# Patient Record
Sex: FEMALE | Race: BLACK OR AFRICAN AMERICAN | Employment: OTHER | URBAN - METROPOLITAN AREA
[De-identification: names, ages, dates, MRNs, and addresses within clinical notes are randomized per-mention and may not be internally consistent; named-entity substitution may affect disease eponyms.]

---

## 2017-01-31 ENCOUNTER — OFFICE VISIT (OUTPATIENT)
Dept: PAIN MANAGEMENT | Age: 52
End: 2017-01-31

## 2017-01-31 VITALS — DIASTOLIC BLOOD PRESSURE: 83 MMHG | SYSTOLIC BLOOD PRESSURE: 141 MMHG | RESPIRATION RATE: 19 BRPM | HEART RATE: 89 BPM

## 2017-01-31 DIAGNOSIS — G89.4 CHRONIC PAIN SYNDROME: ICD-10-CM

## 2017-01-31 DIAGNOSIS — M17.11 PRIMARY OSTEOARTHRITIS OF RIGHT KNEE: Primary | ICD-10-CM

## 2017-01-31 DIAGNOSIS — G89.29 CHRONIC BILATERAL LOW BACK PAIN, WITH SCIATICA PRESENCE UNSPECIFIED: ICD-10-CM

## 2017-01-31 DIAGNOSIS — M54.5 CHRONIC BILATERAL LOW BACK PAIN, WITH SCIATICA PRESENCE UNSPECIFIED: ICD-10-CM

## 2017-01-31 DIAGNOSIS — M47.896 OTHER OSTEOARTHRITIS OF SPINE, LUMBAR REGION: ICD-10-CM

## 2017-01-31 DIAGNOSIS — Z79.899 ENCOUNTER FOR LONG-TERM (CURRENT) USE OF MEDICATIONS: ICD-10-CM

## 2017-01-31 LAB
ALCOHOL UR POC: NORMAL
AMPHETAMINES UR POC: NEGATIVE
BARBITURATES UR POC: NEGATIVE
BENZODIAZEPINES UR POC: NORMAL
BUPRENORPHINE UR POC: NORMAL
CANNABINOIDS UR POC: NEGATIVE
CARISOPRODOL UR POC: NORMAL
COCAINE UR POC: NEGATIVE
FENTANYL UR POC: NORMAL
MDMA/ECSTASY UR POC: NEGATIVE
METHADONE UR POC: NEGATIVE
METHAMPHETAMINE UR POC: NEGATIVE
METHYLPHENIDATE UR POC: NEGATIVE
OPIATES UR POC: NEGATIVE
OXYCODONE UR POC: NORMAL
PHENCYCLIDINE UR POC: NEGATIVE
PROPOXYPHENE UR POC: NORMAL
TRAMADOL UR POC: NORMAL
TRICYCLICS UR POC: NEGATIVE

## 2017-01-31 RX ORDER — OXYCODONE AND ACETAMINOPHEN 10; 325 MG/1; MG/1
1 TABLET ORAL
Qty: 90 TAB | Refills: 0 | Status: SHIPPED | OUTPATIENT
Start: 2017-02-27 | End: 2017-03-30 | Stop reason: SDUPTHER

## 2017-01-31 RX ORDER — OXYCODONE AND ACETAMINOPHEN 10; 325 MG/1; MG/1
1 TABLET ORAL
Qty: 90 TAB | Refills: 0 | Status: SHIPPED | OUTPATIENT
Start: 2017-01-31 | End: 2017-03-30 | Stop reason: SDUPTHER

## 2017-01-31 NOTE — PROGRESS NOTES
Nursing Notes    Patient presents to the office today in follow-up.   eviewed medications with counts as follows:    Rx Date filled Qty Dispensed Pill Count Last Dose Short   Percocet 10/325 12/31/16 90 8 today no   opana er 15 mg 12/31/16 60 0 This am no   Ms. Chaitanya Bills has a reminder for a \"due or due soon\" health maintenance. I have asked that she contact her primary care provider for follow-up on this health maintenance. POC UDS was performed in office today    Any new labs or imaging since last appointment? NO    Have you been to an emergency room (ER) or urgent care clinic since your last visit? NO            Have you been hospitalized since your last visit? NO     If yes, where, when, and reason for visit? Have you seen or consulted any other health care providers outside of the [address] [place] [address]  since your last visit? YES     If yes, where, when, and reason for visit?    Psychologist  Nutritionist   pcp

## 2017-01-31 NOTE — MR AVS SNAPSHOT
Visit Information Date & Time Provider Department Dept. Phone Encounter #  
 1/31/2017 10:30 AM Linda Maharaj MD [address] for Pain Management [phone] [id number] Follow-up Instructions Return in about 2 months (around 3/31/2017). Upcoming Health Maintenance Date Due Hepatitis C Screening 1965 DTaP/Tdap/Td series (1 - Tdap) 8/27/1986 PAP AKA CERVICAL CYTOLOGY 8/27/1986 BREAST CANCER SCRN MAMMOGRAM 8/27/2015 FOBT Q 1 YEAR AGE 50-75 8/27/2015 INFLUENZA AGE 9 TO ADULT 8/1/2016 Allergies as of 1/31/2017  Review Complete On: 1/31/2017 By: Linda Maharaj MD  
 No Known Allergies Current Immunizations  Never Reviewed No immunizations on file. Not reviewed this visit You Were Diagnosed With   
  
 Codes Comments Primary osteoarthritis of right knee    -  Primary ICD-10-CM: M17.11 ICD-9-CM: 715.16 Encounter for long-term (current) use of medications     ICD-10-CM: Z79.899 ICD-9-CM: V58.69 Chronic pain syndrome     ICD-10-CM: G89.4 ICD-9-CM: 338.4 Other osteoarthritis of spine, lumbar region     ICD-10-CM: M47.896 Chronic bilateral low back pain, with sciatica presence unspecified     ICD-10-CM: M54.5, G89.29 ICD-9-CM: 724.2, 338.29 Vitals BP Pulse Resp OB Status Smoking Status 141/83 80 19 Hysterectomy Former Smoker Vitals History Preferred Pharmacy Pharmacy Name Phone [facility] [address] [place] [address], [address] [place] [address] Your Updated Medication List  
  
   
This list is accurate as of: 1/31/17 12:12 PM.  Always use your most recent med list.  
  
  
  
  
 ALPRAZolam 0.5 mg tablet Commonly known as:  Bandar Goody Take  by mouth two (2) times a day. amLODIPine 10 mg tablet Commonly known as:  Tad Alee Take  by mouth daily. Akila Rivero Use as directed. CeleXA 40 mg tablet Generic drug:  citalopram  
 Take 40 mg by mouth daily. FIORICET -40 mg per tablet Generic drug:  butalbital-acetaminophen-caffeine Take 1 Tab by mouth.  
  
 griseofulvin 500 mg tablet Commonly known as:  Tory Mealing Take 500 mg by mouth daily. LASIX 20 mg tablet Generic drug:  furosemide Take  by mouth as needed. lisinopril 10 mg tablet Commonly known as:  Cedric Economy Take  by mouth daily. morphine CR 15 mg CR tablet Commonly known as:  MS CONTIN Take  by mouth every twelve (12) hours. NEURONTIN 300 mg capsule Generic drug:  gabapentin Take 300 mg by mouth two (2) times a day. * oxyCODONE-acetaminophen  mg per tablet Commonly known as:  PERCOCET 10 Take 1 Tab by mouth every eight (8) hours as needed for Pain for up to 30 days. Max Daily Amount: 3 Tabs. * oxyCODONE-acetaminophen  mg per tablet Commonly known as:  PERCOCET 10 Take 1 Tab by mouth every eight (8) hours as needed for Pain for up to 30 days. Max Daily Amount: 3 Tabs. Start taking on:  2/27/2017 * oxyMORphone 15 mg Tr12 Commonly known as:  OPANA ER Take 15 mg by mouth every twelve (12) hours for 30 days. Max Daily Amount: 30 mg.  
  
 * oxyMORphone 15 mg Tr12 Commonly known as:  OPANA ER Take 15 mg by mouth every twelve (12) hours for 30 days. Max Daily Amount: 30 mg. Start taking on:  2/27/2017 PEPCID 20 mg tablet Generic drug:  famotidine Take 20 mg by mouth two (2) times a day. pravastatin 40 mg tablet Commonly known as:  PRAVACHOL Take 40 mg by mouth nightly. topiramate 25 mg tablet Commonly known as:  TOPAMAX Take  by mouth two (2) times a day. traZODone 50 mg tablet Commonly known as:  Emaline Sober Take  by mouth nightly. venlafaxine 75 mg tablet Commonly known as:  [facility] Take 25 mg by mouth nightly. VITAMIN D2 50,000 unit capsule Generic drug:  ergocalciferol Take 50,000 Units by mouth. WELLBUTRIN 75 mg tablet Generic drug:  buPROPion Take  by mouth two (2) times a day. * Notice: This list has 4 medication(s) that are the same as other medications prescribed for you. Read the directions carefully, and ask your doctor or other care provider to review them with you. Prescriptions Printed Refills  
 oxyMORphone (OPANA ER) 15 mg TR12 0 Sig: Take 15 mg by mouth every twelve (12) hours for 30 days. Max Daily Amount: 30 mg.  
 Class: Print Route: Oral  
 oxyCODONE-acetaminophen (PERCOCET 10)  mg per tablet 0 Sig: Take 1 Tab by mouth every eight (8) hours as needed for Pain for up to 30 days. Max Daily Amount: 3 Tabs. Class: Print Route: Oral  
 oxyCODONE-acetaminophen (PERCOCET 10)  mg per tablet 0 Starting on: 2/27/2017 Sig: Take 1 Tab by mouth every eight (8) hours as needed for Pain for up to 30 days. Max Daily Amount: 3 Tabs. Class: Print Route: Oral  
 oxyMORphone (OPANA ER) 15 mg TR12 0 Starting on: 2/27/2017 Sig: Take 15 mg by mouth every twelve (12) hours for 30 days. Max Daily Amount: 30 mg.  
 Class: Print Route: Oral  
  
We Performed the Following AMB POC DRUG SCREEN () [ [facility]] DRUG SCREEN [[id number] Custom] Follow-up Instructions Return in about 2 months (around 3/31/2017). Introducing [facility] & HEALTH SERVICES! Luis Yuen introduces [employer] patient portal. Now you can access parts of your medical record, email your doctor's office, and request medication refills online. 1. In your internet browser, go to [email or web address] 2. Click on the First Time User? Click Here link in the Sign In box. You will see the New Member Sign Up page. 3. Enter your [employer] Access Code exactly as it appears below. You will not need to use this code after youve completed the sign-up process. If you do not sign up before the expiration date, you must request a new code. · [employer] Access Code: [email or web address] Expires: 3/29/2017 12:25 PM 
 
4. Enter the last four digits of your Social Security Number (xxxx) and Date of Birth (mm/dd/yyyy) as indicated and click Submit. You will be taken to the next sign-up page. 5. Create a [employer] ID. This will be your [employer] login ID and cannot be changed, so think of one that is secure and easy to remember. 6. Create a [employer] password. You can change your password at any time. 7. Enter your Password Reset Question and Answer. This can be used at a later time if you forget your password. 8. Enter your e-mail address. You will receive e-mail notification when new information is available in [address] 9. Click Sign Up. You can now view and download portions of your medical record. 10. Click the Download Summary menu link to download a portable copy of your medical information. If you have questions, please visit the Frequently Asked Questions section of the [employer] website. Remember, [employer] is NOT to be used for urgent needs. For medical emergencies, dial 911. Now available from your iPhone and Android! Please provide this summary of care documentation to your next provider. If you have any questions after today's visit, please call [phone].

## 2017-01-31 NOTE — PROGRESS NOTES
HISTORY OF PRESENT ILLNESS  Christi Cabral is a 46 y.o. female. HPI Comments: Meds help with pain control and quality of life. No new side effects reported today. No new medical problems reported today. Visit survey reviewed, and will be scanned. Recent Average pain level (out of 10). -- 7  Chief complaint, right knee pain  Pain to different joints, back pain  Chronic pain  She noticed improvement when the oxymorphone was increased from 10 mg up to 15 mg  Using Percocet 10 mg 3 times a day as needed  Extended release oxymorphone 15 mg twice a day  Alprazolam prescribed by different clinic  She did have an appointment last week but had to reschedule  Medication helps improve general activity, mood, walking, sleep, enjoyment of life             Review of Systems   Constitutional: Positive for chills. HENT: Negative for hearing loss. Eyes: Negative for blurred vision and double vision. Respiratory: Negative for cough and sputum production. Cardiovascular: Positive for leg swelling. Gastrointestinal: Positive for heartburn. Negative for nausea. Genitourinary: Negative for urgency. Musculoskeletal: Positive for joint pain. Skin: Negative for rash. Neurological: Negative for dizziness and seizures. Endo/Heme/Allergies: Negative for environmental allergies. Does not bruise/bleed easily. Psychiatric/Behavioral: Positive for depression. Negative for suicidal ideas. The patient is nervous/anxious and has insomnia. Physical Exam   Constitutional: She appears well-developed and well-nourished. She is cooperative. She does not have a sickly appearance. HENT:   Head: Normocephalic and atraumatic. Right Ear: External ear normal. No drainage. Left Ear: External ear normal. No drainage. Nose: Nose normal.   Eyes: Lids are normal. Right eye exhibits no discharge. Left eye exhibits no discharge. Right conjunctiva has no hemorrhage. Left conjunctiva has no hemorrhage. Neck: Neck supple.  No tracheal deviation present. No thyroid mass present. Pulmonary/Chest: Effort normal. No respiratory distress. Neurological: She is alert. No cranial nerve deficit. Skin: Skin is intact. No rash noted. Psychiatric: Her speech is normal. Her affect is not angry. She does not express inappropriate judgment. Nursing note and vitals reviewed. ASSESSMENT and PLAN  Encounter Diagnoses   Name Primary?  Primary osteoarthritis of right knee Yes    Encounter for long-term (current) use of medications     Chronic pain syndrome     Other osteoarthritis of spine, lumbar region     Chronic bilateral low back pain, with sciatica presence unspecified    No signs of addiction or diversion. The primary Dxes. ,including pain are controlled. Pain/Pain control/Meds and Quality Of Life have been reviewed. Nonpharmacologic therapy and non-opioid pharmacologic therapy are always considered. If opioid therapy is prescribed, this is only if the expected benefits for both pain and function are anticipated to outweigh risks. Possible changes to treatment plan considered. Support/education given as needed. Today-medications are as listed. No significant changes to medications. Follow up -- 2 months.    --Urine test or oral swab today. Also, the prescription monitoring program was reviewed today. The majority of today's visit was spent counseling and coordinating care. Total visit time-40 minutes. -Dragon medical dictation software was used for portions of this report. Unintended errors may occur.

## 2017-03-30 ENCOUNTER — OFFICE VISIT (OUTPATIENT)
Dept: PAIN MANAGEMENT | Age: 52
End: 2017-03-30

## 2017-03-30 VITALS — DIASTOLIC BLOOD PRESSURE: 85 MMHG | SYSTOLIC BLOOD PRESSURE: 151 MMHG | HEART RATE: 90 BPM | RESPIRATION RATE: 19 BRPM

## 2017-03-30 DIAGNOSIS — G89.29 CHRONIC BILATERAL LOW BACK PAIN, WITH SCIATICA PRESENCE UNSPECIFIED: Primary | ICD-10-CM

## 2017-03-30 DIAGNOSIS — M25.561 CHRONIC PAIN OF RIGHT KNEE: ICD-10-CM

## 2017-03-30 DIAGNOSIS — M54.5 CHRONIC BILATERAL LOW BACK PAIN, WITH SCIATICA PRESENCE UNSPECIFIED: Primary | ICD-10-CM

## 2017-03-30 DIAGNOSIS — M17.11 PRIMARY OSTEOARTHRITIS OF RIGHT KNEE: ICD-10-CM

## 2017-03-30 DIAGNOSIS — G89.4 CHRONIC PAIN SYNDROME: ICD-10-CM

## 2017-03-30 DIAGNOSIS — M47.896 OTHER OSTEOARTHRITIS OF SPINE, LUMBAR REGION: ICD-10-CM

## 2017-03-30 DIAGNOSIS — G89.29 CHRONIC PAIN OF RIGHT KNEE: ICD-10-CM

## 2017-03-30 RX ORDER — ALBUTEROL SULFATE 90 UG/1
2 AEROSOL, METERED RESPIRATORY (INHALATION)
COMMUNITY
End: 2021-10-04

## 2017-03-30 RX ORDER — OXYCODONE AND ACETAMINOPHEN 10; 325 MG/1; MG/1
1 TABLET ORAL
Qty: 90 TAB | Refills: 0 | Status: SHIPPED | OUTPATIENT
Start: 2017-03-30 | End: 2017-05-22 | Stop reason: SDUPTHER

## 2017-03-30 RX ORDER — OXYCODONE AND ACETAMINOPHEN 10; 325 MG/1; MG/1
1 TABLET ORAL
Qty: 90 TAB | Refills: 0 | Status: SHIPPED | OUTPATIENT
Start: 2017-04-29 | End: 2017-05-22 | Stop reason: SDUPTHER

## 2017-03-30 RX ORDER — NALOXONE HYDROCHLORIDE 4 MG/.1ML
4 SPRAY NASAL AS NEEDED
Qty: 1 BOTTLE | Refills: 1 | Status: SHIPPED | OUTPATIENT
Start: 2017-03-30 | End: 2021-10-04

## 2017-03-30 NOTE — PROGRESS NOTES
HISTORY OF PRESENT ILLNESS  Gilberto Guillen is a 46 y.o. female. HPI Comments: Meds help with pain control and quality of life. No new side effects reported today. Visit survey reviewed and will be scanned.  reviewed. Recent average level of pain(out of 10)-8  Chief complaint low back pain, knee pain  Chronic pain  Using extended release oxymorphone 15 mg twice a day  Percocet 10 mg 3 times a day as needed  Alprazolam prescribed by different clinic  She reports, gastric bypass surgery next month  She does not feel that the Opana covers her very well, certainly not 12 hours. We have discussed this and I will increase this to 3 times a day. Medication does help with general activity, mood        Measuring clinical outcomes of chronic pain patients. This was reviewed today. The survey will be scanned. Please see the survey for details. Total score-6      Review of Systems   Constitutional: Positive for chills. HENT: Negative for hearing loss. Eyes: Negative for blurred vision and double vision. Respiratory: Negative for cough and sputum production. Cardiovascular: Positive for leg swelling. Gastrointestinal: Positive for heartburn. Negative for nausea. Genitourinary: Negative for urgency. Musculoskeletal: Positive for joint pain. Skin: Negative for rash. Neurological: Negative for dizziness and seizures. Endo/Heme/Allergies: Negative for environmental allergies. Does not bruise/bleed easily. Psychiatric/Behavioral: Positive for depression. Negative for suicidal ideas. The patient is nervous/anxious and has insomnia. Physical Exam   Constitutional: She appears well-developed and well-nourished. She is cooperative. She does not have a sickly appearance. HENT:   Head: Normocephalic and atraumatic. Right Ear: External ear normal. No drainage. Left Ear: External ear normal. No drainage. Nose: Nose normal.   Eyes: Lids are normal. Right eye exhibits no discharge.  Left eye exhibits no discharge. Right conjunctiva has no hemorrhage. Left conjunctiva has no hemorrhage. Neck: Neck supple. No tracheal deviation present. No thyroid mass present. Pulmonary/Chest: Effort normal. No respiratory distress. Neurological: She is alert. No cranial nerve deficit. Skin: Skin is intact. No rash noted. Psychiatric: Her speech is normal. Her affect is not angry. She does not express inappropriate judgment. Nursing note and vitals reviewed. ASSESSMENT and PLAN  Encounter Diagnoses   Name Primary?  Chronic bilateral low back pain, with sciatica presence unspecified Yes    Chronic pain syndrome     Other osteoarthritis of spine, lumbar region     Primary osteoarthritis of right knee     Chronic pain of right knee    No indicators for medication misuse.  reviewed. Pain Meds and Quality Of Life have been reviewed. Nonpharmacologic therapy and non-opioid pharmacologic therapy were considered. If opioid therapy is prescribed, this is only if the expected benefits are anticipated to outweigh risks. Possible changes to treatment plan considered. Support/education given as needed. Today-medications are as listed. changes to medications. Follow up -- 2 months.    -Because the patient's current regimen places him/her at increased risk for possible overdose, a prescription for the lock so nasal spray is being provided. The patient understands that this medication is only to be used in the setting of a possible overdose and that inadvertent use of this medication could precipitate overt withdrawal.  I discussed naloxone with the patient today. In addition, the patient has received the naloxone instruction sheet.

## 2017-03-30 NOTE — PROGRESS NOTES
Nursing Notes    Patient presents to the office today in follow-up. Reviewed medications with counts as follows:    Rx Date filled Qty Dispensed Pill Count Last Dose Short   Percocet 10/325 2/27/17 90 0 2 days ago no   opana er 15 mg 2/27/17 60 0 2 days ago no   Ms. Mimi Sanchez has a reminder for a \"due or due soon\" health maintenance. I have asked that she contact her primary care provider for follow-up on this health maintenance. POC UDS was not performed in office today    Any new labs or imaging since last appointment? NO    Have you been to an emergency room (ER) or urgent care clinic since your last visit? NO            Have you been hospitalized since your last visit? NO     If yes, where, when, and reason for visit? Have you seen or consulted any other health care providers outside of the [address] [place] [address]  since your last visit? YES     If yes, where, when, and reason for visit?    Dr Manpreet Chicas surgery

## 2017-03-30 NOTE — MR AVS SNAPSHOT
Visit Information Date & Time Provider Department Dept. Phone Encounter #  
 3/30/2017 11:30 AM Anson Barajas MD [address] [place] [address] for Pain Management [phone] [id number] Follow-up Instructions Return in about 2 months (around 5/30/2017). Upcoming Health Maintenance Date Due Hepatitis C Screening 1965 DTaP/Tdap/Td series (1 - Tdap) 8/27/1986 PAP AKA CERVICAL CYTOLOGY 8/27/1986 BREAST CANCER SCRN MAMMOGRAM 8/27/2015 FOBT Q 1 YEAR AGE 50-75 8/27/2015 INFLUENZA AGE 9 TO ADULT 8/1/2016 Allergies as of 3/30/2017  Review Complete On: 3/30/2017 By: Anson Barajas MD  
 No Known Allergies Current Immunizations  Never Reviewed No immunizations on file. Not reviewed this visit You Were Diagnosed With   
  
 Codes Comments Chronic bilateral low back pain, with sciatica presence unspecified    -  Primary ICD-10-CM: M54.5, G89.29 ICD-9-CM: 724.2, 338.29 Chronic pain syndrome     ICD-10-CM: G89.4 ICD-9-CM: 338.4 Other osteoarthritis of spine, lumbar region     ICD-10-CM: M47.896 Primary osteoarthritis of right knee     ICD-10-CM: M17.11 ICD-9-CM: 715.16 Chronic pain of right knee     ICD-10-CM: M25.561, G89.29 ICD-9-CM: 719.46, 338.29 Vitals BP Pulse Resp OB Status Smoking Status 151/85 80 19 Hysterectomy Former Smoker Vitals History Preferred Pharmacy Pharmacy Name Phone [facility] [address] [place] [address], [address] [place] [address] Your Updated Medication List  
  
   
This list is accurate as of: 3/30/17 12:21 PM.  Always use your most recent med list.  
  
  
  
  
 ALPRAZolam 0.5 mg tablet Commonly known as:  Leward Lennox Take  by mouth two (2) times a day. amLODIPine 10 mg tablet Commonly known as:  [place] Fantasma Take  by mouth daily. Taylor Cortes Use as directed. CeleXA 40 mg tablet Generic drug:  citalopram  
Take 40 mg by mouth daily. FIORICET -40 mg per tablet Generic drug:  butalbital-acetaminophen-caffeine Take 1 Tab by mouth.  
  
 griseofulvin 500 mg tablet Commonly known as:  Eliana Serene Take 500 mg by mouth daily. LASIX 20 mg tablet Generic drug:  furosemide Take  by mouth as needed. lisinopril 10 mg tablet Commonly known as:  Hayder Antonio Take  by mouth daily. morphine CR 15 mg CR tablet Commonly known as:  MS CONTIN Take  by mouth every twelve (12) hours. NEURONTIN 300 mg capsule Generic drug:  gabapentin Take 300 mg by mouth two (2) times a day. * oxyCODONE-acetaminophen  mg per tablet Commonly known as:  PERCOCET 10 Take 1 Tab by mouth every eight (8) hours as needed for Pain for up to 30 days. Max Daily Amount: 3 Tabs. * oxyCODONE-acetaminophen  mg per tablet Commonly known as:  PERCOCET 10 Take 1 Tab by mouth every eight (8) hours as needed for Pain for up to 30 days. Max Daily Amount: 3 Tabs. Start taking on:  4/29/2017 * oxyMORphone 15 mg Tr12 Commonly known as:  OPANA ER Take 15 mg by mouth three (3) times daily for 30 days. Max Daily Amount: 45 mg.  
  
 * oxyMORphone 15 mg Tr12 Commonly known as:  OPANA ER Take 15 mg by mouth three (3) times daily for 30 days. Max Daily Amount: 45 mg. Start taking on:  4/29/2017 PEPCID 20 mg tablet Generic drug:  famotidine Take 20 mg by mouth two (2) times a day. pravastatin 40 mg tablet Commonly known as:  PRAVACHOL Take 40 mg by mouth nightly. PROAIR HFA 90 mcg/actuation inhaler Generic drug:  albuterol Take  by inhalation. topiramate 25 mg tablet Commonly known as:  TOPAMAX Take  by mouth two (2) times a day. traZODone 50 mg tablet Commonly known as:  Adrian Snyder Take  by mouth nightly. venlafaxine 75 mg tablet Commonly known as:  [facility] Take 25 mg by mouth nightly. VITAMIN D2 50,000 unit capsule Generic drug:  ergocalciferol Take 50,000 Units by mouth. WELLBUTRIN 75 mg tablet Generic drug:  buPROPion Take  by mouth two (2) times a day. * Notice: This list has 4 medication(s) that are the same as other medications prescribed for you. Read the directions carefully, and ask your doctor or other care provider to review them with you. Prescriptions Printed Refills  
 oxyMORphone (OPANA ER) 15 mg TR12 0 Starting on: 4/29/2017 Sig: Take 15 mg by mouth three (3) times daily for 30 days. Max Daily Amount: 45 mg.  
 Class: Print Route: Oral  
 oxyCODONE-acetaminophen (PERCOCET 10)  mg per tablet 0 Sig: Take 1 Tab by mouth every eight (8) hours as needed for Pain for up to 30 days. Max Daily Amount: 3 Tabs. Class: Print Route: Oral  
 oxyCODONE-acetaminophen (PERCOCET 10)  mg per tablet 0 Starting on: 4/29/2017 Sig: Take 1 Tab by mouth every eight (8) hours as needed for Pain for up to 30 days. Max Daily Amount: 3 Tabs. Class: Print Route: Oral  
 oxyMORphone (OPANA ER) 15 mg TR12 0 Sig: Take 15 mg by mouth three (3) times daily for 30 days. Max Daily Amount: 45 mg.  
 Class: Print Route: Oral  
  
Follow-up Instructions Return in about 2 months (around 5/30/2017). Introducing [facility] & [facility] SERVICES! Albert Springer introduces [employer] patient portal. Now you can access parts of your medical record, email your doctor's office, and request medication refills online. 1. In your internet browser, go to [email or web address] 2. Click on the First Time User? Click Here link in the Sign In box. You will see the New Member Sign Up page. 3. Enter your [employer] Access Code exactly as it appears below. You will not need to use this code after youve completed the sign-up process. If you do not sign up before the expiration date, you must request a new code. · [employer] Access Code: [email or web address] Expires: 6/28/2017 12:21 PM 
 
4. Enter the last four digits of your Social Security Number (xxxx) and Date of Birth (mm/dd/yyyy) as indicated and click Submit. You will be taken to the next sign-up page. 5. Create a "[employer]" ID. This will be your "[employer]" login ID and cannot be changed, so think of one that is secure and easy to remember. 6. Create a "[employer]" password. You can change your password at any time. 7. Enter your Password Reset Question and Answer. This can be used at a later time if you forget your password. 8. Enter your e-mail address. You will receive e-mail notification when new information is available in [address] 9. Click Sign Up. You can now view and download portions of your medical record. 10. Click the Download Summary menu link to download a portable copy of your medical information. If you have questions, please visit the Frequently Asked Questions section of the "[employer]" website. Remember, "[employer]" is NOT to be used for urgent needs. For medical emergencies, dial 911. Now available from your iPhone and Android! Please provide this summary of care documentation to your next provider. If you have any questions after today's visit, please call [phone].

## 2017-05-22 ENCOUNTER — OFFICE VISIT (OUTPATIENT)
Dept: PAIN MANAGEMENT | Age: 52
End: 2017-05-22

## 2017-05-22 VITALS — SYSTOLIC BLOOD PRESSURE: 139 MMHG | HEART RATE: 91 BPM | DIASTOLIC BLOOD PRESSURE: 79 MMHG | RESPIRATION RATE: 19 BRPM

## 2017-05-22 DIAGNOSIS — M54.5 CHRONIC BILATERAL LOW BACK PAIN, WITH SCIATICA PRESENCE UNSPECIFIED: ICD-10-CM

## 2017-05-22 DIAGNOSIS — G89.4 CHRONIC PAIN SYNDROME: Primary | ICD-10-CM

## 2017-05-22 DIAGNOSIS — M17.11 PRIMARY OSTEOARTHRITIS OF RIGHT KNEE: ICD-10-CM

## 2017-05-22 DIAGNOSIS — M47.816 SPONDYLOSIS OF LUMBAR REGION WITHOUT MYELOPATHY OR RADICULOPATHY: ICD-10-CM

## 2017-05-22 DIAGNOSIS — G89.29 CHRONIC PAIN OF RIGHT KNEE: ICD-10-CM

## 2017-05-22 DIAGNOSIS — G89.29 CHRONIC BILATERAL LOW BACK PAIN, WITH SCIATICA PRESENCE UNSPECIFIED: ICD-10-CM

## 2017-05-22 DIAGNOSIS — M25.561 CHRONIC PAIN OF RIGHT KNEE: ICD-10-CM

## 2017-05-22 RX ORDER — OXYCODONE AND ACETAMINOPHEN 10; 325 MG/1; MG/1
1 TABLET ORAL
Qty: 90 TAB | Refills: 0 | Status: SHIPPED | OUTPATIENT
Start: 2017-06-21 | End: 2017-08-07 | Stop reason: SDUPTHER

## 2017-05-22 RX ORDER — GABAPENTIN 300 MG/1
300 CAPSULE ORAL 2 TIMES DAILY
Qty: 60 CAP | Refills: 1 | Status: SHIPPED | OUTPATIENT
Start: 2017-05-22 | End: 2020-07-27

## 2017-05-22 RX ORDER — OXYCODONE AND ACETAMINOPHEN 10; 325 MG/1; MG/1
1 TABLET ORAL
Qty: 90 TAB | Refills: 0 | Status: SHIPPED | OUTPATIENT
Start: 2017-05-22 | End: 2017-08-07 | Stop reason: SDUPTHER

## 2017-05-22 NOTE — PROGRESS NOTES
HISTORY OF PRESENT ILLNESS  Vanessa Siddiqi is a 46 y.o. female. HPI Comments: Visit survey reviewed  Chief complaint chronic pain  Low back pain, knee pain  She is using gabapentin 300 mg twice a day  Extended release oxymorphone 15 mg 3 times a day  Percocet 10 mg 3 times a day as needed  Alprazolam prescribed by different clinic  She is hopeful that she will undergo gastric bypass surgery in the near future. She told me today that she was scheduled for the surgery but when they reweighed her, they were hopeful she would have lost weight on her own before surgery but instead she reports she had gained 8 pounds therefore they put the surgery on hold. Measurement of clinical outcome/chronic pain patient-total score of 7 today. Information was reviewed and the sheet will be scanned      Review of Systems   Constitutional: Negative for chills. HENT: Negative for hearing loss. Eyes: Negative for blurred vision and double vision. Respiratory: Negative for cough and sputum production. Cardiovascular: Positive for leg swelling. Gastrointestinal: Positive for heartburn. Negative for nausea. Genitourinary: Negative for urgency. Musculoskeletal: Positive for joint pain. Skin: Negative for rash. Neurological: Negative for dizziness and seizures. Endo/Heme/Allergies: Negative for environmental allergies. Does not bruise/bleed easily. Psychiatric/Behavioral: Positive for depression. Negative for suicidal ideas. The patient is nervous/anxious and has insomnia. Physical Exam   Constitutional: She appears well-developed and well-nourished. She is cooperative. She does not have a sickly appearance. HENT:   Head: Normocephalic and atraumatic. Right Ear: External ear normal. No drainage. Left Ear: External ear normal. No drainage. Nose: Nose normal.   Eyes: Lids are normal. Right eye exhibits no discharge. Left eye exhibits no discharge. Right conjunctiva has no hemorrhage.  Left conjunctiva has no hemorrhage. Neck: Neck supple. No tracheal deviation present. No thyroid mass present. Pulmonary/Chest: Effort normal. No respiratory distress. Neurological: She is alert. No cranial nerve deficit. Skin: Skin is intact. No rash noted. Psychiatric: Her speech is normal. Her affect is not angry. She does not express inappropriate judgment. Nursing note and vitals reviewed. ASSESSMENT and PLAN  Encounter Diagnoses   Name Primary?  Chronic pain syndrome Yes    Primary osteoarthritis of right knee     Chronic bilateral low back pain, with sciatica presence unspecified     Spondylosis of lumbar region without myelopathy or radiculopathy     Chronic pain of right knee    No indicators for recent medication misuse.  reviewed. Pain Meds and Quality Of Life have been reviewed. Nonpharmacologic therapy and non-opioid pharmacologic therapy were considered. If opioid therapy is prescribed, this is only if the expected benefits are anticipated to outweigh risks. Possible changes to treatment plan considered. Support/education given as needed. Today-medications are as listed. No significant changes to medications. Follow up -- 2 months. Jemima Pantoja

## 2017-05-22 NOTE — MR AVS SNAPSHOT
Visit Information Date & Time Provider Department Dept. Phone Encounter #  
 5/22/2017  2:15 PM Ela Lake MD [facility] for Pain Management [phone] Follow-up Instructions Return in about 2 months (around 7/22/2017). Upcoming Health Maintenance Date Due Hepatitis C Screening 1965 DTaP/Tdap/Td series (1 - Tdap) 8/27/1986 PAP AKA CERVICAL CYTOLOGY 8/27/1986 BREAST CANCER SCRN MAMMOGRAM 8/27/2015 FOBT Q 1 YEAR AGE 50-75 8/27/2015 INFLUENZA AGE 9 TO ADULT 8/1/2017 Allergies as of 5/22/2017  Review Complete On: 5/22/2017 By: Ela Lake MD  
 No Known Allergies Current Immunizations  Never Reviewed No immunizations on file. Not reviewed this visit You Were Diagnosed With   
  
 Codes Comments Chronic pain syndrome     ICD-10-CM: G89.4 ICD-9-CM: 338.4 Primary osteoarthritis of right knee     ICD-10-CM: M17.11 ICD-9-CM: 715.16 Vitals BP Pulse Resp OB Status Smoking Status 139/79 80 19 Hysterectomy Former Smoker Vitals History Preferred Pharmacy Pharmacy Name Phone [facility] [address] [facility], [address] [place] [address] Your Updated Medication List  
  
   
This list is accurate as of: 5/22/17  2:44 PM.  Always use your most recent med list.  
  
  
  
  
 ALPRAZolam 0.5 mg tablet Commonly known as:  Barbara Dials Take  by mouth two (2) times a day. amLODIPine 10 mg tablet Commonly known as:  Carlton Cradle Take  by mouth daily. Alfrieda Gaunt Use as directed. CeleXA 40 mg tablet Generic drug:  citalopram  
Take 40 mg by mouth daily. FIORICET -40 mg per tablet Generic drug:  butalbital-acetaminophen-caffeine Take 1 Tab by mouth.  
  
 griseofulvin 500 mg tablet Commonly known as:  Belynda Kelp Take 500 mg by mouth daily. LASIX 20 mg tablet Generic drug:  furosemide Take  by mouth as needed. lisinopril 10 mg tablet Commonly known as:  Jeannie Gear Take  by mouth daily. morphine CR 15 mg CR tablet Commonly known as:  MS CONTIN Take  by mouth every twelve (12) hours. naloxone 4 mg/actuation Spry 4 mg by Nasal route as needed. NEURONTIN 300 mg capsule Generic drug:  gabapentin Take 300 mg by mouth two (2) times a day. oxyCODONE-acetaminophen  mg per tablet Commonly known as:  PERCOCET 10 Take 1 Tab by mouth every eight (8) hours as needed for Pain for up to 30 days. Max Daily Amount: 3 Tabs. oxyMORphone 15 mg Tr12 Commonly known as:  OPANA ER Take 15 mg by mouth three (3) times daily for 30 days. Max Daily Amount: 45 mg. PEPCID 20 mg tablet Generic drug:  famotidine Take 20 mg by mouth two (2) times a day. pravastatin 40 mg tablet Commonly known as:  PRAVACHOL Take 40 mg by mouth nightly. PROAIR HFA 90 mcg/actuation inhaler Generic drug:  albuterol Take  by inhalation. topiramate 25 mg tablet Commonly known as:  TOPAMAX Take  by mouth two (2) times a day. traZODone 50 mg tablet Commonly known as:  Luellen Loft Take  by mouth nightly. venlafaxine 75 mg tablet Commonly known as:  [facility] [place] [facility] Take 25 mg by mouth nightly. VITAMIN D2 50,000 unit capsule Generic drug:  ergocalciferol Take 50,000 Units by mouth. WELLBUTRIN 75 mg tablet Generic drug:  buPROPion Take  by mouth two (2) times a day. Follow-up Instructions Return in about 2 months (around 7/22/2017). Introducing [facility] & HEALTH SERVICES! [facility] introduces [employer] patient portal. Now you can access parts of your medical record, email your doctor's office, and request medication refills online. 1. In your internet browser, go to [email or web address] 2. Click on the First Time User? Click Here link in the Sign In box. You will see the New Member Sign Up page. 3. Enter your [employer] Access Code exactly as it appears below. You will not need to use this code after youve completed the sign-up process. If you do not sign up before the expiration date, you must request a new code. · [employer] Access Code: [email or web address] Expires: 6/28/2017 12:21 PM 
 
4. Enter the last four digits of your Social Security Number (xxxx) and Date of Birth (mm/dd/yyyy) as indicated and click Submit. You will be taken to the next sign-up page. 5. Create a [employer] ID. This will be your [employer] login ID and cannot be changed, so think of one that is secure and easy to remember. 6. Create a [employer] password. You can change your password at any time. 7. Enter your Password Reset Question and Answer. This can be used at a later time if you forget your password. 8. Enter your e-mail address. You will receive e-mail notification when new information is available in [address] 9. Click Sign Up. You can now view and download portions of your medical record. 10. Click the Download Summary menu link to download a portable copy of your medical information. If you have questions, please visit the Frequently Asked Questions section of the [employer] website. Remember, [employer] is NOT to be used for urgent needs. For medical emergencies, dial 911. Now available from your iPhone and Android! Please provide this summary of care documentation to your next provider. If you have any questions after today's visit, please call [phone].

## 2017-05-22 NOTE — PROGRESS NOTES
Nursing Notes    Patient presents to the office today in follow-up. Reviewed medications with counts as follows:    Rx Date filled Qty Dispensed Pill Count Last Dose Short   opana er 15 mg 5/1/17 90 28 today no   Percocet 10/325 5/1/17 90 24 yesterday no   Ms. Lexa Lemus has a reminder for a \"due or due soon\" health maintenance. I have asked that she contact her primary care provider for follow-up on this health maintenance. POC UDS was not performed in office today    Any new labs or imaging since last appointment? NO  Labs    Have you been to an emergency room (ER) or urgent care clinic since your last visit? NO            Have you been hospitalized since your last visit? NO     If yes, where, when, and reason for visit? Have you seen or consulted any other health care providers outside of the [address] [place] [address]  since your last visit? YES     If yes, where, when, and reason for visit?    [facility] at [address] [place] [address]

## 2017-08-07 ENCOUNTER — OFFICE VISIT (OUTPATIENT)
Dept: PAIN MANAGEMENT | Age: 52
End: 2017-08-07

## 2017-08-07 VITALS
HEART RATE: 85 BPM | RESPIRATION RATE: 16 BRPM | SYSTOLIC BLOOD PRESSURE: 163 MMHG | TEMPERATURE: 98.7 F | WEIGHT: 293 LBS | DIASTOLIC BLOOD PRESSURE: 84 MMHG | HEIGHT: 68 IN | BODY MASS INDEX: 44.41 KG/M2

## 2017-08-07 DIAGNOSIS — Z79.899 ENCOUNTER FOR LONG-TERM (CURRENT) USE OF HIGH-RISK MEDICATION: ICD-10-CM

## 2017-08-07 DIAGNOSIS — G44.229 CHRONIC TENSION-TYPE HEADACHE, NOT INTRACTABLE: ICD-10-CM

## 2017-08-07 DIAGNOSIS — M17.11 PRIMARY OSTEOARTHRITIS OF RIGHT KNEE: ICD-10-CM

## 2017-08-07 DIAGNOSIS — M54.40 CHRONIC LOW BACK PAIN WITH SCIATICA, SCIATICA LATERALITY UNSPECIFIED, UNSPECIFIED BACK PAIN LATERALITY: ICD-10-CM

## 2017-08-07 DIAGNOSIS — G89.4 CHRONIC PAIN SYNDROME: Primary | ICD-10-CM

## 2017-08-07 DIAGNOSIS — E66.9 OBESITY, UNSPECIFIED OBESITY SEVERITY, UNSPECIFIED OBESITY TYPE: ICD-10-CM

## 2017-08-07 DIAGNOSIS — G89.29 CHRONIC LOW BACK PAIN WITH SCIATICA, SCIATICA LATERALITY UNSPECIFIED, UNSPECIFIED BACK PAIN LATERALITY: ICD-10-CM

## 2017-08-07 LAB
ALCOHOL UR POC: NORMAL
AMPHETAMINES UR POC: NORMAL
BARBITURATES UR POC: NORMAL
BENZODIAZEPINES UR POC: NORMAL
BUPRENORPHINE UR POC: NORMAL
CANNABINOIDS UR POC: NORMAL
CARISOPRODOL UR POC: NORMAL
COCAINE UR POC: NORMAL
FENTANYL UR POC: NORMAL
MDMA/ECSTASY UR POC: NORMAL
METHADONE UR POC: NORMAL
METHAMPHETAMINE UR POC: NORMAL
METHYLPHENIDATE UR POC: NORMAL
OPIATES UR POC: NORMAL
OXYCODONE UR POC: NORMAL
PHENCYCLIDINE UR POC: NORMAL
PROPOXYPHENE UR POC: NORMAL
TRAMADOL UR POC: NORMAL
TRICYCLICS UR POC: NORMAL

## 2017-08-07 RX ORDER — OXYCODONE AND ACETAMINOPHEN 10; 325 MG/1; MG/1
1 TABLET ORAL
Qty: 90 TAB | Refills: 0 | Status: SHIPPED | OUTPATIENT
Start: 2017-08-07 | End: 2017-09-06

## 2017-08-07 RX ORDER — OXYCODONE AND ACETAMINOPHEN 10; 325 MG/1; MG/1
1 TABLET ORAL
Qty: 90 TAB | Refills: 0 | Status: SHIPPED | OUTPATIENT
Start: 2017-09-06 | End: 2017-12-07 | Stop reason: SDUPTHER

## 2017-08-07 NOTE — PROGRESS NOTES
Nursing Notes    Patient presents to the office today in follow-up. Patient rates her pain at 9/10 on the numerical pain scale. Reviewed medications with counts as follows:    Rx Date filled Qty Dispensed Pill Count Last Dose Short   Oxycodone 10-325mg 06/23/17 90 0 07/25/17 no   opana ER 15mg 06/23/17 90 0 07/26/17 no                                  Comments:     POC UDS was performed in office today    Any new labs or imaging since last appointment? YES, M[facility] lab Xray of abdomin, EGD    Have you been to an emergency room (ER) or urgent care clinic since your last visit? NO            Have you been hospitalized since your last visit? NO     If yes, where, when, and reason for visit? Have you seen or consulted any other health care providers outside of the [address] [place] [address]  since your last visit? YES     If yes, where, when, and reason for visit? HM deferred to pcp.

## 2017-08-07 NOTE — MR AVS SNAPSHOT
Visit Information Date & Time Provider Department Dept. Phone Encounter #  
 8/7/2017  9:40 AM [facility] [address] [place] [address] for Pain Management [phone] [id number] Follow-up Instructions Return in about 2 months (around 10/7/2017). Upcoming Health Maintenance Date Due Hepatitis C Screening 1965 DTaP/Tdap/Td series (1 - Tdap) 8/27/1986 PAP AKA CERVICAL CYTOLOGY 8/27/1986 BREAST CANCER SCRN MAMMOGRAM 8/27/2015 FOBT Q 1 YEAR AGE 50-75 8/27/2015 INFLUENZA AGE 9 TO ADULT 8/1/2017 Allergies as of 8/7/2017  Review Complete On: 8/7/2017 By: Danis Fox No Known Allergies Current Immunizations  Never Reviewed No immunizations on file. Not reviewed this visit You Were Diagnosed With   
  
 Codes Comments Encounter for long-term (current) use of high-risk medication    -  Primary ICD-10-CM: N44.507 ICD-9-CM: V58.69 Chronic pain syndrome     ICD-10-CM: G89.4 ICD-9-CM: 338.4 Primary osteoarthritis of right knee     ICD-10-CM: M17.11 ICD-9-CM: 715.16 Vitals BP Pulse Temp Resp Height(growth percentile) Weight(growth percentile) 163/84 85 98.7 °F (37.1 °C) 16 5' 8\" (1.727 m) (!) 373 lb (169.2 kg) BMI OB Status Smoking Status 56.71 kg/m2 Hysterectomy Former Smoker BMI and BSA Data Body Mass Index Body Surface Area 56.71 kg/m 2 2.85 m 2 Preferred Pharmacy Pharmacy Name Phone [facility] [address] [facility], [address] [facility] Your Updated Medication List  
  
   
This list is accurate as of: 8/7/17 12:16 PM.  Always use your most recent med list.  
  
  
  
  
 ALPRAZolam 0.5 mg tablet Commonly known as:  Johnice Salts Take  by mouth two (2) times a day. amLODIPine 10 mg tablet Commonly known as:  Baljit Railing Take  by mouth daily. Abhishek Held Use as directed. CeleXA 40 mg tablet Generic drug:  citalopram  
 Take 40 mg by mouth daily. FIORICET -40 mg per tablet Generic drug:  butalbital-acetaminophen-caffeine Take 1 Tab by mouth.  
  
 gabapentin 300 mg capsule Commonly known as:  NEURONTIN Take 1 Cap by mouth two (2) times a day. griseofulvin 500 mg tablet Commonly known as:  Gustavo Grove Take 500 mg by mouth daily. LASIX 20 mg tablet Generic drug:  furosemide Take  by mouth as needed. lisinopril 10 mg tablet Commonly known as:  Tildon Lashell Take  by mouth daily. morphine CR 15 mg CR tablet Commonly known as:  MS CONTIN Take  by mouth every twelve (12) hours. naloxone 4 mg/actuation Spry 4 mg by Nasal route as needed. * oxyCODONE-acetaminophen  mg per tablet Commonly known as:  PERCOCET 10 Take 1 Tab by mouth every eight (8) hours as needed for Pain for up to 30 days. Max Daily Amount: 3 Tabs. * oxyCODONE-acetaminophen  mg per tablet Commonly known as:  PERCOCET 10 Take 1 Tab by mouth every eight (8) hours as needed for Pain for up to 30 days. Max Daily Amount: 3 Tabs. Start taking on:  9/6/2017 * oxyMORphone 15 mg Tr12 Commonly known as:  OPANA ER Take 15 mg by mouth three (3) times daily for 30 days. Max Daily Amount: 45 mg.  
  
 * oxyMORphone 15 mg Tr12 Commonly known as:  OPANA ER Take 15 mg by mouth three (3) times daily for 30 days. Max Daily Amount: 45 mg. Start taking on:  9/6/2017 PEPCID 20 mg tablet Generic drug:  famotidine Take 20 mg by mouth two (2) times a day. pravastatin 40 mg tablet Commonly known as:  PRAVACHOL Take 40 mg by mouth nightly. PROAIR HFA 90 mcg/actuation inhaler Generic drug:  albuterol Take  by inhalation. topiramate 25 mg tablet Commonly known as:  TOPAMAX Take  by mouth two (2) times a day. traZODone 50 mg tablet Commonly known as:  Donzella Eaton Take  by mouth nightly. venlafaxine 75 mg tablet Commonly known as:  [facility] Take 25 mg by mouth nightly. VITAMIN D2 50,000 unit capsule Generic drug:  ergocalciferol Take 50,000 Units by mouth. WELLBUTRIN 75 mg tablet Generic drug:  buPROPion Take  by mouth two (2) times a day. * Notice: This list has 4 medication(s) that are the same as other medications prescribed for you. Read the directions carefully, and ask your doctor or other care provider to review them with you. Prescriptions Printed Refills  
 oxyMORphone (OPANA ER) 15 mg TR12 0 Starting on: 9/6/2017 Sig: Take 15 mg by mouth three (3) times daily for 30 days. Max Daily Amount: 45 mg.  
 Class: Print Route: Oral  
 oxyCODONE-acetaminophen (PERCOCET 10)  mg per tablet 0 Sig: Take 1 Tab by mouth every eight (8) hours as needed for Pain for up to 30 days. Max Daily Amount: 3 Tabs. Class: Print Route: Oral  
 oxyCODONE-acetaminophen (PERCOCET 10)  mg per tablet 0 Starting on: 9/6/2017 Sig: Take 1 Tab by mouth every eight (8) hours as needed for Pain for up to 30 days. Max Daily Amount: 3 Tabs. Class: Print Route: Oral  
 oxyMORphone (OPANA ER) 15 mg TR12 0 Sig: Take 15 mg by mouth three (3) times daily for 30 days. Max Daily Amount: 45 mg.  
 Class: Print Route: Oral  
  
We Performed the Following AMB POC DRUG SCREEN () [ [facility]] DRUG SCREEN [[id number] Custom] Follow-up Instructions Return in about 2 months (around 10/7/2017). Patient Instructions PLAN / Pt Instructions: 1. Continue current plan with no evidence of addiction or diversion. Stable on current medication without adverse events. 2. Refill and adjust oxymorphone 10 mg back to 15 mg every 12 hours. 3. Refill oxycodone 10/325 mg up to 3 times daily as needed.  
4. Continue Home exercise program.   
5. She was informed that she will need to inform the pain management clinic within 72 hours if she is prescribed any new pain medicines after surgery. 6. Discussed risks of addiction, dependency, and opioid induced hyperalgesia. 7. Return to clinic in 2 month Introducing [facility]! [facility] introduces [employer] patient portal. Now you can access parts of your medical record, email your doctor's office, and request medication refills online. 1. In your internet browser, go to [email or web address] 2. Click on the First Time User? Click Here link in the Sign In box. You will see the New Member Sign Up page. 3. Enter your [employer] Access Code exactly as it appears below. You will not need to use this code after youve completed the sign-up process. If you do not sign up before the expiration date, you must request a new code. · [employer] Access Code: [email or web address] Expires: 11/5/2017 12:16 PM 
 
4. Enter the last four digits of your Social Security Number (xxxx) and Date of Birth (mm/dd/yyyy) as indicated and click Submit. You will be taken to the next sign-up page. 5. Create a [employer] ID. This will be your [employer] login ID and cannot be changed, so think of one that is secure and easy to remember. 6. Create a [employer] password. You can change your password at any time. 7. Enter your Password Reset Question and Answer. This can be used at a later time if you forget your password. 8. Enter your e-mail address. You will receive e-mail notification when new information is available in [address] 9. Click Sign Up. You can now view and download portions of your medical record. 10. Click the Download Summary menu link to download a portable copy of your medical information. If you have questions, please visit the Frequently Asked Questions section of the [employer] website. Remember, [employer] is NOT to be used for urgent needs. For medical emergencies, dial 911. Now available from your iPhone and Android! Please provide this summary of care documentation to your next provider. If you have any questions after today's visit, please call [phone].

## 2017-08-07 NOTE — PATIENT INSTRUCTIONS
PLAN / Pt Instructions:  1. Continue current plan with no evidence of addiction or diversion. Stable on current medication without adverse events. 2. Refill Oxymorphone 15 mg every 12 hours. 3. Refill Oxycodone 10/325 mg up to 3 times daily as needed. 4. Continue Home exercise program.    5. She was informed that she will need to inform the pain management clinic within 72 hours if she is prescribed any new pain medicines after surgery. 6. Discussed risks of addiction, dependency, and opioid induced hyperalgesia.    7. Return to clinic in 2 month

## 2017-08-07 NOTE — PROGRESS NOTES
HISTORY OF PRESENT ILLNESS  Alpesh June is a 46 y.o. female    HPI: Ms. Latasha Lujan returns today for f/u of Chronic low back pain and right knee pain. H/o partial right TKA. H/o of 2 lumbar surgeries. No h/o ZI injections. Past treatment includes morphine ER and oxymorphone with no improvement. This is my first visit with this patient. She continues unchanged with her chronic pain since last visit. Ms. Latasha Lujan reports that she will be scheduled for gastric bypass surgery after she is cleared by cardiology. She will be taking a stress test soon. She reports once her gastric bypass surgery is complete and she has healed, she will have a breast reduction surgery and a tummy tuck when her weight loss is adequate. She is currently using Opana ER 15 mg every 12 hours and Oxycodone 10/325 mg TID PRN. Medications are helping with pain control and quality of life. Her pain is 7/10 with medication and 10/10 without. Pt describes pain as aching. Aggravating factors include standing and walking. Relieved with rest, sitting, lying down, and avoiding painful activities. Current treatment is helping to improve general activity, mood, walking, sleep, enjoyment of life     She tolerates medications without side effects. Alpesh June reports no change in sleep or constipation. The patient reports 60% relief with current medications. Measuring clinical outcomes of chronic pain patients: score 7/28 ; the lower the number the better the outcome. She  is otherwise doing well with no other complaints today. She denies any adverse events including nausea, vomiting, dizziness, increased constipation, hallucinations, or seizures. The patient reports functional improvement and QOL with pain medication.        Vitals:    08/07/17 1109   BP: 163/84   Pulse: 85   Resp: 16   Temp: 98.7 °F (37.1 °C)   Weight: (!) 169.2 kg (373 lb)   Height: 5' 8\" (1.727 m)   PainSc:   9   PainLoc: Arm       No Known Allergies    History reviewed. No pertinent surgical history. Review of Systems   Constitutional: Positive for malaise/fatigue. Negative for chills, fever and weight loss. Weight loss is dieting   HENT: Positive for hearing loss. Negative for congestion, ear discharge, ear pain, nosebleeds and sore throat. Migraines seeing PCP  Needs to have hearing checked. Eyes: Negative for blurred vision, double vision, pain and discharge. Respiratory: Negative for cough, shortness of breath and wheezing. Cardiovascular: Negative for chest pain, palpitations and leg swelling. Gastrointestinal: Positive for constipation and diarrhea. Negative for heartburn, nausea and vomiting. OTC medications with prune juice. Genitourinary: Negative for dysuria, frequency and urgency. Musculoskeletal: Positive for back pain, joint pain, myalgias and neck pain. Negative for falls. Skin: Negative for itching and rash. Neurological: Positive for tingling, weakness and headaches. Negative for dizziness, tremors, seizures and loss of consciousness. Seeing PCP   Psychiatric/Behavioral: Positive for depression. Negative for hallucinations and suicidal ideas. The patient is nervous/anxious and has insomnia. Followed by psychology        Physical Exam   Constitutional: She is oriented to person, place, and time and well-developed, well-nourished, and in no distress. She appears healthy. Non-toxic appearance. No distress. Obese with cane to walk  Has a knee brace on left knee   HENT:   Head: Normocephalic and atraumatic. Eyes: Right eye exhibits no discharge. Left eye exhibits no discharge. Neck: Neck supple. Pulmonary/Chest: Effort normal.   Musculoskeletal: She exhibits tenderness. Right knee: Tenderness found. Left knee: Tenderness found. Cervical back: She exhibits tenderness. Lumbar back: She exhibits tenderness. Right hand: She exhibits tenderness.         Left hand: She exhibits tenderness. Right foot: There is tenderness. Left foot: There is tenderness. Neurological: She is alert and oriented to person, place, and time. Skin: Skin is warm and dry. She is not diaphoretic. Psychiatric: Mood and affect normal.   Nursing note and vitals reviewed. ASSESSMENT:    1. Chronic pain syndrome    2. Chronic low back pain with sciatica, sciatica laterality unspecified, unspecified back pain laterality    3. Primary osteoarthritis of right knee    4. Obesity, unspecified obesity severity, unspecified obesity type    5. Chronic tension-type headache, not intractable    6. Encounter for long-term (current) use of high-risk medication          [place] Prescription Monitoring Program was reviewed which does not demonstrate aberrancies and/or inconsistencies with regard to the historical prescribing of controlled medications to this patient by other providers. Medications were brought to visit today. Pill count was appropriate. The patients condition and plan were discussed. All questions were answered. The patient agrees with the plan. PLAN / Pt Instructions:  1. Continue current plan with no evidence of addiction or diversion. Stable on current medication without adverse events. 2. Refill Oxymorphone 15 mg every 12 hours. 3. Refill Oxycodone 10/325 mg up to 3 times daily as needed. 4. Continue Home exercise program.    5. She was informed that she will need to inform the pain management clinic within 72 hours if she is prescribed any new pain medicines after surgery. 6. Discussed risks of addiction, dependency, and opioid induced hyperalgesia. 7. Return to clinic in 2 month     Medications Ordered Today   Medications    oxyMORphone (OPANA ER) 15 mg TR12     Sig: Take 15 mg by mouth three (3) times daily for 30 days. Max Daily Amount: 45 mg.      Dispense:  90 Each     Refill:  0    oxyCODONE-acetaminophen (PERCOCET 10)  mg per tablet Sig: Take 1 Tab by mouth every eight (8) hours as needed for Pain for up to 30 days. Max Daily Amount: 3 Tabs. Dispense:  90 Tab     Refill:  0    oxyCODONE-acetaminophen (PERCOCET 10)  mg per tablet     Sig: Take 1 Tab by mouth every eight (8) hours as needed for Pain for up to 30 days. Max Daily Amount: 3 Tabs. Dispense:  90 Tab     Refill:  0    oxyMORphone (OPANA ER) 15 mg TR12     Sig: Take 15 mg by mouth three (3) times daily for 30 days. Max Daily Amount: 45 mg. Dispense:  90 Each     Refill:  0         Prescription monitoring program reviewed. The patient  should keep track of total Tylenol intake and make sure liver function tests have been checked with primary care physician. POC UDS today. Pain medications prescribed with the objective of pain relief and improved physical and psychosocial function in this patient. Spent 25 minutes with patient today reviewing the treatment plan, goals of treatment plan, and limitations of the treatment plan, to include the potential for side effects from medications and procedures. Zara Huggins PA-C 8/7/2017      Note: Please excuse any typographical errors. Voice recognition software was used for this note and may cause mistakes.

## 2017-09-07 ENCOUNTER — OP HISTORICAL/CONVERTED ENCOUNTER (OUTPATIENT)
Dept: OTHER | Age: 52
End: 2017-09-07

## 2017-10-09 ENCOUNTER — OFFICE VISIT (OUTPATIENT)
Dept: PAIN MANAGEMENT | Age: 52
End: 2017-10-09

## 2017-10-09 VITALS
BODY MASS INDEX: 44.41 KG/M2 | HEIGHT: 68 IN | SYSTOLIC BLOOD PRESSURE: 152 MMHG | HEART RATE: 86 BPM | WEIGHT: 293 LBS | DIASTOLIC BLOOD PRESSURE: 82 MMHG | RESPIRATION RATE: 16 BRPM | TEMPERATURE: 99 F

## 2017-10-09 DIAGNOSIS — M47.816 SPONDYLOSIS OF LUMBAR REGION WITHOUT MYELOPATHY OR RADICULOPATHY: ICD-10-CM

## 2017-10-09 DIAGNOSIS — M17.11 PRIMARY OSTEOARTHRITIS OF RIGHT KNEE: ICD-10-CM

## 2017-10-09 DIAGNOSIS — G89.29 CHRONIC PAIN OF RIGHT KNEE: ICD-10-CM

## 2017-10-09 DIAGNOSIS — M25.561 CHRONIC PAIN OF RIGHT KNEE: ICD-10-CM

## 2017-10-09 DIAGNOSIS — M47.896 OTHER OSTEOARTHRITIS OF SPINE, LUMBAR REGION: ICD-10-CM

## 2017-10-09 DIAGNOSIS — G89.4 CHRONIC PAIN SYNDROME: ICD-10-CM

## 2017-10-09 RX ORDER — OXYCODONE AND ACETAMINOPHEN 10; 325 MG/1; MG/1
1 TABLET ORAL
Qty: 90 TAB | Refills: 0 | Status: SHIPPED | OUTPATIENT
Start: 2017-11-08 | End: 2017-12-07 | Stop reason: SDUPTHER

## 2017-10-09 RX ORDER — OXYCODONE AND ACETAMINOPHEN 10; 325 MG/1; MG/1
1 TABLET ORAL
Qty: 90 TAB | Refills: 0 | Status: SHIPPED | OUTPATIENT
Start: 2017-10-09 | End: 2017-12-07 | Stop reason: SDUPTHER

## 2017-10-09 NOTE — PATIENT INSTRUCTIONS
1. Continue current plan with no evidence of addiction or diversion. Stable on current medication without adverse events. 2. Refill  oxymorphone ER 15 mg every 12 hours. 3. Refill oxycodone 10/325 mg up to 3 times daily as needed. 4. Continue Home exercise program.    5. Discussed risks of addiction, dependency, and opioid induced hyperalgesia.    6. Return to clinic in 2 months

## 2017-10-09 NOTE — PROGRESS NOTES
HISTORY OF PRESENT ILLNESS  Mil Allen is a 46 y.o. female. HPI Ms. Dl Julien returns today for f/u of Chronic low back pain and Right knee pain. H/o partial right TKA. H/o of 2 lumbar surgeries. No h/o ZI injections. Past treatment includes morphine ER and oxymorphone with no improvement. She continues unchanged since last visit. She is doing well with her current treatment plan which has been offering moderate pain control. She is planning several upcoming surgeries. She is scheduled for gastric sleeve on 10/23. She is very optimistic about her upcoming surgery. She is then looking forward to breast surgery and tummy tuck as well. After the surgery she is planning to have bilateral knee replacements. She is otherwise doing well with no other complaints today. We will continue with her current treatment plan with no changes. I will have her follow-up in 2 months for further evaluation and recommendation. She is currently using Opana ER 15 mg every 12 hours and Oxycodone 10/325 mg TID PRN. Medications are helping with pain control and quality of life. Her pain is 7/10 with medication and 10/10 without. Pt describes pain as aching. Aggravating factors include standing and walking. Relieved with rest, sitting, lying down, and avoiding painful activities. Current treatment is helping to improve general activity, mood, walking, sleep, enjoyment of life     She is otherwise doing well with no other complaints today. She denies any adverse events including nausea, vomiting, dizziness, constipation, hallucinations, or seizures. Patient attended education class 3/17/15     No Known Allergies    History reviewed. No pertinent surgical history. Review of Systems   Constitutional: Positive for chills. HENT: Negative for hearing loss. Eyes: Negative for blurred vision and double vision. Respiratory: Negative for cough and sputum production. Cardiovascular: Positive for leg swelling. Gastrointestinal: Positive for heartburn. Genitourinary: Negative for urgency. Musculoskeletal: Positive for joint pain. Skin: Negative for rash. Neurological: Negative for seizures. Endo/Heme/Allergies: Negative for environmental allergies. Does not bruise/bleed easily. Psychiatric/Behavioral: Positive for depression. Negative for suicidal ideas. The patient is nervous/anxious and has insomnia. Physical Exam   Constitutional: She appears well-developed and well-nourished. She is cooperative. She does not have a sickly appearance. Obese, walks with a cane. HENT:   Head: Normocephalic and atraumatic. Eyes: EOM are normal.   Neck: Neck supple. No tracheal deviation present. No thyroid mass present. Pulmonary/Chest: Effort normal. No respiratory distress. Musculoskeletal:   Right knee brace. Gait antalgic    Neurological: She is alert. No cranial nerve deficit. Skin: Skin is intact. No rash noted. Psychiatric: Her speech is normal. Her affect is not angry. She does not express inappropriate judgment. Nursing note and vitals reviewed. ASSESSMENT:    1. Chronic pain syndrome    2. Other osteoarthritis of spine, lumbar region    3. Primary osteoarthritis of right knee    4. Spondylosis of lumbar region without myelopathy or radiculopathy    5. Chronic pain of right knee         [place] Prescription Monitoring Program was reviewed which does not demonstrate aberrancies and/or inconsistencies with regard to the historical prescribing of controlled medications to this patient by other providers. PLAN / Pt Instructions:  1. Continue current plan with no evidence of addiction or diversion. Stable on current medication without adverse events. 2. Refill  oxymorphone ER 15 mg every 12 hours. 3. Refill oxycodone 10/325 mg up to 3 times daily as needed. 4. Continue Home exercise program.    5. Discussed risks of addiction, dependency, and opioid induced hyperalgesia.    6. Return to clinic in 2 months    Medications Ordered Today   Medications    oxyMORphone (OPANA ER) 15 mg TR12     Sig: Take 15 mg by mouth three (3) times daily for 30 days. Max Daily Amount: 45 mg. Dispense:  90 Each     Refill:  0    oxyMORphone (OPANA ER) 15 mg TR12     Sig: Take 15 mg by mouth three (3) times daily for 30 days. Max Daily Amount: 45 mg. Dispense:  90 Each     Refill:  0    oxyCODONE-acetaminophen (PERCOCET 10)  mg per tablet     Sig: Take 1 Tab by mouth every eight (8) hours as needed for Pain for up to 30 days. Max Daily Amount: 3 Tabs. Dispense:  90 Tab     Refill:  0    oxyCODONE-acetaminophen (PERCOCET 10)  mg per tablet     Sig: Take 1 Tab by mouth every eight (8) hours as needed for Pain for up to 30 days. Max Daily Amount: 3 Tabs. Dispense:  90 Tab     Refill:  0       Pain medications prescribed with the objective of pain relief and improved physical and psychosocial function in this patient. Spent 25 minutes with patient today reviewing the treatment plan, goals of treatment plan, and limitations of the treatment plan, to include the potential for side effects from medications and procedures. Ilene Ulrich, [id number] 10/9/2017     Note: Please excuse any typographical errors. Voice recognition software was used for this note and may cause mistakes.

## 2017-10-09 NOTE — MR AVS SNAPSHOT
Visit Information Date & Time Provider Department Dept. Phone Encounter #  
 10/9/2017  2:00 PM Jamar Jones [facility] [id number] Follow-up Instructions Return in about 2 months (around 12/9/2017). Upcoming Health Maintenance Date Due Hepatitis C Screening 1965 DTaP/Tdap/Td series (1 - Tdap) 8/27/1986 PAP AKA CERVICAL CYTOLOGY 8/27/1986 BREAST CANCER SCRN MAMMOGRAM 8/27/2015 FOBT Q 1 YEAR AGE 50-75 8/27/2015 INFLUENZA AGE 9 TO ADULT 8/1/2017 Allergies as of 10/9/2017  Review Complete On: 10/9/2017 By: TABATHA Hawthorne No Known Allergies Current Immunizations  Never Reviewed No immunizations on file. Not reviewed this visit You Were Diagnosed With   
  
 Codes Comments Chronic pain syndrome     ICD-10-CM: G89.4 ICD-9-CM: 338.4 Other osteoarthritis of spine, lumbar region     ICD-10-CM: M47.896 ICD-9-CM: 721.3 Primary osteoarthritis of right knee     ICD-10-CM: M17.11 ICD-9-CM: 715.16 Spondylosis of lumbar region without myelopathy or radiculopathy     ICD-10-CM: M47.816 ICD-9-CM: 700. 3 Chronic pain of right knee     ICD-10-CM: M25.561, G89.29 ICD-9-CM: 719.46, 338.29 Vitals BP Pulse Temp Resp Height(growth percentile) Weight(growth percentile) 152/82 86 99 °F (37.2 °C) 16 5' 8\" (1.727 m) (!) 365 lb (165.6 kg) BMI OB Status Smoking Status 55.5 kg/m2 Hysterectomy Former Smoker Vitals History BMI and BSA Data Body Mass Index Body Surface Area 55.5 kg/m 2 2.82 m 2 Preferred Pharmacy Pharmacy Name Phone [facility] [id number], [address] Your Updated Medication List  
  
   
This list is accurate as of: 10/9/17  2:46 PM.  Always use your most recent med list.  
  
  
  
  
 ALPRAZolam 0.5 mg tablet Commonly known as:  Marcy Gaspar Take  by mouth two (2) times a day. amLODIPine 10 mg tablet Commonly known as:  Angel Denisa Take  by mouth daily. Joaquina Echols Use as directed. CeleXA 40 mg tablet Generic drug:  citalopram  
Take 40 mg by mouth daily. FIORICET -40 mg per tablet Generic drug:  butalbital-acetaminophen-caffeine Take 1 Tab by mouth.  
  
 gabapentin 300 mg capsule Commonly known as:  NEURONTIN Take 1 Cap by mouth two (2) times a day. griseofulvin 500 mg tablet Commonly known as:  Emir Maw Take 500 mg by mouth daily. LASIX 20 mg tablet Generic drug:  furosemide Take  by mouth as needed. lisinopril 10 mg tablet Commonly known as:  Michelle Jose Take  by mouth daily. morphine CR 15 mg CR tablet Commonly known as:  MS CONTIN Take  by mouth every twelve (12) hours. naloxone 4 mg/actuation nasal spray Commonly known as:  NARCAN  
4 mg by Nasal route as needed. * oxyCODONE-acetaminophen  mg per tablet Commonly known as:  PERCOCET 10 Take 1 Tab by mouth every eight (8) hours as needed for Pain for up to 30 days. Max Daily Amount: 3 Tabs. * oxyCODONE-acetaminophen  mg per tablet Commonly known as:  PERCOCET 10 Take 1 Tab by mouth every eight (8) hours as needed for Pain for up to 30 days. Max Daily Amount: 3 Tabs. Start taking on:  11/8/2017 * oxyMORphone 15 mg Tr12 Commonly known as:  OPANA ER Take 15 mg by mouth three (3) times daily for 30 days. Max Daily Amount: 45 mg.  
  
 * oxyMORphone 15 mg Tr12 Commonly known as:  OPANA ER Take 15 mg by mouth three (3) times daily for 30 days. Max Daily Amount: 45 mg. Start taking on:  11/8/2017 PEPCID 20 mg tablet Generic drug:  famotidine Take 20 mg by mouth two (2) times a day. pravastatin 40 mg tablet Commonly known as:  PRAVACHOL Take 40 mg by mouth nightly. PROAIR HFA 90 mcg/actuation inhaler Generic drug:  albuterol Take  by inhalation. topiramate 25 mg tablet Commonly known as:  TOPAMAX Take  by mouth two (2) times a day. traZODone 50 mg tablet Commonly known as:  Delifna Askew Take  by mouth nightly. venlafaxine 75 mg tablet Commonly known as:  [facility] Take 25 mg by mouth nightly. VITAMIN D2 50,000 unit capsule Generic drug:  ergocalciferol Take 50,000 Units by mouth. WELLBUTRIN 75 mg tablet Generic drug:  buPROPion Take  by mouth two (2) times a day. * Notice: This list has 4 medication(s) that are the same as other medications prescribed for you. Read the directions carefully, and ask your doctor or other care provider to review them with you. Prescriptions Printed Refills  
 oxyMORphone (OPANA ER) 15 mg TR12 0 Sig: Take 15 mg by mouth three (3) times daily for 30 days. Max Daily Amount: 45 mg.  
 Class: Print Route: Oral  
 oxyMORphone (OPANA ER) 15 mg TR12 0 Starting on: 11/8/2017 Sig: Take 15 mg by mouth three (3) times daily for 30 days. Max Daily Amount: 45 mg.  
 Class: Print Route: Oral  
 oxyCODONE-acetaminophen (PERCOCET 10)  mg per tablet 0 Sig: Take 1 Tab by mouth every eight (8) hours as needed for Pain for up to 30 days. Max Daily Amount: 3 Tabs. Class: Print Route: Oral  
 oxyCODONE-acetaminophen (PERCOCET 10)  mg per tablet 0 Starting on: 11/8/2017 Sig: Take 1 Tab by mouth every eight (8) hours as needed for Pain for up to 30 days. Max Daily Amount: 3 Tabs. Class: Print Route: Oral  
  
Follow-up Instructions Return in about 2 months (around 12/9/2017). Patient Instructions 1. Continue current plan with no evidence of addiction or diversion. Stable on current medication without adverse events. 2. Refill  oxymorphone ER 15 mg every 12 hours. 3. Refill oxycodone 10/325 mg up to 3 times daily as needed.  
4. Continue Home exercise program.   
 5. Discussed risks of addiction, dependency, and opioid induced hyperalgesia. 6. Return to clinic in 2 months Introducing [facility]! Lucie Chou introduces [employer] patient portal. Now you can access parts of your medical record, email your doctor's office, and request medication refills online. 1. In your internet browser, go to [email or web address] 2. Click on the First Time User? Click Here link in the Sign In box. You will see the New Member Sign Up page. 3. Enter your [employer] Access Code exactly as it appears below. You will not need to use this code after youve completed the sign-up process. If you do not sign up before the expiration date, you must request a new code. · [employer] Access Code: [email or web address] Expires: 11/5/2017 12:16 PM 
 
4. Enter the last four digits of your Social Security Number (xxxx) and Date of Birth (mm/dd/yyyy) as indicated and click Submit. You will be taken to the next sign-up page. 5. Create a [employer] ID. This will be your [employer] login ID and cannot be changed, so think of one that is secure and easy to remember. 6. Create a [employer] password. You can change your password at any time. 7. Enter your Password Reset Question and Answer. This can be used at a later time if you forget your password. 8. Enter your e-mail address. You will receive e-mail notification when new information is available in [address]. 9. Click Sign Up. You can now view and download portions of your medical record. 10. Click the Download Summary menu link to download a portable copy of your medical information. If you have questions, please visit the Frequently Asked Questions section of the [employer] website. Remember, [employer] is NOT to be used for urgent needs. For medical emergencies, dial 911. Now available from your iPhone and Android! Please provide this summary of care documentation to your next provider. If you have any questions after today's visit, please call [phone].

## 2017-10-09 NOTE — PROGRESS NOTES
Nursing Notes    Patient presents to the office today in follow-up. Patient rates her pain at 10/10 on the numerical pain scale. Reviewed medications with counts as follows:    Rx Date filled Qty Dispensed Pill Count Last Dose Short   opana ER 15mg 09/06/17 90 0 10/05/17 no   Oxycodone 10-325mg  09/06/17 90 0 10/05/17 no                                  Comments:     POC UDS was not performed in office today    Any new labs or imaging since last appointment? YES    Have you been to an emergency room (ER) or urgent care clinic since your last visit? NO            Have you been hospitalized since your last visit? NO     If yes, where, when, and reason for visit? Have you seen or consulted any other health care providers outside of the [address] [place] [address]  since your last visit? YES     If yes, where, when, and reason for visit? HM deferred to pcp.

## 2017-10-27 ENCOUNTER — TELEPHONE (OUTPATIENT)
Dept: PAIN MANAGEMENT | Age: 52
End: 2017-10-27

## 2017-12-07 ENCOUNTER — OFFICE VISIT (OUTPATIENT)
Dept: PAIN MANAGEMENT | Age: 52
End: 2017-12-07

## 2017-12-07 VITALS
RESPIRATION RATE: 18 BRPM | TEMPERATURE: 98.6 F | SYSTOLIC BLOOD PRESSURE: 124 MMHG | DIASTOLIC BLOOD PRESSURE: 80 MMHG | HEART RATE: 63 BPM

## 2017-12-07 DIAGNOSIS — M54.40 CHRONIC LOW BACK PAIN WITH SCIATICA, SCIATICA LATERALITY UNSPECIFIED, UNSPECIFIED BACK PAIN LATERALITY: ICD-10-CM

## 2017-12-07 DIAGNOSIS — G89.4 CHRONIC PAIN SYNDROME: ICD-10-CM

## 2017-12-07 DIAGNOSIS — M25.561 CHRONIC PAIN OF RIGHT KNEE: ICD-10-CM

## 2017-12-07 DIAGNOSIS — M47.816 SPONDYLOSIS OF LUMBAR REGION WITHOUT MYELOPATHY OR RADICULOPATHY: ICD-10-CM

## 2017-12-07 DIAGNOSIS — M47.896 OTHER OSTEOARTHRITIS OF SPINE, LUMBAR REGION: ICD-10-CM

## 2017-12-07 DIAGNOSIS — M17.11 PRIMARY OSTEOARTHRITIS OF RIGHT KNEE: ICD-10-CM

## 2017-12-07 DIAGNOSIS — G89.29 CHRONIC PAIN OF RIGHT KNEE: ICD-10-CM

## 2017-12-07 DIAGNOSIS — G89.29 CHRONIC LOW BACK PAIN WITH SCIATICA, SCIATICA LATERALITY UNSPECIFIED, UNSPECIFIED BACK PAIN LATERALITY: ICD-10-CM

## 2017-12-07 PROBLEM — E66.01 OBESITY, MORBID (HCC): Status: ACTIVE | Noted: 2017-12-07

## 2017-12-07 RX ORDER — OXYCODONE AND ACETAMINOPHEN 10; 325 MG/1; MG/1
1 TABLET ORAL
Qty: 90 TAB | Refills: 0 | Status: SHIPPED | OUTPATIENT
Start: 2017-12-07 | End: 2018-03-23 | Stop reason: SDUPTHER

## 2017-12-07 RX ORDER — OXYCODONE AND ACETAMINOPHEN 10; 325 MG/1; MG/1
1 TABLET ORAL
Qty: 90 TAB | Refills: 0 | Status: SHIPPED | OUTPATIENT
Start: 2018-01-06 | End: 2018-03-23 | Stop reason: SDUPTHER

## 2017-12-07 RX ORDER — OXYCODONE AND ACETAMINOPHEN 10; 325 MG/1; MG/1
1 TABLET ORAL
Qty: 90 TAB | Refills: 0 | Status: SHIPPED | OUTPATIENT
Start: 2018-02-05 | End: 2018-03-23 | Stop reason: SDUPTHER

## 2017-12-07 NOTE — MR AVS SNAPSHOT
Visit Information Date & Time Provider Department Dept. Phone Encounter #  
 12/7/2017 12:40 PM Rene Leon, [address] [place] [address] Pain Management [phone] Follow-up Instructions Return in about 3 months (around 3/7/2018). Upcoming Health Maintenance Date Due Hepatitis C Screening 1965 DTaP/Tdap/Td series (1 - Tdap) 8/27/1986 PAP AKA CERVICAL CYTOLOGY 8/27/1986 BREAST CANCER SCRN MAMMOGRAM 8/27/2015 FOBT Q 1 YEAR AGE 50-75 8/27/2015 Influenza Age 5 to Adult 8/1/2017 Allergies as of 12/7/2017  Review Complete On: 12/7/2017 By: TABATHA Church No Known Allergies Current Immunizations  Never Reviewed No immunizations on file. Not reviewed this visit You Were Diagnosed With   
  
 Codes Comments Chronic pain syndrome     ICD-10-CM: G89.4 ICD-9-CM: 338.4 Other osteoarthritis of spine, lumbar region     ICD-10-CM: M47.896 ICD-9-CM: 721.3 Primary osteoarthritis of right knee     ICD-10-CM: M17.11 ICD-9-CM: 715.16 Chronic low back pain with sciatica, sciatica laterality unspecified, unspecified back pain laterality     ICD-10-CM: M54.40, G89.29 ICD-9-CM: 724.2, 724.3, 338.29 Spondylosis of lumbar region without myelopathy or radiculopathy     ICD-10-CM: M47.816 ICD-9-CM: 251. 3 Chronic pain of right knee     ICD-10-CM: M25.561, G89.29 ICD-9-CM: 719.46, 338.29 Vitals BP Pulse Temp Resp OB Status Smoking Status 124/80 (BP 1 Location: Right arm, BP Patient Position: Sitting) 63 98.6 °F (37 °C) (Oral) 18 Hysterectomy Former Smoker Vitals History Preferred Pharmacy Pharmacy Name Phone [facility] [address] [facility], [address] Your Updated Medication List  
  
   
This list is accurate as of: 12/7/17 12:54 PM.  Always use your most recent med list.  
  
  
  
  
 ALPRAZolam 0.5 mg tablet Commonly known as:  Denia Dungannon Take  by mouth two (2) times a day. amLODIPine 10 mg tablet Commonly known as:  Edgar Conine Take  by mouth daily. Colletta Glenwood Use as directed. CeleXA 40 mg tablet Generic drug:  citalopram  
Take 40 mg by mouth daily. FIORICET -40 mg per tablet Generic drug:  butalbital-acetaminophen-caffeine Take 1 Tab by mouth.  
  
 gabapentin 300 mg capsule Commonly known as:  NEURONTIN Take 1 Cap by mouth two (2) times a day. griseofulvin 500 mg tablet Commonly known as:  Juani Jami Take 500 mg by mouth daily. LASIX 20 mg tablet Generic drug:  furosemide Take  by mouth as needed. lisinopril 10 mg tablet Commonly known as:  Terence Lights Take  by mouth daily. morphine CR 15 mg CR tablet Commonly known as:  MS CONTIN Take  by mouth every twelve (12) hours. naloxone 4 mg/actuation nasal spray Commonly known as:  NARCAN  
4 mg by Nasal route as needed. * oxyCODONE-acetaminophen  mg per tablet Commonly known as:  PERCOCET 10 Take 1 Tab by mouth every eight (8) hours as needed for Pain for up to 30 days. Max Daily Amount: 3 Tabs. * oxyCODONE-acetaminophen  mg per tablet Commonly known as:  PERCOCET 10 Take 1 Tab by mouth every eight (8) hours as needed for Pain for up to 30 days. Max Daily Amount: 3 Tabs. Start taking on:  1/6/2018 * oxyCODONE-acetaminophen  mg per tablet Commonly known as:  PERCOCET 10 Take 1 Tab by mouth every eight (8) hours as needed for Pain for up to 30 days. Max Daily Amount: 3 Tabs. Start taking on:  2/5/2018 * oxyMORphone 15 mg Tr12 Commonly known as:  OPANA ER Take 15 mg by mouth three (3) times daily for 30 days. Max Daily Amount: 45 mg.  
  
 * oxyMORphone 15 mg Tr12 Commonly known as:  OPANA ER Take 15 mg by mouth three (3) times daily for 30 days. Max Daily Amount: 45 mg. Start taking on:  1/6/2018 * oxyMORphone 15 mg Tr12 Commonly known as:  OPANA ER Take 15 mg by mouth three (3) times daily for 30 days. Max Daily Amount: 45 mg. Start taking on:  2/5/2018 PEPCID 20 mg tablet Generic drug:  famotidine Take 20 mg by mouth two (2) times a day. pravastatin 40 mg tablet Commonly known as:  PRAVACHOL Take 40 mg by mouth nightly. PROAIR HFA 90 mcg/actuation inhaler Generic drug:  albuterol Take  by inhalation. topiramate 25 mg tablet Commonly known as:  TOPAMAX Take  by mouth two (2) times a day. traZODone 50 mg tablet Commonly known as:  Mylene  Take  by mouth nightly. venlafaxine 75 mg tablet Commonly known as:  [facility] Take 25 mg by mouth nightly. VITAMIN D2 50,000 unit capsule Generic drug:  ergocalciferol Take 50,000 Units by mouth. WELLBUTRIN 75 mg tablet Generic drug:  buPROPion Take  by mouth two (2) times a day. * Notice: This list has 6 medication(s) that are the same as other medications prescribed for you. Read the directions carefully, and ask your doctor or other care provider to review them with you. Prescriptions Printed Refills  
 oxyMORphone (OPANA ER) 15 mg TR12 0 Sig: Take 15 mg by mouth three (3) times daily for 30 days. Max Daily Amount: 45 mg.  
 Class: Print Route: Oral  
 oxyMORphone (OPANA ER) 15 mg TR12 0 Starting on: 1/6/2018 Sig: Take 15 mg by mouth three (3) times daily for 30 days. Max Daily Amount: 45 mg.  
 Class: Print Route: Oral  
 oxyMORphone (OPANA ER) 15 mg TR12 0 Starting on: 2/5/2018 Sig: Take 15 mg by mouth three (3) times daily for 30 days. Max Daily Amount: 45 mg.  
 Class: Print Route: Oral  
 oxyCODONE-acetaminophen (PERCOCET 10)  mg per tablet 0 Sig: Take 1 Tab by mouth every eight (8) hours as needed for Pain for up to 30 days. Max Daily Amount: 3 Tabs. Class: Print  Route: Oral  
 oxyCODONE-acetaminophen (PERCOCET 10)  mg per tablet 0 Starting on: 1/6/2018 Sig: Take 1 Tab by mouth every eight (8) hours as needed for Pain for up to 30 days. Max Daily Amount: 3 Tabs. Class: Print Route: Oral  
 oxyCODONE-acetaminophen (PERCOCET 10)  mg per tablet 0 Starting on: 2/5/2018 Sig: Take 1 Tab by mouth every eight (8) hours as needed for Pain for up to 30 days. Max Daily Amount: 3 Tabs. Class: Print Route: Oral  
  
Follow-up Instructions Return in about 3 months (around 3/7/2018). Patient Instructions 1. Continue current plan with no evidence of addiction or diversion. Stable on current medication without adverse events. 2. Refill  oxymorphone ER 15 mg every 12 hours. 3. Refill oxycodone 10/325 mg up to 3 times daily as needed. 4. Continue Home exercise program.   
5. Discussed risks of addiction, dependency, and opioid induced hyperalgesia. 6. Return to clinic in 3 months Introducing [facility]! Shahrzad Jake introduces [employer] patient portal. Now you can access parts of your medical record, email your doctor's office, and request medication refills online. 1. In your internet browser, go to [email or web address] 2. Click on the First Time User? Click Here link in the Sign In box. You will see the New Member Sign Up page. 3. Enter your [employer] Access Code exactly as it appears below. You will not need to use this code after youve completed the sign-up process. If you do not sign up before the expiration date, you must request a new code. · [employer] Access Code: [email or web address] Expires: 3/7/2018 12:54 PM 
 
4. Enter the last four digits of your Social Security Number (xxxx) and Date of Birth (mm/dd/yyyy) as indicated and click Submit. You will be taken to the next sign-up page. 5. Create a [employer] ID.  This will be your [employer] login ID and cannot be changed, so think of one that is secure and easy to remember. 6. Create a [employer] password. You can change your password at any time. 7. Enter your Password Reset Question and Answer. This can be used at a later time if you forget your password. 8. Enter your e-mail address. You will receive e-mail notification when new information is available in [address] 9. Click Sign Up. You can now view and download portions of your medical record. 10. Click the Download Summary menu link to download a portable copy of your medical information. If you have questions, please visit the Frequently Asked Questions section of the [employer] website. Remember, [employer] is NOT to be used for urgent needs. For medical emergencies, dial 911. Now available from your iPhone and Android! Please provide this summary of care documentation to your next provider. If you have any questions after today's visit, please call [phone].

## 2017-12-07 NOTE — PROGRESS NOTES
HISTORY OF PRESENT ILLNESS  Jj Thompson is a 46 y.o. female. HPI Ms. Ramon Ward returns today for f/u of Chronic low back pain and Right knee pain. H/o partial right TKA. H/o of 2 lumbar surgeries. No h/o ZI injections. Past treatment includes morphine ER and oxymorphone with no improvement. She is now status post gastric sleeve surgery on 10/23/2017. She did receive some postsurgical medication while she was in the hospital and was disclosed to our practice. She does report some mild worsening knee pain since her surgery but currently remains optimistic. She is overall doing well with her treatment plan which has been offering moderate pain control. We will continue with no changes today. I will have her follow-up in 3 months or sooner if needed. She is currently using Opana ER 15 mg every 12 hours and Oxycodone 10/325 mg TID PRN. Medications are helping with pain control and quality of life. Her pain is 7/10 with medication and 10/10 without. Pt describes pain as aching. Aggravating factors include standing and walking. Relieved with rest, sitting, lying down, and avoiding painful activities. Current treatment is helping to improve general activity, mood, walking, sleep, enjoyment of life     She is otherwise doing well with no other complaints today. She denies any adverse events including nausea, vomiting, dizziness, constipation, hallucinations, or seizures. Because the patient's current regimen places him/her at increased risk for possible overdose, a prescription for naloxone nasal spray has been provided. The patient understands that this medication is only to be used in the setting of a possible overdose and that inadvertent use of this medication could precipitate overt withdrawal.    Patient attended education class 3/17/15       No Known Allergies    History reviewed. No pertinent surgical history. Review of Systems   Constitutional: Positive for chills.    HENT: Negative for hearing loss. Eyes: Negative for blurred vision and double vision. Respiratory: Negative for cough and sputum production. Cardiovascular: Positive for leg swelling. Gastrointestinal: Positive for heartburn. Genitourinary: Negative for urgency. Musculoskeletal: Positive for joint pain. Skin: Negative for rash. Neurological: Negative for seizures. Endo/Heme/Allergies: Negative for environmental allergies. Does not bruise/bleed easily. Psychiatric/Behavioral: Positive for depression. Negative for suicidal ideas. The patient is nervous/anxious and has insomnia. Physical Exam   Constitutional: She appears well-developed and well-nourished. She is cooperative. She does not have a sickly appearance. Obese, walks with a cane. HENT:   Head: Normocephalic and atraumatic. Eyes: EOM are normal.   Neck: Neck supple. No tracheal deviation present. No thyroid mass present. Pulmonary/Chest: Effort normal. No respiratory distress. Musculoskeletal:   Right knee brace. Gait antalgic    Neurological: She is alert. No cranial nerve deficit. Skin: Skin is intact. No rash noted. Psychiatric: Her speech is normal. Her affect is not angry. She does not express inappropriate judgment. Nursing note and vitals reviewed. ASSESSMENT:    1. Chronic pain syndrome    2. Other osteoarthritis of spine, lumbar region    3. Primary osteoarthritis of right knee    4. Chronic low back pain with sciatica, sciatica laterality unspecified, unspecified back pain laterality    5. Spondylosis of lumbar region without myelopathy or radiculopathy    6. Chronic pain of right knee         [place] Prescription Monitoring Program was reviewed which does not demonstrate aberrancies and/or inconsistencies with regard to the historical prescribing of controlled medications to this patient by other providers. NOTE: disclosed oxycodone for postsurgical pain 10/27/2017    PLAN / Pt Instructions:  1.  Continue current plan with no evidence of addiction or diversion. Stable on current medication without adverse events. 2. Refill  oxymorphone ER 15 mg every 12 hours. 3. Refill oxycodone 10/325 mg up to 3 times daily as needed. 4. Continue Home exercise program.    5. Naloxone 4 mg nasal spray for opioid induced respiratory depression emergency only. 6. Discussed risks of addiction, dependency, and opioid induced hyperalgesia. 7. Return to clinic in 3 months    Medications Ordered Today   Medications    oxyMORphone (OPANA ER) 15 mg TR12     Sig: Take 15 mg by mouth three (3) times daily for 30 days. Max Daily Amount: 45 mg. Dispense:  90 Each     Refill:  0    oxyMORphone (OPANA ER) 15 mg TR12     Sig: Take 15 mg by mouth three (3) times daily for 30 days. Max Daily Amount: 45 mg. Dispense:  90 Each     Refill:  0    oxyMORphone (OPANA ER) 15 mg TR12     Sig: Take 15 mg by mouth three (3) times daily for 30 days. Max Daily Amount: 45 mg. Dispense:  90 Each     Refill:  0    oxyCODONE-acetaminophen (PERCOCET 10)  mg per tablet     Sig: Take 1 Tab by mouth every eight (8) hours as needed for Pain for up to 30 days. Max Daily Amount: 3 Tabs. Dispense:  90 Tab     Refill:  0    oxyCODONE-acetaminophen (PERCOCET 10)  mg per tablet     Sig: Take 1 Tab by mouth every eight (8) hours as needed for Pain for up to 30 days. Max Daily Amount: 3 Tabs. Dispense:  90 Tab     Refill:  0    oxyCODONE-acetaminophen (PERCOCET 10)  mg per tablet     Sig: Take 1 Tab by mouth every eight (8) hours as needed for Pain for up to 30 days. Max Daily Amount: 3 Tabs. Dispense:  90 Tab     Refill:  0       Pain medications prescribed with the objective of pain relief and improved physical and psychosocial function in this patient.     Spent 25 minutes with patient today reviewing the treatment plan, goals of treatment plan, and limitations of the treatment plan, to include the potential for side effects from medications and procedures. Jamar Jones, [address] 12/7/2017     Note: Please excuse any typographical errors. Voice recognition software was used for this note and may cause mistakes.

## 2017-12-07 NOTE — PROGRESS NOTES
Nursing Notes    Patient presents to the office today in follow-up. Patient rates her pain at 8/10 on the numerical pain scale. Reviewed medications with counts as follows:    Rx Date filled Qty Dispensed Pill Count Last Dose Short   OPANA ER 15 MG TAB 11/8/17 60 13 TODAY NO   PERCOCET  MG TAB 11/8/17 90 0 12/6/17 NO                                  Comments:     POC UDS was not performed in office today    Any new labs or imaging since last appointment? YES, LAB WORK,MAMMOGRAM, STRESS TEST AND EKG     Have you been to an emergency room (ER) or urgent care clinic since your last visit? NO            Have you been hospitalized since your last visit? YES, GASTRIC SLEEVE SURGERY ON 10/23/17 AT [facility]. If yes, where, when, and reason for visit? Have you seen or consulted any other health care providers outside of the [address] [place] [address]  since your last visit? YES, BARIATRIC SURGEON. If yes, where, when, and reason for visit? HM deferred to pcp.

## 2017-12-07 NOTE — PATIENT INSTRUCTIONS
1. Continue current plan with no evidence of addiction or diversion. Stable on current medication without adverse events. 2. Refill  oxymorphone ER 15 mg every 12 hours. 3. Refill oxycodone 10/325 mg up to 3 times daily as needed. 4. Continue Home exercise program.    5. Discussed risks of addiction, dependency, and opioid induced hyperalgesia.    6. Return to clinic in 3 months

## 2018-02-06 ENCOUNTER — TELEPHONE (OUTPATIENT)
Dept: PAIN MANAGEMENT | Age: 53
End: 2018-02-06

## 2018-03-12 ENCOUNTER — OP HISTORICAL/CONVERTED ENCOUNTER (OUTPATIENT)
Dept: OTHER | Age: 53
End: 2018-03-12

## 2018-03-23 ENCOUNTER — OFFICE VISIT (OUTPATIENT)
Dept: PAIN MANAGEMENT | Age: 53
End: 2018-03-23

## 2018-03-23 VITALS
SYSTOLIC BLOOD PRESSURE: 128 MMHG | HEART RATE: 83 BPM | HEIGHT: 68 IN | BODY MASS INDEX: 44.41 KG/M2 | WEIGHT: 293 LBS | DIASTOLIC BLOOD PRESSURE: 83 MMHG | TEMPERATURE: 98.4 F | RESPIRATION RATE: 16 BRPM

## 2018-03-23 DIAGNOSIS — M54.40 CHRONIC LOW BACK PAIN WITH SCIATICA, SCIATICA LATERALITY UNSPECIFIED, UNSPECIFIED BACK PAIN LATERALITY: ICD-10-CM

## 2018-03-23 DIAGNOSIS — M17.11 PRIMARY OSTEOARTHRITIS OF RIGHT KNEE: ICD-10-CM

## 2018-03-23 DIAGNOSIS — Z79.899 ENCOUNTER FOR LONG-TERM (CURRENT) USE OF HIGH-RISK MEDICATION: Primary | ICD-10-CM

## 2018-03-23 DIAGNOSIS — G89.29 CHRONIC LOW BACK PAIN WITH SCIATICA, SCIATICA LATERALITY UNSPECIFIED, UNSPECIFIED BACK PAIN LATERALITY: ICD-10-CM

## 2018-03-23 DIAGNOSIS — M47.816 SPONDYLOSIS OF LUMBAR REGION WITHOUT MYELOPATHY OR RADICULOPATHY: ICD-10-CM

## 2018-03-23 DIAGNOSIS — G89.29 CHRONIC PAIN OF RIGHT KNEE: ICD-10-CM

## 2018-03-23 DIAGNOSIS — M25.561 CHRONIC PAIN OF RIGHT KNEE: ICD-10-CM

## 2018-03-23 DIAGNOSIS — G89.4 CHRONIC PAIN SYNDROME: ICD-10-CM

## 2018-03-23 LAB
ALCOHOL UR POC: NORMAL
AMPHETAMINES UR POC: NEGATIVE
BARBITURATES UR POC: NORMAL
BENZODIAZEPINES UR POC: NORMAL
BUPRENORPHINE UR POC: NEGATIVE
CANNABINOIDS UR POC: NEGATIVE
CARISOPRODOL UR POC: NORMAL
COCAINE UR POC: NEGATIVE
FENTANYL UR POC: NORMAL
MDMA/ECSTASY UR POC: NORMAL
METHADONE UR POC: NEGATIVE
METHAMPHETAMINE UR POC: NORMAL
METHYLPHENIDATE UR POC: NORMAL
OPIATES UR POC: NEGATIVE
OXYCODONE UR POC: NEGATIVE
PHENCYCLIDINE UR POC: NORMAL
PROPOXYPHENE UR POC: NORMAL
TRAMADOL UR POC: NORMAL
TRICYCLICS UR POC: NORMAL

## 2018-03-23 RX ORDER — TRAZODONE HYDROCHLORIDE 100 MG/1
150 TABLET ORAL DAILY
COMMUNITY
Start: 2018-03-06 | End: 2020-10-07 | Stop reason: SDUPTHER

## 2018-03-23 RX ORDER — ARIPIPRAZOLE 5 MG/1
5 TABLET ORAL DAILY
COMMUNITY
Start: 2018-03-06 | End: 2020-07-27 | Stop reason: DRUGHIGH

## 2018-03-23 RX ORDER — URSODIOL 300 MG/1
300 CAPSULE ORAL 2 TIMES DAILY
COMMUNITY
Start: 2018-02-19 | End: 2018-10-03

## 2018-03-23 RX ORDER — OXYCODONE AND ACETAMINOPHEN 10; 325 MG/1; MG/1
1 TABLET ORAL
Qty: 90 TAB | Refills: 0 | Status: SHIPPED | OUTPATIENT
Start: 2018-03-23 | End: 2018-06-25 | Stop reason: SDUPTHER

## 2018-03-23 RX ORDER — OXYCODONE AND ACETAMINOPHEN 10; 325 MG/1; MG/1
1 TABLET ORAL
Qty: 90 TAB | Refills: 0 | Status: SHIPPED | OUTPATIENT
Start: 2018-04-22 | End: 2018-06-25 | Stop reason: SDUPTHER

## 2018-03-23 RX ORDER — TIZANIDINE 4 MG/1
4 TABLET ORAL DAILY
COMMUNITY
Start: 2018-03-12 | End: 2020-07-27

## 2018-03-23 RX ORDER — PANTOPRAZOLE SODIUM 40 MG/1
40 TABLET, DELAYED RELEASE ORAL 2 TIMES DAILY
COMMUNITY
Start: 2018-03-01 | End: 2020-07-27

## 2018-03-23 RX ORDER — OXYCODONE AND ACETAMINOPHEN 10; 325 MG/1; MG/1
1 TABLET ORAL
Qty: 90 TAB | Refills: 0 | Status: SHIPPED | OUTPATIENT
Start: 2018-05-21 | End: 2018-06-25 | Stop reason: SDUPTHER

## 2018-03-23 NOTE — PROGRESS NOTES
HISTORY OF PRESENT ILLNESS  Jared Cam is a 46 y.o. female. HPI Ms. Gonzalez Leonard returns today for f/u of Chronic low back pain and Right knee pain. H/o partial right TKA. H/o of 2 lumbar surgeries. No h/o ZI injections. Past treatment includes morphine ER and oxymorphone with no improvement. status post gastric sleeve surgery on 10/23/2017. She continues unchanged since last visit. She does report that she ran out of her medications because she missed her last appointment. I discussed with her that we will have to make adjustments to her medication. She is upset about this today because she was hoping to increase her medications due to increased pain. I have recommended that we adjust her oxymorphone down to 10 mg every 12 hours ×7 days and then adjust back to 15 mg. We will discuss further changes next visit. She is also followed up with her obstetrician who has recommended surgery \"due to arthritis on her pelvis. \"  We discussed postprocedural plan in the office today. I explained to her that she must contact our office to disclose any outside controlled substance within 72 hours. She verbally agrees. I will have her follow-up in 3 months or sooner if needed. She is currently using Oxymorphone ER 15 mg every 12 hours and Oxycodone 10/325 mg TID PRN. Medications are helping with pain control and quality of life. Her pain is 7/10 with medication and 10/10 without. Pt describes pain as aching. Aggravating factors include standing and walking. Relieved with rest, sitting, lying down, and avoiding painful activities. Current treatment is helping to improve general activity, mood, walking, sleep, enjoyment of life     She is otherwise doing well with no other complaints today. She denies any adverse events including nausea, vomiting, dizziness, constipation, hallucinations, or seizures.      Because the patient's current regimen places him/her at increased risk for possible overdose, a prescription for naloxone nasal spray has been provided. The patient understands that this medication is only to be used in the setting of a possible overdose and that inadvertent use of this medication could precipitate overt withdrawal.    Patient attended education class 3/17/15    POC UDS today. Confirmation pending. No Known Allergies    History reviewed. No pertinent surgical history. Review of Systems   Constitutional: Positive for chills. HENT: Negative for hearing loss. Eyes: Negative for blurred vision and double vision. Respiratory: Negative for cough and sputum production. Cardiovascular: Positive for leg swelling. Gastrointestinal: Positive for heartburn. Genitourinary: Negative for urgency. Skin: Negative for rash. Neurological: Negative for seizures. Endo/Heme/Allergies: Negative for environmental allergies. Does not bruise/bleed easily. Psychiatric/Behavioral: Positive for depression. Negative for suicidal ideas. The patient is nervous/anxious and has insomnia. Physical Exam   Constitutional: She appears well-developed and well-nourished. She is cooperative. She does not have a sickly appearance. Obese, walks with a cane. HENT:   Head: Normocephalic and atraumatic. Eyes: EOM are normal.   Neck: Neck supple. No tracheal deviation present. No thyroid mass present. Pulmonary/Chest: Effort normal. No respiratory distress. Musculoskeletal:   Right knee brace. Gait antalgic    Neurological: She is alert. No cranial nerve deficit. Skin: Skin is intact. No rash noted. Psychiatric: Her speech is normal. Her affect is not angry. She does not express inappropriate judgment. Nursing note and vitals reviewed. ASSESSMENT:    1. Chronic low back pain with sciatica, sciatica laterality unspecified, unspecified back pain laterality    2. Chronic pain syndrome    3. Spondylosis of lumbar region without myelopathy or radiculopathy    4.  Chronic pain of right knee 5. Primary osteoarthritis of right knee         [place] Prescription Monitoring Program was reviewed which does not demonstrate aberrancies and/or inconsistencies with regard to the historical prescribing of controlled medications to this patient by other providers. NOTE: disclosed oxycodone for postsurgical pain 10/27/2017    PLAN / Pt Instructions:  1. Continue current plan with no evidence of addiction or diversion. Stable on current medication without adverse events. 2. Refill and adjust oxymorphone ER 50 mg down to 10 mg every 12 hours ×7 days and then adjust back to 15 mg thereafter. 3. Refill oxycodone 10/325 mg up to 3 times daily as needed. 4. Please call to disclose any outside controlled substance within 72 hours  5. Continue Home exercise program.    6. Naloxone 4 mg nasal spray for opioid induced respiratory depression emergency only. 7. Discussed risks of addiction, dependency, and opioid induced hyperalgesia. 8. Return to clinic in 3 months    Medications Ordered Today   Medications    oxyMORphone (OPANA ER) 15 mg TR12     Sig: Take 15 mg by mouth three (3) times daily for 30 days. Max Daily Amount: 45 mg. Dispense:  90 Each     Refill:  0    oxyMORphone (OPANA ER) 15 mg TR12     Sig: Take 15 mg by mouth three (3) times daily for 30 days. Max Daily Amount: 45 mg. Dispense:  90 Each     Refill:  0    oxyMORphone (OPANA ER) 15 mg TR12     Sig: Take 15 mg by mouth three (3) times daily for 30 days. Max Daily Amount: 45 mg. Dispense:  90 Each     Refill:  0    oxyCODONE-acetaminophen (PERCOCET 10)  mg per tablet     Sig: Take 1 Tab by mouth every eight (8) hours as needed for Pain for up to 30 days. Max Daily Amount: 3 Tabs. Dispense:  90 Tab     Refill:  0    oxyCODONE-acetaminophen (PERCOCET 10)  mg per tablet     Sig: Take 1 Tab by mouth every eight (8) hours as needed for Pain for up to 30 days. Max Daily Amount: 3 Tabs.      Dispense:  90 Tab     Refill: 0    oxyCODONE-acetaminophen (PERCOCET 10)  mg per tablet     Sig: Take 1 Tab by mouth every eight (8) hours as needed for Pain for up to 30 days. Max Daily Amount: 3 Tabs. Dispense:  90 Tab     Refill:  0    oxyMORphone 10 mg PO ER tablet     Sig: Take 1 Tab by mouth every twelve (12) hours for 7 days. Max Daily Amount: 20 mg. Dispense:  14 Tab     Refill:  0       Pain medications prescribed with the objective of pain relief and improved physical and psychosocial function in this patient. Spent 25 minutes with patient today reviewing the treatment plan, goals of treatment plan, and limitations of the treatment plan, to include the potential for side effects from medications and procedures. Son Kessler 3/23/2018     Note: Please excuse any typographical errors. Voice recognition software was used for this note and may cause mistakes.

## 2018-03-23 NOTE — PATIENT INSTRUCTIONS
1. Continue current plan with no evidence of addiction or diversion. Stable on current medication without adverse events. 2. Refill and adjust oxymorphone ER 50 mg down to 10 mg every 12 hours ×7 days and then adjust back to 15 mg thereafter. 3. Refill oxycodone 10/325 mg up to 3 times daily as needed. 4. Please call to disclose any outside controlled substance within 72 hours  5. Continue Home exercise program.    6. Naloxone 4 mg nasal spray for opioid induced respiratory depression emergency only. 7. Discussed risks of addiction, dependency, and opioid induced hyperalgesia.    8. Return to clinic in 3 months

## 2018-03-23 NOTE — PROGRESS NOTES
Nursing Notes    Patient presents to the office today in follow-up. Patient rates her pain at 10/10 on the numerical pain scale. Reviewed medications with counts as follows:    Rx Date filled Qty Dispensed Pill Count Last Dose Short   Percocet 10 mg 02/05/18 90 0 2 weeks  no   Oxymorphone Er 15 mg 02/06/18 90 0 2 weeks  no         Comments: Patient is here today for a follow up appt today she states her pain level today is a 10  She states she was in the ER for pain and hernia xray was taken she also has arthritis on her pelvis   Will be having surgery next month. POC UDS was performed in office today per verbal order     Any new labs or imaging since last appointment? YES xray of pelvis     Have you been to an emergency room (ER) or urgent care clinic since your last visit? YES       [facility]     Have you been hospitalized since your last visit? NO     If yes, where, when, and reason for visit? Have you seen or consulted any other health care providers outside of the [address] [place] [address]  since your last visit? YES  [facility]     If yes, where, when, and reason for visit? HM deferred to pcp.

## 2018-03-23 NOTE — MR AVS SNAPSHOT
[address] [address] 
[phone] Patient: Tosin Ambrosio MRN: [id number] :1965 Visit Information Date & Time Provider Department Dept. Phone Encounter #  
 3/23/2018 10:20 AM TABATHA Scott [facility] [id number] Follow-up Instructions Return in about 3 months (around 2018). Upcoming Health Maintenance Date Due Hepatitis C Screening 1965 DTaP/Tdap/Td series (1 - Tdap) 1986 PAP AKA CERVICAL CYTOLOGY 1986 BREAST CANCER SCRN MAMMOGRAM 2015 FOBT Q 1 YEAR AGE 50-75 2015 Influenza Age 5 to Adult 2017 MEDICARE YEARLY EXAM 3/14/2018 Allergies as of 3/23/2018  Review Complete On: 3/23/2018 By: TABATHA Scott No Known Allergies Current Immunizations  Never Reviewed No immunizations on file. Not reviewed this visit You Were Diagnosed With   
  
 Codes Comments Chronic low back pain with sciatica, sciatica laterality unspecified, unspecified back pain laterality     ICD-10-CM: M54.40, G89.29 ICD-9-CM: 724.2, 724.3, 338.29 Chronic pain syndrome     ICD-10-CM: G89.4 ICD-9-CM: 338.4 Spondylosis of lumbar region without myelopathy or radiculopathy     ICD-10-CM: M47.816 ICD-9-CM: 126. 3 Chronic pain of right knee     ICD-10-CM: M25.561, G89.29 ICD-9-CM: 719.46, 338.29 Primary osteoarthritis of right knee     ICD-10-CM: M17.11 ICD-9-CM: 715.16 Vitals BP Pulse Temp Resp Height(growth percentile) Weight(growth percentile) 128/83 (BP 1 Location: Right arm, BP Patient Position: Sitting) 83 98.4 °F (36.9 °C) 16 5' 8\" (1.727 m) 340 lb (154.2 kg) BMI OB Status Smoking Status 51.7 kg/m2 Hysterectomy Former Smoker BMI and BSA Data Body Mass Index Body Surface Area 51.7 kg/m 2 2.72 m 2 Preferred Pharmacy Pharmacy Name Phone [facility] [address] [facility], [address] [phone] Your Updated Medication List  
  
   
This list is accurate as of 3/23/18 11:11 AM.  Always use your most recent med list.  
  
  
  
  
 ALPRAZolam 0.5 mg tablet Commonly known as:  Ezequiel Mackeyker Take  by mouth two (2) times a day. amLODIPine 10 mg tablet Commonly known as:  Kalyani Abhi Take  by mouth daily. ARIPiprazole 5 mg tablet Commonly known as:  ABILIFY Take 5 mg by mouth two (2) times a day. Misheljoe Fleming Use as directed. CeleXA 40 mg tablet Generic drug:  citalopram  
Take 40 mg by mouth daily. FIORICET -40 mg per tablet Generic drug:  butalbital-acetaminophen-caffeine Take 1 Tab by mouth.  
  
 gabapentin 300 mg capsule Commonly known as:  NEURONTIN Take 1 Cap by mouth two (2) times a day. griseofulvin 500 mg tablet Commonly known as:  Edu Chacko Take 500 mg by mouth daily. LASIX 20 mg tablet Generic drug:  furosemide Take  by mouth as needed. lisinopril 10 mg tablet Commonly known as:  Chelo Fair Take  by mouth daily. morphine CR 15 mg CR tablet Commonly known as:  MS CONTIN Take  by mouth every twelve (12) hours. naloxone 4 mg/actuation nasal spray Commonly known as:  NARCAN  
4 mg by Nasal route as needed. * oxyCODONE-acetaminophen  mg per tablet Commonly known as:  PERCOCET 10 Take 1 Tab by mouth every eight (8) hours as needed for Pain for up to 30 days. Max Daily Amount: 3 Tabs. * oxyCODONE-acetaminophen  mg per tablet Commonly known as:  PERCOCET 10 Take 1 Tab by mouth every eight (8) hours as needed for Pain for up to 30 days. Max Daily Amount: 3 Tabs. Start taking on:  4/22/2018 * oxyCODONE-acetaminophen  mg per tablet Commonly known as:  PERCOCET 10 Take 1 Tab by mouth every eight (8) hours as needed for Pain for up to 30 days. Max Daily Amount: 3 Tabs. Start taking on:  5/21/2018 * oxyMORphone 10 mg ER tablet Commonly known as:  OPANA ER Take 1 Tab by mouth every twelve (12) hours for 7 days. Max Daily Amount: 20 mg.  
  
 * oxyMORphone 15 mg Tr12 Commonly known as:  OPANA ER Take 15 mg by mouth three (3) times daily for 30 days. Max Daily Amount: 45 mg. Start taking on:  3/30/2018 * oxyMORphone 15 mg Tr12 Commonly known as:  OPANA ER Take 15 mg by mouth three (3) times daily for 30 days. Max Daily Amount: 45 mg. Start taking on:  4/29/2018 * oxyMORphone 15 mg Tr12 Commonly known as:  OPANA ER Take 15 mg by mouth three (3) times daily for 30 days. Max Daily Amount: 45 mg. Start taking on:  5/28/2018  
  
 pantoprazole 40 mg tablet Commonly known as:  PROTONIX Take 40 mg by mouth two (2) times a day. PEPCID 20 mg tablet Generic drug:  famotidine Take 20 mg by mouth two (2) times a day. pravastatin 40 mg tablet Commonly known as:  PRAVACHOL Take 40 mg by mouth nightly. PROAIR HFA 90 mcg/actuation inhaler Generic drug:  albuterol Take  by inhalation. tiZANidine 4 mg tablet Commonly known as:  Megan Base Take 4 mg by mouth daily. topiramate 25 mg tablet Commonly known as:  TOPAMAX Take  by mouth two (2) times a day. * traZODone 50 mg tablet Commonly known as:  Sherrlyn Pali Take  by mouth nightly. * traZODone 100 mg tablet Commonly known as:  Sherrlyn Pali Take 100 mg by mouth daily. ursodiol 300 mg capsule Commonly known as:  ACTIGALL Take 300 mg by mouth two (2) times a day. venlafaxine 75 mg tablet Commonly known as:  [facility] Take 25 mg by mouth nightly. VITAMIN D2 50,000 unit capsule Generic drug:  ergocalciferol Take 50,000 Units by mouth. WELLBUTRIN 75 mg tablet Generic drug:  buPROPion Take  by mouth two (2) times a day. * Notice: This list has 9 medication(s) that are the same as other medications prescribed for you. Read the directions carefully, and ask your doctor or other care provider to review them with you. Prescriptions Printed Refills  
 oxyMORphone (OPANA ER) 15 mg TR12 0 Starting on: 3/30/2018 Sig: Take 15 mg by mouth three (3) times daily for 30 days. Max Daily Amount: 45 mg.  
 Class: Print Route: Oral  
 oxyMORphone (OPANA ER) 15 mg TR12 0 Starting on: 4/29/2018 Sig: Take 15 mg by mouth three (3) times daily for 30 days. Max Daily Amount: 45 mg.  
 Class: Print Route: Oral  
 oxyMORphone (OPANA ER) 15 mg TR12 0 Starting on: 5/28/2018 Sig: Take 15 mg by mouth three (3) times daily for 30 days. Max Daily Amount: 45 mg.  
 Class: Print Route: Oral  
 oxyCODONE-acetaminophen (PERCOCET 10)  mg per tablet 0 Sig: Take 1 Tab by mouth every eight (8) hours as needed for Pain for up to 30 days. Max Daily Amount: 3 Tabs. Class: Print Route: Oral  
 oxyCODONE-acetaminophen (PERCOCET 10)  mg per tablet 0 Starting on: 4/22/2018 Sig: Take 1 Tab by mouth every eight (8) hours as needed for Pain for up to 30 days. Max Daily Amount: 3 Tabs. Class: Print Route: Oral  
 oxyCODONE-acetaminophen (PERCOCET 10)  mg per tablet 0 Starting on: 5/21/2018 Sig: Take 1 Tab by mouth every eight (8) hours as needed for Pain for up to 30 days. Max Daily Amount: 3 Tabs. Class: Print Route: Oral  
 oxyMORphone 10 mg PO ER tablet 0 Sig: Take 1 Tab by mouth every twelve (12) hours for 7 days. Max Daily Amount: 20 mg.  
 Class: Print Route: Oral  
  
Follow-up Instructions Return in about 3 months (around 6/23/2018). Patient Instructions 1. Continue current plan with no evidence of addiction or diversion. Stable on current medication without adverse events.    
2. Refill and adjust oxymorphone ER 50 mg down to 10 mg every 12 hours ×7 days and then adjust back to 15 mg thereafter. 3. Refill oxycodone 10/325 mg up to 3 times daily as needed. 4. Please call to disclose any outside controlled substance within 72 hours 5. Continue Home exercise program.   
6. Naloxone 4 mg nasal spray for opioid induced respiratory depression emergency only. 7. Discussed risks of addiction, dependency, and opioid induced hyperalgesia. 8. Return to clinic in 3 months Introducing [facility] & [facility]! [facility] introduces Maraquia patient portal. Now you can access parts of your medical record, email your doctor's office, and request medication refills online. 1. In your internet browser, go to [email or web address] 2. Click on the First Time User? Click Here link in the Sign In box. You will see the New Member Sign Up page. 3. Enter your Maraquia Access Code exactly as it appears below. You will not need to use this code after youve completed the sign-up process. If you do not sign up before the expiration date, you must request a new code. · Maraquia Access Code: [email or web address] Expires: 6/21/2018 11:03 AM 
 
4. Enter the last four digits of your Social Security Number (xxxx) and Date of Birth (mm/dd/yyyy) as indicated and click Submit. You will be taken to the next sign-up page. 5. Create a Maraquia ID. This will be your Maraquia login ID and cannot be changed, so think of one that is secure and easy to remember. 6. Create a Maraquia password. You can change your password at any time. 7. Enter your Password Reset Question and Answer. This can be used at a later time if you forget your password. 8. Enter your e-mail address. You will receive e-mail notification when new information is available in [address] 9. Click Sign Up. You can now view and download portions of your medical record. 10. Click the Download Summary menu link to download a portable copy of your medical information. If you have questions, please visit the Frequently Asked Questions section of the [employer] website. Remember, V[employer] is NOT to be used for urgent needs. For medical emergencies, dial 911. Now available from your iPhone and Android! Please provide this summary of care documentation to your next provider. If you have any questions after today's visit, please call [phone].

## 2018-04-16 ENCOUNTER — OP HISTORICAL/CONVERTED ENCOUNTER (OUTPATIENT)
Dept: OTHER | Age: 53
End: 2018-04-16

## 2018-04-24 ENCOUNTER — IP HISTORICAL/CONVERTED ENCOUNTER (OUTPATIENT)
Dept: OTHER | Age: 53
End: 2018-04-24

## 2018-05-09 ENCOUNTER — TELEPHONE (OUTPATIENT)
Dept: PAIN MANAGEMENT | Age: 53
End: 2018-05-09

## 2018-06-25 ENCOUNTER — OFFICE VISIT (OUTPATIENT)
Dept: PAIN MANAGEMENT | Age: 53
End: 2018-06-25

## 2018-06-25 VITALS
HEIGHT: 68 IN | SYSTOLIC BLOOD PRESSURE: 120 MMHG | RESPIRATION RATE: 16 BRPM | BODY MASS INDEX: 44.41 KG/M2 | DIASTOLIC BLOOD PRESSURE: 77 MMHG | TEMPERATURE: 98 F | WEIGHT: 293 LBS | HEART RATE: 87 BPM

## 2018-06-25 DIAGNOSIS — M47.816 SPONDYLOSIS OF LUMBAR REGION WITHOUT MYELOPATHY OR RADICULOPATHY: ICD-10-CM

## 2018-06-25 DIAGNOSIS — G89.4 CHRONIC PAIN SYNDROME: ICD-10-CM

## 2018-06-25 DIAGNOSIS — G89.29 CHRONIC LOW BACK PAIN WITH SCIATICA, SCIATICA LATERALITY UNSPECIFIED, UNSPECIFIED BACK PAIN LATERALITY: ICD-10-CM

## 2018-06-25 DIAGNOSIS — M54.40 CHRONIC LOW BACK PAIN WITH SCIATICA, SCIATICA LATERALITY UNSPECIFIED, UNSPECIFIED BACK PAIN LATERALITY: ICD-10-CM

## 2018-06-25 DIAGNOSIS — M17.11 PRIMARY OSTEOARTHRITIS OF RIGHT KNEE: ICD-10-CM

## 2018-06-25 DIAGNOSIS — G89.29 CHRONIC PAIN OF RIGHT KNEE: ICD-10-CM

## 2018-06-25 DIAGNOSIS — M25.561 CHRONIC PAIN OF RIGHT KNEE: ICD-10-CM

## 2018-06-25 RX ORDER — OXYCODONE AND ACETAMINOPHEN 10; 325 MG/1; MG/1
1 TABLET ORAL
Qty: 120 TAB | Refills: 0 | Status: SHIPPED | OUTPATIENT
Start: 2018-08-23 | End: 2018-09-22

## 2018-06-25 RX ORDER — OXYCODONE AND ACETAMINOPHEN 10; 325 MG/1; MG/1
1 TABLET ORAL
Qty: 120 TAB | Refills: 0 | Status: SHIPPED | OUTPATIENT
Start: 2018-07-24 | End: 2018-08-23

## 2018-06-25 RX ORDER — OXYCODONE AND ACETAMINOPHEN 10; 325 MG/1; MG/1
1 TABLET ORAL
Qty: 90 TAB | Refills: 0 | Status: SHIPPED | OUTPATIENT
Start: 2018-06-25 | End: 2018-07-25

## 2018-06-25 NOTE — MR AVS SNAPSHOT
[place] [id number] 
[phone] Patient: Tin Bundy MRN: [id number] :1965 Visit Information Date & Time Provider Department Dept. Phone Encounter #  
 [facility] 12[id number] Follow-up Instructions Return in about 3 months (around 2018). Upcoming Health Maintenance Date Due Hepatitis C Screening 1965 DTaP/Tdap/Td series (1 - Tdap) 1986 PAP AKA CERVICAL CYTOLOGY 1986 BREAST CANCER SCRN MAMMOGRAM 2015 FOBT Q 1 YEAR AGE 50-75 2015 MEDICARE YEARLY EXAM 3/14/2018 Influenza Age 5 to Adult 2018 Allergies as of 2018  Review Complete On: 2018 By: TABATHA Cazares No Known Allergies Current Immunizations  Never Reviewed No immunizations on file. Not reviewed this visit You Were Diagnosed With   
  
 Codes Comments Chronic low back pain with sciatica, sciatica laterality unspecified, unspecified back pain laterality     ICD-10-CM: M54.40, G89.29 ICD-9-CM: 724.2, 724.3, 338.29 Chronic pain syndrome     ICD-10-CM: G89.4 ICD-9-CM: 338.4 Spondylosis of lumbar region without myelopathy or radiculopathy     ICD-10-CM: M47.816 ICD-9-CM: 317. 3 Chronic pain of right knee     ICD-10-CM: M25.561, G89.29 ICD-9-CM: 719.46, 338.29 Primary osteoarthritis of right knee     ICD-10-CM: M17.11 ICD-9-CM: 715.16 Vitals BP Pulse Temp Resp Height(growth percentile) Weight(growth percentile) 120/77 (BP 1 Location: Left arm, BP Patient Position: Sitting) 87 98 °F (36.7 °C) 16 5' 8\" (1.727 m) 338 lb (153.3 kg) BMI OB Status Smoking Status 51.39 kg/m2 Hysterectomy Former Smoker BMI and BSA Data Body Mass Index Body Surface Area  
 51.39 kg/m 2 2.71 m 2 Preferred Pharmacy Pharmacy Name Phone [address]ie [address] [facility], [address] [phone] Your Updated Medication List  
  
   
This list is accurate as of 6/25/18  1:19 PM.  Always use your most recent med list.  
  
  
  
  
 ALPRAZolam 0.5 mg tablet Commonly known as:  Townsend Crater Take  by mouth two (2) times a day. amLODIPine 10 mg tablet Commonly known as:  Win John Take  by mouth daily. ARIPiprazole 5 mg tablet Commonly known as:  ABILIFY Take 5 mg by mouth two (2) times a day. Suzette Khoury Use as directed. CeleXA 40 mg tablet Generic drug:  citalopram  
Take 40 mg by mouth daily. FIORICET -40 mg per tablet Generic drug:  butalbital-acetaminophen-caffeine Take 1 Tab by mouth.  
  
 gabapentin 300 mg capsule Commonly known as:  NEURONTIN Take 1 Cap by mouth two (2) times a day. griseofulvin 500 mg tablet Commonly known as:  Elihue Rough Take 500 mg by mouth daily. LASIX 20 mg tablet Generic drug:  furosemide Take  by mouth as needed. lisinopril 10 mg tablet Commonly known as:  Neetu Golder Take  by mouth daily. morphine CR 15 mg CR tablet Commonly known as:  MS CONTIN Take  by mouth every twelve (12) hours. naloxone 4 mg/actuation nasal spray Commonly known as:  NARCAN  
4 mg by Nasal route as needed. * oxyCODONE-acetaminophen  mg per tablet Commonly known as:  PERCOCET 10 Take 1 Tab by mouth every eight (8) hours as needed for Pain for up to 30 days. Max Daily Amount: 3 Tabs. * oxyCODONE-acetaminophen  mg per tablet Commonly known as:  PERCOCET 10 Take 1 Tab by mouth four (4) times daily as needed for Pain for up to 30 days. Max Daily Amount: 4 Tabs. Start taking on:  7/24/2018 * oxyCODONE-acetaminophen  mg per tablet Commonly known as:  PERCOCET 10 Take 1 Tab by mouth four (4) times daily as needed for Pain for up to 30 days. Max Daily Amount: 4 Tabs. Start taking on:  8/23/2018 * oxyMORphone 15 mg Tr12 Commonly known as:  OPANA ER Take 15 mg by mouth three (3) times daily for 30 days. Max Daily Amount: 45 mg.  
  
 * oxyMORphone 10 mg ER tablet Commonly known as:  OPANA ER Take 1 Tab by mouth every eight (8) hours for 30 days. Max Daily Amount: 30 mg. Start taking on:  7/3/2018 * oxyMORphone 10 mg ER tablet Commonly known as:  OPANA ER Take 1 Tab by mouth every twelve (12) hours for 30 days. Max Daily Amount: 20 mg.  
Start taking on:  8/2/2018 * oxyMORphone 5 mg ER tablet Commonly known as:  OPANA ER Take 1 Tab by mouth every twelve (12) hours for 30 days. Max Daily Amount: 10 mg. Start taking on:  9/1/2018  
  
 pantoprazole 40 mg tablet Commonly known as:  PROTONIX Take 40 mg by mouth two (2) times a day. PEPCID 20 mg tablet Generic drug:  famotidine Take 20 mg by mouth two (2) times a day. pravastatin 40 mg tablet Commonly known as:  PRAVACHOL Take 40 mg by mouth nightly. PROAIR HFA 90 mcg/actuation inhaler Generic drug:  albuterol Take  by inhalation. tiZANidine 4 mg tablet Commonly known as:  Patsey Beets Take 4 mg by mouth daily. topiramate 25 mg tablet Commonly known as:  TOPAMAX Take  by mouth two (2) times a day. * traZODone 50 mg tablet Commonly known as:  Wendy Vazquez Take  by mouth nightly. * traZODone 100 mg tablet Commonly known as:  Wendy Vazquez Take 100 mg by mouth daily. ursodiol 300 mg capsule Commonly known as:  ACTIGALL Take 300 mg by mouth two (2) times a day. venlafaxine 75 mg tablet Commonly known as:  [facility] Take 25 mg by mouth nightly. VITAMIN D2 50,000 unit capsule Generic drug:  ergocalciferol Take 50,000 Units by mouth. WELLBUTRIN 75 mg tablet Generic drug:  buPROPion Take  by mouth two (2) times a day. * Notice: This list has 9 medication(s) that are the same as other medications prescribed for you. Read the directions carefully, and ask your doctor or other care provider to review them with you. Prescriptions Printed Refills  
 oxyCODONE-acetaminophen (PERCOCET 10)  mg per tablet 0 Sig: Take 1 Tab by mouth every eight (8) hours as needed for Pain for up to 30 days. Max Daily Amount: 3 Tabs. Class: Print Route: Oral  
 oxyMORphone 10 mg PO ER tablet 0 Starting on: 7/3/2018 Sig: Take 1 Tab by mouth every eight (8) hours for 30 days. Max Daily Amount: 30 mg.  
 Class: Print Route: Oral  
 oxyCODONE-acetaminophen (PERCOCET 10)  mg per tablet 0 Starting on: 7/24/2018 Sig: Take 1 Tab by mouth four (4) times daily as needed for Pain for up to 30 days. Max Daily Amount: 4 Tabs. Class: Print Route: Oral  
 oxyCODONE-acetaminophen (PERCOCET 10)  mg per tablet 0 Starting on: 8/23/2018 Sig: Take 1 Tab by mouth four (4) times daily as needed for Pain for up to 30 days. Max Daily Amount: 4 Tabs. Class: Print Route: Oral  
 oxyMORphone 10 mg PO ER tablet 0 Starting on: 8/2/2018 Sig: Take 1 Tab by mouth every twelve (12) hours for 30 days. Max Daily Amount: 20 mg.  
 Class: Print Route: Oral  
 oxyMORphone 5 mg PO ER tablet 0 Starting on: 9/1/2018 Sig: Take 1 Tab by mouth every twelve (12) hours for 30 days. Max Daily Amount: 10 mg.  
 Class: Print Route: Oral  
  
Follow-up Instructions Return in about 3 months (around 9/25/2018). Patient Instructions 1. Continue current plan with no evidence of addiction or diversion. Stable on current medication without adverse events. 2. Refill and adjust oxymorphone ER 15 mg down to 10 mg every 8 hours x 1 month, then adjust down to 10 mg every 12 hours x 1 month, then adjust down to 5 mg every 12 hours until next visit. 3. Refill oxycodone 10/325 mg up to 3 times daily as needed times 1 month, then adjust up to 4 times daily as needed until next visit. 4. Continue Home exercise program.   
5. Naloxone 4 mg nasal spray for opioid induced respiratory depression emergency only. 6. Discussed risks of addiction, dependency, and opioid induced hyperalgesia. 7. Please remember to call at least 4-5 business days prior to your medication refill. 8. Return to clinic in 3 months. Please call and cancel your appointment and pain management agreement if you do decide to transfer your care. Introducing [facility] & HEALTH SERVICES! [employer] introduces InnoCyte patient portal. Now you can access parts of your medical record, email your doctor's office, and request medication refills online. 1. In your internet browser, go to [email or web address] 2. Click on the First Time User? Click Here link in the Sign In box. You will see the New Member Sign Up page. 3. Enter your InnoCyte Access Code exactly as it appears below. You will not need to use this code after youve completed the sign-up process. If you do not sign up before the expiration date, you must request a new code. · InnoCyte Access Code: [email or web address] Expires: 9/23/2018  1:19 PM 
 
4. Enter the last four digits of your Social Security Number (xxxx) and Date of Birth (mm/dd/yyyy) as indicated and click Submit. You will be taken to the next sign-up page. 5. Create a Root4t ID. This will be your InnoCyte login ID and cannot be changed, so think of one that is secure and easy to remember. 6. Create a InnoCyte password. You can change your password at any time. 7. Enter your Password Reset Question and Answer. This can be used at a later time if you forget your password. 8. Enter your e-mail address. You will receive e-mail notification when new information is available in [address] 9. Click Sign Up.  You can now view and download portions of your medical record. 10. Click the Download Summary menu link to download a portable copy of your medical information. If you have questions, please visit the Frequently Asked Questions section of the [employer] website. Remember, [employer] is NOT to be used for urgent needs. For medical emergencies, dial 911. Now available from your iPhone and Android! Please provide this summary of care documentation to your next provider. If you have any questions after today's visit, please call [phone].

## 2018-06-25 NOTE — PROGRESS NOTES
Nursing Notes    Patient presents to the office today in follow-up. Patient rates her pain at 9/10 on the numerical pain scale. Reviewed medications with counts as follows:    Rx Date filled Qty Dispensed Pill Count Last Dose Short   Percocet 10 mg 05/22/18 90 0 2 days ago no   Oxymorphone ER 15 mg 06/04/18 90 19 This am no         Comments: Patient is here today for a follow up appt today she states her pain level today is a 9  She states she had a knee replacement in [place] and she stayed overnight she states she was given Percocet medication and she called the clinic and informed us   PHQ9 was done patient takes antidepressant medications     POC UDS was not performed in office today    Any new labs or imaging since last appointment? YES    Have you been to an emergency room (ER) or urgent care clinic since your last visit? NO            Have you been hospitalized since your last visit? YES   Knee replacement   If yes, where, when, and reason for visit? Have you seen or consulted any other health care providers outside of the [address] [place] [address]  since your last visit? YES   Knee replacement was done at [facility] [place]   If yes, where, when, and reason for visit? Ms. Little Wiseman has a reminder for a \"due or due soon\" health maintenance. I have asked that she contact her primary care provider for follow-up on this health maintenance.

## 2018-06-25 NOTE — PATIENT INSTRUCTIONS
1. Continue current plan with no evidence of addiction or diversion. Stable on current medication without adverse events. 2. Refill and adjust oxymorphone ER 15 mg down to 10 mg every 8 hours x 1 month, then adjust down to 10 mg every 12 hours x 1 month, then adjust down to 5 mg every 12 hours until next visit. 3. Refill oxycodone 10/325 mg up to 3 times daily as needed times 1 month, then adjust up to 4 times daily as needed until next visit. 4. Continue Home exercise program.    5. Naloxone 4 mg nasal spray for opioid induced respiratory depression emergency only. 6. Discussed risks of addiction, dependency, and opioid induced hyperalgesia. 7. Please remember to call at least 4-5 business days prior to your medication refill. 8. Return to clinic in 3 months. Please call and cancel your appointment and pain management agreement if you do decide to transfer your care.

## 2018-06-25 NOTE — PROGRESS NOTES
HISTORY OF PRESENT ILLNESS  Mian Pacheco is a 46 y.o. female. HPI Ms. Vale Pallas returns today for f/u of chronic low back pain and Right knee pain. H/o partial right TKA. H/o of 2 lumbar surgeries. No h/o ZI injections. Past treatment includes morphine ER and oxymorphone with no improvement. status post gastric sleeve surgery on 10/23/2017. She continues unchanged since last visit. We discussed her current condition and medications in detail today. She has been doing well with her current treatment plan but does continue to report minimal improvement with oxymorphone. We will go ahead and start tapering her oxymorphone. Please see tapering plan below. She will continue with her oxycodone to use on an as-needed basis only. I have agreed to adjust this up to 4 times daily as needed during her tapering plan. We had a lengthy discussion about changes to our practice. I explained to her that moving forward we will be focusing on more conservative and limited opioid plan of care. This will result in changes to her current treatment plan. We have discussed starting tapering oxymorphone today. She is in agreement with this plan as she notices no improvement with oxymorphone. She understands it we will also be tapering her oxycodone when she has completed her taper of oxymorphone. I will have her follow-up in 3 months or sooner if needed. She is currently using Oxymorphone ER 15 mg every 12 hours and Oxycodone 10/325 mg TID PRN. Medications are helping with pain control and quality of life. Her pain is 7/10 with medication and 10/10 without. Pt describes pain as aching. Aggravating factors include standing and walking. Relieved with rest, sitting, lying down, and avoiding painful activities. Current treatment is helping to improve general activity, mood, walking, sleep, enjoyment of life     Ms. Vale Pallas is tolerating medications well, with no side effects noted.  She is able to stay more active with less discomfort with these current doses. In the past 30 days, the patient reports an average of 30% pain relief with current treatment/medications. She is informed of side effects, risks, and benefits of this regimen, and emphasizes that she derives a significant improvement in functionality and quality of life, and notes that non-opioid medications and therapies in the past have not offered significant benefit. She is otherwise doing well with no other complaints today. She denies any adverse events including nausea, vomiting, dizziness, constipation, hallucinations, or seizures. Because the patient's current regimen places him/her at increased risk for possible overdose, a prescription for naloxone nasal spray has been provided. The patient understands that this medication is only to be used in the setting of a possible overdose and that inadvertent use of this medication could precipitate overt withdrawal.      MME: 180    Adjusted MME: 105  COMM: not completed   OSWESTRY: 46 %  Last UDS reviewed  Patient attended education class 3/17/15         No Known Allergies    History reviewed. No pertinent surgical history. Review of Systems   Constitutional: Positive for chills. HENT: Negative for hearing loss. Eyes: Negative for blurred vision and double vision. Respiratory: Negative for cough and sputum production. Cardiovascular: Positive for leg swelling. Gastrointestinal: Positive for heartburn. Genitourinary: Negative for urgency. Skin: Negative for rash. Neurological: Negative for seizures. Endo/Heme/Allergies: Negative for environmental allergies. Does not bruise/bleed easily. Psychiatric/Behavioral: Positive for depression. Negative for suicidal ideas. The patient is nervous/anxious and has insomnia. Physical Exam   Constitutional: She appears well-developed and well-nourished. She is cooperative. She does not have a sickly appearance. Obese, walks with a cane. HENT:   Head: Normocephalic and atraumatic. Eyes: EOM are normal.   Neck: Neck supple. No tracheal deviation present. No thyroid mass present. Pulmonary/Chest: Effort normal. No respiratory distress. Musculoskeletal:   Right knee brace. Gait antalgic    Neurological: She is alert. No cranial nerve deficit. Skin: Skin is intact. No rash noted. Psychiatric: Her speech is normal. Her affect is not angry. She does not express inappropriate judgment. Nursing note and vitals reviewed. ASSESSMENT:    1. Chronic low back pain with sciatica, sciatica laterality unspecified, unspecified back pain laterality    2. Chronic pain syndrome    3. Spondylosis of lumbar region without myelopathy or radiculopathy    4. Chronic pain of right knee    5. Primary osteoarthritis of right knee         [place] Prescription Monitoring Program was reviewed which does not demonstrate aberrancies and/or inconsistencies with regard to the historical prescribing of controlled medications to this patient by other providers. NOTE: disclosed oxycodone for postsurgical pain 10/27/2017    PLAN / Pt Instructions:  1. Continue current plan with no evidence of addiction or diversion. Stable on current medication without adverse events. 2. Refill and adjust oxymorphone ER 15 mg down to 10 mg every 8 hours x 1 month, then adjust down to 10 mg every 12 hours x 1 month, then adjust down to 5 mg every 12 hours until next visit. 3. Refill oxycodone 10/325 mg up to 3 times daily as needed times 1 month, then adjust up to 4 times daily as needed until next visit. 4. Continue Home exercise program.    5. Naloxone 4 mg nasal spray for opioid induced respiratory depression emergency only. 6. Discussed risks of addiction, dependency, and opioid induced hyperalgesia. 7. Please remember to call at least 4-5 business days prior to your medication refill. 8. Return to clinic in 3 months.  Please call and cancel your appointment and pain management agreement if you do decide to transfer your care. Medications Ordered Today   Medications    oxyCODONE-acetaminophen (PERCOCET 10)  mg per tablet     Sig: Take 1 Tab by mouth every eight (8) hours as needed for Pain for up to 30 days. Max Daily Amount: 3 Tabs. Dispense:  90 Tab     Refill:  0    oxyMORphone 10 mg PO ER tablet     Sig: Take 1 Tab by mouth every eight (8) hours for 30 days. Max Daily Amount: 30 mg. Dispense:  90 Tab     Refill:  0    oxyCODONE-acetaminophen (PERCOCET 10)  mg per tablet     Sig: Take 1 Tab by mouth four (4) times daily as needed for Pain for up to 30 days. Max Daily Amount: 4 Tabs. Dispense:  120 Tab     Refill:  0    oxyCODONE-acetaminophen (PERCOCET 10)  mg per tablet     Sig: Take 1 Tab by mouth four (4) times daily as needed for Pain for up to 30 days. Max Daily Amount: 4 Tabs. Dispense:  120 Tab     Refill:  0    oxyMORphone 10 mg PO ER tablet     Sig: Take 1 Tab by mouth every twelve (12) hours for 30 days. Max Daily Amount: 20 mg. Dispense:  60 Tab     Refill:  0    oxyMORphone 5 mg PO ER tablet     Sig: Take 1 Tab by mouth every twelve (12) hours for 30 days. Max Daily Amount: 10 mg. Dispense:  60 Tab     Refill:  0       DISPOSITION   Pain medications are prescribed with the objective of pain relief and improved physical and psychosocial function in this patient.  Patient has been counseled on proper use of prescribed medications.  Patient has been counseled about chronic medical conditions and their relationship to anxiety and depression and recommended mental health support as needed.  Reviewed with patient self-help tools, home exercise, and lifestyle changes to assist the patient in self-management of symptoms.  Reviewed with patient the treatment plan, goals of treatment plan, and limitations of treatment plan, to include the potential for side effects from medications and procedures.   If side effects occur, it is the responsibility of the patient to inform the clinic so that a change in the treatment plan can be made in a safe manner. The patient is advised that stopping prescribed medication may cause an increase in symptoms and possible medication withdrawal symptoms. The patient is informed an emergency room evaluation may be necessary if this occurs. Spent 25 minutes with patient today which more than 50% of that time was spent on counseling and coordination of care. Kurt Brewer, [id number] 6/25/2018     Note: Please excuse any typographical errors. Voice recognition software was used for this note and may cause mistakes.

## 2018-07-20 ENCOUNTER — TELEPHONE (OUTPATIENT)
Dept: PAIN MANAGEMENT | Age: 53
End: 2018-07-20

## 2018-07-20 ENCOUNTER — DOCUMENTATION ONLY (OUTPATIENT)
Dept: PAIN MANAGEMENT | Age: 53
End: 2018-07-20

## 2018-07-20 NOTE — TELEPHONE ENCOUNTER
Sola Gonzalez has called requesting a refill of their controlled medication, Percocet  mg, for the management of Chronic low back pain with sciatica, sciatica laterality unspecified, unspecified back pain laterality . Last office visit date: 6/25/18    Date last  was pulled and reviewed : 7/20/18 and compliant. Last filled 6/25/18    Was the patient compliant when the above report was pulled? yes    Analgesia: Patient report 75% of pain relief with current medication regimen    Aberrancies: No aberranices in the last 30 days. ADL's: Patient report she is able to do basic ADL's at home. Adverse Reaction:Patient report no adverse reaction. Provider's last note and plan of care reviewed? yes  Request forwarded to provider for review.

## 2018-08-24 ENCOUNTER — TELEPHONE (OUTPATIENT)
Dept: PAIN MANAGEMENT | Age: 53
End: 2018-08-24

## 2018-08-24 NOTE — TELEPHONE ENCOUNTER
Patient requested medicine refill [id number] 9:53am    1. Name,  verified  2. Medicine needed - percocet, opana  3.  [phone]  4. Percentage of pain relief while on medicine - 50  5. Can patient do daily tasks - yes  6.   Is patient having any side effects while on medicine - constipation only thing listed

## 2018-08-28 ENCOUNTER — TELEPHONE (OUTPATIENT)
Dept: PAIN MANAGEMENT | Age: 53
End: 2018-08-28

## 2018-08-29 NOTE — TELEPHONE ENCOUNTER
Verbal order was obtained by the provider for the pt to be able to come by the office and  her prescriptions. The order was RBV'd. I called and made the pt aware. She verbalized understanding and will be by the office later to  her prescriptions.

## 2018-10-03 ENCOUNTER — OFFICE VISIT (OUTPATIENT)
Dept: PAIN MANAGEMENT | Age: 53
End: 2018-10-03

## 2018-10-03 VITALS
WEIGHT: 293 LBS | RESPIRATION RATE: 16 BRPM | BODY MASS INDEX: 44.41 KG/M2 | HEIGHT: 68 IN | HEART RATE: 81 BPM | SYSTOLIC BLOOD PRESSURE: 132 MMHG | DIASTOLIC BLOOD PRESSURE: 88 MMHG | TEMPERATURE: 98.4 F

## 2018-10-03 DIAGNOSIS — G89.29 CHRONIC BILATERAL LOW BACK PAIN, WITH SCIATICA PRESENCE UNSPECIFIED: ICD-10-CM

## 2018-10-03 DIAGNOSIS — G89.4 CHRONIC PAIN SYNDROME: Primary | ICD-10-CM

## 2018-10-03 DIAGNOSIS — Z79.899 ENCOUNTER FOR LONG-TERM (CURRENT) USE OF HIGH-RISK MEDICATION: ICD-10-CM

## 2018-10-03 DIAGNOSIS — M25.50 PAIN IN JOINT, MULTIPLE SITES: ICD-10-CM

## 2018-10-03 DIAGNOSIS — M54.5 CHRONIC BILATERAL LOW BACK PAIN, WITH SCIATICA PRESENCE UNSPECIFIED: ICD-10-CM

## 2018-10-03 LAB
ALCOHOL UR POC: NORMAL
AMPHETAMINES UR POC: NEGATIVE
BARBITURATES UR POC: NORMAL
BENZODIAZEPINES UR POC: NORMAL
BUPRENORPHINE UR POC: NEGATIVE
CANNABINOIDS UR POC: NEGATIVE
CARISOPRODOL UR POC: NORMAL
COCAINE UR POC: NEGATIVE
FENTANYL UR POC: NORMAL
MDMA/ECSTASY UR POC: NORMAL
METHADONE UR POC: NEGATIVE
METHAMPHETAMINE UR POC: NORMAL
METHYLPHENIDATE UR POC: NORMAL
OPIATES UR POC: NEGATIVE
OXYCODONE UR POC: NORMAL
PHENCYCLIDINE UR POC: NORMAL
PROPOXYPHENE UR POC: NORMAL
TRAMADOL UR POC: NORMAL
TRICYCLICS UR POC: NORMAL

## 2018-10-03 RX ORDER — OXYCODONE AND ACETAMINOPHEN 10; 325 MG/1; MG/1
1 TABLET ORAL
COMMUNITY
End: 2018-10-03 | Stop reason: SDUPTHER

## 2018-10-03 RX ORDER — OXYCODONE AND ACETAMINOPHEN 10; 325 MG/1; MG/1
1 TABLET ORAL
Qty: 120 TAB | Refills: 0 | Status: SHIPPED | OUTPATIENT
Start: 2018-10-04 | End: 2018-11-05 | Stop reason: SDUPTHER

## 2018-10-03 RX ORDER — OXYMORPHONE HYDROCHLORIDE 5 MG/1
5 TABLET ORAL 2 TIMES DAILY
Qty: 60 TAB | Refills: 0 | Status: SHIPPED | OUTPATIENT
Start: 2018-10-04 | End: 2018-11-05 | Stop reason: SDUPTHER

## 2018-10-03 NOTE — PROGRESS NOTES
Nursing Notes    Patient presents to the office today in follow-up. Patient rates her pain at 9/10 on the numerical pain scale. Reviewed medications with counts as follows:    Rx Date filled Qty Dispensed Pill Count Last Dose Short   Percocet 10/325 mg 09/05/18 120 12 today no   opana ER 10 mg  09/05/18 60 5 today no                            Last opioid agreement 12/06/17  Last urine drug screen 10/03/18    Comments:     POC UDS was performed in office today    Any new labs or imaging since last appointment? NO    Have you been to an emergency room (ER) or urgent care clinic since your last visit? NO            Have you been hospitalized since your last visit? NO     If yes, where, when, and reason for visit? Have you seen or consulted any other health care providers outside of the [address] [place] [address]  since your last visit? NO     If yes, where, when, and reason for visit? Ms. Bhavana Dorman has a reminder for a \"due or due soon\" health maintenance. I have asked that she contact her primary care provider for follow-up on this health maintenance.     PHQ over the last two weeks 10/3/2018   PHQ Not Done -   Little interest or pleasure in doing things Several days   Feeling down, depressed, irritable, or hopeless Not at all   Total Score PHQ 2 1   Trouble falling or staying asleep, or sleeping too much Nearly every day   Feeling tired or having little energy Not at all   Poor appetite, weight loss, or overeating Several days   Feeling bad about yourself - or that you are a failure or have let yourself or your family down Not at all   Trouble concentrating on things such as school, work, reading, or watching TV Not at all   Moving or speaking so slowly that other people could have noticed; or the opposite being so fidgety that others notice Not at all   Thoughts of being better off dead, or hurting yourself in some way Not at all   PHQ 9 Score 5   How difficult have these problems made it for you to do your work, take care of your home and get along with others Somewhat difficult     Pt is on medication for depression. She is controlled at this time. Provider made aware. Fall Risk Assessment, last 12 mths 10/3/2018   Able to walk? Yes   Fall in past 12 months?  No

## 2018-10-03 NOTE — PROGRESS NOTES
Referral date 2/25/15, source Dr. Janki Tyler (PCP) and for joint pain and low back pain. HPI:  Page Bernabe is a 48 y.o. female here for f/u visit for ongoing evaluation of chronic low back and joint pain. Pt was last seen here on 6/25/18. Pt denies interval changes on the character or distribution of pain. Pain is located lower back, neto knees, neto feet and neto hands. The pain is described as aching and burning with pins and needles to neto feet. Pain at its best is 9/10. Pain at its worse is 10/10. The pain is worsened by house chores, walking, bending. Symptoms are relieved by pace hands in hot water, massaging feet, injections (for a day). Pt has tried oxymorphone, oxycodone,compound cream, lidocaine patches with no perceived benefit. Patient has history of right TKA, lumbar surgery x2. Pt followed up with PCP about new left pinky numbness. Pt has not been to a chiropractor, RFA, acupuncture, SCS, nexwave. Social History     Social History    Marital status: SINGLE     Spouse name: N/A    Number of children: N/A    Years of education: N/A     Occupational History    Not on file. Social History Main Topics    Smoking status: Former Smoker    Smokeless tobacco: Never Used    Alcohol use No    Drug use: No    Sexual activity: Not on file     Other Topics Concern    Not on file     Social History Narrative     History reviewed. No pertinent family history. No Known Allergies  Past Medical History:   Diagnosis Date    Arthritis     Back injury     Essential hypertension      History reviewed. No pertinent surgical history. Current Outpatient Prescriptions on File Prior to Visit   Medication Sig    ARIPiprazole (ABILIFY) 5 mg tablet Take 5 mg by mouth daily.  traZODone (DESYREL) 100 mg tablet Take 100 mg by mouth daily.  pantoprazole (PROTONIX) 40 mg tablet Take 40 mg by mouth two (2) times a day.  tiZANidine (ZANAFLEX) 4 mg tablet Take 4 mg by mouth daily.     gabapentin (NEURONTIN) 300 mg capsule Take 1 Cap by mouth two (2) times a day.  albuterol (PROAIR HFA) 90 mcg/actuation inhaler Take 2 Puffs by inhalation every four (4) hours as needed.  ergocalciferol (VITAMIN D2) 50,000 unit capsule Take 50,000 Units by mouth every seven (7) days.  Cane lita Use as directed.  buPROPion (WELLBUTRIN) 75 mg tablet Take  by mouth two (2) times a day.  topiramate (TOPAMAX) 25 mg tablet Take  by mouth two (2) times a day.  amLODIPine (NORVASC) 10 mg tablet Take  by mouth daily.  ALPRAZolam (XANAX) 0.5 mg tablet Take  by mouth two (2) times a day.  citalopram (CELEXA) 40 mg tablet Take 40 mg by mouth daily.  lisinopril (PRINIVIL, ZESTRIL) 10 mg tablet Take  by mouth daily.  butalbital-acetaminophen-caffeine (FIORICET) -40 mg per tablet Take 1 Tab by mouth daily as needed.  furosemide (LASIX) 20 mg tablet Take  by mouth as needed.  venlafaxine (EFFEXOR) 75 mg tablet Take 25 mg by mouth nightly.  pravastatin (PRAVACHOL) 40 mg tablet Take 40 mg by mouth nightly.  naloxone 4 mg/actuation spry 4 mg by Nasal route as needed. No current facility-administered medications on file prior to visit. ROS:  Denies fever,  nausea, vomiting, diarrhea,chest pain,  weakness, trouble swallowing, changes in hearing, falls, dizziness, bladder incontinence, bowel incontinence,suicidal ideations, homicidal ideations or alcohol use. Positive findings for chills, constipation, shortness of breath/trouble breathing, abdominal pain, changes in vision, depression, anxiety    Opioid specific risk: Benzo. Opioid specific history: Concurrent opioid use since approximately 2015 with no opioid holiday.     Vitals:    10/03/18 1554   BP: 132/88   Pulse: 81   Resp: 16   Temp: 98.4 °F (36.9 °C)   TempSrc: Oral   Weight: (!) 160.6 kg (354 lb)   Height: 5' 8\" (1.727 m)   PainSc:   9        Imaging:  ordered    Labs:   BUN/Cr:   AST/ALT:       Physical exam:  AFVSS, no acute distress, overweight body habitus. A&OXs 3. Normocephalic, atraumatic. Conjugate gaze, clear sclerae. Speech is clear and appropriate. Mood is appropriate and patient is cooperative. Gait and balance are within functional limits. Non-labored breathing. Lungs CTA B/L. No wheezing, rales or rhonchi. Heart RRR with S1 and S2 noted. No murmurs. UE:   Strength for right upper extremity is 5/5 for shoulder abduction, elbow flexion, wrist extension, elbow extension, finger abduction, flexor digitorum profundus to digits 2 through 5. Strength for left upper extremity is 5/5 for shoulder abduction, elbow flexion, wrist extension, elbow extension, finger abduction, flexor digitorum profundus to digits 2 through 5. Decreased sensation to light touch is intact for left C4-T1. Sensation to light touch is intact for right C4-T1. Muscle stretch reflexes for right upper extremity are absent for C5, C6, C7. Muscle stretch reflexes for left upper extremity are absent for C5, C6, C7. LE:   Strength for right lower extremity is 5/5 for hip flexion, knee extension, dorsiflexion, extensor hallucis longus, plantar flexion. Strength for left lower extremity is 5/5 for hip flexion, knee extension, dorsiflexion, extensor hallucis longus, plantar flexion. Sensation to light touch is intact for right L2-S2. Sensation to light touch is intact for left L2-S2. Muscle Stretch reflexes for right lower extremity are absent for L4, S1.   Muscle Stretch reflexes for left lower extremity are absent for L4, S1.   Negative ankle clonus on the right and the left. Downgoing plantar response on the right and the left. Negative seated straight leg raise bilaterally. Negative supine straight leg raise bilaterally. Full AROM lumbar flexion decreased by 50% to endrange reproduction of primary pain. Full AROM lumbar extension decreased by 75% to endrange reproduction of primary pain.   TTP bilateral SIJ, bilateral piriformis, lumbosacral beltline. Exam limited by patient's ability to lie on exam table due to increased pain    Calculated MEQ - 120  Naloxone rescue - no  Prophylactic bowel program - no  Date of last OCA 12/21/17  Last UDS today, consistent   date checked today, findings consistent    Primary Care Physician  No primary care provider on file. No primary provider on file. None    Today   Last Visit  Prior Visit  ORT -        PGIC -2 and 9       ERROL -at least 48%  46%     COMM - 8  not completed       PHQ -- . PHQ over the last two weeks 10/3/2018   PHQ Not Done -   Little interest or pleasure in doing things Several days   Feeling down, depressed, irritable, or hopeless Not at all   Total Score PHQ 2 1   Trouble falling or staying asleep, or sleeping too much Nearly every day   Feeling tired or having little energy Not at all   Poor appetite, weight loss, or overeating Several days   Feeling bad about yourself - or that you are a failure or have let yourself or your family down Not at all   Trouble concentrating on things such as school, work, reading, or watching TV Not at all   Moving or speaking so slowly that other people could have noticed; or the opposite being so fidgety that others notice Not at all   Thoughts of being better off dead, or hurting yourself in some way Not at all   PHQ 9 Score 5   How difficult have these problems made it for you to do your work, take care of your home and get along with others Somewhat difficult       DSM V-OUD Screen -deferred    Assessment/Plan:     ICD-10-CM ICD-9-CM    1. Chronic pain syndrome G89.4 338.4 XR SPINE LUMB COMP W BEND      oxyMORphone (OPANA) 5 mg tablet      oxyCODONE-acetaminophen (PERCOCET 10)  mg per tablet   2. Encounter for long-term (current) use of high-risk medication Z79.899 V58.69 DRUG SCREEN      AMB POC DRUG SCREEN ()   3.  Chronic bilateral low back pain, with sciatica presence unspecified M54.5 724.2 XR SPINE LUMB COMP W BEND    G89.29 338.29 oxyMORphone (OPANA) 5 mg tablet      oxyCODONE-acetaminophen (PERCOCET 10)  mg per tablet   4. Pain in joint, multiple sites M25.50 719.49 oxyMORphone (OPANA) 5 mg tablet      oxyCODONE-acetaminophen (PERCOCET 10)  mg per tablet      Ordered nexwave to be used as needed for pain    Order compound cream to be used 3-4 times daily as needed for pain    Ordered xray lumbar spine with flexion/extension/Gonzalez views    Do not recommend long term opioid therapy for this patient at this time. Pt currently taking oxymorphone 10 mg tablet 1 tablet every 12 hours for pain and oxycodone/acetaminophen 10/325 mg tablet take 1 tablet 4 times daily as needed for pain. Their MME is 120. Today, we will continue the weaning of patients opioid medication with a goal of being opioid free, pending safety and compliance. Pt instructed to call if they experience any signs of withdrawal (diarrhea, nausea, vomiting, sweating or chills, agitation, itching). Today, pt given prescription for oxymorphone 5 mg tablet take 1 tablet 12 hours daily for pain and oxycodone/acetaminophen 10/325 mg tablet take 1 tablet 4 times daily as needed for pain. Their new MME is 90. Will address short-acting opioid once extended release has been discontinued. Pt instructed to call 7-10 days before they run out of their medications for refill. At this time pt will be provided with oxymorphone 5 mg tablet 1 tablet daily for pain  and  oxycodone/acetaminophen 10/325 mg tablet take 1 tablet 4 times daily as needed for pain pending safety and compliance. At following refill, pt will be provided with discontinue oxymorphone and continue oxycodone/acetaminophen 10/325 mg tablet take 1 tablet 4 times daily as needed for pain  pending safety and compliance.     At next office visit, the plan is to provide patient with oxycodone/acetaminophen 10/325 mg tablet take 1 tablet 4 times daily as needed for pain with a total of 110 tablets to be budgeted over 30 days. Their new MME will be approximately 60. If patient has difficulty with the wean or difficulty with cravings we will consider referral to mental health for ongoing assessment and treatment for opioid use disorder. Consider RFA, physical therapy, SCS trial    Follow up ongoing assessment and ongoing development of integrative and comprehensive plan of care for chronic pain. Goals: To establish complementary and integrative plan of care to address chronic pain issues while minimizing pharmaceuticals to maximize patient's function improve quality of life. Education:  Patient again educated on the importance of strict compliance with the opioid care agreement while on opioid therapy. Patient also again educated that they should avoid driving while on chronic opioid therapy. Also advised to avoid alcohol and to avoid benzodiazepines while on opioid therapy. Handouts given regarding opioid safety and sleep health. Patient Homework:  Continue to independently investigate yoga for persons with chronic pain and core strengthening exercises. Follow-up Disposition:  Return in about 3 months (around 1/3/2019). [address] was used for portions of this report. Unintended errors may occur.

## 2018-10-03 NOTE — MR AVS SNAPSHOT
[address] [facility] [address] 
[phone] Patient: Yuliet Walter MRN: [id number] :1965 Visit Information Date & Time Provider Department Dept. Phone Encounter #  
 10/3/2018  3:30 PM Luz Dugan NP [address] [facility] [phone] Follow-up Instructions Return in about 3 months (around 1/3/2019). Upcoming Health Maintenance Date Due Hepatitis C Screening 1965 DTaP/Tdap/Td series (1 - Tdap) 1986 PAP AKA CERVICAL CYTOLOGY 1986 Shingrix Vaccine Age 50> (1 of 2) 2015 BREAST CANCER SCRN MAMMOGRAM 2015 FOBT Q 1 YEAR AGE 50-75 2015 MEDICARE YEARLY EXAM 3/14/2018 Influenza Age 5 to Adult 2018 Allergies as of 10/3/2018  Review Complete On: 10/3/2018 By: Luz Dugan NP No Known Allergies Current Immunizations  Never Reviewed No immunizations on file. Not reviewed this visit You Were Diagnosed With   
  
 Codes Comments Chronic pain syndrome    -  Primary ICD-10-CM: G89.4 ICD-9-CM: 338.4 Encounter for long-term (current) use of high-risk medication     ICD-10-CM: Z79.899 ICD-9-CM: V58.69 Chronic bilateral low back pain, with sciatica presence unspecified     ICD-10-CM: M54.5, G89.29 ICD-9-CM: 724.2, 338.29 Pain in joint, multiple sites     ICD-10-CM: M25.50 ICD-9-CM: 719.49 Vitals BP Pulse Temp Resp Height(growth percentile) Weight(growth percentile) 132/88 (BP 1 Location: Right arm, BP Patient Position: Sitting) 81 98.4 °F (36.9 °C) (Oral) 16 5' 8\" (1.727 m) (!) 354 lb (160.6 kg) BMI OB Status Smoking Status 53.83 kg/m2 Hysterectomy Former Smoker Vitals History BMI and BSA Data Body Mass Index Body Surface Area  
 53.83 kg/m 2 2.78 m 2 Preferred Pharmacy Pharmacy Name Phone [address] [facility], [address] [phone] Your Updated Medication List  
  
   
This list is accurate as of 10/3/18  4:46 PM.  Always use your most recent med list.  
  
  
  
  
 ALPRAZolam 0.5 mg tablet Commonly known as:  aYzmin Rousseau Take  by mouth two (2) times a day. amLODIPine 10 mg tablet Commonly known as:  Betty Gaytaner Take  by mouth daily. ARIPiprazole 5 mg tablet Commonly known as:  ABILIFY Take 5 mg by mouth daily. Marcellina Gosling Use as directed. CeleXA 40 mg tablet Generic drug:  citalopram  
Take 40 mg by mouth daily. FIORICET -40 mg per tablet Generic drug:  butalbital-acetaminophen-caffeine Take 1 Tab by mouth daily as needed. gabapentin 300 mg capsule Commonly known as:  NEURONTIN Take 1 Cap by mouth two (2) times a day. LASIX 20 mg tablet Generic drug:  furosemide Take  by mouth as needed. lisinopril 10 mg tablet Commonly known as:  Daniel Spinner Take  by mouth daily. naloxone 4 mg/actuation nasal spray Commonly known as:  NARCAN  
4 mg by Nasal route as needed. * OPANA ER 10 mg ER tablet Generic drug:  oxyMORphone Take 10 mg by mouth every twelve (12) hours. * oxyMORphone 5 mg tablet Commonly known as:  OPANA Take 1 Tab by mouth two (2) times a day for 30 days. Max Daily Amount: 10 mg. Start taking on:  10/4/2018  
  
 oxyCODONE-acetaminophen  mg per tablet Commonly known as:  PERCOCET 10 Take 1 Tab by mouth every six (6) hours as needed for Pain for up to 30 days. Max Daily Amount: 4 Tabs. Start taking on:  10/4/2018  
  
 pantoprazole 40 mg tablet Commonly known as:  PROTONIX Take 40 mg by mouth two (2) times a day. pravastatin 40 mg tablet Commonly known as:  PRAVACHOL Take 40 mg by mouth nightly. PROAIR HFA 90 mcg/actuation inhaler Generic drug:  albuterol Take 2 Puffs by inhalation every four (4) hours as needed. tiZANidine 4 mg tablet Commonly known as:  Reno Weber Take 4 mg by mouth daily. topiramate 25 mg tablet Commonly known as:  TOPAMAX Take  by mouth two (2) times a day. traZODone 100 mg tablet Commonly known as:  Evelina Delfino Take 100 mg by mouth daily. venlafaxine 75 mg tablet Commonly known as:  [facility] Take 25 mg by mouth nightly. VITAMIN D2 50,000 unit capsule Generic drug:  ergocalciferol Take 50,000 Units by mouth every seven (7) days. WELLBUTRIN 75 mg tablet Generic drug:  buPROPion Take  by mouth two (2) times a day. * Notice: This list has 2 medication(s) that are the same as other medications prescribed for you. Read the directions carefully, and ask your doctor or other care provider to review them with you. Prescriptions Printed Refills  
 oxyMORphone (OPANA) 5 mg tablet 0 Starting on: 10/4/2018 Sig: Take 1 Tab by mouth two (2) times a day for 30 days. Max Daily Amount: 10 mg.  
 Class: Print Route: Oral  
 oxyCODONE-acetaminophen (PERCOCET 10)  mg per tablet 0 Starting on: 10/4/2018 Sig: Take 1 Tab by mouth every six (6) hours as needed for Pain for up to 30 days. Max Daily Amount: 4 Tabs. Class: Print Route: Oral  
  
We Performed the Following AMB POC DRUG SCREEN () [ [facility]] DRUG SCREEN [[id number] Custom] Follow-up Instructions Return in about 3 months (around 1/3/2019). To-Do List   
 10/03/2018 Imaging:  XR SPINE LUMB COMP W BEND Patient Instructions Learning About Sleeping Well What does sleeping well mean? Sleeping well means getting enough sleep. How much sleep is enough varies among people. The number of hours you sleep is not as important as how you feel when you wake up. If you do not feel refreshed, you probably need more sleep. Another sign of not getting enough sleep is feeling tired during the day. The average total nightly sleep time is 7½ to 8 hours. Healthy adults may need a little more or a little less than this. Why is getting enough sleep important? Getting enough quality sleep is a basic part of good health. When your sleep suffers, your mood and your thoughts can suffer too. You may find yourself feeling more grumpy or stressed. Not getting enough sleep also can lead to serious problems, including injury, accidents, anxiety, and depression. What might cause poor sleeping? Many things can cause sleep problems, including: · Stress. Stress can be caused by fear about a single event, such as giving a speech. Or you may have ongoing stress, such as worry about work or school. · Depression, anxiety, and other mental or emotional conditions. · Changes in your sleep habits or surroundings. This includes changes that happen where you sleep, such as noise, light, or sleeping in a different bed. It also includes changes in your sleep pattern, such as having jet lag or working a late shift. · Health problems, such as pain, breathing problems, and restless legs syndrome. · Lack of regular exercise. How can you help yourself? Here are some tips that may help you sleep more soundly and wake up feeling more refreshed. Your sleeping area · Use your bedroom only for sleeping and sex. A bit of light reading may help you fall asleep. But if it doesn't, do your reading elsewhere in the house. Don't watch TV in bed. · Be sure your bed is big enough to stretch out comfortably, especially if you have a sleep partner. · Keep your bedroom quiet, dark, and cool. Use curtains, blinds, or a sleep mask to block out light. To block out noise, use earplugs, soothing music, or a \"white noise\" machine. Your evening and bedtime routine · Create a relaxing bedtime routine. You might want to take a warm shower or bath, listen to soothing music, or drink a cup of noncaffeinated tea. · Go to bed at the same time every night. And get up at the same time every morning, even if you feel tired. What to avoid · Limit caffeine (coffee, tea, caffeinated sodas) during the day, and don't have any for at least 4 to 6 hours before bedtime. · Don't drink alcohol before bedtime. Alcohol can cause you to wake up more often during the night. · Don't smoke or use tobacco, especially in the evening. Nicotine can keep you awake. · Don't take naps during the day, especially close to bedtime. · Don't lie in bed awake for too long. If you can't fall asleep, or if you wake up in the middle of the night and can't get back to sleep within 15 minutes or so, get out of bed and go to another room until you feel sleepy. · Don't take medicine right before bed that may keep you awake or make you feel hyper or energized. Your doctor can tell you if your medicine may do this and if you can take it earlier in the day. If you can't sleep · Imagine yourself in a peaceful, pleasant scene. Focus on the details and feelings of being in a place that is relaxing. · Get up and do a quiet or boring activity until you feel sleepy. · Don't drink any liquids after 6 p.m. if you wake up often because you have to go to the bathroom. Where can you learn more? Go to http://karen-vijay.info/. Enter T155 in the search box to learn more about \"Learning About Sleeping Well. \" Current as of: December 7, 2017 Content Version: 11.8 © 2009-2941 [employer] ([employer]). Care instructions adapted under license by "[employer] (which disclaims liability or warranty for this information). If you have questions about a medical condition or this instruction, always ask your healthcare professional. [place] [address] any warranty or liability for your use of this information. Safe Use of Opioid Pain Medicine: Care Instructions Your Care Instructions Pain is your body's way of warning you that something is wrong. Pain feels different for everybody. Only you can describe your pain. A doctor can suggest or prescribe many types of medicines for pain. These range from over-the-counter medicines like acetaminophen (Tylenol) to powerful medicines called opioids. Examples of opioids are fentanyl, hydrocodone, morphine, and oxycodone. Heroin is an illegal opioid Opioids are strong medicines. They can help you manage pain when you use them the right way. But if you misuse them, they can cause serious harm and even death. For these reasons, doctors are very careful about how they prescribe opioids. If you decide to take opioids, here are some things to remember. · Keep your doctor informed. You can get addicted to opioids. The risk is higher if you have a history of substance use. Your doctor will monitor you closely for signs of misuse and addiction and to figure out when you no longer need to take opioids. · Make a treatment plan. The goal of your plan is to be able to function and do the things you need to do, even if you still have some pain. You might be able to manage your pain with other non-opioid options like physical therapy, relaxation, or over-the-counter pain medicines. · Be aware of the side effects. Opioids can cause serious side effects, such as constipation, dry mouth, and nausea. And over time, you may need a higher dose to get pain relief. This is called tolerance. Your body also gets used to opioids. This is called physical dependence. If you suddenly stop taking them, you may have withdrawal symptoms. The doctor carefully considered what pain medicine is right for you. You may not have received opioids if your doctor was concerned about drug interactions or your safety, or if he or she had other concerns. It is best to have one doctor or clinic treat your pain.  This way you will get the pain medicine that will help you the most. And a doctor will be able to watch for any problems that the medicine might cause. The doctor has checked you carefully, but problems can develop later. If you notice any problems or new symptoms,  get medical treatment right away. Follow-up care is a key part of your treatment and safety. Be sure to make and go to all appointments, and call your doctor if you are having problems. It's also a good idea to know your test results and keep a list of the medicines you take. How can you care for yourself at home? · If you need to take opioids to manage your pain, remember these safety tips. ¨ Follow directions carefully. It's easy to misuse opioids if you take a dose other than what's prescribed by your doctor. This can lead to overdose and even death. Even sharing them with someone they weren't meant for is misuse. ¨ Be cautious. Opioids may affect your judgment and decision making. Do not drive or operate machinery until you can think clearly. Talk with your doctor about when it is safe to drive. ¨ Reduce the risk of drug interactions. Opioids can be dangerous if you take them with alcohol or with certain drugs like sleeping pills and muscle relaxers. Make sure your doctor knows about all the other medicines you take, including over-the-counter medicines. Don't start any new medicines before you talk to your doctor or pharmacist. 
Grace Harrington Keep others safe. Store opioids in a safe and secure place. Make sure that pets, children, friends, and family can't get to them. When you're done using opioids, make sure to properly dispose of them. You can either use a community drug take-back program or your drugstore's mail-back program. If one of these programs isn't available, you can flush opioid skin patches and unused opioid pills down the toilet. ¨ Reduce the risk of overdose. Misuse of opioids can be very dangerous. Protect yourself by asking your doctor about a naloxone rescue kit. It can help youand even save your lifeif you take too much of an opioid. · Try other ways to reduce pain. ¨ Relax, and reduce stress. Relaxation techniques such as deep breathing or meditation can help. ¨ Keep moving. Gentle, daily exercise can help reduce pain over the long run. Try low- or no-impact exercises such as walking, swimming, and stationary biking. Do stretches to stay flexible. ¨ Try heat, cold packs, and massage. ¨ Get enough sleep. Pain can make you tired and drain your energy. Talk with your doctor if you have trouble sleeping because of pain. ¨ Think positive. Your thoughts can affect your pain level. Do things that you enjoy to distract yourself when you have pain instead of focusing on the pain. See a movie, read a book, listen to music, or spend time with a friend. · If you are not taking a prescription pain medicine, ask your doctor if you can take an over-the-counter medicine. When should you call for help? Call your doctor now or seek immediate medical care if: 
  · You have a new kind of pain.  
  · You have new symptoms, such as a fever or rash, along with the pain.  
 Watch closely for changes in your health, and be sure to contact your doctor if: 
  · You think you might be using too much pain medicine, and you need help to use less or stop.  
  · Your pain gets worse.  
  · You would like a referral to a doctor or clinic that specializes in pain management. Where can you learn more? Go to http://karen-vijay.info/. Enter R108 in the search box to learn more about \"Safe Use of Opioid Pain Medicine: Care Instructions. \" Current as of: September 10, 2017 Content Version: 11.8 © 9877-2011 [employer]. Care instructions adapted under license by [employer] (which disclaims liability or warranty for this information).  If you have questions about a medical condition or this instruction, always ask your healthcare professional. [address] any warranty or liability for your use of this information. Recovering From Depression: Care Instructions Your Care Instructions Taking good care of yourself is important as you recover from depression. In time, your symptoms will fade as your treatment takes hold. Do not give up. Instead, focus your energy on getting better. Your mood will improve. It just takes some time. Focus on things that can help you feel better, such as being with friends and family, eating well, and getting enough rest. But take things slowly. Do not do too much too soon. You will begin to feel better gradually. Follow-up care is a key part of your treatment and safety. Be sure to make and go to all appointments, and call your doctor if you are having problems. It's also a good idea to know your test results and keep a list of the medicines you take. How can you care for yourself at home? Be realistic · If you have a large task to do, break it up into smaller steps you can handle, and just do what you can. · You may want to put off important decisions until your depression has lifted. If you have plans that will have a major impact on your life, such as marriage, divorce, or a job change, try to wait a bit. Talk it over with friends and loved ones who can help you look at the overall picture first. 
· Reaching out to people for help is important. Do not isolate yourself. Let your family and friends help you. Find someone you can trust and confide in, and talk to that person. · Be patient, and be kind to yourself. Remember that depression is not your fault and is not something you can overcome with willpower alone. Treatment is necessary for depression, just like for any other illness. Feeling better takes time, and your mood will improve little by little. Stay active · Stay busy and get outside. Take a walk, or try some other light exercise. · Talk with your doctor about an exercise program. Exercise can help with mild depression. · Go to a movie or concert. Take part in a Buddhism activity or other social gathering. Go to a ball game. · Ask a friend to have dinner with you. Take care of yourself · Eat a balanced diet with plenty of fresh fruits and vegetables, whole grains, and lean protein. If you have lost your appetite, eat small snacks rather than large meals. · Avoid drinking alcohol or using illegal drugs. Do not take medicines that have not been prescribed for you. They may interfere with medicines you may be taking for depression, or they may make your depression worse. · Take your medicines exactly as they are prescribed. You may start to feel better within 1 to 3 weeks of taking antidepressant medicine. But it can take as many as 6 to 8 weeks to see more improvement. If you have questions or concerns about your medicines, or if you do not notice any improvement by 3 weeks, talk to your doctor. · If you have any side effects from your medicine, tell your doctor. Antidepressants can make you feel tired, dizzy, or nervous. Some people have dry mouth, constipation, headaches, sexual problems, or diarrhea. Many of these side effects are mild and will go away on their own after you have been taking the medicine for a few weeks. Some may last longer. Talk to your doctor if side effects are bothering you too much. You might be able to try a different medicine. · Get enough sleep. If you have problems sleeping: ¨ Go to bed at the same time every night, and get up at the same time every morning. ¨ Keep your bedroom dark and quiet. ¨ Do not exercise after 5:00 p.m. ¨ Avoid drinks with caffeine after 5:00 p.m. · Avoid sleeping pills unless they are prescribed by the doctor treating your depression.  Sleeping pills may make you groggy during the day, and they may interact with other medicine you are taking. · If you have any other illnesses, such as diabetes, heart disease, or high blood pressure, make sure to continue with your treatment. Tell your doctor about all of the medicines you take, including those with or without a prescription. · Keep the numbers for these national suicide hotlines: 9-380-100-TALK (4-229.761.7477) and 7-061-KBRERAN (5-127.371.6103). If you or someone you know talks about suicide or feeling hopeless, get help right away. When should you call for help? Call 911 anytime you think you may need emergency care. For example, call if: 
  · You feel like hurting yourself or someone else.  
  · Someone you know has depression and is about to attempt or is attempting suicide.  
[facility] your doctor now or seek immediate medical care if: 
  · You hear voices.  
  · Someone you know has depression and: 
¨ Starts to give away his or her possessions. ¨ Uses illegal drugs or drinks alcohol heavily. ¨ Talks or writes about death, including writing suicide notes or talking about guns, knives, or pills. ¨ Starts to spend a lot of time alone. ¨ Acts very aggressively or suddenly appears calm.  
 Watch closely for changes in your health, and be sure to contact your doctor if: 
  · You do not get better as expected. Where can you learn more? Go to http://karen-vijay.info/. Enter B834 in the search box to learn more about \"Recovering From Depression: Care Instructions. \" Current as of: December 7, 2017 Content Version: 11.7 © 0280-3565 [employer]. Care instructions adapted under license by [employer] (which disclaims liability or warranty for this information). If you have questions about a medical condition or this instruction, always ask your healthcare professional. [address] any warranty or liability for your use of this information. Introducing [facility] SERVICES! [employer] introduces Swish patient portal. Now you can access parts of your medical record, email your doctor's office, and request medication refills online. 1. In your internet browser, go to [email or web address] 2. Click on the First Time User? Click Here link in the Sign In box. You will see the New Member Sign Up page. 3. Enter your Swish Access Code exactly as it appears below. You will not need to use this code after youve completed the sign-up process. If you do not sign up before the expiration date, you must request a new code. · Swish Access Code: [email or web address] Expires: 1/1/2019  4:46 PM 
 
4. Enter the last four digits of your Social Security Number (xxxx) and Date of Birth (mm/dd/yyyy) as indicated and click Submit. You will be taken to the next sign-up page. 5. Create a Swish ID. This will be your Swish login ID and cannot be changed, so think of one that is secure and easy to remember. 6. Create a Swish password. You can change your password at any time. 7. Enter your Password Reset Question and Answer. This can be used at a later time if you forget your password. 8. Enter your e-mail address. You will receive e-mail notification when new information is available in [address] 9. Click Sign Up. You can now view and download portions of your medical record. 10. Click the Download Summary menu link to download a portable copy of your medical information. If you have questions, please visit the Frequently Asked Questions section of the Swish website. Remember, Swish is NOT to be used for urgent needs. For medical emergencies, dial 911. Now available from your iPhone and Android! Please provide this summary of care documentation to your next provider. If you have any questions after today's visit, please call [phone].

## 2018-11-05 DIAGNOSIS — M54.5 CHRONIC BILATERAL LOW BACK PAIN, WITH SCIATICA PRESENCE UNSPECIFIED: ICD-10-CM

## 2018-11-05 DIAGNOSIS — M25.50 PAIN IN JOINT, MULTIPLE SITES: ICD-10-CM

## 2018-11-05 DIAGNOSIS — G89.4 CHRONIC PAIN SYNDROME: ICD-10-CM

## 2018-11-05 DIAGNOSIS — G89.29 CHRONIC BILATERAL LOW BACK PAIN, WITH SCIATICA PRESENCE UNSPECIFIED: ICD-10-CM

## 2018-11-05 RX ORDER — OXYMORPHONE HYDROCHLORIDE 5 MG/1
5 TABLET ORAL DAILY
Qty: 30 TAB | Refills: 0 | Status: SHIPPED | OUTPATIENT
Start: 2018-11-05 | End: 2018-12-05

## 2018-11-05 RX ORDER — OXYCODONE AND ACETAMINOPHEN 10; 325 MG/1; MG/1
1 TABLET ORAL
Qty: 120 TAB | Refills: 0 | Status: SHIPPED | OUTPATIENT
Start: 2018-11-05 | End: 2018-12-04 | Stop reason: SDUPTHER

## 2018-11-05 NOTE — TELEPHONE ENCOUNTER
Chong Cunningham has called requesting a refill of their controlled medication, Opana 5 mg tab and Percocet  mg tab, for the management of chronic LBP and joint pain. Last office visit date: 10/3/18 with Talha Ngo and has a f/u appt with them on 1/3/19. Date last  was pulled and reviewed : 11/5/18. Last fill date: 10/6/18. Was the patient compliant when the above report was pulled? Patient filled Alprazolam 0.5 MG Tablet from another provider on 10/18/18. Analgesia: Patient reports 50% pain relief. Aberrancies: None reported in the last 30 days. ADL's: Patient states they are able to perform ADL's on current regimen. Adverse Reaction: Patient reports constipation. Provider's last note and plan of care reviewed? yes  Request forwarded to provider for review.     Last UDS:10/3/18  Last OCA: 12/7/17

## 2018-11-06 NOTE — TELEPHONE ENCOUNTER
Attempted to contact patient regarding prescription pick-up, to inform them it was ready for  ; no answer noted at number given; vm left to contact triage line.

## 2018-12-04 DIAGNOSIS — G89.29 CHRONIC BILATERAL LOW BACK PAIN, WITH SCIATICA PRESENCE UNSPECIFIED: ICD-10-CM

## 2018-12-04 DIAGNOSIS — M54.5 CHRONIC BILATERAL LOW BACK PAIN, WITH SCIATICA PRESENCE UNSPECIFIED: ICD-10-CM

## 2018-12-04 DIAGNOSIS — M25.50 PAIN IN JOINT, MULTIPLE SITES: ICD-10-CM

## 2018-12-04 DIAGNOSIS — G89.4 CHRONIC PAIN SYNDROME: ICD-10-CM

## 2018-12-04 RX ORDER — OXYCODONE AND ACETAMINOPHEN 10; 325 MG/1; MG/1
1 TABLET ORAL
Qty: 120 TAB | Refills: 0 | Status: SHIPPED | OUTPATIENT
Start: 2018-12-06 | End: 2019-01-05

## 2018-12-04 NOTE — TELEPHONE ENCOUNTER
Ebony Claude has called requesting a refill of their controlled medication, Opana 5mg and Oxycodone 10/325mg, for the management of LBP and joint pain. Last office visit date: 10/31/18  Last opioid care agreement 12/07/17  Last UDS was done 10/03/18    Date last  was pulled and reviewed : 12/04/18    Was the patient compliant when the above report was pulled? Patient filled Alprazolam 0.5mg #60 filled 11/16/18    Analgesia:  Patient reports 50% pain relief on current regimen. Aberrancies:  None in last 30 days. ADL's:  Patient states they are able to perform ADL's on current regimen. Adverse Reaction: Patient reports some constipation. Provider's last note and plan of care reviewed? yes  Request forwarded to provider for review.

## 2018-12-04 NOTE — TELEPHONE ENCOUNTER
Reviewed, please have patient update opioid care agreement prior to picking up prescriptions thank you

## 2018-12-05 NOTE — TELEPHONE ENCOUNTER
Attached OCA with prescription  Patient identified using two patient identifiers (name and ); patient advised prescriptions are ready for pick-up. Patient also informed to avoid concurrent use of benzodiazepines and opioids d/t increase r/o respiratory depression. Patient verbalized understanding. Patient has Narcan prescription.

## 2019-01-08 ENCOUNTER — OFFICE VISIT (OUTPATIENT)
Dept: PAIN MANAGEMENT | Age: 54
End: 2019-01-08

## 2019-01-08 VITALS
WEIGHT: 293 LBS | HEART RATE: 105 BPM | RESPIRATION RATE: 16 BRPM | OXYGEN SATURATION: 94 % | DIASTOLIC BLOOD PRESSURE: 76 MMHG | BODY MASS INDEX: 44.41 KG/M2 | TEMPERATURE: 98.6 F | SYSTOLIC BLOOD PRESSURE: 132 MMHG | HEIGHT: 68 IN

## 2019-01-08 DIAGNOSIS — M54.5 CHRONIC BILATERAL LOW BACK PAIN, WITH SCIATICA PRESENCE UNSPECIFIED: ICD-10-CM

## 2019-01-08 DIAGNOSIS — Z79.899 ENCOUNTER FOR LONG-TERM (CURRENT) USE OF HIGH-RISK MEDICATION: ICD-10-CM

## 2019-01-08 DIAGNOSIS — M25.50 PAIN IN JOINT, MULTIPLE SITES: Primary | ICD-10-CM

## 2019-01-08 DIAGNOSIS — G89.29 CHRONIC BILATERAL LOW BACK PAIN, WITH SCIATICA PRESENCE UNSPECIFIED: ICD-10-CM

## 2019-01-08 DIAGNOSIS — G89.4 CHRONIC PAIN SYNDROME: ICD-10-CM

## 2019-01-08 RX ORDER — OXYCODONE AND ACETAMINOPHEN 10; 325 MG/1; MG/1
1 TABLET ORAL
Qty: 120 TAB | Refills: 0 | Status: SHIPPED | OUTPATIENT
Start: 2019-01-08 | End: 2019-02-12 | Stop reason: SDUPTHER

## 2019-01-08 NOTE — PROGRESS NOTES
Referral date 2/25/15, source Dr. Hilaria Caceres (PCP) and for joint pain and low back pain. HPI: 
Gilda Shepard is a 48 y.o. female here for f/u visit for ongoing evaluation of chronic low back and joint pain. Pt was last seen here on 10/3/18. Pt denies interval changes on the character or distribution of pain. Pain is located lower back, neto knees, neto feet and neto hands. The pain is described as burning to lower back, aching to bilateral knees and  pins and needles to neto feet. Pain at its best is 7/10. Pain at its worse is 10/10. The pain is worsened by house chores, walking, bending. Symptoms are improved by placing hands in hot water, massaging feet, injections (for a day), Aspercreme. Pt has tried oxymorphone, oxycodone,compound cream, lidocaine patches with no perceived benefit. Patient has history of right TKA, lumbar surgery x2. Pt has not been to a chiropractor, RFA, acupuncture, SCS. Since last visit, patient has new bilateral shoulder pain we will have her follow-up with PCP. Social History Socioeconomic History  Marital status: SINGLE Spouse name: Not on file  Number of children: Not on file  Years of education: Not on file  Highest education level: Not on file Social Needs  Financial resource strain: Not on file  Food insecurity - worry: Not on file  Food insecurity - inability: Not on file  Transportation needs - medical: Not on file  Transportation needs - non-medical: Not on file Occupational History  Not on file Tobacco Use  Smoking status: Former Smoker  Smokeless tobacco: Never Used Substance and Sexual Activity  Alcohol use: No  
 Drug use: No  
 Sexual activity: Not on file Other Topics Concern  Not on file Social History Narrative  Not on file History reviewed. No pertinent family history. No Known Allergies Past Medical History:  
Diagnosis Date  Arthritis  Back injury  Essential hypertension History reviewed. No pertinent surgical history. Current Outpatient Medications on File Prior to Visit Medication Sig  ARIPiprazole (ABILIFY) 5 mg tablet Take 5 mg by mouth daily.  traZODone (DESYREL) 100 mg tablet Take 100 mg by mouth daily.  pantoprazole (PROTONIX) 40 mg tablet Take 40 mg by mouth two (2) times a day.  tiZANidine (ZANAFLEX) 4 mg tablet Take 4 mg by mouth daily.  gabapentin (NEURONTIN) 300 mg capsule Take 1 Cap by mouth two (2) times a day. (Patient taking differently: Take 300 mg by mouth three (3) times daily.)  albuterol (PROAIR HFA) 90 mcg/actuation inhaler Take 2 Puffs by inhalation every four (4) hours as needed.  naloxone 4 mg/actuation spry 4 mg by Nasal route as needed.  ergocalciferol (VITAMIN D2) 50,000 unit capsule Take 50,000 Units by mouth every seven (7) days.  Cane lita Use as directed.  buPROPion (WELLBUTRIN) 75 mg tablet Take  by mouth two (2) times a day.  topiramate (TOPAMAX) 25 mg tablet Take  by mouth two (2) times a day.  amLODIPine (NORVASC) 10 mg tablet Take  by mouth daily.  ALPRAZolam (XANAX) 0.5 mg tablet Take  by mouth two (2) times a day.  citalopram (CELEXA) 40 mg tablet Take 40 mg by mouth daily.  lisinopril (PRINIVIL, ZESTRIL) 10 mg tablet Take  by mouth daily.  butalbital-acetaminophen-caffeine (FIORICET) -40 mg per tablet Take 1 Tab by mouth daily as needed.  furosemide (LASIX) 20 mg tablet Take  by mouth as needed.  venlafaxine (EFFEXOR) 75 mg tablet Take 25 mg by mouth nightly.  pravastatin (PRAVACHOL) 40 mg tablet Take 40 mg by mouth nightly. No current facility-administered medications on file prior to visit.    
  
 
ROS: 
Denies fever, chills, nausea, vomiting,  constipation, abdominal pain, chest pain, shortness or breath/trouble breathing, weakness, trouble swallowing, changes in vision, changes in hearing, falls, dizziness, bladder incontinence, bowel incontinence, depression, anxiety, suicidal ideations, homicidal ideations or alcohol use. Positive findings for diarrhea has since subsided Opioid specific risk: Benzo. Opioid specific history: Concurrent opioid use since approximately 2015 with no opioid holiday. Vitals:  
 01/08/19 1402 BP: 132/76 Pulse: (!) 105 Resp: 16 Temp: 98.6 °F (37 °C) TempSrc: Oral  
SpO2: 94% Weight: (!) 161.9 kg (357 lb) Height: 5' 8\" (1.727 m) PainSc:   8 PainLoc: Knee Imaging: 
Ordered patient did not obtain Labs: BUN/Cr:  
AST/ALT:  
 
 
Physical exam: 
AFVS  Slightly tachcardia, no acute distress, obese body habitus. A&OXs 3. Normocephalic, atraumatic. Conjugate gaze, clear sclerae. Speech is clear and appropriate. Mood is appropriate and patient is cooperative. Gait and balance are within functional limits with use of right hand and mono cane Non-labored breathing. No acute distress noted UE:  
Strength for right upper extremity is 5/5 for shoulder abduction, elbow flexion, wrist extension, elbow extension, finger abduction, flexor digitorum profundus to digits 2 through 5. Strength for left upper extremity is 5/5 for shoulder abduction, elbow flexion, wrist extension, elbow extension, finger abduction, flexor digitorum profundus to digits 2 through 5. Decreased sensation to light touch is intact for left C4-T1. Sensation to light touch is intact for right C4-T1. Muscle stretch reflexes for right upper extremity are absent for C5, C6, C7. Muscle stretch reflexes for left upper extremity are absent for C5, C6, C7. LE:  
Strength for right lower extremity is 5/5 for hip flexion, knee extension, dorsiflexion, extensor hallucis longus, plantar flexion. Strength for left lower extremity is 5/5 for hip flexion, knee extension, dorsiflexion, extensor hallucis longus, plantar flexion.  
Sensation to light touch is intact for right L2-S2. 
 Sensation to light touch is intact for left L2-S2. Muscle Stretch reflexes for right lower extremity are absent for L4, S1.  
Muscle Stretch reflexes for left lower extremity are absent for L4, S1.  
Negative ankle clonus on the right and the left. Downgoing plantar response on the right and the left. Negative seated straight leg raise bilaterally. Negative supine straight leg raise bilaterally. Full AROM lumbar flexion decreased by 50% to endrange reproduction of primary pain. Full AROM lumbar extension decreased by 75% to endrange reproduction of primary pain. Bilateral SIJ, bilateral piriformis, lumbosacral beltline TTP. Exam limited by patient's ability to lie on exam table due to increased pain Calculated MEQ - 60 Naloxone rescue - yes Prophylactic bowel program - no Date of last OCA 12/7/18 Last UDS 10/3/18, consistent  date checked today, findings consistent Primary Care Physician No primary care provider on file. No primary provider on file. None Today   Last Visit  Prior Visit ORT -       
PGIC - 1 and 10 2 and 9      
ERROL -68%   at least 48%  46% COMM -2   8   not completed PHQ -- . PHQ over the last two weeks 1/8/2019 PHQ Not Done Active Diagnosis of Depression or Bipolar Disorder Little interest or pleasure in doing things Not at all Feeling down, depressed, irritable, or hopeless Not at all Total Score PHQ 2 0 Trouble falling or staying asleep, or sleeping too much - Feeling tired or having little energy - Poor appetite, weight loss, or overeating - Feeling bad about yourself - or that you are a failure or have let yourself or your family down - Trouble concentrating on things such as school, work, reading, or watching TV - Moving or speaking so slowly that other people could have noticed; or the opposite being so fidgety that others notice - Thoughts of being better off dead, or hurting yourself in some way -  
PHQ 9 Score -  
 How difficult have these problems made it for you to do your work, take care of your home and get along with others -  
 
 
DSM V-OUD Screen -deferred Assessment/Plan: ICD-10-CM ICD-9-CM 1. Chronic bilateral low back pain, with sciatica presence unspecified M54.5 724.2 REFERRAL TO PAIN MANAGEMENT  
 G89.29 338.29 REFERRAL TO PHYSICAL THERAPY  
   oxyCODONE-acetaminophen (PERCOCET 10)  mg per tablet 2. Chronic pain syndrome G89.4 338.4 REFERRAL TO PAIN MANAGEMENT  
   REFERRAL TO PHYSICAL THERAPY  
   oxyCODONE-acetaminophen (PERCOCET 10)  mg per tablet 3. Pain in joint, multiple sites M25.50 719.49 REFERRAL TO PAIN MANAGEMENT  
   REFERRAL TO PHYSICAL THERAPY  
   oxyCODONE-acetaminophen (PERCOCET 10)  mg per tablet 4. Encounter for long-term (current) use of high-risk medication Z79.899 V58.69 Pain management referral per patient request  
 
Patient declines chiropractic care Patient declines physical therapy but would like to try aquatic therapy. Referral for aquatic therapy please address Kinesio phobia, fear avoidance of movement therapies to help empower patient overcome chronic pain Pt to trial OTC capsicin cream to be applied to knees 3-4 times daily as needed for pain Pt declines any other compound cream at this time At last visit ordered nexwave to be used as needed for pain, patient declined due to cost 
 
At last visit ordered compound cream to be used 3-4 times daily as needed for pain, patient declined due to cost 
 
At the last visit ordered xray lumbar spine with flexion/extension/Gonzalez views, patient did not obtain Do not recommend long term opioid therapy for this patient at this time. Pt currently taking oxycodone/acetaminophen 10/325 mg tablet 1 tablet up to 4 times daily as needed. Their MME is 61.  Today, we will continue the weaning of patients opioid medication with a goal of being opioid free, pending safety and compliance. Pt instructed to call if they experience any signs of withdrawal (diarrhea, nausea, vomiting, sweating or chills, agitation, itching). Today, pt given prescription for oxycodone/acetaminophen 10/325 mg tablet 1 tablet up to 4 times daily as needed. Their new MME is 61. Pt instructed to call 5-7 days before they run out of their medications for refill. At this time pt will be provided with  oxycodone/acetaminophen 10/325 mg tablet 1 tablet up to 4 times daily as needed pending safety and compliance. At following refill, pt will be provided with oxycodone/acetaminophen 10/325 mg tablet 1 tablet up to 4 times daily as needed with a total of 110 tablets to be budgeted over 30 days pending safety and compliance. At next office visit, the plan is to provide patient with oxycodone/acetaminophen 10/325 mg tablet 1 tablet up to 4 times daily as needed with a total of 110 tablets to be budgeted over 30 days. Their new MME will be 60. If patient has difficulty with the wean or difficulty with cravings we will consider referral to mental health for ongoing assessment and treatment for opioid use disorder. Consider RFA, SCS trial, updated imaging based on results interventional options as appropriate. Diagnostic/therapeutic bilateral SIJ injection, piriformis stretching exercises to be done at home. Follow up ongoing assessment and ongoing development of integrative and comprehensive plan of care for chronic pain. Goals: To establish complementary and integrative plan of care to address chronic pain issues while minimizing pharmaceuticals to maximize patient's function improve quality of life. Education: 
Patient again educated on the importance of strict compliance with the opioid care agreement while on opioid therapy. Patient also again educated that they should avoid driving while on chronic opioid therapy. Also advised to avoid alcohol and to avoid benzodiazepines while on opioid therapy. Handouts given regarding opioid safety and sleep health. Patient given handout information on anti-inflammatory diet, medical risk of long-term opioid use, treat your own back by Jean Thompson. Patient Homework: 
Continue to independently investigate yoga for persons with chronic pain and core strengthening exercises. Follow-up Disposition: 
Return in about 3 months (around 4/8/2019). [address] was used for portions of this report. Unintended errors may occur.

## 2019-01-08 NOTE — PATIENT INSTRUCTIONS
Learning About Sleeping Well What does sleeping well mean? Sleeping well means getting enough sleep. How much sleep is enough varies among people. The number of hours you sleep is not as important as how you feel when you wake up. If you do not feel refreshed, you probably need more sleep. Another sign of not getting enough sleep is feeling tired during the day. The average total nightly sleep time is 7½ to 8 hours. Healthy adults may need a little more or a little less than this. Why is getting enough sleep important? Getting enough quality sleep is a basic part of good health. When your sleep suffers, your mood and your thoughts can suffer too. You may find yourself feeling more grumpy or stressed. Not getting enough sleep also can lead to serious problems, including injury, accidents, anxiety, and depression. What might cause poor sleeping? Many things can cause sleep problems, including: · Stress. Stress can be caused by fear about a single event, such as giving a speech. Or you may have ongoing stress, such as worry about work or school. · Depression, anxiety, and other mental or emotional conditions. · Changes in your sleep habits or surroundings. This includes changes that happen where you sleep, such as noise, light, or sleeping in a different bed. It also includes changes in your sleep pattern, such as having jet lag or working a late shift. · Health problems, such as pain, breathing problems, and restless legs syndrome. · Lack of regular exercise. How can you help yourself? Here are some tips that may help you sleep more soundly and wake up feeling more refreshed. Your sleeping area · Use your bedroom only for sleeping and sex. A bit of light reading may help you fall asleep. But if it doesn't, do your reading elsewhere in the house. Don't watch TV in bed. · Be sure your bed is big enough to stretch out comfortably, especially if you have a sleep partner. · Keep your bedroom quiet, dark, and cool. Use curtains, blinds, or a sleep mask to block out light. To block out noise, use earplugs, soothing music, or a \"white noise\" machine. Your evening and bedtime routine · Create a relaxing bedtime routine. You might want to take a warm shower or bath, listen to soothing music, or drink a cup of noncaffeinated tea. · Go to bed at the same time every night. And get up at the same time every morning, even if you feel tired. What to avoid · Limit caffeine (coffee, tea, caffeinated sodas) during the day, and don't have any for at least 4 to 6 hours before bedtime. · Don't drink alcohol before bedtime. Alcohol can cause you to wake up more often during the night. · Don't smoke or use tobacco, especially in the evening. Nicotine can keep you awake. · Don't take naps during the day, especially close to bedtime. · Don't lie in bed awake for too long. If you can't fall asleep, or if you wake up in the middle of the night and can't get back to sleep within 15 minutes or so, get out of bed and go to another room until you feel sleepy. · Don't take medicine right before bed that may keep you awake or make you feel hyper or energized. Your doctor can tell you if your medicine may do this and if you can take it earlier in the day. If you can't sleep · Imagine yourself in a peaceful, pleasant scene. Focus on the details and feelings of being in a place that is relaxing. · Get up and do a quiet or boring activity until you feel sleepy. · Don't drink any liquids after 6 p.m. if you wake up often because you have to go to the bathroom. Where can you learn more? Go to http://karen-vijay.info/. Enter F630 in the search box to learn more about \"Learning About Sleeping Well. \" Current as of: December 7, 2017 Content Version: 11.8 © 4486-9351 Healthwise, [employer].  Care instructions adapted under license by [address] (which disclaims liability or warranty for this information). If you have questions about a medical condition or this instruction, always ask your healthcare professional. [address] any warranty or liability for your use of this information. Safe Use of Opioid Pain Medicine: Care Instructions Your Care Instructions Pain is your body's way of warning you that something is wrong. Pain feels different for everybody. Only you can describe your pain. A doctor can suggest or prescribe many types of medicines for pain. These range from over-the-counter medicines like acetaminophen (Tylenol) to powerful medicines called opioids. Examples of opioids are fentanyl, hydrocodone, morphine, and oxycodone. Heroin is an illegal opioid Opioids are strong medicines. They can help you manage pain when you use them the right way. But if you misuse them, they can cause serious harm and even death. For these reasons, doctors are very careful about how they prescribe opioids. If you decide to take opioids, here are some things to remember. · Keep your doctor informed. You can get addicted to opioids. The risk is higher if you have a history of substance use. Your doctor will monitor you closely for signs of misuse and addiction and to figure out when you no longer need to take opioids. · Make a treatment plan. The goal of your plan is to be able to function and do the things you need to do, even if you still have some pain. You might be able to manage your pain with other non-opioid options like physical therapy, relaxation, or over-the-counter pain medicines. · Be aware of the side effects. Opioids can cause serious side effects, such as constipation, dry mouth, and nausea. And over time, you may need a higher dose to get pain relief. This is called tolerance. Your body also gets used to opioids. This is called physical dependence.  If you suddenly stop taking them, you may have withdrawal symptoms. The doctor carefully considered what pain medicine is right for you. You may not have received opioids if your doctor was concerned about drug interactions or your safety, or if he or she had other concerns. It is best to have one doctor or clinic treat your pain. This way you will get the pain medicine that will help you the most. And a doctor will be able to watch for any problems that the medicine might cause. The doctor has checked you carefully, but problems can develop later. If you notice any problems or new symptoms,  get medical treatment right away. Follow-up care is a key part of your treatment and safety. Be sure to make and go to all appointments, and call your doctor if you are having problems. It's also a good idea to know your test results and keep a list of the medicines you take. How can you care for yourself at home? · If you need to take opioids to manage your pain, remember these safety tips. ? Follow directions carefully. It's easy to misuse opioids if you take a dose other than what's prescribed by your doctor. This can lead to overdose and even death. Even sharing them with someone they weren't meant for is misuse. ? Be cautious. Opioids may affect your judgment and decision making. Do not drive or operate machinery until you can think clearly. Talk with your doctor about when it is safe to drive. ? Reduce the risk of drug interactions. Opioids can be dangerous if you take them with alcohol or with certain drugs like sleeping pills and muscle relaxers. Make sure your doctor knows about all the other medicines you take, including over-the-counter medicines. Don't start any new medicines before you talk to your doctor or pharmacist. 
? Keep others safe. Store opioids in a safe and secure place. Make sure that pets, children, friends, and family can't get to them.  When you're done using opioids, make sure to properly dispose of them. You can either use a community drug take-back program or your drugstore's mail-back program. If one of these programs isn't available, you can flush opioid skin patches and unused opioid pills down the toilet. ? Reduce the risk of overdose. Misuse of opioids can be very dangerous. Protect yourself by asking your doctor about a naloxone rescue kit. It can help youand even save your lifeif you take too much of an opioid. · Try other ways to reduce pain. ? Relax, and reduce stress. Relaxation techniques such as deep breathing or meditation can help. ? Keep moving. Gentle, daily exercise can help reduce pain over the long run. Try low- or no-impact exercises such as walking, swimming, and stationary biking. Do stretches to stay flexible. ? Try heat, cold packs, and massage. ? Get enough sleep. Pain can make you tired and drain your energy. Talk with your doctor if you have trouble sleeping because of pain. ? Think positive. Your thoughts can affect your pain level. Do things that you enjoy to distract yourself when you have pain instead of focusing on the pain. See a movie, read a book, listen to music, or spend time with a friend. · If you are not taking a prescription pain medicine, ask your doctor if you can take an over-the-counter medicine. When should you call for help? Call your doctor now or seek immediate medical care if: 
  · You have a new kind of pain.  
  · You have new symptoms, such as a fever or rash, along with the pain.  
 Watch closely for changes in your health, and be sure to contact your doctor if: 
  · You think you might be using too much pain medicine, and you need help to use less or stop.  
  · Your pain gets worse.  
  · You would like a referral to a doctor or clinic that specializes in pain management. Where can you learn more? Go to http://karen-vijay.info/. Enter R108 in the search box to learn more about \"Safe Use of Opioid Pain Medicine: Care Instructions. \" Current as of: September 10, 2017 Content Version: 11.8 © 8433-4123 Healthwise, [employer]. Care instructions adapted under license by [employer] (which disclaims liability or warranty for this information). If you have questions about a medical condition or this instruction, always ask your healthcare professional. [place] [address] any warranty or liability for your use of this information.

## 2019-01-08 NOTE — PROGRESS NOTES
Nursing Notes Patient presents to the office today in follow-up. Patient rates her pain at 8/10 on the numerical pain scale. Reviewed medications with counts as follows:   
Rx Date filled Qty Dispensed Pill Count Last Dose Short Oxycodone  mg 12/07/18 120 0 yesterday no The pt was counseled about bringing all of our medication bottles to their appt. Last opioid agreement 12/7/18 Last urine drug screen 10/3/18 PHQ over the last two weeks 1/8/2019 PHQ Not Done Active Diagnosis of Depression or Bipolar Disorder Little interest or pleasure in doing things Not at all Feeling down, depressed, irritable, or hopeless Not at all Total Score PHQ 2 0 Trouble falling or staying asleep, or sleeping too much - Feeling tired or having little energy - Poor appetite, weight loss, or overeating - Feeling bad about yourself - or that you are a failure or have let yourself or your family down - Trouble concentrating on things such as school, work, reading, or watching TV - Moving or speaking so slowly that other people could have noticed; or the opposite being so fidgety that others notice - Thoughts of being better off dead, or hurting yourself in some way -  
PHQ 9 Score - How difficult have these problems made it for you to do your work, take care of your home and get along with others - Comments: POC UDS was not performed in office today Any new labs or imaging since last appointment? NO Have you been to an emergency room (ER) or urgent care clinic since your last visit? NO Have you been hospitalized since your last visit? NO If yes, where, when, and reason for visit? Have you seen or consulted any other health care providers outside of the [address] [place] [address]  since your last visit? YES If yes, where, when, and reason for visit? Ms. Richard Monson has a reminder for a \"due or due soon\" health maintenance.  I have asked that she contact her primary care provider for follow-up on this health maintenance.

## 2019-02-12 DIAGNOSIS — G89.29 CHRONIC BILATERAL LOW BACK PAIN, WITH SCIATICA PRESENCE UNSPECIFIED: ICD-10-CM

## 2019-02-12 DIAGNOSIS — M25.50 PAIN IN JOINT, MULTIPLE SITES: ICD-10-CM

## 2019-02-12 DIAGNOSIS — M54.5 CHRONIC BILATERAL LOW BACK PAIN, WITH SCIATICA PRESENCE UNSPECIFIED: ICD-10-CM

## 2019-02-12 DIAGNOSIS — G89.4 CHRONIC PAIN SYNDROME: ICD-10-CM

## 2019-02-12 NOTE — TELEPHONE ENCOUNTER
Samy Aguiar has called requesting a refill of their controlled medication, Percocet 10/325 mg tab, for the management of chronic LBP and joint pain. Last office visit date: 1/8/19 with Harrison and has a f/u appt on 4/8/19 with Queen Lesley. Last opioid care agreement 12/7/18  Last UDS was done 10/3/18    Date last  was pulled and reviewed : 2/12/19  Last fill date for medication was 1/10/19    Was the patient compliant when the above report was pulled? Patient filled Alprazolam from another provider on 2/6/19. Analgesia: Patient reports 50% pain relief on current regimen. Aberrancies: Last FYI 1/8/19    ADL's: Patient states they are able to perform ADL's on current regimen. Adverse Reaction: Patient reports some constipation. Provider's last note and plan of care reviewed? yes  Request forwarded to provider for review.

## 2019-02-13 RX ORDER — OXYCODONE AND ACETAMINOPHEN 10; 325 MG/1; MG/1
1 TABLET ORAL
Qty: 120 TAB | Refills: 0 | Status: SHIPPED | OUTPATIENT
Start: 2019-02-13 | End: 2019-03-15

## 2019-02-13 NOTE — TELEPHONE ENCOUNTER
Attempted to contact patient to inform them their prescription was ready for ; as well as inform patient to avoid the concurrent use of benzodiazepines and opioids d/t increased r/o respiratory depression;but patient did not answer the phone at one of the numbers listed, and when I tried to call the other number, a male answered the phone and I started hearing vulgarities in the background, and the phone call ended.

## 2019-02-13 NOTE — TELEPHONE ENCOUNTER
Please advise patient against taking benzos while on opioid chronic opioid therapy, will discuss UDS at next office visit

## 2020-01-07 ENCOUNTER — OFFICE VISIT (OUTPATIENT)
Dept: SURGERY | Age: 55
End: 2020-01-07

## 2020-01-07 VITALS
OXYGEN SATURATION: 93 % | DIASTOLIC BLOOD PRESSURE: 86 MMHG | TEMPERATURE: 99.3 F | SYSTOLIC BLOOD PRESSURE: 186 MMHG | RESPIRATION RATE: 20 BRPM | HEART RATE: 119 BPM | HEIGHT: 68 IN | WEIGHT: 293 LBS | BODY MASS INDEX: 44.41 KG/M2

## 2020-01-07 DIAGNOSIS — G89.29 CHRONIC LOW BACK PAIN WITH SCIATICA, SCIATICA LATERALITY UNSPECIFIED, UNSPECIFIED BACK PAIN LATERALITY: ICD-10-CM

## 2020-01-07 DIAGNOSIS — E66.01 OBESITY, MORBID (HCC): Primary | ICD-10-CM

## 2020-01-07 DIAGNOSIS — I10 ESSENTIAL HYPERTENSION: ICD-10-CM

## 2020-01-07 DIAGNOSIS — J45.20 MILD INTERMITTENT ASTHMA WITHOUT COMPLICATION: ICD-10-CM

## 2020-01-07 DIAGNOSIS — K21.9 GASTROESOPHAGEAL REFLUX DISEASE WITHOUT ESOPHAGITIS: ICD-10-CM

## 2020-01-07 DIAGNOSIS — R63.5 WEIGHT GAIN, ABNORMAL: ICD-10-CM

## 2020-01-07 DIAGNOSIS — M54.40 CHRONIC LOW BACK PAIN WITH SCIATICA, SCIATICA LATERALITY UNSPECIFIED, UNSPECIFIED BACK PAIN LATERALITY: ICD-10-CM

## 2020-01-07 DIAGNOSIS — E78.00 HYPERCHOLESTEREMIA: ICD-10-CM

## 2020-01-07 RX ORDER — LANOLIN ALCOHOL/MO/W.PET/CERES
500 CREAM (GRAM) TOPICAL DAILY
COMMUNITY

## 2020-01-07 RX ORDER — BISMUTH SUBSALICYLATE 262 MG
1 TABLET,CHEWABLE ORAL DAILY
COMMUNITY

## 2020-01-07 RX ORDER — MELOXICAM 15 MG/1
15 TABLET ORAL DAILY
COMMUNITY
End: 2021-01-16

## 2020-01-07 NOTE — PROGRESS NOTES
1. Have you been to the ER, urgent care clinic since your last visit? Hospitalized since your last visit? new patient    2. Have you seen or consulted any other health care providers outside of the [address] [place] [address] since your last visit? Include any pap smears or colon screening.  New patient

## 2020-01-08 PROBLEM — E78.00 HYPERCHOLESTEREMIA: Status: ACTIVE | Noted: 2020-01-08

## 2020-01-08 PROBLEM — J45.20 MILD INTERMITTENT ASTHMA WITHOUT COMPLICATION: Status: ACTIVE | Noted: 2020-01-08

## 2020-01-08 PROBLEM — K21.9 GASTROESOPHAGEAL REFLUX DISEASE WITHOUT ESOPHAGITIS: Status: ACTIVE | Noted: 2020-01-08

## 2020-01-08 PROBLEM — I10 ESSENTIAL HYPERTENSION: Status: ACTIVE | Noted: 2020-01-08

## 2020-01-08 NOTE — PATIENT INSTRUCTIONS
Learning About Bariatric Surgery  What is bariatric surgery? Bariatric surgery is surgery to help you lose weight. This type of surgery is only used for people who are very overweight and have not been able to lose weight with diet and exercise. This surgery makes the stomach smaller. Some types of surgery also change the connection between your stomach and intestines. How is bariatric surgery done? Bariatric surgery may be either \"open\" or \"laparoscopic. \" Open surgery is done through a large cut (incision) in the belly. Laparoscopic surgery is done through several small cuts. The doctor puts a lighted tube, or scope, and other surgical tools through small cuts in your belly. The doctor is able to see your organs with the scope. There are different types of bariatric surgery. Gastric sleeve surgery  The surgery is usually done through several small incisions in the belly. The doctor removes more than half of your stomach. This leaves a thin sleeve, or tube, that is about the size of a banana. Because part of your stomach has been removed, this can't be reversed. Garry-en-Y gastric bypass surgery  Garry-en-Y (say \"samantha-en-why\") surgery changes the connection between the stomach and the intestines. The doctor separates a section of your stomach from the rest of your stomach. This makes a small pouch. The new pouch will hold the food you eat. The doctor connects the stomach pouch to the middle part of the small intestine. Gastric banding surgery  The surgery is usually done through several small incisions in the belly. The doctor wraps a band around the upper part of the stomach. This creates a small pouch. The small size of the pouch means that you will get full after you eat just a small amount of food. The doctor can inflate or deflate the band to adjust the size. This lets the doctor adjust how quickly food passes from the new pouch into the stomach.  It does not change the connection between the stomach and the intestines. What can you expect after the surgery? You may stay in the hospital for one or more days after the surgery. How long you stay depends on the type of surgery you had. Most people need 2 to 4 weeks before they are ready to get back to their usual routine. For the first 2 to 6 weeks after surgery, you probably will need to follow a liquid or soft diet. Bit by bit, you will be able to eat more solid foods. Your doctor may advise you to work with a dietitian. This way you'll be sure to get enough protein, vitamins, and minerals while you are losing weight. Even with a healthy diet, you may need to take vitamin and mineral supplements. After surgery, you will not be able to eat very much at one time. You will get full quickly. Try not to eat too much at one time or eat foods that are high in fat or sugar. If you do, you may vomit, get stomach pain, or have diarrhea. You probably will lose weight very quickly in the first few months after surgery. As time goes on, your weight loss will slow down. You will have regular doctor visits to check how you are doing. Think of bariatric surgery as a tool to help you lose weight. It isn't an instant fix. You will still need to eat a healthy diet and get regular exercise. This will help you reach your weight goal and avoid regaining the weight you lose. Follow-up care is a key part of your treatment and safety. Be sure to make and go to all appointments, and call your doctor if you are having problems. It's also a good idea to know your test results and keep a list of the medicines you take. Where can you learn more? Go to http://karen-vijay.info/. Enter G469 in the search box to learn more about \"Learning About Bariatric Surgery. \"  Current as of: March 28, 2019  Content Version: 12.2  © 6201-9984 [employer].  Care instructions adapted under license by [employer] (which disclaims liability or warranty for this information). If you have questions about a medical condition or this instruction, always ask your healthcare professional. [place] [address] any warranty or liability for your use of this information.

## 2020-01-12 NOTE — PROGRESS NOTES
Bariatric Surgery Consultation    Subjective: The patient is a 47 y.o. obese  female with a Body mass index is 61.58 kg/m². .  The patient has been at her heaviest weight for the past year; she underwent laparoscopic sleeve gastrectomy at [facility] several years ago with regain of all lost weight (she now weighs more than pre-sleeve weight). Bariatric comorbidities present are   Patient Active Problem List   Diagnosis Code    Low back pain M54.5    Encounter for long-term (current) use of other medications Z79.899    HA (headache) R51    Chronic pain syndrome G89.4    DJD (degenerative joint disease), lumbar M47.816    Right knee pain M25.561    Right knee DJD M17.11    Chronic headache R51    Obesity, morbid ([facility]) E66.01    Gastroesophageal reflux disease without esophagitis K21.9    Mild intermittent asthma without complication R84.15    Essential hypertension I10    Hypercholesteremia E78.00   . The patient desires revision to gastric bypass for additional surgical weight loss. The patients goal weight is 200 lbs. The highest acceptable weight is 250 lbs. These goals are consistent with expected outcomes of their desired operation. her Medical goals are to be able to proceed with knee replacement;  her qualty of life goals include the ability to ambulate better.     Patient Active Problem List    Diagnosis Date Noted    Gastroesophageal reflux disease without esophagitis 01/08/2020    Mild intermittent asthma without complication 03/29/0256    Essential hypertension 01/08/2020    Hypercholesteremia 01/08/2020    Obesity, morbid ([facility] [address] 12/07/2017    Chronic headache 08/22/2016    Low back pain 02/25/2015    Encounter for long-term (current) use of other medications 02/25/2015    HA (headache) 02/25/2015    Chronic pain syndrome 02/25/2015    DJD (degenerative joint disease), lumbar 02/25/2015    Right knee pain 02/25/2015    Right knee DJD 02/25/2015      Past Surgical History:   Procedure Laterality Date    HX APPENDECTOMY      HX  SECTION      HX GASTRECTOMY      lap sleeve gastrectomy    HX HYSTERECTOMY      HX KNEE REPLACEMENT      left knee    HX ORTHOPAEDIC      rt knee partial replacement      Social History     Tobacco Use    Smoking status: Former Smoker     Packs/day: 1.00     Years: 30.00     Pack years: 30.00     Last attempt to quit: 2019     Years since quittin.1    Smokeless tobacco: Never Used   Substance Use Topics    Alcohol use: No      No family history on file. Prior to Admission medications    Medication Sig Start Date End Date Taking? Authorizing Provider   meloxicam (MOBIC) 15 mg tablet Take 15 mg by mouth daily. Yes Provider, Historical   calcium citrate/vitamin D3 (CALCIUM CITRATE + D PO) Take  by mouth. Yes Provider, Historical   cyanocobalamin (VITAMIN B-12) 500 mcg tablet Take 500 mcg by mouth daily. Yes Provider, Historical   multivitamin (ONE A DAY) tablet Take 1 Tab by mouth daily. Yes Provider, Historical   POTASSIUM PO Take  by mouth. Yes Provider, Historical   ARIPiprazole (ABILIFY) 5 mg tablet Take 5 mg by mouth daily. 3/6/18  Yes Provider, Historical   traZODone (DESYREL) 100 mg tablet Take 150 mg by mouth daily. 3/6/18  Yes Provider, Historical   tiZANidine (ZANAFLEX) 4 mg tablet Take 4 mg by mouth daily. 3/12/18  Yes Provider, Historical   gabapentin (NEURONTIN) 300 mg capsule Take 1 Cap by mouth two (2) times a day. Patient taking differently: Take 300 mg by mouth three (3) times daily. 17  Yes Amish Garcia MD   albuterol [facility] HFA) 90 mcg/actuation inhaler Take 2 Puffs by inhalation every four (4) hours as needed. Yes Provider, Historical   ergocalciferol (VITAMIN D2) 50,000 unit capsule Take 50,000 Units by mouth every seven (7) days. Yes Provider, Historical   Cane lita Use as directed. 3/14/16  Yes Abhishek German NP   amLODIPine (NORVASC) 10 mg tablet Take  by mouth daily.    Yes Provider, Historical   ALPRAZolam (XANAX) 0.5 mg tablet Take  by mouth two (2) times a day. Yes Provider, Historical   citalopram (CELEXA) 40 mg tablet Take 40 mg by mouth daily. Yes Provider, Historical   lisinopril (PRINIVIL, ZESTRIL) 10 mg tablet Take  by mouth daily. Yes Provider, Historical   butalbital-acetaminophen-caffeine (FIORICET) -40 mg per tablet Take 1 Tab by mouth daily as needed. Yes Provider, Historical   furosemide (LASIX) 20 mg tablet Take  by mouth as needed. Yes Provider, Historical   pravastatin (PRAVACHOL) 40 mg tablet Take 40 mg by mouth nightly. Yes Provider, Historical   pantoprazole (PROTONIX) 40 mg tablet Take 40 mg by mouth two (2) times a day. 3/1/18   Provider, Historical   naloxone 4 mg/actuation spry 4 mg by Nasal route as needed. 3/30/17   Manfred Messina MD   buPROPion [facility]) 75 mg tablet Take  by mouth two (2) times a day. Provider, Historical   topiramate (TOPAMAX) 25 mg tablet Take  by mouth two (2) times a day. Provider, Historical   venlafaxine (EFFEXOR) 75 mg tablet Take 25 mg by mouth nightly.     Provider, Historical     No Known Allergies      Review of Systems:    Constitutional: positive for weight gain  Eyes: positive for contacts/glasses  Ears, nose, mouth, throat, and face: positive for snoring  Respiratory: positive for asthma, wheezing or dyspnea on exertion, negative for pneumonia  Cardiovascular: negative for chest pressure/discomfort, palpitations  Gastrointestinal: positive for reflux symptoms, negative for dysphagia and diarrhea  Genitourinary:negative  Integument/breast: negative  Hematologic/lymphatic: negative  Musculoskeletal:positive for arthralgias, stiff joints, back pain and bone pain  Neurological: negative for paresthesia    Objective:     Visit Vitals  /86   Pulse (!) 119   Temp 99.3 °F (37.4 °C)   Resp 20   Ht 5' 8\" (1.727 m)   Wt (!) 405 lb (183.7 kg)   SpO2 93%   BMI 61.58 kg/m²       Physical Exam:  GENERAL: alert, cooperative, no distress, appears stated age, morbidly obese, EYE: negative, LYMPHATIC: Cervical, supraclavicular nodes normal. THROAT & NECK: normal, LUNG: clear to auscultation bilaterally, HEART: regular rate and rhythm, S1, S2 normal, no murmur. ABDOMEN: Obese, non-distended, soft. Well-healed laparoscopic scars. No pain with palpation, mass or hernia. EXTREMITIES:  extremities normal, atraumatic, no cyanosis or edema, SKIN: Normal., NEUROLOGIC: negative. Assessment:     Persistent morbid obesity with comorbidities despite sleeve gastrectomy. No success with medical management. She has regained all previously lost weight. She admits to poor food choices to include fried chicken and Hawaiian Punch; minimal exercise ability with orthopedic problems. She desires laparoscopic revision to gastric bypass. Plan:     1. UGI, upper endoscopy. 2. Dietician evaluation. 3. Follow-up after above.       Signed By: Salima Dudley MD     January 12, 2020

## 2020-03-03 ENCOUNTER — HOSPITAL ENCOUNTER (OUTPATIENT)
Dept: GENERAL RADIOLOGY | Age: 55
Discharge: HOME | End: 2020-03-03
Attending: SURGERY
Payer: MEDICARE

## 2020-03-03 DIAGNOSIS — K21.9 GASTROESOPHAGEAL REFLUX DISEASE WITHOUT ESOPHAGITIS: ICD-10-CM

## 2020-03-03 PROCEDURE — 74240 X-RAY XM UPR GI TRC 1CNTRST: CPT

## 2020-03-30 ENCOUNTER — TELEPHONE (OUTPATIENT)
Dept: SURGERY | Age: 55
End: 2020-03-30

## 2020-03-30 NOTE — TELEPHONE ENCOUNTER
I called the patient and she said she was called from the Dr that is to look down her throat and they wanted to change her appointment and she was calling to get the time and date., I gave her Dr Sol Brown office number and then I told her per Dr Pretty Jorgensen note she needs to see a dietician as well. She said she was never given any information in regards to that. I gave her Yoon's Phone number so she can get her scheduled. Pt in agreement.

## 2020-03-30 NOTE — TELEPHONE ENCOUNTER
Patient called stating she has an upcoming  appointment with Dr. Tori Maria but didn't know when. She stated she thought she was told by nurse it would be on 4/3/20.

## 2020-05-29 ENCOUNTER — CLINICAL SUPPORT (OUTPATIENT)
Dept: SURGERY | Age: 55
End: 2020-05-29

## 2020-05-29 DIAGNOSIS — E66.01 OBESITY, MORBID (HCC): Primary | ICD-10-CM

## 2020-05-29 NOTE — PROGRESS NOTES
Pre-operative Bariatric Nutrition Evaluation (1 of 4)     Date: 2020   Debra Baltazar M.D. Name: Colleen Lowe  :  1965  Age:  47  Gender: Female   Type of Surgery: [x]           Gastric Bypass   []           Sleeve Gastrectomy    ASSESSMENT:    Medications/Supplements:   Prior to Admission medications    Medication Sig Start Date End Date Taking? Authorizing Provider   meloxicam (MOBIC) 15 mg tablet Take 15 mg by mouth daily. Provider, Historical   calcium citrate/vitamin D3 (CALCIUM CITRATE + D PO) Take  by mouth. Provider, Historical   cyanocobalamin (VITAMIN B-12) 500 mcg tablet Take 500 mcg by mouth daily. Provider, Historical   multivitamin (ONE A DAY) tablet Take 1 Tab by mouth daily. Provider, Historical   POTASSIUM PO Take  by mouth. Provider, Historical   ARIPiprazole (ABILIFY) 5 mg tablet Take 5 mg by mouth daily. 3/6/18   Provider, Historical   traZODone (DESYREL) 100 mg tablet Take 150 mg by mouth daily. 3/6/18   Provider, Historical   pantoprazole (PROTONIX) 40 mg tablet Take 40 mg by mouth two (2) times a day. 3/1/18   Provider, Historical   tiZANidine (ZANAFLEX) 4 mg tablet Take 4 mg by mouth daily. 3/12/18   Provider, Historical   gabapentin (NEURONTIN) 300 mg capsule Take 1 Cap by mouth two (2) times a day. Patient taking differently: Take 300 mg by mouth three (3) times daily. 17   Smitha Morse MD   albuterol [facility] HFA) 90 mcg/actuation inhaler Take 2 Puffs by inhalation every four (4) hours as needed. Provider, Historical   naloxone 4 mg/actuation spry 4 mg by Nasal route as needed. 3/30/17   Smitha Morse MD   ergocalciferol (VITAMIN D2) 50,000 unit capsule Take 50,000 Units by mouth every seven (7) days. Provider, Historical   Cane lita Use as directed. 3/14/16   Norm Mistry, NP   buPROPion [facility]) 75 mg tablet Take  by mouth two (2) times a day.     Provider, Historical   topiramate (TOPAMAX) 25 mg tablet Take  by mouth two (2) times a day. Provider, Historical   amLODIPine (NORVASC) 10 mg tablet Take  by mouth daily. Provider, Historical   ALPRAZolam (XANAX) 0.5 mg tablet Take  by mouth two (2) times a day. Provider, Historical   citalopram (CELEXA) 40 mg tablet Take 40 mg by mouth daily. Provider, Historical   lisinopril (PRINIVIL, ZESTRIL) 10 mg tablet Take  by mouth daily. Provider, Historical   butalbital-acetaminophen-caffeine (FIORICET) -40 mg per tablet Take 1 Tab by mouth daily as needed. Provider, Historical   furosemide (LASIX) 20 mg tablet Take  by mouth as needed. Provider, Historical   venlafaxine (EFFEXOR) 75 mg tablet Take 25 mg by mouth nightly. Provider, Historical   pravastatin (PRAVACHOL) 40 mg tablet Take 40 mg by mouth nightly. Provider, Historical       Food Allergies/Intolerances:no food allergies; lactose intolerance     Anthropometrics:    Ht:68\"   Recent Office Wt: 405#    IBW: 140#    %IBW: 289%     BMI:61    Category: obesity III     Reported wt history: Pt presents today for pre-op nutrition evaluation for wt loss surgery. Pt with previous sleeve gastrectomy in 2017 and pt reports losing about 65#. Has since regained majority of her wt. Attributes wt regain r/t poor eating habits influenced by home environment and food preferences of others in her home. Has attempted wt loss through various methods. Recently trying to eat more baked foods and cut out sodas and sweets. Has been unable to maintain long term or significant wt loss and is now seeking approval for weight loss surgery revision. Pt will need to complete 4 months of supervised weight loss for insurance requirements.      Exercise/Physical Activity:none d/t arthritis and joint pain; in the past would do some exercise at home     Reported Diet History:h/o sleeve gastrectomy in 2017; otherwise mostly self-directed diets     24 Hour Diet Recall  Breakfast  Slice of bread, stinson and scrambled eggs or oatmeal or sausage and eggs    Lunch  Lunch is usually skipped    Dinner  Fried chicken, greens, baked potato    Snacks  2nd helpings at 10:00 pm or fruit    Beverages  Flavored water; soda 2 times per week        Environment/Psychosocial/Support:states she has a good support system at home; pt does majority of cooking at home. Pt writes a list for grocery shopping. Pt's sister has had a gastric bypass several years ago. NUTRITION DIAGNOSIS:  1. Self-monitoring deficit   2. Food and nutrition related knowledge deficit       NUTRITION INTERVENTION:  Pt educated on nutrition recommendations for weight loss surgery, specifically gastric bypass. Instructed on consuming 3 meals per day starting now. Use the balanced plate method to plan meals, include 3 oz of lean source of protein, 1/2 cup whole grains, unlimited non-starchy vegetables, 1/2 cup fruit and 1 serving of low fat dairy. Utilize handouts listing healthy snack and meal ideas to limit restaurant meals. After surgery measure all meals to 1/2 cup. Each meal will contain a 1/4 cup lean protein and 1/4 cup fruit, non-starchy vegetable or starch (limiting to once per day). Aim for 60 g protein per day. Sip on 48-64 oz of sugar free, calorie free, non-carbonated beverages each day. Do not use a straw. Do not consume beverages 30 minutes before, during or 30 minutes after meals. Read all nutrition labels. Demonstrated and emphasized identifying serving size, total fat, sugar and protein content. Defined low fat as </= 3 g per serving. Discussed lean and extra lean sources of protein. Provided list of low fat cooking methods. Avoid foods with sugar listed in the first 3 ingredients and >/15 g sugar per serving. Excess sugar/fat intake may lead to dumping syndrome. Discussed signs and symptoms of dumping syndrome. Practice mindful eating habits; take small bites, chew thoroughly, avoid distractions, utilize hunger/fullness scale.  Consume meals over 20-30 minutes. Attend Bariatric Support Group and increase physical activity (approved per MD) for long term weight maintenance. NUTRITION MONITORING AND EVALUATION:    The following goals were established with patient;  1. Eat 3 meals day. Do not skip meals. Use protein shakes PRN. 2. Continue to reduce and eliminate carbonated beverages. 3. Review nutrition education handouts. Follow up next month for continued nutrition education and supervised weight loss. Specific tips and techniques to facilitate compliance with above recommendations were provided and discussed. Nutrition evaluation reveals important lifestyle changes are indicated. Goals set and recommendations made. Will continue to assess. If further details are desired please feel free to contact me at [phone]. This phone number was also provided to the patient for any further questions or concerns.            Liudmila Allan RD

## 2020-06-17 PROBLEM — G47.30 SLEEP APNEA IN ADULT: Status: ACTIVE | Noted: 2018-03-06

## 2020-06-17 PROBLEM — F32.A DEPRESSION: Status: ACTIVE | Noted: 2018-03-06

## 2020-06-17 PROBLEM — F41.9 ANXIETY: Status: ACTIVE | Noted: 2017-08-24

## 2020-06-20 ENCOUNTER — OFFICE VISIT (OUTPATIENT)
Dept: PRIMARY CARE CLINIC | Age: 55
End: 2020-06-20

## 2020-06-20 DIAGNOSIS — K21.9 GASTROESOPHAGEAL REFLUX DISEASE WITHOUT ESOPHAGITIS: Primary | ICD-10-CM

## 2020-06-20 DIAGNOSIS — Z01.818 PRE-OP EXAM: ICD-10-CM

## 2020-06-25 LAB — SARS-COV-2, NAA: NOT DETECTED

## 2020-06-29 ENCOUNTER — CLINICAL SUPPORT (OUTPATIENT)
Dept: SURGERY | Age: 55
End: 2020-06-29

## 2020-06-29 NOTE — PROGRESS NOTES
[facility] at [facility]  Supervised Weight Loss     Date:   2020    Patient's Name: Colleen Lowe  : 1965    Insurance:  Medicare          Session: 2 of  4  Surgery: Gastric Bypass  Surgeon:  Edgar Contreras M.D. Height: 68\"   Previous Weight:    405#      Lbs. BMI: 61   Pounds Lost since last month: 0               Pounds Gained since last month: 0    Starting Weight: 405#   Previous Months Weight: 405#  Overall Pounds Lost: 0  Overall Pounds Gained: 0    Other Pertinent Information: Today's appointment was completed over the phone in accordance with social distancing guidelines d/t COVID-19. Pt was unable to report an updated weight for today's appointment. Smoking Status:  Currently taking Chantix; 1-2 cigarettes per day  Alcohol Intake: none    I have reviewed with pt the guidelines of the supervised wt loss program.  Pt understands the expectations of some wt loss during the program and that wt gain could delay the process. I have also explained that classes need to be consecutive. Missing a class may result in starting over. Pt has received this information in writing. Changes that patient has made since last month include:  Reduced soda intake, increased water, reducing fried foods. Using protein shakes PRN. Eating Habits and Behaviors  General healthy eating guidelines were discussed. A nutrition lesson specific to the importance of protein intake after surgery was provided. We discussed food sources of protein, protein supplements and multiple reasons as to why protein is important after bariatric surgery. Pts were instructed to focus on including protein at every meal and practice eating protein first at the meal. Pts were encouraged to sample a protein shake for tolerance. Patients were also instructed to use the balanced plate method for help with portion control and general healthy eating prior to surgery.  We discussed measuring meals to 1/2 cup total per meal after surgery. Drinking only calorie-free, sugar-free and non-carbonated beverages. We discussed the importance of drinking 64 ounces of fluid per day to prevent dehydration post-operatively. Patient's current diet habits include: eating 2-3 meals per day. Denies snacking. Eating baked, grilled, broiled and fried foods. Denies eating out. Drinking mostly water and 1 soda per day (reduced from previous intake). Physical Activity/Exercise  We talked about the importance of increasing daily physical activity and beginning to develop an exercise regimen/routine. We talked about exercise as being an important part of long term weight loss after surgery. Comments:  During class, I discussed with patient the importance of getting into an exercise routine. Pt is currently doing leg exercises at home for activity. Pt has been encouraged to maintain and increase as tolerated. Try adding in chair exercises as tolerated. Behavior Modification       We talked about how to eat more mindfully. Tips and recommendations for how to make these changes were provided. Pt was encouraged to keep a food journal and record what they were taking in daily. Overall Assessment: Pt demonstrates small/appropriate lifestyle changes evidenced by reported changes. Will continue to assess. Patient-Set Goals:   1. Nutrition - protein at each meal or snack  2. Exercise - chair exercises  3.  Behavior -smoking cessation     Sally Kumari RD  6/29/2020

## 2020-07-27 ENCOUNTER — CLINICAL SUPPORT (OUTPATIENT)
Dept: SURGERY | Age: 55
End: 2020-07-27

## 2020-07-27 ENCOUNTER — VIRTUAL VISIT (OUTPATIENT)
Dept: BEHAVIORAL/MENTAL HEALTH CLINIC | Age: 55
End: 2020-07-27
Payer: MEDICARE

## 2020-07-27 DIAGNOSIS — F33.1 MODERATE EPISODE OF RECURRENT MAJOR DEPRESSIVE DISORDER (HCC): Primary | ICD-10-CM

## 2020-07-27 PROCEDURE — 99441 PR PHYS/QHP TELEPHONE EVALUATION 5-10 MIN: CPT | Performed by: NURSE PRACTITIONER

## 2020-07-27 RX ORDER — ARIPIPRAZOLE 10 MG/1
10 TABLET ORAL DAILY
Qty: 90 TAB | Refills: 0 | Status: SHIPPED | OUTPATIENT
Start: 2020-07-27 | End: 2020-09-04 | Stop reason: SDUPTHER

## 2020-07-27 NOTE — PROGRESS NOTES
Greg Dorman is a 47 y.o. female who presents today for the following:  Chief Complaint   Patient presents with    Follow-up     \"When I first started that Abilify it was helping me but now it's not helping as much. \"    Depression     Telephone visit. No Known Allergies    Current Outpatient Medications   Medication Sig    meloxicam (MOBIC) 15 mg tablet Take 15 mg by mouth daily.  calcium citrate/vitamin D3 (CALCIUM CITRATE + D PO) Take  by mouth.  cyanocobalamin (VITAMIN B-12) 500 mcg tablet Take 500 mcg by mouth daily.  multivitamin (ONE A DAY) tablet Take 1 Tab by mouth daily.  ARIPiprazole (ABILIFY) 5 mg tablet Take 5 mg by mouth daily.  traZODone (DESYREL) 100 mg tablet Take 150 mg by mouth daily.  albuterol (PROAIR HFA) 90 mcg/actuation inhaler Take 2 Puffs by inhalation every four (4) hours as needed.  naloxone 4 mg/actuation spry 4 mg by Nasal route as needed.  ergocalciferol (VITAMIN D2) 50,000 unit capsule Take 50,000 Units by mouth every seven (7) days.  Cane lita Use as directed.  amLODIPine (NORVASC) 10 mg tablet Take  by mouth daily.  citalopram (CELEXA) 40 mg tablet Take 40 mg by mouth daily.  lisinopril (PRINIVIL, ZESTRIL) 10 mg tablet Take  by mouth daily.  butalbital-acetaminophen-caffeine (FIORICET) -40 mg per tablet Take 1 Tab by mouth daily as needed.  furosemide (LASIX) 20 mg tablet Take  by mouth as needed. No current facility-administered medications for this visit.         Past Medical History:   Diagnosis Date    Anxiety 2017    Arthritis     Back injury     Depression     Essential hypertension     GERD (gastroesophageal reflux disease)     Sleep apnea in adult 3/6/2018       Past Surgical History:   Procedure Laterality Date    HX APPENDECTOMY      HX  SECTION      HX GASTRECTOMY      lap sleeve gastrectomy    HX HYSTERECTOMY      HX KNEE REPLACEMENT      left knee    HX ORTHOPAEDIC      rt knee partial replacement       History reviewed. No pertinent family history. Social History     Socioeconomic History    Marital status: SINGLE     Spouse name: Not on file    Number of children: Not on file    Years of education: Not on file    Highest education level: Not on file   Occupational History    Not on file   Social Needs    Financial resource strain: Not on file    Food insecurity     Worry: Not on file     Inability: Not on file    Transportation needs     Medical: Not on file     Non-medical: Not on file   Tobacco Use    Smoking status: Former Smoker     Packs/day: 1.00     Years: 30.00     Pack years: 30.00     Last attempt to quit: 2019     Years since quittin.6    Smokeless tobacco: Never Used   Substance and Sexual Activity    Alcohol use: No    Drug use: No    Sexual activity: Not on file   Lifestyle    Physical activity     Days per week: Not on file     Minutes per session: Not on file    Stress: Not on file   Relationships    Social connections     Talks on phone: Not on file     Gets together: Not on file     Attends Jain service: Not on file     Active member of club or organization: Not on file     Attends meetings of clubs or organizations: Not on file     Relationship status: Not on file    Intimate partner violence     Fear of current or ex partner: Not on file     Emotionally abused: Not on file     Physically abused: Not on file     Forced sexual activity: Not on file   Other Topics Concern    Not on file   Social History Narrative    Not on file         Mrs. Aidan Eli consents to telephone visit. She follows up in the clinic for recurrent major depression and anxiety disorder.   Patient was last seen by Dr. Mathew Paul March 3, 2019, where she was prescribed Celexa 40 mg tablet take 1 tablet daily, trazodone 100 mg tablet take 1 tablet at bedtime, Abilify 5 mg tablet take 1 tablet daily and Xanax 0.5 mg tablet take 1 tablet 3 times daily as needed for anxiety. On today's visit, patient reports feeling depressed for the past 6 months. She describes her mood as being \"down. \"  Patient denies suicidal/homicidal thinking-risk for suicide is low. She also reports having anger episodes about 3 times a week. Patient reports that the Abilify used to work well but now it is not helping very much. No psychotic symptoms noted or reported. Sleep and appetite-stable. Patient reports that she is in the process of quitting smoking. She is also receptive to coming off of Xanax in the near future. Review of Systems   Cardiovascular:        Swelling in ankles sometimes   Psychiatric/Behavioral: Positive for depression. The patient is nervous/anxious. All other systems reviewed and are negative. There were no vitals taken for this visit. Physical Exam  Neurological:      Mental Status: She is alert and oriented to person, place, and time. Psychiatric:         Attention and Perception: Attention and perception normal.         Mood and Affect: Mood is depressed. Speech: Speech normal.         Behavior: Behavior normal. Behavior is cooperative. Thought Content: Thought content normal.         Cognition and Memory: Cognition and memory normal.         Judgment: Judgment normal.        Plan:    Increase Abilify to 10 mg tablet take 1 tablet daily for mood. Continue Xanax as needed for anxiety, Celexa and trazodone. Patient is advised to call 911 or go to the local emergency department for suicidal/homicidal thinking. Patient is advised to follow-up with primary care provider and specialists as appropriate. Patient is advised to avoid smoking, alcohol and drug use. There are no diagnoses linked to this encounter.

## 2020-07-27 NOTE — PROGRESS NOTES
[employer] [facility] at [facility]  Supervised Weight Loss     Date:   2020    Patient's Name: Isaiah Coreas  : 1965     Insurance:  Medicare          Session: 3 of  4  Surgery: Gastric Bypass                  Surgeon:  Alena Pineda M.D.      Height: 68\"                 Previous Weight:    405#      Lbs. BMI: 61             Pounds Lost since last month: 0               Pounds Gained since last month: 0     Starting Weight: 405#                       Previous Months Weight: 405#  Overall Pounds Lost: 0                    Overall Pounds Gained: 0     Other Pertinent Information: Today's appointment was completed over the phone in accordance with social distancing guidelines d/t COVID-19. Pt was unable to report an updated weight for today's appointment.      Smoking Status: quit smoking   Alcohol Intake: none    I have reviewed with pt the guidelines of the supervised wt loss program.  Pt understands the expectations of some wt loss during the program and that wt gain could delay the process. I have also explained that appointments need to be consecutive and missing an appointment may result in starting over. Pt has received this information in writing. Changes that patient has made since last month include:  Smoking cessation (quit date ), stretching exercise, working on not skipping meals - drinking protein shakes instead of skipping. Eating Habits and Behaviors  A nutrition lesson specific to vitamins was provided. We discussed the various reasons for needing vitamins and different types and doses. General healthy eating guidelines were also discussed. Pts were instructed that their plate should be made up 1/2 plate coming from non-starchy vegetables, 1/4 coming from lean meat, and 1/4 of their plate coming from carbohydrates, including fruits, starches, or milk.   We discussed measuring meals to 1/2 cup total per meal after surgery. Drinking only calorie-free, sugar-free and non-carbonated beverages. We discussed the importance of drinking 64 ounces of fluid per day to prevent dehydration post-operatively. Patient's current diet habits include: eating 2-3 meals per day. Using protein shakes instead of skipping meals. Drinking mostly water and flavored bhatt, occasional soda. Snacking on cookies. Pt does most of her own cooking. Eating out is minimal.         Physical Activity/Exercise  We talked about the importance of increasing daily physical activity and beginning to develop an exercise regimen/routine. We talked about exercise as being an important part of long term weight loss after surgery. Comments:  During class, I discussed with patient the importance of getting into an exercise routine. Pt is currently doing some stretching at home for activity. Pt has been encouraged to continue with stretching and add in more chair exercises. Behavior Modification       We talked about how to eat more mindfully and identify emotional eating triggers. Tips and recommendations for how to make these changes were provided. Pt was encouraged to keep a food journal and record what they were taking in daily. Overall Assessment: Pt demonstrates appropriate lifestyle changes evidenced by reported changes. Will continue to assess. Patient-Set Goals:   1. Nutrition - reading labels for added sugar content   2. Exercise - chair exercises   3.  Behavior -continue with smoking cessation     Mati Samaniego, RD  7/27/2020

## 2020-07-30 ENCOUNTER — TELEPHONE (OUTPATIENT)
Dept: BEHAVIORAL/MENTAL HEALTH CLINIC | Age: 55
End: 2020-07-30

## 2020-08-14 ENCOUNTER — TELEPHONE (OUTPATIENT)
Dept: BEHAVIORAL/MENTAL HEALTH CLINIC | Age: 55
End: 2020-08-14

## 2020-08-14 DIAGNOSIS — F41.1 GENERALIZED ANXIETY DISORDER: Primary | ICD-10-CM

## 2020-08-14 RX ORDER — ALPRAZOLAM 0.5 MG/1
0.5 TABLET ORAL
Qty: 90 TAB | Refills: 0 | OUTPATIENT
Start: 2020-08-14 | End: 2020-08-19 | Stop reason: SDUPTHER

## 2020-08-19 ENCOUNTER — TELEPHONE (OUTPATIENT)
Dept: BEHAVIORAL/MENTAL HEALTH CLINIC | Age: 55
End: 2020-08-19

## 2020-08-19 DIAGNOSIS — F41.1 GENERALIZED ANXIETY DISORDER: ICD-10-CM

## 2020-08-19 RX ORDER — ALPRAZOLAM 0.5 MG/1
0.5 TABLET ORAL
Qty: 90 TAB | Refills: 0 | Status: SHIPPED | OUTPATIENT
Start: 2020-08-19 | End: 2020-09-18

## 2020-08-31 ENCOUNTER — CLINICAL SUPPORT (OUTPATIENT)
Dept: SURGERY | Age: 55
End: 2020-08-31

## 2020-08-31 NOTE — PROGRESS NOTES
[facility] at [address]  Supervised Weight Loss     Date:   2020    Patient's Name: Day Weiss  : 1965     Insurance:  Medicare          Session: 9 NI  4  Surgery: Gastric Bypass                  Surgeon: Adam Mehta M.D.      Height: 68\"                 SIZFWRTY ISTVAC:    462#      FJZ.                              BMI: 40             Pounds Lost since last month: 12               Pounds Gained since last month: 0     Starting Weight: 405#                       Previous Months Weight: 405#  Overall Pounds Lost: 12                    Overall Pounds Gained: 0     Other Pertinent Information: Today's appointment was completed over the phone in accordance with social distancing guidelines d/t COVID-19. Pt provided an updated wt from a recent physician office visit. Smoking Status:  Quit on    Alcohol Intake: none    I have reviewed with pt the guidelines of the supervised wt loss program.  Pt understands the expectations of some wt loss during the program and that wt gain could delay the process. I have also explained that appointments need to be consecutive and missing an appointment may result in starting over. Pt has received this information in writing. Changes that patient has made since last month include:  Drinking protein shakes,eating more protein, drinking more water. Eating Habits and Behaviors  General healthy eating guidelines were also discussed. Pts were instructed that their plate should be made up 1/2 plate coming from non-starchy vegetables, 1/4 coming from lean meat, and 1/4 of their plate coming from carbohydrates, including fruits, starches, or milk. We discussed measuring meals to 1/2 cup total per meal after surgery. Drinking only calorie-free, sugar-free and non-carbonated beverages. We discussed the importance of drinking 64 ounces of fluid per day to prevent dehydration post-operatively. Patient's current diet habits include: Using protein shakes (3-4 per day, not daily) in place of skipping the meal. Eating protein foods at most meals (eggs,stinson, chicken, fish) and eating fruits and vegetables. Still consuming fried foods and we discussed the risk for dumping syndrome with high fat or high sugar foods. Drinking mostly water and flavored bhatt. Physical Activity/Exercise  We talked about the importance of increasing daily physical activity and beginning to develop an exercise regimen/routine. We talked about exercise as being an important part of long term weight loss after surgery. Comments:  During class, I discussed with patient the importance of getting into an exercise routine. Pt is currently doing leg exercises at home for activity. Pt has been encouraged to try chair exercises at home. Behavior Modification       A behavior modification lesson was provided with an emphasis on developing mindful eating behaviors. We talked about how to eat more mindfully and identify emotional eating triggers. Tips and recommendations for how to make these changes were provided. Pt was encouraged to keep a food journal and record what they were taking in daily. Overall Assessment: Pt demonstrates appropriate lifestyle changes evidenced by reported changes and reported weight loss. Demonstrates good understanding of basic nutrition guidelines for gastric bypass. Appears to be an appropriate candidate for surgery at this time. Patient-Set Goals:   1. Nutrition - continue to improve food choices, limiting intake of fried foods (try air fried)   2. Exercise - chair exercises   3.  Behavior -continue to review nutrition education materials    Julian Carlson RD  8/31/2020 Area H Indication Text: Tumors in this location are included in Area H (eyelids, eyebrows, nose, lips, chin, ear, pre-auricular, post-auricular, temple, genitalia, hands, feet, ankles and areola).  Tissue conservation is critical in these anatomic locations.

## 2020-09-04 DIAGNOSIS — F33.1 MODERATE EPISODE OF RECURRENT MAJOR DEPRESSIVE DISORDER (HCC): ICD-10-CM

## 2020-09-04 DIAGNOSIS — F33.9 EPISODE OF RECURRENT MAJOR DEPRESSIVE DISORDER, UNSPECIFIED DEPRESSION EPISODE SEVERITY (HCC): Primary | ICD-10-CM

## 2020-09-04 RX ORDER — CITALOPRAM 40 MG/1
40 TABLET, FILM COATED ORAL DAILY
Qty: 90 TAB | Refills: 0 | Status: SHIPPED | OUTPATIENT
Start: 2020-09-04 | End: 2020-11-30 | Stop reason: SDUPTHER

## 2020-09-04 RX ORDER — ARIPIPRAZOLE 10 MG/1
10 TABLET ORAL DAILY
Qty: 90 TAB | Refills: 0 | Status: SHIPPED | OUTPATIENT
Start: 2020-09-04 | End: 2020-11-30 | Stop reason: SDUPTHER

## 2020-09-18 ENCOUNTER — HOSPITAL ENCOUNTER (OUTPATIENT)
Dept: PREADMISSION TESTING | Age: 55
Discharge: HOME | End: 2020-09-18
Payer: MEDICARE

## 2020-09-18 DIAGNOSIS — Z01.812 PRE-PROCEDURE LAB EXAM: ICD-10-CM

## 2020-09-18 PROCEDURE — 87635 SARS-COV-2 COVID-19 AMP PRB: CPT

## 2020-09-20 LAB — SARS-COV-2, COV2NT: NOT DETECTED

## 2020-09-22 ENCOUNTER — ANESTHESIA (OUTPATIENT)
Dept: ENDOSCOPY | Age: 55
End: 2020-09-22
Payer: MEDICARE

## 2020-09-22 ENCOUNTER — ANESTHESIA EVENT (OUTPATIENT)
Dept: ENDOSCOPY | Age: 55
End: 2020-09-22
Payer: MEDICARE

## 2020-09-22 ENCOUNTER — HOSPITAL ENCOUNTER (OUTPATIENT)
Age: 55
Setting detail: OUTPATIENT SURGERY
Discharge: HOME | End: 2020-09-22
Attending: SPECIALIST | Admitting: SPECIALIST
Payer: MEDICARE

## 2020-09-22 VITALS
HEART RATE: 103 BPM | DIASTOLIC BLOOD PRESSURE: 77 MMHG | BODY MASS INDEX: 43.4 KG/M2 | OXYGEN SATURATION: 94 % | HEIGHT: 69 IN | RESPIRATION RATE: 16 BRPM | TEMPERATURE: 98 F | WEIGHT: 293 LBS | SYSTOLIC BLOOD PRESSURE: 174 MMHG

## 2020-09-22 PROCEDURE — 76060000031 HC ANESTHESIA FIRST 0.5 HR: Performed by: SPECIALIST

## 2020-09-22 PROCEDURE — 77030021593 HC FCPS BIOP ENDOSC BSC -A: Performed by: SPECIALIST

## 2020-09-22 PROCEDURE — 2709999900 HC NON-CHARGEABLE SUPPLY: Performed by: SPECIALIST

## 2020-09-22 PROCEDURE — 88305 TISSUE EXAM BY PATHOLOGIST: CPT

## 2020-09-22 PROCEDURE — 74011000250 HC RX REV CODE- 250: Performed by: NURSE ANESTHETIST, CERTIFIED REGISTERED

## 2020-09-22 PROCEDURE — 76040000019: Performed by: SPECIALIST

## 2020-09-22 PROCEDURE — 74011250636 HC RX REV CODE- 250/636: Performed by: SPECIALIST

## 2020-09-22 PROCEDURE — 74011250636 HC RX REV CODE- 250/636: Performed by: NURSE ANESTHETIST, CERTIFIED REGISTERED

## 2020-09-22 RX ORDER — SODIUM CHLORIDE 9 MG/ML
50 INJECTION, SOLUTION INTRAVENOUS CONTINUOUS
Status: DISCONTINUED | OUTPATIENT
Start: 2020-09-22 | End: 2020-09-22 | Stop reason: HOSPADM

## 2020-09-22 RX ORDER — TIZANIDINE HYDROCHLORIDE 4 MG/1
4 CAPSULE, GELATIN COATED ORAL 3 TIMES DAILY
COMMUNITY

## 2020-09-22 RX ORDER — ALPRAZOLAM 0.5 MG/1
0.5 TABLET ORAL
COMMUNITY
End: 2020-10-07 | Stop reason: SDUPTHER

## 2020-09-22 RX ORDER — ATROPINE SULFATE 0.1 MG/ML
0.5 INJECTION INTRAVENOUS
Status: DISCONTINUED | OUTPATIENT
Start: 2020-09-22 | End: 2020-09-22 | Stop reason: HOSPADM

## 2020-09-22 RX ORDER — KETOCONAZOLE 20 MG/G
CREAM TOPICAL DAILY
COMMUNITY

## 2020-09-22 RX ORDER — SODIUM CHLORIDE 9 MG/ML
50 INJECTION, SOLUTION INTRAVENOUS CONTINUOUS
Status: CANCELLED | OUTPATIENT
Start: 2020-09-22 | End: 2020-09-22

## 2020-09-22 RX ORDER — EPINEPHRINE 0.1 MG/ML
1 INJECTION INTRACARDIAC; INTRAVENOUS
Status: DISCONTINUED | OUTPATIENT
Start: 2020-09-22 | End: 2020-09-22 | Stop reason: HOSPADM

## 2020-09-22 RX ORDER — FLUMAZENIL 0.1 MG/ML
0.2 INJECTION INTRAVENOUS
Status: DISCONTINUED | OUTPATIENT
Start: 2020-09-22 | End: 2020-09-22 | Stop reason: HOSPADM

## 2020-09-22 RX ORDER — SODIUM CHLORIDE 0.9 % (FLUSH) 0.9 %
5-40 SYRINGE (ML) INJECTION EVERY 8 HOURS
Status: DISCONTINUED | OUTPATIENT
Start: 2020-09-22 | End: 2020-09-22 | Stop reason: HOSPADM

## 2020-09-22 RX ORDER — SODIUM CHLORIDE 0.9 % (FLUSH) 0.9 %
5-40 SYRINGE (ML) INJECTION EVERY 8 HOURS
Status: CANCELLED | OUTPATIENT
Start: 2020-09-22

## 2020-09-22 RX ORDER — PROPOFOL 10 MG/ML
INJECTION, EMULSION INTRAVENOUS AS NEEDED
Status: DISCONTINUED | OUTPATIENT
Start: 2020-09-22 | End: 2020-09-22 | Stop reason: HOSPADM

## 2020-09-22 RX ORDER — NALOXONE HYDROCHLORIDE 0.4 MG/ML
0.4 INJECTION, SOLUTION INTRAMUSCULAR; INTRAVENOUS; SUBCUTANEOUS
Status: DISCONTINUED | OUTPATIENT
Start: 2020-09-22 | End: 2020-09-22 | Stop reason: HOSPADM

## 2020-09-22 RX ORDER — SODIUM CHLORIDE 0.9 % (FLUSH) 0.9 %
5-40 SYRINGE (ML) INJECTION AS NEEDED
Status: DISCONTINUED | OUTPATIENT
Start: 2020-09-22 | End: 2020-09-22 | Stop reason: HOSPADM

## 2020-09-22 RX ORDER — PRAVASTATIN SODIUM 20 MG/1
20 TABLET ORAL
COMMUNITY

## 2020-09-22 RX ORDER — OLOPATADINE HYDROCHLORIDE 1 MG/ML
2 SOLUTION/ DROPS OPHTHALMIC 2 TIMES DAILY
COMMUNITY

## 2020-09-22 RX ORDER — DEXTROMETHORPHAN/PSEUDOEPHED 2.5-7.5/.8
1.2 DROPS ORAL
Status: DISCONTINUED | OUTPATIENT
Start: 2020-09-22 | End: 2020-09-22 | Stop reason: HOSPADM

## 2020-09-22 RX ORDER — LIDOCAINE HYDROCHLORIDE 20 MG/ML
INJECTION, SOLUTION EPIDURAL; INFILTRATION; INTRACAUDAL; PERINEURAL AS NEEDED
Status: DISCONTINUED | OUTPATIENT
Start: 2020-09-22 | End: 2020-09-22 | Stop reason: HOSPADM

## 2020-09-22 RX ORDER — BISACODYL 5 MG
5 TABLET, DELAYED RELEASE (ENTERIC COATED) ORAL DAILY PRN
COMMUNITY
End: 2021-10-04

## 2020-09-22 RX ORDER — VARENICLINE TARTRATE 0.5 MG/1
0.5 TABLET, FILM COATED ORAL 2 TIMES DAILY
COMMUNITY
End: 2020-12-03

## 2020-09-22 RX ORDER — CHOLECALCIFEROL (VITAMIN D3) 125 MCG
220 CAPSULE ORAL
COMMUNITY
End: 2020-12-03

## 2020-09-22 RX ORDER — SODIUM CHLORIDE 0.9 % (FLUSH) 0.9 %
5-40 SYRINGE (ML) INJECTION AS NEEDED
Status: CANCELLED | OUTPATIENT
Start: 2020-09-22

## 2020-09-22 RX ORDER — MINOCYCLINE HYDROCHLORIDE 100 MG/1
100 TABLET ORAL 2 TIMES DAILY
COMMUNITY
End: 2021-03-10 | Stop reason: ALTCHOICE

## 2020-09-22 RX ADMIN — LIDOCAINE HYDROCHLORIDE 50 MG: 20 INJECTION, SOLUTION EPIDURAL; INFILTRATION; INTRACAUDAL; PERINEURAL at 12:08

## 2020-09-22 RX ADMIN — PROPOFOL 40 MG: 10 INJECTION, EMULSION INTRAVENOUS at 12:14

## 2020-09-22 RX ADMIN — SODIUM CHLORIDE: 900 INJECTION, SOLUTION INTRAVENOUS at 11:53

## 2020-09-22 RX ADMIN — PROPOFOL 100 MG: 10 INJECTION, EMULSION INTRAVENOUS at 12:09

## 2020-09-22 RX ADMIN — PROPOFOL 100 MG: 10 INJECTION, EMULSION INTRAVENOUS at 12:10

## 2020-09-22 RX ADMIN — PROPOFOL 100 MG: 10 INJECTION, EMULSION INTRAVENOUS at 12:08

## 2020-09-22 RX ADMIN — PROPOFOL 100 MG: 10 INJECTION, EMULSION INTRAVENOUS at 12:12

## 2020-09-22 NOTE — ANESTHESIA PREPROCEDURE EVALUATION
Relevant Problems   No relevant active problems       Anesthetic History   No history of anesthetic complications            Review of Systems / Medical History  Patient summary reviewed, nursing notes reviewed and pertinent labs reviewed    Pulmonary  Within defined limits      Sleep apnea    Asthma        Neuro/Psych   Within defined limits      Psychiatric history     Cardiovascular  Within defined limits  Hypertension                   GI/Hepatic/Renal  Within defined limits   GERD           Endo/Other  Within defined limits      Morbid obesity and arthritis     Other Findings              Physical Exam    Airway  Mallampati: II  TM Distance: > 6 cm  Neck ROM: normal range of motion   Mouth opening: Normal     Cardiovascular  Regular rate and rhythm,  S1 and S2 normal,  no murmur, click, rub, or gallop             Dental  No notable dental hx       Pulmonary  Breath sounds clear to auscultation               Abdominal  GI exam deferred       Other Findings            Anesthetic Plan    ASA: 4  Anesthesia type: MAC            Anesthetic plan and risks discussed with: Patient

## 2020-09-22 NOTE — PROCEDURES
[address]  [address]                 NAME:  Lucy Darden   :   1965   MRN:   [id number]     Date/Time:  2020 12:18 PM    Esophagogastroduodenoscopy (EGD) Procedure Note    :  Krystian Guillory MD    Referring Provider:  Don Escobedo MD    Anethesia/Sedation:  MAC anesthesia Propofol    Preoperative diagnosis: GERD    Postoperative diagnosis: gastritis    Procedure Details     After infom consent was obtained for the procedure, with all risks and benefits of procedure explained the patient was taken to the endoscopy suite and placed in the left lateral decubitus position. Following sequential administration of sedation as per above, the [id number] gastroscope was inserted into the mouth and advanced under direct vision to second portion of the duodenum. A careful inspection was made as the gastroscope was withdrawn, including a retroflexed view of the proximal stomach; findings and interventions are described below. Findings:  Esophagus:normal  Stomach:Erythema and erosions in antrum, biopsies done  Duodenum/jejunum:normal      Therapies:  none    Specimens: antral bx           EBL: None    Complications:   None; patient tolerated the procedure well. Impression:    See Postoperative diagnosis above    Recommendations:  -Acid suppression with a proton pump inhibitor. , -Await pathology. , -No NSAIDS    Discharge disposition:  Home in the company of  when able to ambulate    Krystian Guillory MD

## 2020-09-22 NOTE — PERIOP NOTES

## 2020-09-22 NOTE — ANESTHESIA POSTPROCEDURE EVALUATION
Procedure(s):  ESOPHAGOGASTRODUODENOSCOPY (EGD)    :-  ESOPHAGOGASTRODUODENAL (EGD) BIOPSY. MAC    Anesthesia Post Evaluation      Multimodal analgesia: multimodal analgesia not used between 6 hours prior to anesthesia start to PACU discharge  Patient location during evaluation: PACU  Patient participation: complete - patient participated  Level of consciousness: awake  Pain score: 0  Pain management: adequate  Airway patency: patent  Anesthetic complications: no  Cardiovascular status: acceptable  Respiratory status: acceptable  Hydration status: acceptable  Comments: I have evaluated the patient and meets criteria for discharge from PACU.  Pee Brian MD        INITIAL Post-op Vital signs:   Vitals Value Taken Time   /77 9/22/2020 12:36 PM   Temp 36.7 °C (98 °F) 9/22/2020 12:36 PM   Pulse 103 9/22/2020 12:36 PM   Resp 16 9/22/2020 12:36 PM   SpO2 94 % 9/22/2020 12:31 PM

## 2020-09-22 NOTE — H&P
Pre-endoscopy H and P     The patient was seen and examined in the endoscopy suite. The airway was assessed and docuemented. The problem list and medications were reviewed.      Patient Active Problem List   Diagnosis Code    Low back pain M54.5    Encounter for long-term (current) use of other medications Z79.899    HA (headache) R51    Chronic pain syndrome G89.4    DJD (degenerative joint disease), lumbar M47.816    Right knee pain M25.561    Right knee DJD M17.11    Chronic headache R51    Obesity, morbid (HCC) E66.01    Gastroesophageal reflux disease without esophagitis K21.9    Mild intermittent asthma without complication B22.92    Essential hypertension I10    Hypercholesteremia E78.00    Anxiety F41.9    Depression F32.9    Sleep apnea in adult G47.30     Social History     Socioeconomic History    Marital status: SINGLE     Spouse name: Not on file    Number of children: Not on file    Years of education: Not on file    Highest education level: Not on file   Occupational History    Not on file   Social Needs    Financial resource strain: Not on file    Food insecurity     Worry: Not on file     Inability: Not on file    Transportation needs     Medical: Not on file     Non-medical: Not on file   Tobacco Use    Smoking status: Former Smoker     Packs/day: 1.00     Years: 30.00     Pack years: 30.00     Last attempt to quit: 2019     Years since quittin.8    Smokeless tobacco: Never Used   Substance and Sexual Activity    Alcohol use: No    Drug use: No    Sexual activity: Not on file   Lifestyle    Physical activity     Days per week: Not on file     Minutes per session: Not on file    Stress: Not on file   Relationships    Social connections     Talks on phone: Not on file     Gets together: Not on file     Attends Restoration service: Not on file     Active member of club or organization: Not on file     Attends meetings of clubs or organizations: Not on file Relationship status: Not on file    Intimate partner violence     Fear of current or ex partner: Not on file     Emotionally abused: Not on file     Physically abused: Not on file     Forced sexual activity: Not on file   Other Topics Concern    Not on file   Social History Narrative    Not on file     Past Medical History:   Diagnosis Date    Anxiety 8/24/2017    Arthritis     Back injury     Depression     Essential hypertension     GERD (gastroesophageal reflux disease)     Sleep apnea in adult 3/6/2018         Prior to Admission Medications   Prescriptions Last Dose Informant Patient Reported? Taking? ALPRAZolam (Xanax) 0.5 mg tablet 9/21/2020 at Unknown time  Yes Yes   Sig: Take 0.5 mg by mouth. ARIPiprazole (ABILIFY) 10 mg tablet 9/21/2020 at Unknown time  No Yes   Sig: Take 1 Tab by mouth daily for 90 days. Indications: additional medications to treat depression   Cane lita   No No   Sig: Use as directed. albuterol (PROAIR HFA) 90 mcg/actuation inhaler 9/15/2020 at Unknown time  Yes Yes   Sig: Take 2 Puffs by inhalation every four (4) hours as needed. amLODIPine (NORVASC) 10 mg tablet 9/21/2020 at Unknown time  Yes Yes   Sig: Take  by mouth daily. bisacodyL (Dulcolax, bisacodyl,) 5 mg EC tablet 9/21/2020 at Unknown time  Yes Yes   Sig: Take 5 mg by mouth daily as needed for Constipation. butalbital-acetaminophen-caffeine (FIORICET) -40 mg per tablet 9/21/2020 at Unknown time  Yes Yes   Sig: Take 1 Tab by mouth daily as needed. calcium citrate/vitamin D3 (CALCIUM CITRATE + D PO) 9/21/2020 at Unknown time  Yes Yes   Sig: Take  by mouth.   citalopram (CeleXA) 40 mg tablet 9/21/2020 at Unknown time  No Yes   Sig: Take 1 Tab by mouth daily for 90 days. cyanocobalamin (VITAMIN B-12) 500 mcg tablet 9/21/2020 at Unknown time  Yes Yes   Sig: Take 500 mcg by mouth daily.    ergocalciferol (VITAMIN D2) 50,000 unit capsule 9/15/2020 at Unknown time  Yes Yes   Sig: Take 50,000 Units by mouth every seven (7) days. furosemide (LASIX) 20 mg tablet 2020 at Unknown time  Yes Yes   Sig: Take  by mouth as needed. ketoconazole (NIZORAL) 2 % topical cream 2020 at Unknown time  Yes Yes   Sig: Apply  to affected area daily. lisinopril (PRINIVIL, ZESTRIL) 10 mg tablet 2020 at Unknown time  Yes Yes   Sig: Take  by mouth daily. meloxicam (MOBIC) 15 mg tablet 2020 at Unknown time  Yes Yes   Sig: Take 15 mg by mouth daily. minocycline (DYNACIN) 100 mg tablet 2020 at Unknown time  Yes Yes   Sig: Take 100 mg by mouth two (2) times a day. multivitamin (ONE A DAY) tablet 2020 at Unknown time  Yes Yes   Sig: Take 1 Tab by mouth daily. naloxone 4 mg/actuation spry 2020 at Unknown time  No Yes   Si mg by Nasal route as needed. naproxen sodium (Aleve) 220 mg cap 2020 at Unknown time  Yes Yes   Sig: Take 220 mg by mouth. olopatadine (PATANOL) 0.1 % ophthalmic solution 2020 at Unknown time  Yes Yes   Sig: Administer 2 Drops to both eyes two (2) times a day. pravastatin (PRAVACHOL) 20 mg tablet 2020 at Unknown time  Yes Yes   Sig: Take 20 mg by mouth nightly. tiZANidine (ZANAFLEX) 4 mg capsule 2020 at Unknown time  Yes Yes   Sig: Take 4 mg by mouth three (3) times daily. traZODone (DESYREL) 100 mg tablet 2020 at Unknown time  Yes Yes   Sig: Take 150 mg by mouth daily. varenicline (Chantix) 0.5 mg tablet 2020 at Unknown time  Yes Yes   Sig: Take 0.5 mg by mouth two (2) times a day. Facility-Administered Medications: None       Chief complaint, history of present illness, and review of systems and Past medical History are positive for: GERD, obesity and sleep apnea    The heart, lungs and mental status were satisfactory for the administration of sedation and for the procedure. I discussed with the patient the objectives, risks, consequences and alternatives to the procedure.      The patient was counseled at length about the risks of patric Covid-19 in the rolo-operative and post-operative states including the recovery window of their procedure. The patient was made aware that patric Covid-19 after a surgical procedure may worsen their prognosis for recovering from the virus and lend to a higher morbidity and or mortality risk. The patient was given the options of postponing their procedure. All of the risks, benefits, and alternatives were discussed. The patient does  wish to proceed with the procedure.     Plan: Endoscopic procedure with sedation     Carline Gill MD   9/22/2020  12:01 PM

## 2020-09-22 NOTE — ROUTINE PROCESS
Abbie Baca  1965  [id number]    Situation:  Verbal report given from: Lennard Oppenheim  Procedure: Procedure(s):  ESOPHAGOGASTRODUODENOSCOPY (EGD)    :-  ESOPHAGOGASTRODUODENAL (EGD) BIOPSY    Background:    Preoperative diagnosis: GERD    Postoperative diagnosis: gastritis    :  Dr. Vegas Turkish): Endoscopy Technician-1: Mary Jo Arias  Endoscopy RN-1: Simran Louise RN    Specimens:   ID Type Source Tests Collected by Time Destination   1 : gastric bx Preservative Gastric  Marcella Anne MD 9/22/2020 1214 Pathology       Assessment:  Intra-procedure medications   Propofol  mg      Anesthesia gave intra-procedure sedation and medications, see anesthesia flow sheet     Intravenous fluids: LR@ KVO     Vital signs stable     Abdominal assessment: round and soft       Recommendation:    Permission to share finding with friend Arnel yes    All side rails up, bed in low position, wheels locked. Nurse at bedside.

## 2020-09-22 NOTE — DISCHARGE INSTRUCTIONS
Brooklyn Diaz  [id number]  1965    EGD DISCHARGE INSTRUCTIONS  Discomfort:  Sore throat- throat lozenges or warm salt water gargle  redness at IV site- apply warm compress to area; if redness or soreness persist- contact your physician  Gaseous discomfort- walking, belching will help relieve any discomfort    DIET  You may resume your regular diet - however -  remember your colon is empty and a heavy meal will produce gas. Avoid these foods:  vegetables, fried / greasy foods, carbonated drinks  You may not drink alcoholic beverages for at least 12 hours    MEDICATIONS   Regarding Aspirin or Nonsteroidal medications specifically, please see below. ACTIVITY  You may resume your normal daily activities. Spend the remainder of the day resting -  avoid any strenuous activity. You may not operate a vehicle for 12 hours  You may not engage in an occupation involving machinery or appliances for rest of today. Avoid making any critical decisions for at least 24 hour    CALL M.D. ANY SIGN OF   Increasing pain, nausea, vomiting  Abdominal distension (swelling)  New increased bleeding (oral or rectal)  Fever (chills)  Pain in chest area  Bloody discharge from nose or mouth  Shortness of breath    You may not  take any Advil, Aspirin, Ibuprofen, Motrin, Aleve, or Goodys for 10 days, ONLY  Tylenol as needed for pain.     Post procedure diagnosis: gastritis      Follow-up Instructions:   Call Dr. Arnaldo Srivastava  Results of procedure / biopsy in 10 days, take Omeprazole as prescribed  Telephone #  [phone]        Mack Iverson from Nurse    The following personal items collected during your admission are returned to you:   Dental Appliance: Dental Appliances: None  Vision: Visual Aid: Glasses(reading)  Hearing Aid:    Jewelry:    Clothing:    Other Valuables:    Valuables sent to safe:

## 2020-10-06 ENCOUNTER — VIRTUAL VISIT (OUTPATIENT)
Dept: SURGERY | Age: 55
End: 2020-10-06
Payer: MEDICARE

## 2020-10-06 VITALS — BODY MASS INDEX: 43.4 KG/M2 | HEIGHT: 69 IN | WEIGHT: 293 LBS

## 2020-10-06 DIAGNOSIS — K21.9 GASTROESOPHAGEAL REFLUX DISEASE WITHOUT ESOPHAGITIS: ICD-10-CM

## 2020-10-06 DIAGNOSIS — J45.20 MILD INTERMITTENT ASTHMA WITHOUT COMPLICATION: ICD-10-CM

## 2020-10-06 DIAGNOSIS — E78.00 HYPERCHOLESTEREMIA: ICD-10-CM

## 2020-10-06 DIAGNOSIS — E66.01 OBESITY, MORBID (HCC): Primary | ICD-10-CM

## 2020-10-06 DIAGNOSIS — I10 ESSENTIAL HYPERTENSION: ICD-10-CM

## 2020-10-06 PROCEDURE — 99212 OFFICE O/P EST SF 10 MIN: CPT | Performed by: SURGERY

## 2020-10-06 NOTE — PROGRESS NOTES
1. Have you been to the ER, urgent care clinic since your last visit? Hospitalized since your last visit? No    2. Have you seen or consulted any other health care providers outside of the [address] [place] [address] since your last visit? Include any pap smears or colon screening.  No

## 2020-10-06 NOTE — PATIENT INSTRUCTIONS
Learning About Weight-Loss (Bariatric) Surgery What is weight-loss surgery? Bariatric surgery is surgery to help you lose weight. This type of surgery is only used for people who are very overweight and have not been able to lose weight with diet and exercise. This surgery makes the stomach smaller. Some types of surgery also change the connection between your stomach and intestines. Having weight-loss surgery is a big step. After surgery, you'll need to make new, lifelong changes in how you eat and drink. How is weight-loss surgery done? Bariatric surgery may be either \"open\" or \"laparoscopic. \" Open surgery is done through a large cut (incision) in the belly. Laparoscopic surgery is done through several small cuts. The doctor puts a lighted tube, or scope, and other surgical tools through small cuts in your belly. The doctor is able to see your organs with the scope. There are different types of bariatric surgery. Gastric sleeve surgery The surgery is usually done through several small incisions in the belly. The doctor removes more than half of your stomach. This leaves a thin sleeve, or tube, that is about the size of a banana. Because part of your stomach has been removed, this can't be reversed. Garry-en-Y gastric bypass surgery Garry-en-Y (say \"samantha-en-why\") surgery changes the connection between the stomach and the intestines. The doctor separates a section of your stomach from the rest of your stomach. This makes a small pouch. The new pouch will hold the food you eat. The doctor connects the stomach pouch to the middle part of the small intestine. Gastric banding surgery The surgery is usually done through several small incisions in the belly. The doctor wraps a band around the upper part of the stomach. This creates a small pouch. The small size of the pouch means that you will get full after you eat just a small amount of food.  The doctor can inflate or deflate the band to adjust the size. This lets the doctor adjust how quickly food passes from the new pouch into the stomach. It does not change the connection between the stomach and the intestines. What can you expect after the surgery? You may stay in the hospital for one or more days after the surgery. How long you stay depends on the type of surgery you had. Most people need 2 to 4 weeks before they are ready to get back to their usual routine. For the first 2 to 6 weeks after surgery, you probably will need to follow a liquid or soft diet. Bit by bit, you will be able to eat more solid foods. Your doctor may advise you to work with a dietitian. This way you'll be sure to get enough protein, vitamins, and minerals while you are losing weight. Even with a healthy diet, you may need to take vitamin and mineral supplements. After surgery, you will not be able to eat very much at one time. You will get full quickly. Try not to eat too much at one time or eat foods that are high in fat or sugar. If you do, you may vomit, get stomach pain, or have diarrhea. You probably will lose weight very quickly in the first few months after surgery. As time goes on, your weight loss will slow down. You will have regular doctor visits to check how you are doing. Think of bariatric surgery as a tool to help you lose weight. It isn't an instant fix. You will still need to eat a healthy diet and get regular exercise. This will help you reach your weight goal and avoid regaining the weight you lose. Follow-up care is a key part of your treatment and safety. Be sure to make and go to all appointments, and call your doctor if you are having problems. It's also a good idea to know your test results and keep a list of the medicines you take. Where can you learn more? Go to http://www.gray.com/ Enter G469 in the search box to learn more about \"Learning About Weight-Loss (Bariatric) Surgery. \" 
 Current as of: December 11, 2019               Content Version: 12.6 © 1249-4446 [employer]. Care instructions adapted under license by [employer] (which disclaims liability or warranty for this information). If you have questions about a medical condition or this instruction, always ask your healthcare professional. [address] any warranty or liability for your use of this information.

## 2020-10-06 NOTE — PROGRESS NOTES
I was in the office while conducting this encounter. Consent:  She and/or her healthcare decision maker is aware that this patient-initiated Telehealth encounter is a billable service, with coverage as determined by her insurance carrier. She is aware that she may receive a bill and has provided verbal consent to proceed: Yes    This virtual visit was conducted via [employer]. Pursuant to the emergency declaration under the [facility], [facility] waiver authority and the [employer] and [employer] Act, this Virtual  Visit was conducted to reduce the patient's risk of exposure to COVID-19 and provide continuity of care for an established patient. Services were provided through a video synchronous discussion virtually to substitute for in-person clinic visit. Due to this being a TeleHealth evaluation, many elements of the physical examination are unable to be assessed. Total Time: minutes: 5-10 minutes. Subjective: The patient is a 54 y.o. obese female seeking approval for revision of sleeve gastrectomy to gastric bypass. Body mass index is 58.89 kg/m². Josette Herring has tried multiple diets in her  lifetime most recently trying unsupervised diets during which she was able to lose small amounts of weight and then gained it back plus more.      Bariatric comorbidities present are   Past Medical History:   Diagnosis Date    Anxiety 8/24/2017    Arthritis     Back injury     Depression     Essential hypertension     GERD (gastroesophageal reflux disease)     Sleep apnea in adult 3/6/2018       Patient Active Problem List    Diagnosis Date Noted    Gastroesophageal reflux disease without esophagitis 01/08/2020    Mild intermittent asthma without complication 68/68/8296    Essential hypertension 01/08/2020    Hypercholesteremia 01/08/2020    Depression 03/06/2018    Sleep apnea in adult 03/06/2018    Obesity, morbid ([facility] [address] 2017    Anxiety 2017    Chronic headache 2016    Low back pain 2015    Encounter for long-term (current) use of other medications 2015    HA (headache) 2015    Chronic pain syndrome 2015    DJD (degenerative joint disease), lumbar 2015    Right knee pain 2015    Right knee DJD 2015     Past Medical History:   Diagnosis Date    Anxiety 2017    Arthritis     Back injury     Depression     Essential hypertension     GERD (gastroesophageal reflux disease)     Sleep apnea in adult 3/6/2018      Past Surgical History:   Procedure Laterality Date    HX APPENDECTOMY      HX  SECTION      HX GASTRECTOMY      lap sleeve gastrectomy    HX HYSTERECTOMY      HX KNEE REPLACEMENT      left knee    HX ORTHOPAEDIC      rt knee partial replacement      Social History     Tobacco Use    Smoking status: Former Smoker     Packs/day: 1.00     Years: 30.00     Pack years: 30.00     Last attempt to quit: 2019     Years since quittin.8    Smokeless tobacco: Never Used   Substance Use Topics    Alcohol use: No      No family history on file. Prior to Admission medications    Medication Sig Start Date End Date Taking? Authorizing Provider   naproxen sodium (Aleve) 220 mg cap Take 220 mg by mouth. Yes Provider, Historical   varenicline (Chantix) 0.5 mg tablet Take 0.5 mg by mouth two (2) times a day. Yes Provider, Historical   ALPRAZolam (Xanax) 0.5 mg tablet Take 0.5 mg by mouth. Yes Provider, Historical   tiZANidine (ZANAFLEX) 4 mg capsule Take 4 mg by mouth three (3) times daily. Yes Provider, Historical   pravastatin (PRAVACHOL) 20 mg tablet Take 20 mg by mouth nightly. Yes Provider, Historical   minocycline (DYNACIN) 100 mg tablet Take 100 mg by mouth two (2) times a day. Yes Provider, Historical   ketoconazole (NIZORAL) 2 % topical cream Apply  to affected area daily.    Yes Provider, Historical   olopatadine (PATANOL) 0.1 % ophthalmic solution Administer 2 Drops to both eyes two (2) times a day. Yes Provider, Historical   bisacodyL (Dulcolax, bisacodyl,) 5 mg EC tablet Take 5 mg by mouth daily as needed for Constipation. Yes Provider, Historical   ARIPiprazole (ABILIFY) 10 mg tablet Take 1 Tab by mouth daily for 90 days. Indications: additional medications to treat depression 9/4/20 12/3/20 Yes Bobby Goss NP   citalopram (CeleXA) 40 mg tablet Take 1 Tab by mouth daily for 90 days. 9/4/20 12/3/20 Yes Bobby Goss NP   meloxicam (MOBIC) 15 mg tablet Take 15 mg by mouth daily. Yes Provider, Historical   calcium citrate/vitamin D3 (CALCIUM CITRATE + D PO) Take  by mouth. Yes Provider, Historical   cyanocobalamin (VITAMIN B-12) 500 mcg tablet Take 500 mcg by mouth daily. Yes Provider, Historical   multivitamin (ONE A DAY) tablet Take 1 Tab by mouth daily. Yes Provider, Historical   traZODone (DESYREL) 100 mg tablet Take 150 mg by mouth daily. 3/6/18  Yes Provider, Historical   albuterol (PROAIR HFA) 90 mcg/actuation inhaler Take 2 Puffs by inhalation every four (4) hours as needed. Yes Provider, Historical   ergocalciferol (VITAMIN D2) 50,000 unit capsule Take 50,000 Units by mouth every seven (7) days. Yes Provider, Historical   Cane lita Use as directed. 3/14/16  Yes Trino Quintero NP   amLODIPine (NORVASC) 10 mg tablet Take  by mouth daily. Yes Provider, Historical   lisinopril (PRINIVIL, ZESTRIL) 10 mg tablet Take  by mouth daily. Yes Provider, Historical   butalbital-acetaminophen-caffeine (FIORICET) -40 mg per tablet Take 1 Tab by mouth daily as needed. Yes Provider, Historical   furosemide (LASIX) 20 mg tablet Take  by mouth as needed. Yes Provider, Historical   naloxone 4 mg/actuation spry 4 mg by Nasal route as needed.  3/30/17   Alton Wyatt MD     No Known Allergies      Objective:     Visit Vitals  Ht 5' 8.5\" (1.74 m)   Wt (!) 393 lb (178.3 kg)   BMI 58.89 kg/m² Data Review: Upper gastrointestinal series: No hiatal hernia; significant spontaneous reflux. Upper endoscopy: Gastritis, no esophagitis or hiatal hernia. Assessment:     Recurrent morbid obesity with multiple comorbidities. No success with medical management. She has completed all preoperative prerequisites and has been found to be a suitable candidate for revisional bariatric surgery. Plan:     1. Continue diet, exercise as tolerated. 2.  Will refer to patient selection committee for final approval for laparoscopic revision of sleeve gastrectomy to gastric bypass. 7 minutes spent in virtual encounter reviewing preoperative work-up, choice of procedure, anticipated hospital course, postoperative diet, activity restrictions, need for patient selection committee discussion.       Signed By: Maricel Michele MD     October 6, 2020

## 2020-10-07 ENCOUNTER — TELEPHONE (OUTPATIENT)
Dept: BEHAVIORAL/MENTAL HEALTH CLINIC | Age: 55
End: 2020-10-07

## 2020-10-07 DIAGNOSIS — F41.1 GENERALIZED ANXIETY DISORDER: Primary | ICD-10-CM

## 2020-10-07 DIAGNOSIS — F33.9 EPISODE OF RECURRENT MAJOR DEPRESSIVE DISORDER, UNSPECIFIED DEPRESSION EPISODE SEVERITY (HCC): ICD-10-CM

## 2020-10-07 RX ORDER — TRAZODONE HYDROCHLORIDE 100 MG/1
100 TABLET ORAL
Qty: 90 TAB | Refills: 0 | Status: SHIPPED | OUTPATIENT
Start: 2020-10-07 | End: 2020-12-14 | Stop reason: SDUPTHER

## 2020-10-07 RX ORDER — ALPRAZOLAM 0.5 MG/1
0.5 TABLET ORAL
Qty: 90 TAB | Refills: 0 | Status: SHIPPED | OUTPATIENT
Start: 2020-10-07 | End: 2020-11-06

## 2020-10-29 ENCOUNTER — OFFICE VISIT (OUTPATIENT)
Dept: SURGERY | Age: 55
End: 2020-10-29
Payer: MEDICARE

## 2020-10-29 VITALS
DIASTOLIC BLOOD PRESSURE: 92 MMHG | OXYGEN SATURATION: 96 % | HEIGHT: 69 IN | RESPIRATION RATE: 20 BRPM | TEMPERATURE: 98.1 F | BODY MASS INDEX: 43.4 KG/M2 | WEIGHT: 293 LBS | SYSTOLIC BLOOD PRESSURE: 159 MMHG | HEART RATE: 86 BPM

## 2020-10-29 DIAGNOSIS — J45.20 MILD INTERMITTENT ASTHMA WITHOUT COMPLICATION: ICD-10-CM

## 2020-10-29 DIAGNOSIS — I10 ESSENTIAL HYPERTENSION: ICD-10-CM

## 2020-10-29 DIAGNOSIS — E78.00 HYPERCHOLESTEREMIA: ICD-10-CM

## 2020-10-29 DIAGNOSIS — K21.9 GASTROESOPHAGEAL REFLUX DISEASE WITHOUT ESOPHAGITIS: ICD-10-CM

## 2020-10-29 DIAGNOSIS — M17.11 PRIMARY OSTEOARTHRITIS OF RIGHT KNEE: ICD-10-CM

## 2020-10-29 DIAGNOSIS — E66.01 OBESITY, MORBID (HCC): Primary | ICD-10-CM

## 2020-10-29 DIAGNOSIS — G47.30 SLEEP APNEA IN ADULT: ICD-10-CM

## 2020-10-29 PROCEDURE — G8427 DOCREV CUR MEDS BY ELIG CLIN: HCPCS | Performed by: SURGERY

## 2020-10-29 PROCEDURE — G8755 DIAS BP > OR = 90: HCPCS | Performed by: SURGERY

## 2020-10-29 PROCEDURE — 3017F COLORECTAL CA SCREEN DOC REV: CPT | Performed by: SURGERY

## 2020-10-29 PROCEDURE — G9717 DOC PT DX DEP/BP F/U NT REQ: HCPCS | Performed by: SURGERY

## 2020-10-29 PROCEDURE — 99213 OFFICE O/P EST LOW 20 MIN: CPT | Performed by: SURGERY

## 2020-10-29 PROCEDURE — G8753 SYS BP > OR = 140: HCPCS | Performed by: SURGERY

## 2020-10-29 PROCEDURE — G8417 CALC BMI ABV UP PARAM F/U: HCPCS | Performed by: SURGERY

## 2020-10-29 NOTE — PROGRESS NOTES
1. Have you been to the ER, urgent care clinic since your last visit? Hospitalized since your last visit? No    2. Have you seen or consulted any other health care providers outside of the [address] [place] [address] since your last visit? Include any pap smears or colon screening.  No

## 2020-10-29 NOTE — Clinical Note
Okay to schedule her for laparoscopic revision of gastrojejunostomy with partial gastrectomy and upper endoscopy. She will need IVC filter insertion 1 week preoperatively. She will need cotinine levels checked at preadmission testing. She will do a 4-week preoperative liver shrinking diet.

## 2020-10-30 NOTE — PROGRESS NOTES
Subjective: The patient is a 54 y.o. obese female with recurrent morbid obesity. No success with medical management. She desires revisional bariatric surgery. Body mass index is 59.04 kg/m². Montell Dancer has tried multiple diets in her  lifetime most recently trying unsupervised diets during which she was able to lose small amounts of weight (12 pounds since beginning our program). She is still tobacco free. She walks for exercise but is limited by knee pain.     Bariatric comorbidities present are   Past Medical History:   Diagnosis Date    Anxiety 2017    Arthritis     Back injury     Depression     Essential hypertension     GERD (gastroesophageal reflux disease)     Sleep apnea in adult 3/6/2018       Patient Active Problem List    Diagnosis Date Noted    Gastroesophageal reflux disease without esophagitis 2020    Mild intermittent asthma without complication     Essential hypertension 2020    Hypercholesteremia 2020    Depression 2018    Sleep apnea in adult 2018    Obesity, morbid ([address] 2017    Anxiety 2017    Chronic headache 2016    Low back pain 2015    Encounter for long-term (current) use of other medications 2015    HA (headache) 2015    Chronic pain syndrome 2015    DJD (degenerative joint disease), lumbar 2015    Right knee pain 2015    Right knee DJD 2015     Past Medical History:   Diagnosis Date    Anxiety 2017    Arthritis     Back injury     Depression     Essential hypertension     GERD (gastroesophageal reflux disease)     Sleep apnea in adult 3/6/2018      Past Surgical History:   Procedure Laterality Date    HX APPENDECTOMY      HX  SECTION      HX GASTRECTOMY      lap sleeve gastrectomy    HX HYSTERECTOMY      HX KNEE REPLACEMENT      left knee    HX ORTHOPAEDIC      rt knee partial replacement      Social History Tobacco Use    Smoking status: Former Smoker     Packs/day: 1.00     Years: 30.00     Pack years: 30.00     Last attempt to quit: 2019     Years since quittin.9    Smokeless tobacco: Never Used   Substance Use Topics    Alcohol use: No      No family history on file. Prior to Admission medications    Medication Sig Start Date End Date Taking? Authorizing Provider   traZODone (DESYREL) 100 mg tablet Take 1 Tab by mouth nightly for 90 days. 10/7/20 1/5/21 Yes Alfonso Porter NP   varenicline (Chantix) 0.5 mg tablet Take 0.5 mg by mouth two (2) times a day. Yes Provider, Historical   tiZANidine (ZANAFLEX) 4 mg capsule Take 4 mg by mouth three (3) times daily. Yes Provider, Historical   pravastatin (PRAVACHOL) 20 mg tablet Take 20 mg by mouth nightly. Yes Provider, Historical   ketoconazole (NIZORAL) 2 % topical cream Apply  to affected area daily. Yes Provider, Historical   olopatadine (PATANOL) 0.1 % ophthalmic solution Administer 2 Drops to both eyes two (2) times a day. Yes Provider, Historical   bisacodyL (Dulcolax, bisacodyl,) 5 mg EC tablet Take 5 mg by mouth daily as needed for Constipation. Yes Provider, Historical   ARIPiprazole (ABILIFY) 10 mg tablet Take 1 Tab by mouth daily for 90 days. Indications: additional medications to treat depression 9/4/20 12/3/20 Yes Moshe Goss NP   citalopram (CeleXA) 40 mg tablet Take 1 Tab by mouth daily for 90 days. 9/4/20 12/3/20 Yes Moshe Goss NP   meloxicam (MOBIC) 15 mg tablet Take 15 mg by mouth daily. Yes Provider, Historical   calcium citrate/vitamin D3 (CALCIUM CITRATE + D PO) Take  by mouth. Yes Provider, Historical   cyanocobalamin (VITAMIN B-12) 500 mcg tablet Take 500 mcg by mouth daily. Yes Provider, Historical   multivitamin (ONE A DAY) tablet Take 1 Tab by mouth daily.    Yes Provider, Historical   albuterol (PROAIR HFA) 90 mcg/actuation inhaler Take 2 Puffs by inhalation every four (4) hours as needed. Yes Provider, Historical   ergocalciferol (VITAMIN D2) 50,000 unit capsule Take 50,000 Units by mouth every seven (7) days. Yes Provider, Historical   Cane lita Use as directed. 3/14/16  Yes Kishor Rg NP   amLODIPine (NORVASC) 10 mg tablet Take  by mouth daily. Yes Provider, Historical   lisinopril (PRINIVIL, ZESTRIL) 10 mg tablet Take  by mouth daily. Yes Provider, Historical   butalbital-acetaminophen-caffeine (FIORICET) -40 mg per tablet Take 1 Tab by mouth daily as needed. Yes Provider, Historical   furosemide (LASIX) 20 mg tablet Take  by mouth as needed. Yes Provider, Historical   ALPRAZolam (Xanax) 0.5 mg tablet Take 1 Tab by mouth three (3) times daily as needed for Anxiety for up to 30 days. Max Daily Amount: 1.5 mg. 10/7/20 11/6/20  Nadia Lee NP   naproxen sodium (Aleve) 220 mg cap Take 220 mg by mouth. Provider, Historical   minocycline (DYNACIN) 100 mg tablet Take 100 mg by mouth two (2) times a day. Provider, Historical   naloxone 4 mg/actuation spry 4 mg by Nasal route as needed. 3/30/17   Jelani Garcia MD     No Known Allergies        Objective:     Visit Vitals  BP (!) 159/92   Pulse 86   Temp 98.1 °F (36.7 °C)   Resp 20   Ht 5' 8.5\" (1.74 m)   Wt (!) 394 lb (178.7 kg)   SpO2 96%   BMI 59.04 kg/m²       Assessment:     Recurrent morbid obesity with multiple comorbidities. No success with medical management. She has completed all preoperative prerequisites, and has been cleared for surgery by our patient selection committee with certain additional prerequisites. I discussed IVC filter insertion, a 4-week preoperative diet, cotinine testing at preadmission testing. Patient is in agreement with these additional requirements and desires to proceed with scheduling. All questions answered. Plan:     1. Continue low calorie, low carbohydrate diet. Walking for exercise as tolerated.   2.  We will proceed with scheduling for laparoscopic revision of sleeve gastrectomy to gastric bypass and upper endoscopy. IVC filter insertion will be performed 1 week preoperatively. Urine cotinine levels to be checked at preadmission testing. She will need a 4-week preoperative liver shrinking diet. 15 minutes spent with patient (greater than 50% of time in face-face consultation reviewing patient selection committee findings/recommendations, anticipated hospital course, and expected results).       Signed By: Conni Pallas, MD     October 29, 2020

## 2020-10-30 NOTE — PATIENT INSTRUCTIONS
Learning About Weight-Loss (Bariatric) Surgery What is weight-loss surgery? Bariatric surgery is surgery to help you lose weight. This type of surgery is only used for people who are very overweight and have not been able to lose weight with diet and exercise. This surgery makes the stomach smaller. Some types of surgery also change the connection between your stomach and intestines. Having weight-loss surgery is a big step. After surgery, you'll need to make new, lifelong changes in how you eat and drink. How is weight-loss surgery done? Bariatric surgery may be either \"open\" or \"laparoscopic. \" Open surgery is done through a large cut (incision) in the belly. Laparoscopic surgery is done through several small cuts. The doctor puts a lighted tube, or scope, and other surgical tools through small cuts in your belly. The doctor is able to see your organs with the scope. There are different types of bariatric surgery. Gastric sleeve surgery The surgery is usually done through several small incisions in the belly. The doctor removes more than half of your stomach. This leaves a thin sleeve, or tube, that is about the size of a banana. Because part of your stomach has been removed, this can't be reversed. Garry-en-Y gastric bypass surgery Garry-en-Y (say \"samantha-en-why\") surgery changes the connection between the stomach and the intestines. The doctor separates a section of your stomach from the rest of your stomach. This makes a small pouch. The new pouch will hold the food you eat. The doctor connects the stomach pouch to the middle part of the small intestine. Gastric banding surgery The surgery is usually done through several small incisions in the belly. The doctor wraps a band around the upper part of the stomach. This creates a small pouch. The small size of the pouch means that you will get full after you eat just a small amount of food.  The doctor can inflate or deflate the band to adjust the size. This lets the doctor adjust how quickly food passes from the new pouch into the stomach. It does not change the connection between the stomach and the intestines. What can you expect after the surgery? You may stay in the hospital for one or more days after the surgery. How long you stay depends on the type of surgery you had. Most people need 2 to 4 weeks before they are ready to get back to their usual routine. For the first 2 to 6 weeks after surgery, you probably will need to follow a liquid or soft diet. Bit by bit, you will be able to eat more solid foods. Your doctor may advise you to work with a dietitian. This way you'll be sure to get enough protein, vitamins, and minerals while you are losing weight. Even with a healthy diet, you may need to take vitamin and mineral supplements. After surgery, you will not be able to eat very much at one time. You will get full quickly. Try not to eat too much at one time or eat foods that are high in fat or sugar. If you do, you may vomit, get stomach pain, or have diarrhea. You probably will lose weight very quickly in the first few months after surgery. As time goes on, your weight loss will slow down. You will have regular doctor visits to check how you are doing. Think of bariatric surgery as a tool to help you lose weight. It isn't an instant fix. You will still need to eat a healthy diet and get regular exercise. This will help you reach your weight goal and avoid regaining the weight you lose. Follow-up care is a key part of your treatment and safety. Be sure to make and go to all appointments, and call your doctor if you are having problems. It's also a good idea to know your test results and keep a list of the medicines you take. Where can you learn more? Go to http://www.gray.com/ Enter G469 in the search box to learn more about \"Learning About Weight-Loss (Bariatric) Surgery. \" 
 Current as of: December 11, 2019               Content Version: 12.6 © 2910-1957 [employer]. Care instructions adapted under license by [employer] (which disclaims liability or warranty for this information). If you have questions about a medical condition or this instruction, always ask your healthcare professional. [address] any warranty or liability for your use of this information.

## 2020-11-02 ENCOUNTER — VIRTUAL VISIT (OUTPATIENT)
Dept: BEHAVIORAL/MENTAL HEALTH CLINIC | Age: 55
End: 2020-11-02
Payer: MEDICARE

## 2020-11-02 DIAGNOSIS — F33.41 RECURRENT MAJOR DEPRESSIVE DISORDER, IN PARTIAL REMISSION (HCC): Primary | ICD-10-CM

## 2020-11-02 PROCEDURE — 99441 PR PHYS/QHP TELEPHONE EVALUATION 5-10 MIN: CPT | Performed by: NURSE PRACTITIONER

## 2020-11-02 NOTE — PROGRESS NOTES
Radha Healy is a 54 y.o. female who presents today for the following:  Chief Complaint   Patient presents with    Follow-up     \"The depressed feeling comes and goes but I don't feel depressed. \"    Depression       No Known Allergies    Current Outpatient Medications   Medication Sig    ALPRAZolam (Xanax) 0.5 mg tablet Take 1 Tab by mouth three (3) times daily as needed for Anxiety for up to 30 days. Max Daily Amount: 1.5 mg.    traZODone (DESYREL) 100 mg tablet Take 1 Tab by mouth nightly for 90 days.  naproxen sodium (Aleve) 220 mg cap Take 220 mg by mouth.  varenicline (Chantix) 0.5 mg tablet Take 0.5 mg by mouth two (2) times a day.  tiZANidine (ZANAFLEX) 4 mg capsule Take 4 mg by mouth three (3) times daily.  pravastatin (PRAVACHOL) 20 mg tablet Take 20 mg by mouth nightly.  minocycline (DYNACIN) 100 mg tablet Take 100 mg by mouth two (2) times a day.  ketoconazole (NIZORAL) 2 % topical cream Apply  to affected area daily.  olopatadine (PATANOL) 0.1 % ophthalmic solution Administer 2 Drops to both eyes two (2) times a day.  bisacodyL (Dulcolax, bisacodyl,) 5 mg EC tablet Take 5 mg by mouth daily as needed for Constipation.  ARIPiprazole (ABILIFY) 10 mg tablet Take 1 Tab by mouth daily for 90 days. Indications: additional medications to treat depression    citalopram (CeleXA) 40 mg tablet Take 1 Tab by mouth daily for 90 days.  meloxicam (MOBIC) 15 mg tablet Take 15 mg by mouth daily.  calcium citrate/vitamin D3 (CALCIUM CITRATE + D PO) Take  by mouth.  cyanocobalamin (VITAMIN B-12) 500 mcg tablet Take 500 mcg by mouth daily.  multivitamin (ONE A DAY) tablet Take 1 Tab by mouth daily.  albuterol (PROAIR HFA) 90 mcg/actuation inhaler Take 2 Puffs by inhalation every four (4) hours as needed.  naloxone 4 mg/actuation spry 4 mg by Nasal route as needed.  ergocalciferol (VITAMIN D2) 50,000 unit capsule Take 50,000 Units by mouth every seven (7) days.    Alissa Pallas lita Use as directed.  amLODIPine (NORVASC) 10 mg tablet Take  by mouth daily.  lisinopril (PRINIVIL, ZESTRIL) 10 mg tablet Take  by mouth daily.  butalbital-acetaminophen-caffeine (FIORICET) -40 mg per tablet Take 1 Tab by mouth daily as needed.  furosemide (LASIX) 20 mg tablet Take  by mouth as needed. No current facility-administered medications for this visit. Past Medical History:   Diagnosis Date    Anxiety 2017    Arthritis     Back injury     Depression     Essential hypertension     GERD (gastroesophageal reflux disease)     Sleep apnea in adult 3/6/2018       Past Surgical History:   Procedure Laterality Date    HX APPENDECTOMY      HX  SECTION      HX GASTRECTOMY      lap sleeve gastrectomy    HX HYSTERECTOMY      HX KNEE REPLACEMENT      left knee    HX ORTHOPAEDIC      rt knee partial replacement       History reviewed. No pertinent family history.     Social History     Socioeconomic History    Marital status: SINGLE     Spouse name: Not on file    Number of children: Not on file    Years of education: Not on file    Highest education level: Not on file   Occupational History    Not on file   Social Needs    Financial resource strain: Not on file    Food insecurity     Worry: Not on file     Inability: Not on file    Transportation needs     Medical: Not on file     Non-medical: Not on file   Tobacco Use    Smoking status: Former Smoker     Packs/day: 1.00     Years: 30.00     Pack years: 30.00     Last attempt to quit: 2019     Years since quittin.9    Smokeless tobacco: Never Used   Substance and Sexual Activity    Alcohol use: No    Drug use: No    Sexual activity: Not on file   Lifestyle    Physical activity     Days per week: Not on file     Minutes per session: Not on file    Stress: Not on file   Relationships    Social connections     Talks on phone: Not on file     Gets together: Not on file     Attends [employer]ical service: Not on file     Active member of club or organization: Not on file     Attends meetings of clubs or organizations: Not on file     Relationship status: Not on file    Intimate partner violence     Fear of current or ex partner: Not on file     Emotionally abused: Not on file     Physically abused: Not on file     Forced sexual activity: Not on file   Other Topics Concern    Not on file   Social History Narrative    Not on file         Ms. Rachel Carter is unable to do virtual visit, telephone visit required. Patient last visited the clinic 7/27/2020 which Abilify was increased to Abilify 10 mg 1 tab daily. Psychotropic medications-Celexa 40 mg 1 tab daily, trazodone 100 mg 1 tab at bedtime and xanax 0.5 mg 1 tab three times as needed. Patient reports that she is scheduled for gastric bypass later today. She denies feeling depressed or anxious. However, she reports having short periods of sadness for the past couple of weeks but it goes away quickly. No anger episodes reported. No suicidal/homicidal thinking, risk is low, protective factor-family. Sleep and appetite-stable. Patient denies psychotic symptoms-none noted. She denies medication side effects and is requesting to continue medications as prescribed. Review of Systems   Musculoskeletal: Positive for joint pain. All other systems reviewed and are negative. There were no vitals taken for this visit. Physical Exam  Psychiatric:         Attention and Perception: Attention and perception normal.         Mood and Affect: Mood normal.         Speech: Speech normal.         Behavior: Behavior normal. Behavior is cooperative. Thought Content: Thought content normal.         Cognition and Memory: Cognition and memory normal.         Judgment: Judgment normal.        Plan:    Continue Abilify 10 mg 1 tab daily, Celexa 40 mg 1 tab daily, trazodone 100 mg 1 tab at bedtime and xanax 0.5 mg 1 tab three times a day as needed.    Patient is advised to call 911 or go to the local emergency department for suicidal/homicidal thinking. Patient is advised to follow-up with primary care provider and specialists as appropriate. Patient is advised to avoid smoking, alcohol and drug use.         There are no diagnoses linked to this encounter.

## 2020-11-09 DIAGNOSIS — F41.1 GENERALIZED ANXIETY DISORDER: Primary | ICD-10-CM

## 2020-11-09 RX ORDER — ALPRAZOLAM 0.5 MG/1
0.5 TABLET ORAL
Qty: 90 TAB | Refills: 1 | Status: SHIPPED | OUTPATIENT
Start: 2020-11-09 | End: 2021-02-07

## 2020-11-30 DIAGNOSIS — F33.9 EPISODE OF RECURRENT MAJOR DEPRESSIVE DISORDER, UNSPECIFIED DEPRESSION EPISODE SEVERITY (HCC): ICD-10-CM

## 2020-11-30 DIAGNOSIS — F33.1 MODERATE EPISODE OF RECURRENT MAJOR DEPRESSIVE DISORDER (HCC): ICD-10-CM

## 2020-11-30 RX ORDER — ARIPIPRAZOLE 10 MG/1
10 TABLET ORAL DAILY
Qty: 90 TAB | Refills: 0 | Status: SHIPPED | OUTPATIENT
Start: 2020-11-30 | End: 2021-02-28

## 2020-11-30 RX ORDER — CITALOPRAM 40 MG/1
40 TABLET, FILM COATED ORAL DAILY
Qty: 90 TAB | Refills: 0 | Status: SHIPPED | OUTPATIENT
Start: 2020-11-30 | End: 2021-02-28

## 2020-12-03 ENCOUNTER — HOSPITAL ENCOUNTER (OUTPATIENT)
Dept: GENERAL RADIOLOGY | Age: 55
Discharge: HOME | End: 2020-12-03
Attending: SURGERY
Payer: MEDICARE

## 2020-12-03 ENCOUNTER — HOSPITAL ENCOUNTER (OUTPATIENT)
Dept: PREADMISSION TESTING | Age: 55
Discharge: HOME | End: 2020-12-03
Payer: MEDICARE

## 2020-12-03 VITALS
TEMPERATURE: 97.5 F | SYSTOLIC BLOOD PRESSURE: 167 MMHG | HEART RATE: 93 BPM | WEIGHT: 293 LBS | DIASTOLIC BLOOD PRESSURE: 96 MMHG | HEIGHT: 68 IN | BODY MASS INDEX: 44.41 KG/M2

## 2020-12-03 LAB
25(OH)D3 SERPL-MCNC: 58.7 NG/ML (ref 30–100)
ALBUMIN SERPL-MCNC: 4.2 G/DL (ref 3.5–5)
ALBUMIN/GLOB SERPL: 1 {RATIO} (ref 1.1–2.2)
ALP SERPL-CCNC: 269 U/L (ref 45–117)
ALT SERPL-CCNC: 65 U/L (ref 12–78)
ANION GAP SERPL CALC-SCNC: 9 MMOL/L (ref 5–15)
APPEARANCE UR: ABNORMAL
AST SERPL-CCNC: 75 U/L (ref 15–37)
ATRIAL RATE: 90 BPM
BACTERIA URNS QL MICRO: ABNORMAL /HPF
BASOPHILS # BLD: 0 K/UL (ref 0–0.1)
BASOPHILS NFR BLD: 1 % (ref 0–1)
BILIRUB SERPL-MCNC: 0.6 MG/DL (ref 0.2–1)
BILIRUB UR QL: NEGATIVE
BUN SERPL-MCNC: 16 MG/DL (ref 6–20)
BUN/CREAT SERPL: 23 (ref 12–20)
CALCIUM SERPL-MCNC: 9.8 MG/DL (ref 8.5–10.1)
CALCULATED P AXIS, ECG09: 68 DEGREES
CALCULATED R AXIS, ECG10: 38 DEGREES
CALCULATED T AXIS, ECG11: 63 DEGREES
CHLORIDE SERPL-SCNC: 101 MMOL/L (ref 97–108)
CHOLEST SERPL-MCNC: 241 MG/DL
CO2 SERPL-SCNC: 29 MMOL/L (ref 21–32)
COLOR UR: ABNORMAL
CREAT SERPL-MCNC: 0.69 MG/DL (ref 0.55–1.02)
DIAGNOSIS, 93000: NORMAL
DIFFERENTIAL METHOD BLD: ABNORMAL
EOSINOPHIL # BLD: 0.1 K/UL (ref 0–0.4)
EOSINOPHIL NFR BLD: 2 % (ref 0–7)
EPITH CASTS URNS QL MICRO: ABNORMAL /LPF
ERYTHROCYTE [DISTWIDTH] IN BLOOD BY AUTOMATED COUNT: 14.6 % (ref 11.5–14.5)
GLOBULIN SER CALC-MCNC: 4.1 G/DL (ref 2–4)
GLUCOSE SERPL-MCNC: 170 MG/DL (ref 65–100)
GLUCOSE UR STRIP.AUTO-MCNC: NEGATIVE MG/DL
HCT VFR BLD AUTO: 41.7 % (ref 35–47)
HGB BLD-MCNC: 12.8 G/DL (ref 11.5–16)
HGB UR QL STRIP: NEGATIVE
HYALINE CASTS URNS QL MICRO: ABNORMAL /LPF (ref 0–5)
IMM GRANULOCYTES # BLD AUTO: 0 K/UL (ref 0–0.04)
IMM GRANULOCYTES NFR BLD AUTO: 0 % (ref 0–0.5)
KETONES UR QL STRIP.AUTO: NEGATIVE MG/DL
LEUKOCYTE ESTERASE UR QL STRIP.AUTO: NEGATIVE
LYMPHOCYTES # BLD: 1.6 K/UL (ref 0.8–3.5)
LYMPHOCYTES NFR BLD: 26 % (ref 12–49)
MAGNESIUM SERPL-MCNC: 1.9 MG/DL (ref 1.6–2.4)
MCH RBC QN AUTO: 28.2 PG (ref 26–34)
MCHC RBC AUTO-ENTMCNC: 30.7 G/DL (ref 30–36.5)
MCV RBC AUTO: 91.9 FL (ref 80–99)
MONOCYTES # BLD: 0.4 K/UL (ref 0–1)
MONOCYTES NFR BLD: 6 % (ref 5–13)
NEUTS SEG # BLD: 4.1 K/UL (ref 1.8–8)
NEUTS SEG NFR BLD: 65 % (ref 32–75)
NITRITE UR QL STRIP.AUTO: NEGATIVE
NRBC # BLD: 0 K/UL (ref 0–0.01)
NRBC BLD-RTO: 0 PER 100 WBC
P-R INTERVAL, ECG05: 198 MS
PH UR STRIP: 6 [PH] (ref 5–8)
PLATELET # BLD AUTO: 225 K/UL (ref 150–400)
PMV BLD AUTO: 11.3 FL (ref 8.9–12.9)
POTASSIUM SERPL-SCNC: 3.5 MMOL/L (ref 3.5–5.1)
PROT SERPL-MCNC: 8.3 G/DL (ref 6.4–8.2)
PROT UR STRIP-MCNC: 300 MG/DL
Q-T INTERVAL, ECG07: 334 MS
QRS DURATION, ECG06: 86 MS
QTC CALCULATION (BEZET), ECG08: 408 MS
RBC # BLD AUTO: 4.54 M/UL (ref 3.8–5.2)
RBC #/AREA URNS HPF: ABNORMAL /HPF (ref 0–5)
SODIUM SERPL-SCNC: 139 MMOL/L (ref 136–145)
SP GR UR REFRACTOMETRY: 1.02 (ref 1–1.03)
T4 FREE SERPL-MCNC: 0.9 NG/DL (ref 0.8–1.5)
TSH SERPL DL<=0.05 MIU/L-ACNC: 1.43 UIU/ML (ref 0.36–3.74)
UA: UC IF INDICATED,UAUC: ABNORMAL
UROBILINOGEN UR QL STRIP.AUTO: 0.2 EU/DL (ref 0.2–1)
VENTRICULAR RATE, ECG03: 90 BPM
WBC # BLD AUTO: 6.2 K/UL (ref 3.6–11)
WBC URNS QL MICRO: ABNORMAL /HPF (ref 0–4)

## 2020-12-03 PROCEDURE — 84443 ASSAY THYROID STIM HORMONE: CPT

## 2020-12-03 PROCEDURE — 84439 ASSAY OF FREE THYROXINE: CPT

## 2020-12-03 PROCEDURE — 83735 ASSAY OF MAGNESIUM: CPT

## 2020-12-03 PROCEDURE — 82465 ASSAY BLD/SERUM CHOLESTEROL: CPT

## 2020-12-03 PROCEDURE — 82306 VITAMIN D 25 HYDROXY: CPT

## 2020-12-03 PROCEDURE — 71046 X-RAY EXAM CHEST 2 VIEWS: CPT

## 2020-12-03 PROCEDURE — 80053 COMPREHEN METABOLIC PANEL: CPT

## 2020-12-03 PROCEDURE — 81001 URINALYSIS AUTO W/SCOPE: CPT

## 2020-12-03 PROCEDURE — 36415 COLL VENOUS BLD VENIPUNCTURE: CPT

## 2020-12-03 PROCEDURE — 93005 ELECTROCARDIOGRAM TRACING: CPT

## 2020-12-03 PROCEDURE — 85025 COMPLETE CBC W/AUTO DIFF WBC: CPT

## 2020-12-03 NOTE — PERIOP NOTES
PT ADVISED TO NOT EAT SOLID FOODS AFTER MIDNIGHT THE NIGHT BEFORE SURGERY INCLUDING CANDY,MINTS OR GUM. DO NOT DRINK ALCOHOL 24 HOURS BEFORE SURGERY. PT MAY DRINK CLEAR LIQUIDS FROM 12 MIDNIGHT UNTIL 1 HOUR PRIOR TO ARRIVAL. WENT OVER PAGES 7-11 IN [facility] HANDBOOK. PT INSTRUCTED ON INCENTIVE SPIROMETRY AND TOLERATED WELL. Patient given surgical site infection information FAQs handout and hand hygiene tips sheet. Pre-operative instructions reviewed and patient verbalizes understanding of instructions. Patient has been given the opportunity to ask additional questions. PATIENT GIVEN 2 BOTTLES OF CHG SOAP TO USE DAY BEFORE SURGERY AND DOS. INSTRUCTIONS PROVIDED. PATIENT GIVEN WRITTEN INSTRUCTIONS TO GO TO THE IMAGING CENTER/CANCER CENTER [address] [place] [address] [address] TO HAVE CHEST XRAY COMPLETED. PT WILL BE SCHEDULED FOR COVID TESTING.

## 2020-12-04 NOTE — PERIOP NOTES
12/4/20 @ 8051 - SENT NOTE TO LETICIA STEPHENS [facility]IA [facility] IN REFERENCE TO PT'S ABNORMAL LABS.     Glucose=170  AST=75  ALK Phos=269  Cholesterol=241      12/4/20 @ 0989 - LABS FAXED TO PT'S PCP THROUGH [facility]

## 2020-12-07 ENCOUNTER — DOCUMENTATION ONLY (OUTPATIENT)
Dept: SURGERY | Age: 55
End: 2020-12-07

## 2020-12-07 NOTE — PROGRESS NOTES
Please note the following abnormal labs:   Glucose=170   AST=75   ALK Phos=269   Cholesterol=241     Thank you,   Leslee Bravo RN   [facility] [place] [facility] [place] NP made aware of labs.

## 2020-12-10 ENCOUNTER — OFFICE VISIT (OUTPATIENT)
Dept: SURGERY | Age: 55
End: 2020-12-10
Payer: MEDICARE

## 2020-12-10 VITALS
DIASTOLIC BLOOD PRESSURE: 96 MMHG | HEART RATE: 107 BPM | OXYGEN SATURATION: 93 % | SYSTOLIC BLOOD PRESSURE: 168 MMHG | TEMPERATURE: 98.1 F | HEIGHT: 68 IN | WEIGHT: 293 LBS | RESPIRATION RATE: 20 BRPM | BODY MASS INDEX: 44.41 KG/M2

## 2020-12-10 DIAGNOSIS — I10 ESSENTIAL HYPERTENSION: ICD-10-CM

## 2020-12-10 DIAGNOSIS — Z74.09 IMPAIRED MOBILITY: ICD-10-CM

## 2020-12-10 DIAGNOSIS — R73.9 HYPERGLYCEMIA: ICD-10-CM

## 2020-12-10 DIAGNOSIS — G89.18 POST-OP PAIN: ICD-10-CM

## 2020-12-10 DIAGNOSIS — E66.01 MORBID OBESITY (HCC): Primary | ICD-10-CM

## 2020-12-10 DIAGNOSIS — G47.33 OSA (OBSTRUCTIVE SLEEP APNEA): ICD-10-CM

## 2020-12-10 PROCEDURE — 99024 POSTOP FOLLOW-UP VISIT: CPT | Performed by: NURSE PRACTITIONER

## 2020-12-10 RX ORDER — ONDANSETRON 4 MG/1
4 TABLET, ORALLY DISINTEGRATING ORAL
Qty: 20 TAB | Refills: 0 | Status: SHIPPED | OUTPATIENT
Start: 2020-12-10 | End: 2021-02-11 | Stop reason: SDUPTHER

## 2020-12-10 RX ORDER — GABAPENTIN 100 MG/1
100-200 CAPSULE ORAL
Qty: 30 CAP | Refills: 0 | Status: SHIPPED | OUTPATIENT
Start: 2020-12-10 | End: 2021-02-11 | Stop reason: SDUPTHER

## 2020-12-10 RX ORDER — POLYETHYLENE GLYCOL 3350 17 G/17G
17 POWDER, FOR SOLUTION ORAL DAILY
Qty: 100 PACKET | Refills: 3 | Status: SHIPPED | OUTPATIENT
Start: 2020-12-10 | End: 2021-10-04

## 2020-12-10 RX ORDER — ENOXAPARIN SODIUM 100 MG/ML
40 INJECTION SUBCUTANEOUS DAILY
Qty: 14 SYRINGE | Refills: 0 | Status: SHIPPED | OUTPATIENT
Start: 2020-12-10 | End: 2021-02-11 | Stop reason: ALTCHOICE

## 2020-12-10 RX ORDER — PANTOPRAZOLE SODIUM 40 MG/1
40 TABLET, DELAYED RELEASE ORAL DAILY
Qty: 30 TAB | Refills: 1 | Status: SHIPPED | OUTPATIENT
Start: 2020-12-10 | End: 2021-04-02

## 2020-12-10 NOTE — PATIENT INSTRUCTIONS
Central Scheduling (contact [phone]) will contact you about scheduling the IVC filter which should be about 1 week prior to surgery Go to the lab today to check for diabetes and I will follow up with you next week once I get the results Start the pre op diet tomorrow with goal of 10-15 lbs weight loss prior to surgery Great job staying smoke free! Bring your CPAP to the hospital with you Stop the meloxicam 3 days before surgery Day Before Surgery: 
 -  take (2) Extra Strength Tylenol (acetaminophen) at noon and 8 pm  
 -  you may drink sugar free clear liquids until 3 hours prior to surgery  your after surgery prescriptions BEFORE surgery Make your 2, 4 and 6 week appointments for after surgery You are required to be tested for COVID-19 prior to surgery. Testing must be done at [facility] 96 hours prior to surgery. You will be contacted by preadmission testing for a date and time. Failure to test and or a positive test will result in cancellation of surgery. Sign up for My Chart

## 2020-12-10 NOTE — PROGRESS NOTES
1. Have you been to the ER, urgent care clinic since your last visit? Hospitalized since your last visit? no    2. Have you seen or consulted any other health care providers outside of the [address] [place] [address] since your last visit? Include any pap smears or colon screening.  no

## 2020-12-10 NOTE — PROGRESS NOTES
HISTORY OF PRESENT ILLNESS  Alexander Lamb is a 54 y.o. female. HPI   Chief Complaint   Patient presents with    Surg H&P     Schedule lap revision of jejunostomy with partial gastrectomy on 2020 by Dr. Ginger Perez.      Patient Active Problem List   Diagnosis Code    Low back pain M54.5    Encounter for long-term (current) use of other medications Z79.899    HA (headache) R51.9    Chronic pain syndrome G89.4    DJD (degenerative joint disease), lumbar M47.816    Right knee pain M25.561    Right knee DJD M17.11    Chronic headache R51.9, G89.29    Obesity, morbid (HCC) E66.01    Gastroesophageal reflux disease without esophagitis K21.9    Mild intermittent asthma without complication F85.95    Essential hypertension I10    Hypercholesteremia E78.00    Anxiety F41.9    Depression F32.9    Sleep apnea in adult G47.30     Past Medical History:   Diagnosis Date    Anxiety 2017    Arthritis     Asthma     Back injury     Depression     Essential hypertension     GERD (gastroesophageal reflux disease)     Morbid obesity (HCC)     Sleep apnea in adult 3/6/2018    CPAP     Past Surgical History:   Procedure Laterality Date    HX APPENDECTOMY      HX  SECTION      HX COLONOSCOPY      HX GASTRECTOMY  2016    lap sleeve gastrectomy    HX HYSTERECTOMY      HX KNEE REPLACEMENT      left knee    HX ORTHOPAEDIC      rt knee partial replacement    HX ORTHOPAEDIC Right     LIGAMENT REPAIR     Social History     Socioeconomic History    Marital status: SINGLE     Spouse name: Not on file    Number of children: Not on file    Years of education: Not on file    Highest education level: Not on file   Occupational History    Occupation: disability      Comment: since  with back    Social Needs    Financial resource strain: Not on file    Food insecurity     Worry: Not on file     Inability: Not on file   [employer] needs     Medical: Not on file     Non-medical: Not on file Tobacco Use    Smoking status: Former Smoker     Packs/day: 1.00     Years: 30.00     Pack years: 30.00     Last attempt to quit: 2020     Years since quittin.5    Smokeless tobacco: Never Used   Substance and Sexual Activity    Alcohol use: No    Drug use: No    Sexual activity: Not on file     Comment: post menopausal    Lifestyle    Physical activity     Days per week: Not on file     Minutes per session: Not on file    Stress: Not on file   Relationships    Social connections     Talks on phone: Not on file     Gets together: Not on file     Attends Roman Catholic service: Not on file     Active member of club or organization: Not on file     Attends meetings of clubs or organizations: Not on file     Relationship status: Not on file    Intimate partner violence     Fear of current or ex partner: Not on file     Emotionally abused: Not on file     Physically abused: Not on file     Forced sexual activity: Not on file   Other Topics Concern    Not on file   Social History Narrative    In the home with 15year old grand daughter and friend, Terry Hogue MD    Review of Systems   Constitutional: Negative for chills and fever. HENT: Negative for congestion, hearing loss and sinus pain. Missing teeth and few loose teeth   \"bone lose\"   Eyes: Positive for blurred vision (recently worse). Respiratory: Positive for shortness of breath. Negative for cough. +JACKIE + CPAP    Cardiovascular: Positive for leg swelling. Negative for chest pain. No DVT hx    Gastrointestinal: Negative for abdominal pain, blood in stool, constipation, diarrhea, heartburn, melena, nausea and vomiting. Genitourinary: Positive for frequency and urgency. Negative for dysuria, flank pain and hematuria. Musculoskeletal: Positive for back pain, joint pain and myalgias. Negative for falls and neck pain.         Uses mobile chair, walker , cane   Neurological: Positive for dizziness, tingling (feet ) and headaches (migraines ). Negative for seizures and loss of consciousness. Endo/Heme/Allergies: Positive for polydipsia. Thirsty all the time   \"never told I had diabetes\"   Psychiatric/Behavioral: Positive for depression. The patient is nervous/anxious. Psych regular follow up       Physical Exam  Constitutional:       Appearance: She is obese. HENT:      Head: Normocephalic. Nose: Nose normal.      Mouth/Throat:      Mouth: Mucous membranes are moist.   Eyes:      Extraocular Movements: Extraocular movements intact. Conjunctiva/sclera: Conjunctivae normal.      Pupils: Pupils are equal, round, and reactive to light. Cardiovascular:      Rate and Rhythm: Normal rate and regular rhythm. Pulmonary:      Effort: Pulmonary effort is normal. No respiratory distress. Breath sounds: Normal breath sounds. Abdominal:      Palpations: Abdomen is soft. Tenderness: There is no abdominal tenderness. Comments: Obese with rectus diastasis    Musculoskeletal:         General: Swelling (LE 1+ pedal edema ) present. Comments: Ambulates short distances with cane   Used wheelchair in office    Skin:     General: Skin is warm. Comments: Diaphoretic    Neurological:      General: No focal deficit present. Mental Status: She is alert and oriented to person, place, and time. Psychiatric:         Mood and Affect: Mood normal.         Behavior: Behavior normal.         ASSESSMENT and PLAN    ICD-10-CM ICD-9-CM    1. Morbid obesity ([facility] [address]  E66.01 278.01 IR PLC IVC FILTER      HEMOGLOBIN A1C WITH EAG   2. BMI 60.0-69.9, adult (HCC)  Z68.44 V85.44 IR PLC IVC FILTER   3. JACKIE (obstructive sleep apnea)  G47.33 327.23    4. Essential hypertension  I10 401.9    5. Impaired mobility  Z74.09 799.89 IR PLC IVC FILTER   6. Hyperglycemia  R73.9 790.29 HEMOGLOBIN A1C WITH EAG   7. Post-op pain  G89.18 338.18 gabapentin (NEURONTIN) 100 mg capsule     1.   Morbid obesity: Scheduled for revision sleeve to gastric bypass on 1/14/21 with Dr. Mimi Garcia protocol reviewed:  Acetaminophen 1000 mg at noon and 8 pm day before surgery and may have clear liquids (no caffeine, no ETOH, no carbonation and calorie free) until 3 hours prior to surgery   Preadmission testing results reviewed face to face with patient   BS was 170 and she was fasting; sent to lab for HgA1C level and will f/u once resulted    Post operative prescriptions sent to pharmacy on file for AFTER surgery:  Pantoprazole 40 mg every day x 30 days, then reassess need; Zofran 4 mg ODT #20 no refills for post op nausea; Lovenox 40 mg SQ every day x 14 days for DVT prophylaxis; Miralax   Post op pain management:  Gabapentin 100-300 mg q 8 hours prn pain x 5 days; acetaminophen 1000 mg po q 8 hours prn; abdominal support / splinting; heating pad prn   Started on liver shrinking diet tomorrow and Bariatric vitamins   Reviewed post operative diet, restrictions, follow up and medications  Post operative 2, 4 and 6  week appointments made  Reviewed educational materials and book    Stressed NO NSAIDS and she will DC meloxicam 3 days prior to surgery and for life     2. JACKIE bring CPAP to hospital   3. HTN continue antihypertensives and monitor  4. DM Type 2 in Obese new diagnosis and A1C was 7.7%, will call patient and refer to Hi Krishna MD; however, surgery will improve diabetes and ok to proceed with A1C < 8%  5. Impaired mobility  Due to pain in knees and back. IVC filter preop and will need post op Lovenox x 14 days; PT post op and may need home PT   She is to bring her walker and cane to the hospital       Javier [place] verbalized understanding and questions were answered to the best of my knowledge and ability. Diet and filter placement educational materials were provided.     37 minutes spent in face to face with patient

## 2020-12-11 LAB
EST. AVERAGE GLUCOSE BLD GHB EST-MCNC: 174 MG/DL
HBA1C MFR BLD: 7.7 % (ref 4.8–5.6)

## 2020-12-14 ENCOUNTER — VIRTUAL VISIT (OUTPATIENT)
Dept: BEHAVIORAL/MENTAL HEALTH CLINIC | Age: 55
End: 2020-12-14
Payer: MEDICARE

## 2020-12-14 DIAGNOSIS — F33.9 EPISODE OF RECURRENT MAJOR DEPRESSIVE DISORDER, UNSPECIFIED DEPRESSION EPISODE SEVERITY (HCC): ICD-10-CM

## 2020-12-14 DIAGNOSIS — F41.1 GENERALIZED ANXIETY DISORDER: ICD-10-CM

## 2020-12-14 DIAGNOSIS — F32.5 MAJOR DEPRESSION IN REMISSION (HCC): Primary | ICD-10-CM

## 2020-12-14 PROCEDURE — 99441 PR PHYS/QHP TELEPHONE EVALUATION 5-10 MIN: CPT | Performed by: NURSE PRACTITIONER

## 2020-12-14 RX ORDER — TRAZODONE HYDROCHLORIDE 150 MG/1
150 TABLET ORAL
Qty: 90 TAB | Refills: 0 | Status: SHIPPED | OUTPATIENT
Start: 2020-12-14 | End: 2021-03-16

## 2020-12-14 NOTE — PROGRESS NOTES
Sandeep Beard is a 54 y.o. female who presents today for the following:  Chief Complaint   Patient presents with    Follow-up     \"Everything's going fine. \"    Anxiety    Depression     Sandeep Beard, who was evaluated through a synchronous (real-time) audio telephone only encounter, and/or her healthcare decision maker, is aware that it is a billable service, with coverage as determined by her insurance carrier. She provided verbal consent to proceed: YES Consent obtained within past 12 months:Yes, and patient identification was verified. It was conducted pursuant to the emergency declaration under the [facility], [address] [place] [address] authority and the [employer] and [employer] Act. A caregiver was present when appropriate. Ability to conduct physical exam was limited. I was home. The patient was home. Telephone length of visit 5 minutes and 10 seconds. No Known Allergies    Current Outpatient Medications   Medication Sig    traZODone (DESYREL) 150 mg tablet Take 1 Tab by mouth nightly as needed for Sleep for up to 90 days.  ondansetron (ZOFRAN ODT) 4 mg disintegrating tablet Take 1 Tab by mouth every eight (8) hours as needed for Nausea or Nausea or Vomiting (AFTER SURGERY).  enoxaparin (LOVENOX) 40 mg/0.4 mL 0.4 mL by SubCUTAneous route daily. AFTER SURGERY  Indications: blood clot in a deep vein of the extremities    gabapentin (NEURONTIN) 100 mg capsule Take 1-2 Caps by mouth every eight (8) hours as needed for Pain (AFTER SURGERY). Max Daily Amount: 600 mg.  polyethylene glycol (MIRALAX) 17 gram packet Take 1 Packet by mouth daily. Indications: constipation    pantoprazole (PROTONIX) 40 mg tablet Take 1 Tab by mouth daily. AFTER SURGERY    citalopram (CeleXA) 40 mg tablet Take 1 Tab by mouth daily for 90 days.  ARIPiprazole (ABILIFY) 10 mg tablet Take 1 Tab by mouth daily for 90 days.  Indications: additional medications to treat depression    ALPRAZolam (XANAX) 0.5 mg tablet Take 1 Tab by mouth three (3) times daily as needed for Anxiety for up to 90 days. Max Daily Amount: 1.5 mg.    tiZANidine (ZANAFLEX) 4 mg capsule Take 4 mg by mouth three (3) times daily.  pravastatin (PRAVACHOL) 20 mg tablet Take 20 mg by mouth nightly.  minocycline (DYNACIN) 100 mg tablet Take 100 mg by mouth two (2) times a day.  ketoconazole (NIZORAL) 2 % topical cream Apply  to affected area daily.  olopatadine (PATANOL) 0.1 % ophthalmic solution Administer 2 Drops to both eyes two (2) times a day.  bisacodyL (Dulcolax, bisacodyl,) 5 mg EC tablet Take 5 mg by mouth daily as needed for Constipation.  meloxicam (MOBIC) 15 mg tablet Take 15 mg by mouth daily.  calcium citrate/vitamin D3 (CALCIUM CITRATE + D PO) Take 600 mg by mouth daily.  cyanocobalamin (VITAMIN B-12) 500 mcg tablet Take 500 mcg by mouth daily.  multivitamin (ONE A DAY) tablet Take 1 Tab by mouth daily.  albuterol (PROAIR HFA) 90 mcg/actuation inhaler Take 2 Puffs by inhalation every four (4) hours as needed.  naloxone 4 mg/actuation spry 4 mg by Nasal route as needed.  ergocalciferol (VITAMIN D2) 50,000 unit capsule Take 50,000 Units by mouth every seven (7) days.  Cane lita Use as directed.  amLODIPine (NORVASC) 10 mg tablet Take  by mouth daily.  lisinopril (PRINIVIL, ZESTRIL) 10 mg tablet Take 40 mg by mouth daily.  butalbital-acetaminophen-caffeine (FIORICET) -40 mg per tablet Take 1 Tab by mouth daily as needed.  furosemide (LASIX) 20 mg tablet Take  by mouth as needed. No current facility-administered medications for this visit.         Past Medical History:   Diagnosis Date    Anxiety 8/24/2017    Arthritis     Asthma     Back injury     Depression     Essential hypertension     GERD (gastroesophageal reflux disease)     Morbid obesity ([address]     Sleep apnea in adult 3/6/2018    CPAP       Past Surgical History:   Procedure Laterality Date    HX APPENDECTOMY      HX  SECTION      HX COLONOSCOPY      HX GASTRECTOMY  2016    lap sleeve gastrectomy    HX HYSTERECTOMY      HX KNEE REPLACEMENT      left knee    HX ORTHOPAEDIC      rt knee partial replacement    HX ORTHOPAEDIC Right     LIGAMENT REPAIR       Family History   Problem Relation Age of Onset    Stroke Mother     Heart Disease Mother         PACEMAKER    Alcohol abuse Father     Seizures Father     Anesth Problems Neg Hx        Social History     Socioeconomic History    Marital status: SINGLE     Spouse name: Not on file    Number of children: Not on file    Years of education: Not on file    Highest education level: Not on file   Occupational History    Occupation: disability      Comment: since  with back    Social Needs    Financial resource strain: Not on file    Food insecurity     Worry: Not on file     Inability: Not on file   [employer] needs     Medical: Not on file     Non-medical: Not on file   Tobacco Use    Smoking status: Former Smoker     Packs/day: 1.00     Years: 30.00     Pack years: 30.00     Last attempt to quit: 2020     Years since quittin.5    Smokeless tobacco: Never Used   Substance and Sexual Activity    Alcohol use: No    Drug use: No    Sexual activity: Not on file     Comment: post menopausal    Lifestyle    Physical activity     Days per week: Not on file     Minutes per session: Not on file    Stress: Not on file   Relationships    Social connections     Talks on phone: Not on file     Gets together: Not on file     Attends Judaism service: Not on file     Active member of club or organization: Not on file     Attends meetings of clubs or organizations: Not on file     Relationship status: Not on file    Intimate partner violence     Fear of current or ex partner: Not on file     Emotionally abused: Not on file     Physically abused: Not on file     Forced sexual activity: Not on file   Other Topics Concern    Not on file   Social History Narrative    In the home with 15year old grand daughter and friend, Dean Clarke is unable to do virtual visit, telephone visit required. Patient last visited the clinic 11/2/2020. Psychotropic medications-Celexa 40 mg 1 tab daily, Abilify 10 mg 1 tab daily, trazodone 100 mg 1 tab at bedtime and xanax 0.5 mg 1 tab three times as needed for anxiety. Patient reports that she has been doing well since last visit except for poor sleep. Patient denies feeling depressed or anxious. She reports her medications are working well as evidenced by stable mood. Appetite is stable. No psychotic symptoms noted. No suicidal homicidal thinking noted, risk for suicide is low, protective factor-family. Patient reports her primary care doctor is in the process of running tests to see if she has diabetes due to increased urination. She is currently scheduled for gastric bypass surgery January 14, 2021. Patient has history of smoking. No alcohol or drug use noted. Review of Systems   Genitourinary: Positive for frequency. Musculoskeletal: Positive for joint pain. All other systems reviewed and are negative. There were no vitals taken for this visit. Physical Exam  Psychiatric:         Attention and Perception: Attention and perception normal.         Mood and Affect: Mood normal.         Speech: Speech normal.         Behavior: Behavior normal. Behavior is cooperative. Thought Content: Thought content normal.         Cognition and Memory: Cognition and memory normal.         Judgment: Judgment normal.        Plan:    Increase Trazodone to 150 mg 1 tab at bedtime as needed. Continue Abilify 10 mg 1 tab daily, Celexa 40 mg 1 tab daily and xanax 0.5 mg 1 tab three times a day as needed for anxiety.    Patient is advised to call 911 or go to the local emergency department for suicidal/homicidal thinking. Patient is advised to follow-up with primary care provider and specialists as appropriate. Patient is advised to avoid smoking, alcohol and drug use.     There are no diagnoses linked to this encounter.

## 2020-12-17 ENCOUNTER — OFFICE VISIT (OUTPATIENT)
Dept: SURGERY | Age: 55
End: 2020-12-17
Payer: MEDICARE

## 2020-12-17 VITALS
HEART RATE: 107 BPM | BODY MASS INDEX: 44.41 KG/M2 | WEIGHT: 293 LBS | SYSTOLIC BLOOD PRESSURE: 198 MMHG | OXYGEN SATURATION: 95 % | TEMPERATURE: 98.6 F | DIASTOLIC BLOOD PRESSURE: 92 MMHG | HEIGHT: 68 IN | RESPIRATION RATE: 24 BRPM

## 2020-12-17 DIAGNOSIS — J45.20 MILD INTERMITTENT ASTHMA WITHOUT COMPLICATION: ICD-10-CM

## 2020-12-17 DIAGNOSIS — E66.01 OBESITY, MORBID (HCC): ICD-10-CM

## 2020-12-17 DIAGNOSIS — Z72.0 TOBACCO ABUSE: Primary | ICD-10-CM

## 2020-12-17 DIAGNOSIS — I10 ESSENTIAL HYPERTENSION: ICD-10-CM

## 2020-12-17 DIAGNOSIS — G47.30 SLEEP APNEA IN ADULT: ICD-10-CM

## 2020-12-17 DIAGNOSIS — K21.9 GASTROESOPHAGEAL REFLUX DISEASE WITHOUT ESOPHAGITIS: ICD-10-CM

## 2020-12-17 PROCEDURE — 99214 OFFICE O/P EST MOD 30 MIN: CPT | Performed by: SURGERY

## 2020-12-17 NOTE — PROGRESS NOTES
1. Have you been to the ER, urgent care clinic since your last visit? Hospitalized since your last visit? No    2. Have you seen or consulted any other health care providers outside of the [address] [place] [address] since your last visit? Include any pap smears or colon screening.  No

## 2020-12-18 LAB
COTININE UR QL SCN: NEGATIVE NG/ML
DRUG SCREEN COMMENT:, 753798: NORMAL

## 2020-12-19 NOTE — PROGRESS NOTES
Subjective: The patient is a 54 y.o. obese female scheduled for laparoscopic revision of sleeve gastrectomy to gastric bypass on 2020. Body mass index is 60.06 kg/m². Watson Halsted has tried multiple diets in her  lifetime most recently trying unsupervised diets during which she was able to lose small amounts of weight.     Bariatric comorbidities present are   Past Medical History:   Diagnosis Date    Anxiety 2017    Arthritis     Asthma     Back injury     Depression     Essential hypertension     GERD (gastroesophageal reflux disease)     Morbid obesity ([address]     Sleep apnea in adult 3/6/2018    CPAP       Patient Active Problem List    Diagnosis Date Noted    Gastroesophageal reflux disease without esophagitis 2020    Mild intermittent asthma without complication     Essential hypertension 2020    Hypercholesteremia 2020    Depression 2018    Sleep apnea in adult 2018    Obesity, morbid ([address] 2017    Anxiety 2017    Chronic headache 2016    Low back pain 2015    Encounter for long-term (current) use of other medications 2015    HA (headache) 2015    Chronic pain syndrome 2015    DJD (degenerative joint disease), lumbar 2015    Right knee pain 2015    Right knee DJD 2015     Past Medical History:   Diagnosis Date    Anxiety 2017    Arthritis     Asthma     Back injury     Depression     Essential hypertension     GERD (gastroesophageal reflux disease)     Morbid obesity ([address]     Sleep apnea in adult 3/6/2018    CPAP      Past Surgical History:   Procedure Laterality Date    HX APPENDECTOMY      HX  SECTION      HX COLONOSCOPY      HX GASTRECTOMY      lap sleeve gastrectomy    HX HYSTERECTOMY      HX KNEE REPLACEMENT      left knee    HX ORTHOPAEDIC      rt knee partial replacement    HX ORTHOPAEDIC Right     LIGAMENT REPAIR      Social History     Tobacco Use    Smoking status: Former Smoker     Packs/day: 1.00     Years: 30.00     Pack years: 30.00     Quit date: 2020     Years since quittin.5    Smokeless tobacco: Never Used   Substance Use Topics    Alcohol use: No      Family History   Problem Relation Age of Onset    Stroke Mother     Heart Disease Mother         PACEMAKER    Alcohol abuse Father     Seizures Father     Anesth Problems Neg Hx       Prior to Admission medications    Medication Sig Start Date End Date Taking? Authorizing Provider   traZODone (DESYREL) 150 mg tablet Take 1 Tab by mouth nightly as needed for Sleep for up to 90 days. 12/14/20 3/14/21 Yes Mic Goss NP   citalopram (CeleXA) 40 mg tablet Take 1 Tab by mouth daily for 90 days. 20 Yes Caterina Eisenberg NP   ARIPiprazole (ABILIFY) 10 mg tablet Take 1 Tab by mouth daily for 90 days. Indications: additional medications to treat depression 20 Yes Caterina Eisenberg NP   ALPRAZolam Judy Hymen) 0.5 mg tablet Take 1 Tab by mouth three (3) times daily as needed for Anxiety for up to 90 days. Max Daily Amount: 1.5 mg. 20 Yes Mic Goss NP   tiZANidine (ZANAFLEX) 4 mg capsule Take 4 mg by mouth three (3) times daily. Yes Provider, Historical   pravastatin (PRAVACHOL) 20 mg tablet Take 20 mg by mouth nightly. Yes Provider, Historical   minocycline (DYNACIN) 100 mg tablet Take 100 mg by mouth two (2) times a day. Yes Provider, Historical   ketoconazole (NIZORAL) 2 % topical cream Apply  to affected area daily. Yes Provider, Historical   olopatadine (PATANOL) 0.1 % ophthalmic solution Administer 2 Drops to both eyes two (2) times a day. Yes Provider, Historical   bisacodyL (Dulcolax, bisacodyl,) 5 mg EC tablet Take 5 mg by mouth daily as needed for Constipation. Yes Provider, Historical   meloxicam (MOBIC) 15 mg tablet Take 15 mg by mouth daily.    Yes Provider, Historical   calcium citrate/vitamin D3 (CALCIUM CITRATE + D PO) Take 600 mg by mouth daily. Yes Provider, Historical   cyanocobalamin (VITAMIN B-12) 500 mcg tablet Take 500 mcg by mouth daily. Yes Provider, Historical   multivitamin (ONE A DAY) tablet Take 1 Tab by mouth daily. Yes Provider, Historical   albuterol (PROAIR HFA) 90 mcg/actuation inhaler Take 2 Puffs by inhalation every four (4) hours as needed. Yes Provider, Historical   naloxone 4 mg/actuation spry 4 mg by Nasal route as needed. 3/30/17  Yes Abel Anguiano MD   [facility] Use as directed. 3/14/16  Yes Brendon Li NP   amLODIPine (NORVASC) 10 mg tablet Take  by mouth daily. Yes Provider, Historical   lisinopril (PRINIVIL, ZESTRIL) 10 mg tablet Take 40 mg by mouth daily. Yes Provider, Historical   butalbital-acetaminophen-caffeine (FIORICET) -40 mg per tablet Take 1 Tab by mouth daily as needed. Yes Provider, Historical   furosemide (LASIX) 20 mg tablet Take  by mouth as needed. Yes Provider, Historical   ondansetron (ZOFRAN ODT) 4 mg disintegrating tablet Take 1 Tab by mouth every eight (8) hours as needed for Nausea or Nausea or Vomiting (AFTER SURGERY). 12/10/20   Nydia Gill NP   enoxaparin (LOVENOX) 40 mg/0.4 mL 0.4 mL by SubCUTAneous route daily. AFTER SURGERY  Indications: blood clot in a deep vein of the extremities 12/10/20   Nydia Gill NP   gabapentin (NEURONTIN) 100 mg capsule Take 1-2 Caps by mouth every eight (8) hours as needed for Pain (AFTER SURGERY). Max Daily Amount: 600 mg. 12/10/20   Nydia Gill NP   polyethylene glycol (MIRALAX) 17 gram packet Take 1 Packet by mouth daily. Indications: constipation 12/10/20   Nydia Gill NP   pantoprazole (PROTONIX) 40 mg tablet Take 1 Tab by mouth daily. AFTER SURGERY 12/10/20   Nydia Gill NP   ergocalciferol (VITAMIN D2) 50,000 unit capsule Take 50,000 Units by mouth every seven (7) days.     Provider, Historical     No Known Allergies        Objective:     Visit Vitals  BP (!) 198/92   Pulse (!) 107   Temp 98.6 °F (37 °C)   Resp 24   Ht 5' 8\" (1.727 m)   Wt (!) 395 lb (179.2 kg)   SpO2 95%   BMI 60.06 kg/m²       Assessment:     Recurrent morbid obesity following sleeve gastrectomy. No success with medical management. Plan:     Laparoscopic revision of sleeve gastrectomy to gastric bypass. Technical aspects of procedure reviewed along with risks (to include but not limited to bleeding, wound infection, VTE, leak, ulcer, stricture, open procedure, persistent reflux symptoms, dysphagia, poor weight loss/weight regain). Also reviewed anticipated hospital course, postoperative diet, activity restrictions, and expected results. She understands and desires to proceed. She will start preoperative diet tomorrow. IVC filter insertion is scheduled for 1/4/2021. Cotinine urine screening was performed today as well. 27 minutes spent with patient (greater than 50% of time in face-face consultation reviewing potential risks, anticipated hospital course, postoperative diet, activity restrictions).       Signed By: Sultana Moran MD     December 19, 2020

## 2020-12-19 NOTE — PATIENT INSTRUCTIONS
Learning About How to Prepare for Weight-Loss (Bariatric) Surgery How can you prepare for weight-loss surgery? Having weight-loss surgery is a big step. You can prepare for surgery by having a plan. Your plan may include your goals for losing weight and how to makes changes in your diet, activity, and lifestyle to help raise your chances of success. One way to prepare for surgery is to think about your goal or reason why you want to reach a healthy weight. Do you want to lower your blood pressure, cholesterol, or blood sugar? Do you want to be able to sleep better, play with your kids, or walk around the block? Having a reason can help you stay with your plan and meet your goals. You'll have a weight loss team that you can talk to about your plans and goals. The team will help you get ready for surgery. After surgery, the team will help you adjust to new ways of eating and changes to your body. How will weight-loss surgery affect your life? You have likely thought a lot about how surgery may affect your lifehow you will eat, how your body will look, or how you will feel. Some people feel overwhelmed with these changes. These may include: A slimmer you. You probably will lose weight very quickly in the first few months after surgery. As time goes on, your weight loss will slow down. How much weight you lose depends on what type of surgery you had and how well your new eating and activity plans are working for you. A new way of eating. Success in reaching and keeping a healthy weight depends on making lifelong changes in how you eat. After surgery, you raise your chances of success if you: 
Eat just a few ounces of food at a time. Eat very slowly and chew your food to mush. Don't drink for 30 minutes before you eat, during your meal, and for 30 minutes after you eat. Are careful about drinking alcohol. Avoid foods that are high in fat or sugar. Take vitamin and mineral supplements. A healthier you. Weight-loss surgery can have some real health benefits. Problems like diabetes, high blood pressure, and sleep apnea may go awayor at least become easier to manage. A more active you. After surgery, being active on most days of the week will help you reach your weight goal and avoid gaining back the weight you lose. A lot of extra skin. When you lose weight quickly, you may have a lot of extra skin. That's normal. You can have surgery to remove the extra skin if it bothers you. There are going to be some ups and downs while you get used to these changes. So another way to adjust is to identify who can help support you. Getting support from friends and family can help. And joining a support group for people who have had the surgery can be a big help too, because they know what you're going through. Another way you can help yourself adjust to changes is to have a plan. When you have a plan, you can focus on losing weight and living a healthier life. So what steps can you take to prepare for weight-loss surgery? Will you set some goals? Will you learn about how surgery can affect your life? How about asking family or friends for help? Write out your plan. Then get ready. Where can you learn more? Go to http://www.gray.com/ Enter H701 in the search box to learn more about \"Learning About How to Prepare for Weight-Loss (Bariatric) Surgery. \" Current as of: December 11, 2019               Content Version: 12.6 © 6300-5771 [employer]. Care instructions adapted under license by [employer] (which disclaims liability or warranty for this information). If you have questions about a medical condition or this instruction, always ask your healthcare professional. [address] any warranty or liability for your use of this information.

## 2020-12-23 ENCOUNTER — TELEPHONE (OUTPATIENT)
Dept: SURGERY | Age: 55
End: 2020-12-23

## 2020-12-23 NOTE — TELEPHONE ENCOUNTER
Patient called me back and I let her know that Holland Kebede. NP wants her to follow up with her PCP in regards to her elevated HgbA1c. And getting on medication prior to her surgery. Pt said she will call him today.

## 2020-12-23 NOTE — TELEPHONE ENCOUNTER
Follow up call made to Ms Elio Del Real verified [place]. I informed patient as advised by Nurse MW please see previous note.

## 2020-12-23 NOTE — TELEPHONE ENCOUNTER
I called the patient and I told her that Rivas Ramirez NP wants her to see her PCP in regards to her HGB A!C being elevated, her phone dropped me twice and I called her back and got her voice mail. I will try again later.

## 2020-12-23 NOTE — PROGRESS NOTES
Needs to see PCP about HgA1C of 7.7% = diabetes. Can you please ask her to make appt with PCP about getting on some meds pre surgery.   Thanks KETTY (I talked to her about this when I saw her last in the office)

## 2020-12-28 ENCOUNTER — TRANSCRIBE ORDER (OUTPATIENT)
Dept: SCHEDULING | Age: 55
End: 2020-12-28

## 2020-12-28 DIAGNOSIS — R79.89 ELEVATED LFTS: Primary | ICD-10-CM

## 2021-01-04 ENCOUNTER — HOSPITAL ENCOUNTER (OUTPATIENT)
Dept: INTERVENTIONAL RADIOLOGY/VASCULAR | Age: 56
Discharge: HOME | End: 2021-01-04
Attending: NURSE PRACTITIONER
Payer: MEDICARE

## 2021-01-04 VITALS
OXYGEN SATURATION: 91 % | RESPIRATION RATE: 23 BRPM | TEMPERATURE: 99.9 F | HEART RATE: 84 BPM | HEIGHT: 68 IN | BODY MASS INDEX: 44.41 KG/M2 | DIASTOLIC BLOOD PRESSURE: 86 MMHG | WEIGHT: 293 LBS | SYSTOLIC BLOOD PRESSURE: 150 MMHG

## 2021-01-04 DIAGNOSIS — E66.01 MORBID OBESITY (HCC): ICD-10-CM

## 2021-01-04 DIAGNOSIS — Z74.09 IMPAIRED MOBILITY: ICD-10-CM

## 2021-01-04 PROCEDURE — C1880 VENA CAVA FILTER: HCPCS

## 2021-01-04 PROCEDURE — C1894 INTRO/SHEATH, NON-LASER: HCPCS

## 2021-01-04 PROCEDURE — C1769 GUIDE WIRE: HCPCS

## 2021-01-04 PROCEDURE — 77030031515

## 2021-01-04 PROCEDURE — 77030019940 HC BLNKT HYPOTHRM STRY -B

## 2021-01-04 PROCEDURE — C1887 CATHETER, GUIDING: HCPCS

## 2021-01-04 PROCEDURE — 2709999900 HC NON-CHARGEABLE SUPPLY

## 2021-01-04 PROCEDURE — 77030012468 HC VLV BLEEDBK CNTRL ABBT -B

## 2021-01-04 PROCEDURE — 77030038614 HC TU HI FLW NV MITY -F

## 2021-01-04 PROCEDURE — 37191 INS ENDOVAS VENA CAVA FILTR: CPT

## 2021-01-04 PROCEDURE — 74011000250 HC RX REV CODE- 250: Performed by: STUDENT IN AN ORGANIZED HEALTH CARE EDUCATION/TRAINING PROGRAM

## 2021-01-04 PROCEDURE — 74011000636 HC RX REV CODE- 636: Performed by: STUDENT IN AN ORGANIZED HEALTH CARE EDUCATION/TRAINING PROGRAM

## 2021-01-04 PROCEDURE — 77030008584 HC TOOL GDWRE DEV TERU -A

## 2021-01-04 RX ORDER — LIDOCAINE HYDROCHLORIDE 20 MG/ML
20 INJECTION, SOLUTION INFILTRATION; PERINEURAL
Status: COMPLETED | OUTPATIENT
Start: 2021-01-04 | End: 2021-01-04

## 2021-01-04 RX ADMIN — LIDOCAINE HYDROCHLORIDE 10 ML: 20 INJECTION, SOLUTION INFILTRATION; PERINEURAL at 11:41

## 2021-01-04 RX ADMIN — IOPAMIDOL 20 ML: 612 INJECTION, SOLUTION INTRAVENOUS at 11:55

## 2021-01-04 NOTE — PROGRESS NOTES
Tolerated placement of inferior vena cava filter well. Dressing is dry and intact to right neck, no bleeding or hematoma. Dr. Qasim Pacheco spoke with patient regarding need for return for removal of filter when deemed appropriate. I have reviewed discharge instructions with the patient. The patient verbalized understanding.

## 2021-01-04 NOTE — H&P
Radiology History and Physical    Patient: Noemi Cabrera 54 y.o. female       Chief Complaint: No chief complaint on file. History of Present Illness: IVC filter for VTE prophylaxis perioperatively.     History:    Past Medical History:   Diagnosis Date    Anxiety 2017    Arthritis     Asthma     Back injury     Depression     Essential hypertension     GERD (gastroesophageal reflux disease)     Morbid obesity (HCC)     Sleep apnea in adult 3/6/2018    CPAP     Family History   Problem Relation Age of Onset    Stroke Mother     Heart Disease Mother         PACEMAKER    Alcohol abuse Father     Seizures Father     Anesth Problems Neg Hx      Social History     Socioeconomic History    Marital status: SINGLE     Spouse name: Not on file    Number of children: Not on file    Years of education: Not on file    Highest education level: Not on file   Occupational History    Occupation: disability      Comment: since  with back    Social Needs    Financial resource strain: Not on file    Food insecurity     Worry: Not on file     Inability: Not on file   [employer] needs     Medical: Not on file     Non-medical: Not on file   Tobacco Use    Smoking status: Former Smoker     Packs/day: 1.00     Years: 30.00     Pack years: 30.00     Quit date: 2020     Years since quittin.5    Smokeless tobacco: Never Used   Substance and Sexual Activity    Alcohol use: No    Drug use: No    Sexual activity: Not on file     Comment: post menopausal    Lifestyle    Physical activity     Days per week: Not on file     Minutes per session: Not on file    Stress: Not on file   Relationships    Social connections     Talks on phone: Not on file     Gets together: Not on file     Attends Alevism service: Not on file     Active member of club or organization: Not on file     Attends meetings of clubs or organizations: Not on file     Relationship status: Not on file    Intimate partner violence     Fear of current or ex partner: Not on file     Emotionally abused: Not on file     Physically abused: Not on file     Forced sexual activity: Not on file   Other Topics Concern    Not on file   Social History Narrative    In the home with 15year old grand daughter and friendDevi        Allergies: No Known Allergies    Current Medications:  Current Outpatient Medications   Medication Sig    traZODone (DESYREL) 150 mg tablet Take 1 Tab by mouth nightly as needed for Sleep for up to 90 days.  ondansetron (ZOFRAN ODT) 4 mg disintegrating tablet Take 1 Tab by mouth every eight (8) hours as needed for Nausea or Nausea or Vomiting (AFTER SURGERY).  enoxaparin (LOVENOX) 40 mg/0.4 mL 0.4 mL by SubCUTAneous route daily. AFTER SURGERY  Indications: blood clot in a deep vein of the extremities    gabapentin (NEURONTIN) 100 mg capsule Take 1-2 Caps by mouth every eight (8) hours as needed for Pain (AFTER SURGERY). Max Daily Amount: 600 mg.  polyethylene glycol (MIRALAX) 17 gram packet Take 1 Packet by mouth daily. Indications: constipation    pantoprazole (PROTONIX) 40 mg tablet Take 1 Tab by mouth daily. AFTER SURGERY    citalopram (CeleXA) 40 mg tablet Take 1 Tab by mouth daily for 90 days.  ARIPiprazole (ABILIFY) 10 mg tablet Take 1 Tab by mouth daily for 90 days. Indications: additional medications to treat depression    ALPRAZolam (XANAX) 0.5 mg tablet Take 1 Tab by mouth three (3) times daily as needed for Anxiety for up to 90 days. Max Daily Amount: 1.5 mg.    tiZANidine (ZANAFLEX) 4 mg capsule Take 4 mg by mouth three (3) times daily.  pravastatin (PRAVACHOL) 20 mg tablet Take 20 mg by mouth nightly.  minocycline (DYNACIN) 100 mg tablet Take 100 mg by mouth two (2) times a day.  ketoconazole (NIZORAL) 2 % topical cream Apply  to affected area daily.  olopatadine (PATANOL) 0.1 % ophthalmic solution Administer 2 Drops to both eyes two (2) times a day.     bisacodyL (Dulcolax, bisacodyl,) 5 mg EC tablet Take 5 mg by mouth daily as needed for Constipation.  meloxicam (MOBIC) 15 mg tablet Take 15 mg by mouth daily.  calcium citrate/vitamin D3 (CALCIUM CITRATE + D PO) Take 600 mg by mouth daily.  cyanocobalamin (VITAMIN B-12) 500 mcg tablet Take 500 mcg by mouth daily.  multivitamin (ONE A DAY) tablet Take 1 Tab by mouth daily.  albuterol (PROAIR HFA) 90 mcg/actuation inhaler Take 2 Puffs by inhalation every four (4) hours as needed.  naloxone 4 mg/actuation spry 4 mg by Nasal route as needed.  ergocalciferol (VITAMIN D2) 50,000 unit capsule Take 50,000 Units by mouth every seven (7) days.  Cane lita Use as directed.  amLODIPine (NORVASC) 10 mg tablet Take  by mouth daily.  lisinopril (PRINIVIL, ZESTRIL) 10 mg tablet Take 40 mg by mouth daily.  butalbital-acetaminophen-caffeine (FIORICET) -40 mg per tablet Take 1 Tab by mouth daily as needed.  furosemide (LASIX) 20 mg tablet Take  by mouth as needed. Current Facility-Administered Medications   Medication Dose Route Frequency    lidocaine (XYLOCAINE) 20 mg/mL (2 %) injection 400 mg  20 mL SubCUTAneous RAD ONCE    iopamidoL (ISOVUE 300) 61 % contrast injection 100 mL  100 mL IntraCATHeter RAD ONCE        Physical Exam:  There were no vitals taken for this visit. GENERAL: alert, cooperative, no distress, appears stated age  LUNG: clear to auscultation bilaterally  HEART: regular rate and rhythm  ABD: Non tender, non distended. Alerts:    Hospital Problems  Date Reviewed: 12/14/2020    None          Laboratory:    No results for input(s): HGB, HCT, WBC, PLT, INR, BUN, CREA, K, CRCLT, HGBEXT, HCTEXT, PLTEXT, INREXT in the last 72 hours. No lab exists for component: PTT, PT      Plan of Care/Planned Procedure:  Risks, benefits, and alternatives reviewed with patient and she agrees to proceed with the procedure.        Dia Fragoso MD

## 2021-01-04 NOTE — ROUTINE PROCESS
Pt arrives ambulatory with cane to angio department accompanied by self for placement of inferior vena cava filter by Dr. Tabitha Kaba MD procedure. All assessment completed and consent was reviewed. Education  Teaching given  regarding procedure, post-procedure care and  management/follow-up. Opportunity for questions was provided and all questions and concerns were addressed. Patient voices good understanding.

## 2021-01-05 ENCOUNTER — HOSPITAL ENCOUNTER (OUTPATIENT)
Dept: LAB | Age: 56
Discharge: HOME | End: 2021-01-05
Attending: INTERNAL MEDICINE
Payer: MEDICARE

## 2021-01-05 ENCOUNTER — HOSPITAL ENCOUNTER (OUTPATIENT)
Dept: ULTRASOUND IMAGING | Age: 56
Discharge: HOME | End: 2021-01-05
Attending: INTERNAL MEDICINE
Payer: MEDICARE

## 2021-01-05 ENCOUNTER — TRANSCRIBE ORDER (OUTPATIENT)
Dept: REGISTRATION | Age: 56
End: 2021-01-05

## 2021-01-05 DIAGNOSIS — R94.5 NONSPECIFIC ABNORMAL RESULTS OF LIVER FUNCTION STUDY: Primary | ICD-10-CM

## 2021-01-05 DIAGNOSIS — R79.89 ELEVATED LFTS: ICD-10-CM

## 2021-01-05 DIAGNOSIS — R94.5 NONSPECIFIC ABNORMAL RESULTS OF LIVER FUNCTION STUDY: ICD-10-CM

## 2021-01-05 DIAGNOSIS — Z01.812 PRE-PROCEDURE LAB EXAM: Primary | ICD-10-CM

## 2021-01-05 PROCEDURE — 76705 ECHO EXAM OF ABDOMEN: CPT

## 2021-01-10 ENCOUNTER — HOSPITAL ENCOUNTER (OUTPATIENT)
Dept: PREADMISSION TESTING | Age: 56
Discharge: HOME | End: 2021-01-10
Payer: MEDICARE

## 2021-01-10 DIAGNOSIS — Z01.812 PRE-PROCEDURE LAB EXAM: ICD-10-CM

## 2021-01-10 PROCEDURE — 87635 SARS-COV-2 COVID-19 AMP PRB: CPT

## 2021-01-11 LAB — SARS-COV-2, COV2NT: NOT DETECTED

## 2021-01-12 ENCOUNTER — TELEPHONE (OUTPATIENT)
Dept: SURGERY | Age: 56
End: 2021-01-12

## 2021-01-12 NOTE — TELEPHONE ENCOUNTER
I called the patient back and she wanted to know what bowel prep she needs to take before her surgery and I told her there was no bowel prep for the surgery she is having. Pt in agreement.

## 2021-01-12 NOTE — TELEPHONE ENCOUNTER
Patient called back asking if nurse could return her call again. Patient stated that she had 1 more surgery question to ask.

## 2021-01-12 NOTE — TELEPHONE ENCOUNTER
I called the patient back an she wanted to know if she needed to take a laxative. I told her she could if she wanted to but we do not order a prep for her surgery. Pt in agreement.

## 2021-01-12 NOTE — TELEPHONE ENCOUNTER
Patient would like a call back from the nurse to find out what bowel prep she needs to take the night before her surgery. Patient states she has lost the paper that had that information on it.

## 2021-01-13 ENCOUNTER — ANESTHESIA EVENT (OUTPATIENT)
Dept: SURGERY | Age: 56
DRG: 621 | End: 2021-01-13
Payer: MEDICARE

## 2021-01-14 ENCOUNTER — HOSPITAL ENCOUNTER (INPATIENT)
Age: 56
LOS: 2 days | Discharge: HOME | DRG: 621 | End: 2021-01-16
Attending: SURGERY | Admitting: SURGERY
Payer: MEDICARE

## 2021-01-14 ENCOUNTER — ANESTHESIA (OUTPATIENT)
Dept: SURGERY | Age: 56
DRG: 621 | End: 2021-01-14
Payer: MEDICARE

## 2021-01-14 DIAGNOSIS — Z98.84 S/P GASTRIC BYPASS: Primary | ICD-10-CM

## 2021-01-14 DIAGNOSIS — E66.01 MORBID OBESITY (HCC): ICD-10-CM

## 2021-01-14 LAB
GLUCOSE BLD STRIP.AUTO-MCNC: 129 MG/DL (ref 65–100)
SERVICE CMNT-IMP: ABNORMAL

## 2021-01-14 PROCEDURE — 88300 SURGICAL PATH GROSS: CPT

## 2021-01-14 PROCEDURE — 74011000250 HC RX REV CODE- 250: Performed by: NURSE ANESTHETIST, CERTIFIED REGISTERED

## 2021-01-14 PROCEDURE — 74011250636 HC RX REV CODE- 250/636: Performed by: NURSE ANESTHETIST, CERTIFIED REGISTERED

## 2021-01-14 PROCEDURE — 74011250636 HC RX REV CODE- 250/636: Performed by: SURGERY

## 2021-01-14 PROCEDURE — 76010000135 HC OR TIME 4 TO 4.5 HR: Performed by: SURGERY

## 2021-01-14 PROCEDURE — 77030008437 HC REINF STRP REINF SEMGD WLGO -C: Performed by: SURGERY

## 2021-01-14 PROCEDURE — 76060000039 HC ANESTHESIA 4 TO 4.5 HR: Performed by: SURGERY

## 2021-01-14 PROCEDURE — 77030005513 HC CATH URETH FOL11 MDII -B: Performed by: SURGERY

## 2021-01-14 PROCEDURE — 0DJ08ZZ INSPECTION OF UPPER INTESTINAL TRACT, VIA NATURAL OR ARTIFICIAL OPENING ENDOSCOPIC: ICD-10-PCS | Performed by: SURGERY

## 2021-01-14 PROCEDURE — 77030020268 HC MISC GENERAL SUPPLY: Performed by: SURGERY

## 2021-01-14 PROCEDURE — 0DN84ZZ RELEASE SMALL INTESTINE, PERCUTANEOUS ENDOSCOPIC APPROACH: ICD-10-PCS | Performed by: SURGERY

## 2021-01-14 PROCEDURE — 76210000006 HC OR PH I REC 0.5 TO 1 HR: Performed by: SURGERY

## 2021-01-14 PROCEDURE — 77030038552 HC DRN WND MDII -A: Performed by: SURGERY

## 2021-01-14 PROCEDURE — 77030006629 HC BLD HARM SYN J&J -D: Performed by: SURGERY

## 2021-01-14 PROCEDURE — 77030022871 HC STPL REINF STRP BAXT -C: Performed by: SURGERY

## 2021-01-14 PROCEDURE — 77030008728 HC TU GAST LAPSCP APOL -C: Performed by: SURGERY

## 2021-01-14 PROCEDURE — 77030009965 HC RELD STPLR ENDOS COVD -D: Performed by: SURGERY

## 2021-01-14 PROCEDURE — 82962 GLUCOSE BLOOD TEST: CPT

## 2021-01-14 PROCEDURE — 65660000000 HC RM CCU STEPDOWN

## 2021-01-14 PROCEDURE — 74011000258 HC RX REV CODE- 258: Performed by: SURGERY

## 2021-01-14 PROCEDURE — 43644 LAP GASTRIC BYPASS/ROUX-EN-Y: CPT | Performed by: PHYSICIAN ASSISTANT

## 2021-01-14 PROCEDURE — 77030002996 HC SUT SLK J&J -A: Performed by: SURGERY

## 2021-01-14 PROCEDURE — 77030031139 HC SUT VCRL2 J&J -A: Performed by: SURGERY

## 2021-01-14 PROCEDURE — 77030031753 HC SHR ENDO COAG HARM J&J -E: Performed by: SURGERY

## 2021-01-14 PROCEDURE — 74011000250 HC RX REV CODE- 250: Performed by: SURGERY

## 2021-01-14 PROCEDURE — 77030040361 HC SLV COMPR DVT MDII -B: Performed by: SURGERY

## 2021-01-14 PROCEDURE — 77030037366 HC STPLR ENDO TRI-STPLR COVD -C: Performed by: SURGERY

## 2021-01-14 PROCEDURE — 77030008684 HC TU ET CUF COVD -B: Performed by: NURSE ANESTHETIST, CERTIFIED REGISTERED

## 2021-01-14 PROCEDURE — 77030036732 HC RELD STPLR VASC J&J -F: Performed by: SURGERY

## 2021-01-14 PROCEDURE — 77030009956 HC RELD ENDOSTCH COVD -B: Performed by: SURGERY

## 2021-01-14 PROCEDURE — 0D164ZA BYPASS STOMACH TO JEJUNUM, PERCUTANEOUS ENDOSCOPIC APPROACH: ICD-10-PCS | Performed by: SURGERY

## 2021-01-14 PROCEDURE — 77030009963 HC RELD STPLR ECR J&J -C: Performed by: SURGERY

## 2021-01-14 PROCEDURE — 77030042556 HC PNCL CAUT -B: Performed by: SURGERY

## 2021-01-14 PROCEDURE — 77030002966 HC SUT PDS J&J -A: Performed by: SURGERY

## 2021-01-14 PROCEDURE — 74011250637 HC RX REV CODE- 250/637: Performed by: SURGERY

## 2021-01-14 PROCEDURE — 2709999900 HC NON-CHARGEABLE SUPPLY: Performed by: SURGERY

## 2021-01-14 PROCEDURE — 43644 LAP GASTRIC BYPASS/ROUX-EN-Y: CPT | Performed by: SURGERY

## 2021-01-14 PROCEDURE — 77010033678 HC OXYGEN DAILY

## 2021-01-14 PROCEDURE — 77030002916 HC SUT ETHLN J&J -A: Performed by: SURGERY

## 2021-01-14 PROCEDURE — 94760 N-INVAS EAR/PLS OXIMETRY 1: CPT

## 2021-01-14 PROCEDURE — 77030040922 HC BLNKT HYPOTHRM STRY -A

## 2021-01-14 RX ORDER — HYDROMORPHONE HYDROCHLORIDE 1 MG/ML
1 INJECTION, SOLUTION INTRAMUSCULAR; INTRAVENOUS; SUBCUTANEOUS
Status: DISCONTINUED | OUTPATIENT
Start: 2021-01-14 | End: 2021-01-16 | Stop reason: HOSPADM

## 2021-01-14 RX ORDER — ACETAMINOPHEN 500 MG
1000 TABLET ORAL EVERY 6 HOURS
Status: DISCONTINUED | OUTPATIENT
Start: 2021-01-14 | End: 2021-01-16 | Stop reason: HOSPADM

## 2021-01-14 RX ORDER — LIDOCAINE HYDROCHLORIDE ANHYDROUS AND DEXTROSE MONOHYDRATE .8; 5 G/100ML; G/100ML
INJECTION, SOLUTION INTRAVENOUS
Status: DISCONTINUED | OUTPATIENT
Start: 2021-01-14 | End: 2021-01-14 | Stop reason: HOSPADM

## 2021-01-14 RX ORDER — BUPIVACAINE HYDROCHLORIDE 5 MG/ML
50 INJECTION, SOLUTION EPIDURAL; INTRACAUDAL ONCE
Status: COMPLETED | OUTPATIENT
Start: 2021-01-14 | End: 2021-01-14

## 2021-01-14 RX ORDER — ALBUTEROL SULFATE 90 UG/1
2 AEROSOL, METERED RESPIRATORY (INHALATION)
Status: DISCONTINUED | OUTPATIENT
Start: 2021-01-14 | End: 2021-01-14 | Stop reason: CLARIF

## 2021-01-14 RX ORDER — SODIUM CHLORIDE 0.9 % (FLUSH) 0.9 %
5-40 SYRINGE (ML) INJECTION AS NEEDED
Status: DISCONTINUED | OUTPATIENT
Start: 2021-01-14 | End: 2021-01-14 | Stop reason: HOSPADM

## 2021-01-14 RX ORDER — SODIUM CHLORIDE 0.9 % (FLUSH) 0.9 %
5-40 SYRINGE (ML) INJECTION EVERY 8 HOURS
Status: DISCONTINUED | OUTPATIENT
Start: 2021-01-14 | End: 2021-01-16 | Stop reason: HOSPADM

## 2021-01-14 RX ORDER — ENOXAPARIN SODIUM 100 MG/ML
40 INJECTION SUBCUTANEOUS EVERY 24 HOURS
Status: DISCONTINUED | OUTPATIENT
Start: 2021-01-15 | End: 2021-01-16 | Stop reason: HOSPADM

## 2021-01-14 RX ORDER — SUCCINYLCHOLINE CHLORIDE 20 MG/ML
INJECTION INTRAMUSCULAR; INTRAVENOUS AS NEEDED
Status: DISCONTINUED | OUTPATIENT
Start: 2021-01-14 | End: 2021-01-14 | Stop reason: HOSPADM

## 2021-01-14 RX ORDER — GABAPENTIN 100 MG/1
200 CAPSULE ORAL 2 TIMES DAILY
Status: DISCONTINUED | OUTPATIENT
Start: 2021-01-14 | End: 2021-01-16 | Stop reason: HOSPADM

## 2021-01-14 RX ORDER — NALOXONE HYDROCHLORIDE 0.4 MG/ML
0.4 INJECTION, SOLUTION INTRAMUSCULAR; INTRAVENOUS; SUBCUTANEOUS AS NEEDED
Status: DISCONTINUED | OUTPATIENT
Start: 2021-01-14 | End: 2021-01-16 | Stop reason: HOSPADM

## 2021-01-14 RX ORDER — ONDANSETRON 2 MG/ML
INJECTION INTRAMUSCULAR; INTRAVENOUS AS NEEDED
Status: DISCONTINUED | OUTPATIENT
Start: 2021-01-14 | End: 2021-01-14 | Stop reason: HOSPADM

## 2021-01-14 RX ORDER — LORAZEPAM 2 MG/ML
1 INJECTION INTRAMUSCULAR
Status: DISCONTINUED | OUTPATIENT
Start: 2021-01-14 | End: 2021-01-16 | Stop reason: HOSPADM

## 2021-01-14 RX ORDER — HYDROMORPHONE HYDROCHLORIDE 1 MG/ML
0.5 INJECTION, SOLUTION INTRAMUSCULAR; INTRAVENOUS; SUBCUTANEOUS
Status: DISCONTINUED | OUTPATIENT
Start: 2021-01-14 | End: 2021-01-16 | Stop reason: HOSPADM

## 2021-01-14 RX ORDER — SCOLOPAMINE TRANSDERMAL SYSTEM 1 MG/1
1 PATCH, EXTENDED RELEASE TRANSDERMAL ONCE
Status: DISCONTINUED | OUTPATIENT
Start: 2021-01-14 | End: 2021-01-16 | Stop reason: HOSPADM

## 2021-01-14 RX ORDER — ONDANSETRON 2 MG/ML
4 INJECTION INTRAMUSCULAR; INTRAVENOUS
Status: DISCONTINUED | OUTPATIENT
Start: 2021-01-14 | End: 2021-01-16 | Stop reason: HOSPADM

## 2021-01-14 RX ORDER — MIDAZOLAM HYDROCHLORIDE 1 MG/ML
1 INJECTION, SOLUTION INTRAMUSCULAR; INTRAVENOUS AS NEEDED
Status: DISCONTINUED | OUTPATIENT
Start: 2021-01-14 | End: 2021-01-14 | Stop reason: HOSPADM

## 2021-01-14 RX ORDER — SODIUM CHLORIDE, SODIUM LACTATE, POTASSIUM CHLORIDE, CALCIUM CHLORIDE 600; 310; 30; 20 MG/100ML; MG/100ML; MG/100ML; MG/100ML
125 INJECTION, SOLUTION INTRAVENOUS CONTINUOUS
Status: DISCONTINUED | OUTPATIENT
Start: 2021-01-14 | End: 2021-01-16 | Stop reason: HOSPADM

## 2021-01-14 RX ORDER — ALBUTEROL SULFATE 0.83 MG/ML
2.5 SOLUTION RESPIRATORY (INHALATION)
Status: DISCONTINUED | OUTPATIENT
Start: 2021-01-14 | End: 2021-01-16 | Stop reason: HOSPADM

## 2021-01-14 RX ORDER — PROPOFOL 10 MG/ML
INJECTION, EMULSION INTRAVENOUS AS NEEDED
Status: DISCONTINUED | OUTPATIENT
Start: 2021-01-14 | End: 2021-01-14 | Stop reason: HOSPADM

## 2021-01-14 RX ORDER — GABAPENTIN 250 MG/5ML
500 SOLUTION ORAL ONCE
Status: COMPLETED | OUTPATIENT
Start: 2021-01-14 | End: 2021-01-14

## 2021-01-14 RX ORDER — HYOSCYAMINE SULFATE 0.12 MG/1
0.12 TABLET SUBLINGUAL
Status: DISCONTINUED | OUTPATIENT
Start: 2021-01-14 | End: 2021-01-16 | Stop reason: HOSPADM

## 2021-01-14 RX ORDER — FENTANYL CITRATE 50 UG/ML
25 INJECTION, SOLUTION INTRAMUSCULAR; INTRAVENOUS
Status: DISCONTINUED | OUTPATIENT
Start: 2021-01-14 | End: 2021-01-14 | Stop reason: HOSPADM

## 2021-01-14 RX ORDER — SODIUM CHLORIDE, SODIUM LACTATE, POTASSIUM CHLORIDE, CALCIUM CHLORIDE 600; 310; 30; 20 MG/100ML; MG/100ML; MG/100ML; MG/100ML
INJECTION, SOLUTION INTRAVENOUS
Status: DISCONTINUED | OUTPATIENT
Start: 2021-01-14 | End: 2021-01-14 | Stop reason: HOSPADM

## 2021-01-14 RX ORDER — MIDAZOLAM HYDROCHLORIDE 1 MG/ML
INJECTION, SOLUTION INTRAMUSCULAR; INTRAVENOUS AS NEEDED
Status: DISCONTINUED | OUTPATIENT
Start: 2021-01-14 | End: 2021-01-14 | Stop reason: HOSPADM

## 2021-01-14 RX ORDER — ONDANSETRON 2 MG/ML
4 INJECTION INTRAMUSCULAR; INTRAVENOUS AS NEEDED
Status: DISCONTINUED | OUTPATIENT
Start: 2021-01-14 | End: 2021-01-14 | Stop reason: HOSPADM

## 2021-01-14 RX ORDER — GLYCOPYRROLATE 0.2 MG/ML
INJECTION INTRAMUSCULAR; INTRAVENOUS AS NEEDED
Status: DISCONTINUED | OUTPATIENT
Start: 2021-01-14 | End: 2021-01-14 | Stop reason: HOSPADM

## 2021-01-14 RX ORDER — FENTANYL CITRATE 50 UG/ML
INJECTION, SOLUTION INTRAMUSCULAR; INTRAVENOUS AS NEEDED
Status: DISCONTINUED | OUTPATIENT
Start: 2021-01-14 | End: 2021-01-14 | Stop reason: HOSPADM

## 2021-01-14 RX ORDER — LIDOCAINE HYDROCHLORIDE 20 MG/ML
INJECTION, SOLUTION EPIDURAL; INFILTRATION; INTRACAUDAL; PERINEURAL AS NEEDED
Status: DISCONTINUED | OUTPATIENT
Start: 2021-01-14 | End: 2021-01-14 | Stop reason: HOSPADM

## 2021-01-14 RX ORDER — DEXAMETHASONE SODIUM PHOSPHATE 4 MG/ML
INJECTION, SOLUTION INTRA-ARTICULAR; INTRALESIONAL; INTRAMUSCULAR; INTRAVENOUS; SOFT TISSUE AS NEEDED
Status: DISCONTINUED | OUTPATIENT
Start: 2021-01-14 | End: 2021-01-14 | Stop reason: HOSPADM

## 2021-01-14 RX ORDER — ROCURONIUM BROMIDE 10 MG/ML
INJECTION, SOLUTION INTRAVENOUS AS NEEDED
Status: DISCONTINUED | OUTPATIENT
Start: 2021-01-14 | End: 2021-01-14 | Stop reason: HOSPADM

## 2021-01-14 RX ORDER — SODIUM CHLORIDE 0.9 % (FLUSH) 0.9 %
5-40 SYRINGE (ML) INJECTION EVERY 8 HOURS
Status: DISCONTINUED | OUTPATIENT
Start: 2021-01-14 | End: 2021-01-14 | Stop reason: HOSPADM

## 2021-01-14 RX ORDER — KETAMINE HYDROCHLORIDE 10 MG/ML
INJECTION, SOLUTION INTRAMUSCULAR; INTRAVENOUS AS NEEDED
Status: DISCONTINUED | OUTPATIENT
Start: 2021-01-14 | End: 2021-01-14 | Stop reason: HOSPADM

## 2021-01-14 RX ORDER — DEXMEDETOMIDINE HYDROCHLORIDE 100 UG/ML
INJECTION, SOLUTION INTRAVENOUS AS NEEDED
Status: DISCONTINUED | OUTPATIENT
Start: 2021-01-14 | End: 2021-01-14 | Stop reason: HOSPADM

## 2021-01-14 RX ORDER — SODIUM CHLORIDE 0.9 % (FLUSH) 0.9 %
5-40 SYRINGE (ML) INJECTION AS NEEDED
Status: DISCONTINUED | OUTPATIENT
Start: 2021-01-14 | End: 2021-01-16 | Stop reason: HOSPADM

## 2021-01-14 RX ORDER — LIDOCAINE HYDROCHLORIDE ANHYDROUS AND DEXTROSE MONOHYDRATE .8; 5 G/100ML; G/100ML
1 INJECTION, SOLUTION INTRAVENOUS CONTINUOUS
Status: DISCONTINUED | OUTPATIENT
Start: 2021-01-14 | End: 2021-01-14

## 2021-01-14 RX ORDER — PHENYLEPHRINE HCL IN 0.9% NACL 0.4MG/10ML
SYRINGE (ML) INTRAVENOUS AS NEEDED
Status: DISCONTINUED | OUTPATIENT
Start: 2021-01-14 | End: 2021-01-14 | Stop reason: HOSPADM

## 2021-01-14 RX ORDER — MAGNESIUM SULFATE HEPTAHYDRATE 40 MG/ML
INJECTION, SOLUTION INTRAVENOUS AS NEEDED
Status: DISCONTINUED | OUTPATIENT
Start: 2021-01-14 | End: 2021-01-14 | Stop reason: HOSPADM

## 2021-01-14 RX ADMIN — ROCURONIUM BROMIDE 30 MG: 10 SOLUTION INTRAVENOUS at 09:12

## 2021-01-14 RX ADMIN — ROCURONIUM BROMIDE 5 MG: 10 SOLUTION INTRAVENOUS at 08:30

## 2021-01-14 RX ADMIN — Medication 80 MCG: at 10:48

## 2021-01-14 RX ADMIN — HYDROMORPHONE HYDROCHLORIDE 1 MG: 1 INJECTION, SOLUTION INTRAMUSCULAR; INTRAVENOUS; SUBCUTANEOUS at 17:22

## 2021-01-14 RX ADMIN — SUCCINYLCHOLINE CHLORIDE 300 MG: 20 INJECTION, SOLUTION INTRAMUSCULAR; INTRAVENOUS at 08:30

## 2021-01-14 RX ADMIN — DEXMEDETOMIDINE HYDROCHLORIDE 10 MCG: 100 INJECTION, SOLUTION, CONCENTRATE INTRAVENOUS at 10:42

## 2021-01-14 RX ADMIN — ONDANSETRON HYDROCHLORIDE 4 MG: 2 INJECTION, SOLUTION INTRAMUSCULAR; INTRAVENOUS at 11:57

## 2021-01-14 RX ADMIN — Medication 80 MCG: at 09:58

## 2021-01-14 RX ADMIN — Medication 120 MCG: at 10:05

## 2021-01-14 RX ADMIN — HYDROMORPHONE HYDROCHLORIDE 0.5 MG: 1 INJECTION, SOLUTION INTRAMUSCULAR; INTRAVENOUS; SUBCUTANEOUS at 14:08

## 2021-01-14 RX ADMIN — GABAPENTIN 500 MG: 250 SOLUTION ORAL at 08:11

## 2021-01-14 RX ADMIN — ROCURONIUM BROMIDE 45 MG: 10 SOLUTION INTRAVENOUS at 08:34

## 2021-01-14 RX ADMIN — ACETAMINOPHEN 1000 MG: 650 SOLUTION ORAL at 08:12

## 2021-01-14 RX ADMIN — ROCURONIUM BROMIDE 30 MG: 10 SOLUTION INTRAVENOUS at 10:31

## 2021-01-14 RX ADMIN — SODIUM CHLORIDE, POTASSIUM CHLORIDE, SODIUM LACTATE AND CALCIUM CHLORIDE: 600; 310; 30; 20 INJECTION, SOLUTION INTRAVENOUS at 08:20

## 2021-01-14 RX ADMIN — ONDANSETRON 4 MG: 2 INJECTION, SOLUTION INTRAMUSCULAR; INTRAVENOUS at 14:07

## 2021-01-14 RX ADMIN — GLYCOPYRROLATE 0.1 MG: 0.2 INJECTION, SOLUTION INTRAMUSCULAR; INTRAVENOUS at 08:20

## 2021-01-14 RX ADMIN — SODIUM CHLORIDE, POTASSIUM CHLORIDE, SODIUM LACTATE AND CALCIUM CHLORIDE 125 ML/HR: 600; 310; 30; 20 INJECTION, SOLUTION INTRAVENOUS at 19:20

## 2021-01-14 RX ADMIN — KETAMINE HYDROCHLORIDE 50 MG: 10 INJECTION, SOLUTION INTRAMUSCULAR; INTRAVENOUS at 08:37

## 2021-01-14 RX ADMIN — CEFOTETAN DISODIUM 2 G: 2 INJECTION, POWDER, FOR SOLUTION INTRAMUSCULAR; INTRAVENOUS at 08:49

## 2021-01-14 RX ADMIN — LIDOCAINE HYDROCHLORIDE 100 MG: 20 INJECTION, SOLUTION EPIDURAL; INFILTRATION; INTRACAUDAL; PERINEURAL at 08:30

## 2021-01-14 RX ADMIN — DEXAMETHASONE SODIUM PHOSPHATE 6 MG: 4 INJECTION, SOLUTION INTRAMUSCULAR; INTRAVENOUS at 08:57

## 2021-01-14 RX ADMIN — ACETAMINOPHEN 1000 MG: 500 TABLET ORAL at 19:42

## 2021-01-14 RX ADMIN — KETAMINE HYDROCHLORIDE 50 MG: 10 INJECTION, SOLUTION INTRAMUSCULAR; INTRAVENOUS at 10:42

## 2021-01-14 RX ADMIN — ROCURONIUM BROMIDE 10 MG: 10 SOLUTION INTRAVENOUS at 09:45

## 2021-01-14 RX ADMIN — HYDROMORPHONE HYDROCHLORIDE 1 MG: 1 INJECTION, SOLUTION INTRAMUSCULAR; INTRAVENOUS; SUBCUTANEOUS at 20:35

## 2021-01-14 RX ADMIN — FENTANYL CITRATE 100 MCG: 50 INJECTION, SOLUTION INTRAMUSCULAR; INTRAVENOUS at 08:38

## 2021-01-14 RX ADMIN — SODIUM CHLORIDE, POTASSIUM CHLORIDE, SODIUM LACTATE AND CALCIUM CHLORIDE: 600; 310; 30; 20 INJECTION, SOLUTION INTRAVENOUS at 11:18

## 2021-01-14 RX ADMIN — MIDAZOLAM 2 MG: 1 INJECTION INTRAMUSCULAR; INTRAVENOUS at 08:20

## 2021-01-14 RX ADMIN — MAGNESIUM SULFATE 2 G: 2 INJECTION INTRAVENOUS at 08:37

## 2021-01-14 RX ADMIN — PROPOFOL 300 MG: 10 INJECTION, EMULSION INTRAVENOUS at 08:30

## 2021-01-14 RX ADMIN — LIDOCAINE HYDROCHLORIDE 2 MG/KG/HR: 8 INJECTION, SOLUTION INTRAVENOUS at 08:47

## 2021-01-14 RX ADMIN — SUGAMMADEX 400 MG: 100 INJECTION, SOLUTION INTRAVENOUS at 12:00

## 2021-01-14 RX ADMIN — HYOSCYAMINE SULFATE 0.12 MG: 0.12 TABLET, ORALLY DISINTEGRATING SUBLINGUAL at 16:14

## 2021-01-14 RX ADMIN — Medication 120 MCG: at 11:06

## 2021-01-14 RX ADMIN — GABAPENTIN 200 MG: 100 CAPSULE ORAL at 19:42

## 2021-01-14 NOTE — ANESTHESIA POSTPROCEDURE EVALUATION
Procedure(s):  LAPAROSCOPIC REVISION OF SLEEVE GASTRECTOMY TO GASTRIC BY PASS, EGD (E R A S), LYSIS OF ADHESIONS  ESOPHAGOGASTRODUODENOSCOPY (EGD). general    Anesthesia Post Evaluation        Patient location during evaluation: PACU  Patient participation: complete - patient participated  Level of consciousness: awake and alert  Pain management: adequate  Airway patency: patent  Anesthetic complications: no  Cardiovascular status: acceptable  Respiratory status: acceptable  Hydration status: acceptable  Comments: I have seen and evaluated the patient and is ready for discharge.  Annita Poon MD    Post anesthesia nausea and vomiting:  none      INITIAL Post-op Vital signs:   Vitals Value Taken Time   /62 01/14/21 1234   Temp 36.7 °C (98.1 °F) 01/14/21 1234   Pulse 100 01/14/21 1235   Resp 20 01/14/21 1235   SpO2 93 % 01/14/21 1235

## 2021-01-14 NOTE — BRIEF OP NOTE
Brief Postoperative Note    Patient: Franklin Chadwick  YOB: 1965  MRN: [id number]    Date of Procedure: 1/14/2021     Pre-Op Diagnosis: MORBID OBESITY    Post-Op Diagnosis: Same as preoperative diagnosis. Procedure(s):  LAPAROSCOPIC REVISION OF SLEEVE GASTRECTOMY TO GASTRIC BY PASS, EGD (E R A S), LYSIS OF ADHESIONS  ESOPHAGOGASTRODUODENOSCOPY (EGD)    Surgeon(s):  Woody Marsh MD    Surgical Assistant: Physician Assistant: TABATHA Baumann    Anesthesia: General     Estimated Blood Loss (mL): 75 mL    Complications: None    Specimens:   ID Type Source Tests Collected by Time Destination   1 : Segment of Small Intestine Fresh Intestine  Woody Marsh MD 1/14/2021 1136 Pathology        Implants:   Implant Name Type Inv. Item Serial No.  Lot No. LRB No. Used Action   [employer]   N/A GORE [id number] N/A 2 Implanted       Drains:   Donavan-Webster Drain 01/14/21 Left;Upper Abdomen (Active)       Findings: Dense adhesions from prior surgeries, requiring 45 minutes of lysis. Creation of a 30 cc gastric pouch from proximal sleeve; 616 cm antecolic, antigastric Garry limb. Linear stapler technique for gastrojejunostomy. No intraluminal hemorrhage or insufflation air leak on upper endoscopy.     Electronically Signed by José Miguel Sandhu MD on 1/14/2021 at 12:06 PM

## 2021-01-14 NOTE — ANESTHESIA PREPROCEDURE EVALUATION
Relevant Problems   No relevant active problems       Anesthetic History   No history of anesthetic complications            Review of Systems / Medical History  Patient summary reviewed, nursing notes reviewed and pertinent labs reviewed    Pulmonary  Within defined limits      Sleep apnea    Asthma        Neuro/Psych   Within defined limits      Psychiatric history     Cardiovascular  Within defined limits  Hypertension                   GI/Hepatic/Renal  Within defined limits   GERD           Endo/Other  Within defined limits      Morbid obesity and arthritis     Other Findings              Physical Exam    Airway  Mallampati: II  TM Distance: > 6 cm  Neck ROM: normal range of motion   Mouth opening: Normal     Cardiovascular  Regular rate and rhythm,  S1 and S2 normal,  no murmur, click, rub, or gallop             Dental  No notable dental hx       Pulmonary  Breath sounds clear to auscultation               Abdominal  GI exam deferred       Other Findings            Anesthetic Plan    ASA: 4  Anesthesia type: general            Anesthetic plan and risks discussed with: Patient

## 2021-01-14 NOTE — H&P
HISTORY & PHYSICAL    Madhuri Reardon is a 54 y.o. -American female with a history of laparoscopic sleeve gastrectomy at [facility] [place] in 2016 with fair weight loss but regain of all previously lost weight and reemergence of comorbidities. She also experiences severe GERD symptoms. IVC filter placed last week. Urine cotinin negative on PAT's. She lost 12 lbs on the pre-op diet.          Patient Active Problem List   Diagnosis Code    Low back pain M54.5    Encounter for long-term (current) use of other medications Z79.899    HA (headache) R51.9    Chronic pain syndrome G89.4    DJD (degenerative joint disease), lumbar M47.816    Right knee pain M25.561    Right knee DJD M17.11    Chronic headache R51.9, G89.29    Obesity, morbid (HCC) E66.01    Gastroesophageal reflux disease without esophagitis K21.9    Mild intermittent asthma without complication A75.57    Essential hypertension I10    Hypercholesteremia E78.00    Anxiety F41.9    Depression F32.9    Sleep apnea in adult G47.30           Past Medical History:   Diagnosis Date    Anxiety 2017    Arthritis      Asthma      Back injury      Depression      Essential hypertension      GERD (gastroesophageal reflux disease)      Morbid obesity (HCC)      Sleep apnea in adult 3/6/2018     CPAP            Past Surgical History:   Procedure Laterality Date    HX APPENDECTOMY        HX  SECTION        HX COLONOSCOPY        HX GASTRECTOMY   2016     lap sleeve gastrectomy    HX HYSTERECTOMY        HX KNEE REPLACEMENT         left knee    HX ORTHOPAEDIC         rt knee partial replacement    HX ORTHOPAEDIC Right       LIGAMENT REPAIR      Social History            Socioeconomic History    Marital status: SINGLE       Spouse name: Not on file    Number of children: Not on file    Years of education: Not on file    Highest education level: Not on file   Occupational History    Occupation: disability        Comment: since  with back    Social Needs    Financial resource strain: Not on file    Food insecurity       Worry: Not on file       Inability: Not on file    Transportation needs       Medical: Not on file       Non-medical: Not on file   Tobacco Use    Smoking status: Former Smoker       Packs/day: 1.00       Years: 30.00       Pack years: 30.00       Last attempt to quit: 2020       Years since quittin.5    Smokeless tobacco: Never Used   Substance and Sexual Activity    Alcohol use: No    Drug use: No    Sexual activity: Not on file       Comment: post menopausal    Lifestyle    Physical activity       Days per week: Not on file       Minutes per session: Not on file    Stress: Not on file   Relationships    Social connections       Talks on phone: Not on file       Gets together: Not on file       Attends Episcopal service: Not on file       Active member of club or organization: Not on file       Attends meetings of clubs or organizations: Not on file       Relationship status: Not on file    Intimate partner violence       Fear of current or ex partner: Not on file       Emotionally abused: Not on file       Physically abused: Not on file       Forced sexual activity: Not on file   Other Topics Concern    Not on file   Social History Narrative     In the home with 15year old grand daughter and friend, Ely Cruz       PCP: Tolu Biggs MD     Review of Systems   Constitutional: Negative for chills and fever. HENT: Negative for congestion, hearing loss and sinus pain. Missing teeth and few loose teeth   \"bone lose\"   Eyes: Positive for blurred vision (recently worse). Respiratory: Positive for shortness of breath. Negative for cough. +JACKIE + CPAP    Cardiovascular: Positive for leg swelling. Negative for chest pain. No DVT hx    Gastrointestinal: Negative for abdominal pain, blood in stool, constipation, diarrhea, heartburn, melena, nausea and vomiting.    Genitourinary: Positive for frequency and urgency. Negative for dysuria, flank pain and hematuria. Musculoskeletal: Positive for back pain, joint pain and myalgias. Negative for falls and neck pain. Uses mobile chair, walker , cane   Neurological: Positive for dizziness, tingling (feet ) and headaches (migraines ). Negative for seizures and loss of consciousness. Endo/Heme/Allergies: Positive for polydipsia. Thirsty all the time   \"never told I had diabetes\"   Psychiatric/Behavioral: Positive for depression. The patient is nervous/anxious. Psych regular follow up.      Physical Exam  Visit Vitals  BP (!) 168/87   Pulse 94   Temp 98.1 °F (36.7 °C)   Resp 20   Ht 5' 8\" (1.727 m)   Wt (!) 384 lb 6 oz (174.4 kg)   SpO2 97%   BMI 58.44 kg/m²     Constitutional:       Appearance: She is obese. HENT:      Head: Normocephalic. Nose: Nose normal.      Mouth/Throat:      Mouth: Mucous membranes are moist.   Eyes:      Extraocular Movements: Extraocular movements intact. Conjunctiva/sclera: Conjunctivae normal.      Pupils: Pupils are equal, round, and reactive to light. Cardiovascular:      Rate and Rhythm: Normal rate and regular rhythm. Pulmonary:      Effort: Pulmonary effort is normal. No respiratory distress. Breath sounds: Normal breath sounds. Abdominal:      Palpations: Abdomen is soft. Tenderness: There is no abdominal tenderness. Comments: Obese with rectus diastasis    Musculoskeletal:         General: Swelling (LE 1+ pedal edema ) present. Comments: Ambulates short distances with cane   Used wheelchair in office    Skin:     General: Skin is warm. Comments: Diaphoretic    Neurological:      General: No focal deficit present. Mental Status: She is alert and oriented to person, place, and time. Psychiatric:         Mood and Affect: Mood normal.         Behavior: Behavior normal.       ASSESSMENT and PLAN      ICD-10-CM ICD-9-CM     1.  [facility]  E66.01 278.01 IR PLC IVC FILTER         HEMOGLOBIN A1C WITH EAG   2. BMI 60.0-69.9, adult (HCC)  Z68.44 V85.44 IR PLC IVC FILTER   3. JACKIE (obstructive sleep apnea)  G47.33 327.23     4. Essential hypertension  I10 401. 9     5. Impaired mobility  Z74.09 799.89 IR PLC IVC FILTER   6. Hyperglycemia  R73.9 790.29 HEMOGLOBIN A1C WITH EAG   7. Post-op pain  G89.18 338.18 gabapentin (NEURONTIN) 100 mg capsule      Laparoscopic revision of sleeve gastrectomy to gastric bypass with upper endoscopy. Technical aspects of procedure reviewed along with risks (to include but not limited to bleeding, wound infection, VTE, leak, ulcer, stricture, open procedure, persistent reflux symptoms, dysphagia, poor weight loss/weight regain). Also reviewed anticipated hospital course, postoperative diet, activity restrictions, and expected results. She understands and desires to proceed.

## 2021-01-14 NOTE — ROUTINE PROCESS
Patient: Anastacio Bennett MRN: [id number]  SSN: [id number] YOB: 1965  Age: 54 y.o. Sex: female Patient is status post Procedure(s): LAPAROSCOPIC REVISION OF SLEEVE GASTRECTOMY TO GASTRIC BY PASS, EGD (E R A S), LYSIS OF ADHESIONS 
ESOPHAGOGASTRODUODENOSCOPY (EGD). Surgeon(s) and Role: Rupert Favre, MD - Primary Local/Dose/Irrigation:  See [facility] 
 
                   
Donavan-Webster Drain 01/14/21 Left;Upper Abdomen (Active) Airway - Endotracheal Tube 01/14/21 Oral (Active) Dressing/Packing:  Incision 01/14/21 Abdomen-Dressing/Treatment: Liquid /adhesive(Dermabond) (01/14/21 1202) Splint/Cast:  ] Other:  N/A

## 2021-01-14 NOTE — ROUTINE PROCESS
TRANSFER - IN REPORT: 
 
Verbal report received from RIP RN(name) on Anastacio Bennett  being received from PACU(unit) for routine post - op Report consisted of patients Situation, Background, Assessment and  
Recommendations(SBAR). Information from the following report(s) SBAR, Kardex, Intake/Output, MAR and Recent Results was reviewed with the receiving nurse. Opportunity for questions and clarification was provided. Assessment completed upon patients arrival to unit and care assumed.

## 2021-01-14 NOTE — PROGRESS NOTES
Patient brought to the floor on a bed pan at this time. Urine throughout the bed and a linen change with rolo care completed. Patient is very drowsy but answering questions appropriate. At this time the patient states she still needs to void. This RN placed a purewick on the patient as she is too drowsy at this time to get up to the restroom. Will dc this once patient is safe to get up. Patient pain 9/10 in her abd. No complaints of N/V, SOB, or chest pain. BP slightly elevated, other vitals stable, and will continue to monitor. Primary Nurse Ana Schneider and DONTAE Mcnulty performed a dual skin assessment on this patient No impairment noted  Mark score is 19.

## 2021-01-15 ENCOUNTER — APPOINTMENT (OUTPATIENT)
Dept: GENERAL RADIOLOGY | Age: 56
DRG: 621 | End: 2021-01-15
Attending: SURGERY
Payer: MEDICARE

## 2021-01-15 LAB
ANION GAP SERPL CALC-SCNC: 5 MMOL/L (ref 5–15)
BASOPHILS # BLD: 0 K/UL (ref 0–0.1)
BASOPHILS NFR BLD: 0 % (ref 0–1)
BUN SERPL-MCNC: 8 MG/DL (ref 6–20)
BUN/CREAT SERPL: 12 (ref 12–20)
CALCIUM SERPL-MCNC: 9.8 MG/DL (ref 8.5–10.1)
CHLORIDE SERPL-SCNC: 97 MMOL/L (ref 97–108)
CO2 SERPL-SCNC: 34 MMOL/L (ref 21–32)
COMMENT, HOLDF: NORMAL
CREAT SERPL-MCNC: 0.68 MG/DL (ref 0.55–1.02)
DIFFERENTIAL METHOD BLD: ABNORMAL
EOSINOPHIL # BLD: 0 K/UL (ref 0–0.4)
EOSINOPHIL NFR BLD: 0 % (ref 0–7)
ERYTHROCYTE [DISTWIDTH] IN BLOOD BY AUTOMATED COUNT: 13.9 % (ref 11.5–14.5)
GLUCOSE SERPL-MCNC: 124 MG/DL (ref 65–100)
HCT VFR BLD AUTO: 40.2 % (ref 35–47)
HGB BLD-MCNC: 12.5 G/DL (ref 11.5–16)
IMM GRANULOCYTES # BLD AUTO: 0.1 K/UL (ref 0–0.04)
IMM GRANULOCYTES NFR BLD AUTO: 1 % (ref 0–0.5)
LYMPHOCYTES # BLD: 1.2 K/UL (ref 0.8–3.5)
LYMPHOCYTES NFR BLD: 13 % (ref 12–49)
MCH RBC QN AUTO: 28.5 PG (ref 26–34)
MCHC RBC AUTO-ENTMCNC: 31.1 G/DL (ref 30–36.5)
MCV RBC AUTO: 91.8 FL (ref 80–99)
MONOCYTES # BLD: 0.4 K/UL (ref 0–1)
MONOCYTES NFR BLD: 4 % (ref 5–13)
NEUTS SEG # BLD: 7.6 K/UL (ref 1.8–8)
NEUTS SEG NFR BLD: 82 % (ref 32–75)
NRBC # BLD: 0 K/UL (ref 0–0.01)
NRBC BLD-RTO: 0 PER 100 WBC
PLATELET # BLD AUTO: 249 K/UL (ref 150–400)
PMV BLD AUTO: 11.4 FL (ref 8.9–12.9)
POTASSIUM SERPL-SCNC: 3.7 MMOL/L (ref 3.5–5.1)
RBC # BLD AUTO: 4.38 M/UL (ref 3.8–5.2)
SAMPLES BEING HELD,HOLD: NORMAL
SODIUM SERPL-SCNC: 136 MMOL/L (ref 136–145)
WBC # BLD AUTO: 9.2 K/UL (ref 3.6–11)

## 2021-01-15 PROCEDURE — 74011250637 HC RX REV CODE- 250/637: Performed by: SURGERY

## 2021-01-15 PROCEDURE — 74011000636 HC RX REV CODE- 636: Performed by: SURGERY

## 2021-01-15 PROCEDURE — 65660000000 HC RM CCU STEPDOWN

## 2021-01-15 PROCEDURE — 80048 BASIC METABOLIC PNL TOTAL CA: CPT

## 2021-01-15 PROCEDURE — 74240 X-RAY XM UPR GI TRC 1CNTRST: CPT

## 2021-01-15 PROCEDURE — 85025 COMPLETE CBC W/AUTO DIFF WBC: CPT

## 2021-01-15 PROCEDURE — 74011250636 HC RX REV CODE- 250/636: Performed by: SURGERY

## 2021-01-15 RX ORDER — LISINOPRIL 20 MG/1
40 TABLET ORAL DAILY
Status: DISCONTINUED | OUTPATIENT
Start: 2021-01-15 | End: 2021-01-16 | Stop reason: HOSPADM

## 2021-01-15 RX ORDER — AMLODIPINE BESYLATE 5 MG/1
10 TABLET ORAL DAILY
Status: DISCONTINUED | OUTPATIENT
Start: 2021-01-15 | End: 2021-01-16 | Stop reason: HOSPADM

## 2021-01-15 RX ORDER — HYDROMORPHONE HYDROCHLORIDE 2 MG/1
2-4 TABLET ORAL
Status: DISCONTINUED | OUTPATIENT
Start: 2021-01-15 | End: 2021-01-16 | Stop reason: HOSPADM

## 2021-01-15 RX ORDER — CITALOPRAM 20 MG/1
40 TABLET, FILM COATED ORAL DAILY
Status: DISCONTINUED | OUTPATIENT
Start: 2021-01-15 | End: 2021-01-16 | Stop reason: HOSPADM

## 2021-01-15 RX ORDER — ARIPIPRAZOLE 10 MG/1
10 TABLET ORAL DAILY
Status: DISCONTINUED | OUTPATIENT
Start: 2021-01-15 | End: 2021-01-16 | Stop reason: HOSPADM

## 2021-01-15 RX ORDER — PRAVASTATIN SODIUM 20 MG/1
20 TABLET ORAL
Status: DISCONTINUED | OUTPATIENT
Start: 2021-01-15 | End: 2021-01-16 | Stop reason: HOSPADM

## 2021-01-15 RX ORDER — HYDROMORPHONE HYDROCHLORIDE 2 MG/1
2-4 TABLET ORAL
Qty: 25 TAB | Refills: 0 | Status: SHIPPED | OUTPATIENT
Start: 2021-01-15 | End: 2021-01-22

## 2021-01-15 RX ADMIN — GABAPENTIN 200 MG: 100 CAPSULE ORAL at 18:24

## 2021-01-15 RX ADMIN — ACETAMINOPHEN 1000 MG: 500 TABLET ORAL at 11:24

## 2021-01-15 RX ADMIN — LISINOPRIL 40 MG: 20 TABLET ORAL at 15:25

## 2021-01-15 RX ADMIN — GABAPENTIN 200 MG: 100 CAPSULE ORAL at 11:24

## 2021-01-15 RX ADMIN — ENOXAPARIN SODIUM 40 MG: 100 INJECTION SUBCUTANEOUS at 11:24

## 2021-01-15 RX ADMIN — HYDROMORPHONE HYDROCHLORIDE 1 MG: 1 INJECTION, SOLUTION INTRAMUSCULAR; INTRAVENOUS; SUBCUTANEOUS at 11:23

## 2021-01-15 RX ADMIN — AMLODIPINE BESYLATE 10 MG: 5 TABLET ORAL at 15:25

## 2021-01-15 RX ADMIN — HYDROMORPHONE HYDROCHLORIDE 4 MG: 2 TABLET ORAL at 15:25

## 2021-01-15 RX ADMIN — IOHEXOL 100 ML: 350 INJECTION, SOLUTION INTRAVENOUS at 09:00

## 2021-01-15 RX ADMIN — PRAVASTATIN SODIUM 20 MG: 20 TABLET ORAL at 21:52

## 2021-01-15 RX ADMIN — HYDROMORPHONE HYDROCHLORIDE 1 MG: 1 INJECTION, SOLUTION INTRAMUSCULAR; INTRAVENOUS; SUBCUTANEOUS at 00:06

## 2021-01-15 RX ADMIN — HYDROMORPHONE HYDROCHLORIDE 1 MG: 1 INJECTION, SOLUTION INTRAMUSCULAR; INTRAVENOUS; SUBCUTANEOUS at 18:24

## 2021-01-15 RX ADMIN — SODIUM CHLORIDE, POTASSIUM CHLORIDE, SODIUM LACTATE AND CALCIUM CHLORIDE 125 ML/HR: 600; 310; 30; 20 INJECTION, SOLUTION INTRAVENOUS at 18:24

## 2021-01-15 RX ADMIN — ARIPIPRAZOLE 10 MG: 10 TABLET ORAL at 15:25

## 2021-01-15 RX ADMIN — HYDROMORPHONE HYDROCHLORIDE 0.5 MG: 1 INJECTION, SOLUTION INTRAMUSCULAR; INTRAVENOUS; SUBCUTANEOUS at 21:52

## 2021-01-15 RX ADMIN — HYOSCYAMINE SULFATE 0.12 MG: 0.12 TABLET, ORALLY DISINTEGRATING SUBLINGUAL at 05:40

## 2021-01-15 RX ADMIN — HYDROMORPHONE HYDROCHLORIDE 1 MG: 1 INJECTION, SOLUTION INTRAMUSCULAR; INTRAVENOUS; SUBCUTANEOUS at 07:03

## 2021-01-15 RX ADMIN — HYDROMORPHONE HYDROCHLORIDE 1 MG: 1 INJECTION, SOLUTION INTRAMUSCULAR; INTRAVENOUS; SUBCUTANEOUS at 04:03

## 2021-01-15 RX ADMIN — SODIUM CHLORIDE, POTASSIUM CHLORIDE, SODIUM LACTATE AND CALCIUM CHLORIDE 125 ML/HR: 600; 310; 30; 20 INJECTION, SOLUTION INTRAVENOUS at 07:06

## 2021-01-15 RX ADMIN — CITALOPRAM HYDROBROMIDE 40 MG: 20 TABLET ORAL at 15:25

## 2021-01-15 RX ADMIN — HYOSCYAMINE SULFATE 0.12 MG: 0.12 TABLET, ORALLY DISINTEGRATING SUBLINGUAL at 00:06

## 2021-01-15 RX ADMIN — ACETAMINOPHEN 1000 MG: 500 TABLET ORAL at 18:24

## 2021-01-15 NOTE — ROUTINE PROCESS
Bedside shift change report given to Kael Castle 794 (oncoming nurse) by Gurdeep Rincon RN (offgoing nurse). Report included the following information SBAR, Kardex, Intake/Output, MAR and Recent Results.

## 2021-01-15 NOTE — PROGRESS NOTES
Progress Note    Patient: Milton Su MRN: [id number]  SSN: [id number]    YOB: 1965  Age: 54 y.o. Sex: female      Admit Date: 2021    1 Day Post-Op    Procedure:  Procedure(s):  LAPAROSCOPIC REVISION OF SLEEVE GASTRECTOMY TO GASTRIC BYPASS, EGD (E R A S), LYSIS OF ADHESIONS    Subjective:     She has been out of bed, sitting in chair, walking in bautista. Adequate pain control. .     Objective:     Visit Vitals  BP (!) 153/77   Pulse 77   Temp 98 °F (36.7 °C)   Resp 18   Ht 5' 8\" (1.727 m)   Wt (!) 384 lb 6 oz (174.4 kg)   SpO2 93%   BMI 58.44 kg/m²       Temp (24hrs), Av.5 °F (36.9 °C), Min:97.9 °F (36.6 °C), Max:98.9 °F (37.2 °C)      Physical Exam:    LUNG: Decreased breath sounds bilaterally. HEART: regular rate and rhythm, S1, S2 normal, no murmur. ABDOMEN: Obese, nondistended, soft. Laparoscopic wounds dry and intact. Serosanguineous drain fluid. Appropriate incisional pain with palpation. Data Review: Vital signs, ins/outs, upper GI series (no leak or obstruction)      Assessment:     Hospital Problems  Date Reviewed: 2020          Codes Class Noted POA    Morbid obesity ([facility] ICD-10-CM: E66.01  ICD-9-CM: 278.01  2021 Unknown              Plan/Recommendations/Medical Decision Makin. Start bariatric liquids-goal 4 ounces per hour. 2.  Oral analgesics, oral chronic medications. 3.  Follow-up lab results. 4.  Increase ambulation, spirometry. 5.  Lovenox, mechanical DVT prophylaxis. Home anticoagulation teaching.

## 2021-01-15 NOTE — PROGRESS NOTES
Attempted to get patient up to ambulate at this time. The patient was dizzy once sitting on the edge of the bed. Once standing the patient felt as if she was voiding and asked to sit back down. A bedside commode was obtained in hopes that the patient could void and then go for a walk.

## 2021-01-15 NOTE — PROGRESS NOTES
Bedside shift change report given to DONTAE Hickey (oncoming nurse) by Williams Fernando RN (offgoing nurse). Report included the following information SBAR, Kardex, Intake/Output, MAR and Recent Results.

## 2021-01-15 NOTE — CONSULTS
NUTRITION     Chart reviewed. Post-op bariatric diet instruction completed. Will gladly follow up for additional questions as needed. Thank you.      Macie Garcia RD

## 2021-01-15 NOTE — DISCHARGE INSTRUCTIONS
[facility]  Bariatric Surgery Discharge Instructions     Procedure: Laparoscopic revision of sleeve gastrectomy to gastric bypass    Future Appointments   Date Time Provider Ramon Carmelina   1/28/2021 11:00 AM JORY Maguire AMB   2/11/2021  2:00 PM JORY Maguire AMB   2/25/2021  2:00 PM JORY Maguire AMB         Contact Information:    [facility] at [facility], [address] [facility], [address]  [phone]    After Hours and Weekends  [phone] On Call Surgeon    Non Emergent Medical Needs  Call during office hours or send a message via My Chart   (messages returned during business hours)    DIET    Please remember that you are on ONLY LIQUIDS for the first 2 weeks after surgery. Do not advance to the next phase until advised by your surgeon or Nurse Practitioner. Refer to the Bariatric Handbook for detailed information. TO PREVENT DEHYDRATION:  consume 48-64 ounces of liquids daily. At least 48 ounces of that should come from water, Crystal Light, sugar free popsicles, sugar free gelatin or other calorie-free, sugar-free, caffeine free and noncarbonated beverages. Do not drink with a straw.  Sip, sip, sip throughout the day   Main priority is to stay hydrated   Aim for 60 grams of protein every day. Most of your protein will come from shakes. Refer to the Bariatric Handbook for detailed information.    Add additional protein supplements to meet protein needs (protein powder, clear protein such as protein water, non-fat dry milk powder, NO protein bars at this at this stage)     MEDICATIONS & VITAMINS     Pre-surgery medications should be reviewed with your Bariatric provider and taken as prescribed    Take no more than 2 pills at a time and wait 15-20 minutes between pills       Pain Medication  The first few days home, you may require narcotic pain medication to manage your pain. Take this medication only as prescribed. If your pain is mild to moderate, try taking Acetaminophen (Tylenol) 500 mg 1-2 tablets every 8 hours or as directed by your provider. Avoid taking antiinflammatory medications (NSAID'S) such as Ibuprofen (Motrin, Advil) or Naproxen (Aleve). These medications can be harmful to your stomach and cause bleeding and ulcers. There is a complete list of NSAID medications to AVOID in your handbook. Abdominal support (Spanx or body shaper) and heat (heating pad on low setting) are very helpful in managing pain after surgery. Acid Reducing (\"heartburn/reflux\") Medication   Acid reducing medicine should have been prescribed at your pre-surgery visit. It is recommended you take this medication every day even if you have no symptoms of reflux or heartburn. If you were previously on a medication for reflux/heartburn you should continue the medication daily. *It is common to experience reflux or heartburn after sleeve gastrectomy. These symptoms can usually be managed with medication, diet and behavior changes. In most cases symptoms improve or resolve after a few weeks to a couple of months. Nausea Medication  You should have been prescribed medication for nausea at your pre-surgery visit. If you are experiencing nausea, please take the medication as prescribed to try and get relief. If the nausea medication is not effective, please call your surgeon's office. Constipation   Constipation can be caused by pain medication and reduced food and water intake. Drink at least 64 oz. fluid. OK to use OTC medications such as Benefiber, Milk of Magnesia, Dulcolax, Miralax, senna       Vitamins   Calcium Citrate with Vitamin D-3 - Take 1200--1500 mg  each day. Divide doses throughout the day. Do not take more than 600 mg at one time.    Take at least 2 hours before or after your multivitamin and/or iron supplement. Multivitamin containing Iron - 2 multivitamins with 100% Daily Value of Iron, Folic Acid and Thiamine   Vitamin D-3 - Take 3000 IU  per day  Vitamin B-12 - Oral or Sublingual: 350-500 mcg/day OR 1000 mcg Monthly intramuscular shot        ACTIVITY     Be active. Sit up as much as possible. Walk often. Walking and/or foot exercises will help prevent blood clots.  Continue to sip liquids throughout the day   Continue to use your incentive spirometer 4 to 5 times per day   Keep your incisions clean and dry to prevent infection.  Showering is ok. No submersion in water for 2 weeks (No tubs, pools, etc.)   Weight lifting restrictions:  10 lbs. for the first 2 weeks, 20 lbs. for the next 4 to 6 weeks    TOP REASONS TO CONTACT YOUR SURGEONS'S OFFICE     You have severe pain or discomfort unrelieved by pain medication.  You have been vomiting for more than 24 hours. Call sooner if you are unable to drink any fluids.  Temperature rises above 101 degrees.  You have persistent nausea and/or vomiting.  You are unable to swallow liquids    Increased swelling, redness, or drainage from your incision sites.

## 2021-01-15 NOTE — PROGRESS NOTES
2PM  Patient having immense pain. Medicated at this time. Patient on purewick as she is too unsteady to get up, had a bedpan malfunction, and needs to urinate every few minutes. Will continue to monitor. 3:35PM   Patient continues to have pain. Levsin given. 5:20 PM   Patient continues to call out in pain. Patient medicated at this time. Patient states that pain is too bad to get up.     6:10 PM  Patient is resting peacefully at this time. 7:20 PM  Patient complaining of 10/10 pain at this time. Patient stated \"is there anyway I could not go for a walk tonight? I am just hurting so bad and have not been able to rest well\". This RN educated the patient on the importance of ambulation post-op and how it would not be beneficial for her to not ambulate. This RN educated the patient that the Lilia Star was for temporary use as safety was a concern when she first came to the floor and that once she has gotten up for a walk that the Lilia Star would no longer be an option. This RN stated that she would be back to get the patient up prior to leaving for end of shift.

## 2021-01-15 NOTE — PROGRESS NOTES
Bedside shift change report given to Terry Mazariegos RN (oncoming nurse) by Quan Dunn RN (offgoing nurse). Report included the following information SBAR and Kardex.

## 2021-01-15 NOTE — OP NOTES
[address]   OPERATIVE REPORT    Name:  Ben Branch  MR#:  [id number]  :  1965  ACCOUNT #:  [de-identified]  DATE OF SERVICE:  2021      PRIMARY PREOPERATIVE DIAGNOSIS:  Recurrent morbid obesity. SECONDARY PREOPERATIVE DIAGNOSES:  1. Gastroesophageal reflux disease. 2.  Essential hypertension. 3.  Hypercholesterolemia. 4.  Obstructive sleep apnea. 5.  Asthma. 6.  Chronic headache. 7.  Degenerative joint disease. POSTOPERATIVE DIAGNOSES:  1. Recurrent morbid obesity. 2.  Gastroesophageal reflux disease. 3.  Essential hypertension. 4.  Hypercholesterolemia. 5.  Obstructive sleep apnea. 6.  Asthma. 7.  Chronic headache. 8.  Degenerative joint disease. PROCEDURES PERFORMED:  1. Laparoscopic revision of sleeve gastrectomy to gastric bypass (CPT 98897). 2.  Extensive laparoscopic lysis of adhesions. 3.  Intraoperative upper endoscopy. SURGEON:  Bonnie Marquez MD    ASSISTANT:  TABATHA Mederos    ANESTHESIA:  General endotracheal.    COMPLICATIONS:  None. SPECIMENS REMOVED:  Segment of small intestine. IMPLANTS:  None. ESTIMATED BLOOD LOSS:  75 mL. DRAINS:  19-mm Uzair drain. COUNTS:  Sponge count correct. Needle count correct. INDICATIONS:  The patient is a 59-year-old -American female with a history of morbid obesity, initially managed through laparoscopic sleeve gastrectomy at [address] [place] [address] in 2016 with fair weight loss results. She subsequently experienced regain of nearly all previously lost weight, with re-emergence of comorbidities. She also experiences severe reflux symptoms. All medical efforts at weight loss have been unsuccessful. After extensive preoperative counseling, patient education, and medical screening, it was felt she would be a good candidate for weight reduction surgery (revisional).   She is taken to the operating room today, 1 week following IVC filter insertion for laparoscopic revision of sleeve gastrectomy to gastric bypass. I have asked TABATHA Kaba, to assist with the procedure given the technical complexity of revisional bariatric surgery. She will run the laparoscope, assist in creation of the gastric pouch, assist in creation of the jejunojejunostomy, and assist in creation of the gastrojejunostomy. FINDINGS:  1. Dense small bowel adhesions from prior surgeries, requiring 45 minutes for lysis. 2.  Creation of a 30 mL gastric pouch from proximal sleeve. 3.  A 344 cm antecolic, antegastric Garry limb. 4.  Linear stapler technique for gastrojejunostomy. 5.  No intraluminal hemorrhage or insufflation air leak on upper endoscopy. PROCEDURE:  The patient was identified as the correct patient in the preoperative holding area and informed consent was confirmed. After answering the patient's remaining questions, she was taken to the operating room and placed on the operating room table in the supine position. Sequential compression devices were placed on both lower extremities. Following the uneventful initiation of general anesthesia, she was carefully secured to the operating room table with footboard and safety strap in place. All potential pressure points were padded with eggcrate. Her abdomen was prepped and draped in usual sterile fashion. Final time-out was performed, and it was confirmed she had received intravenous antibiotics. A 5 mm trocar was inserted through a small right upper quadrant skin incision using an Optiview technique. After confirming intraperitoneal location of the trocar tip, insufflation with carbon dioxide gas was initiated. Once adequate working sites had been developed, a 5 mm, 30-degree laparoscope was inserted. Signs of a small tear to the liver capsule was noted, without bleeding, from massive hepatomegaly.   Dense adhesions between loops of small bowel, the omentum, the transverse colon, and intra-abdominal wall were present throughout the abdominal space. A right upper quadrant 12 mm trocar was inserted using visual guidance with the laparoscope. Lysis of adhesions was initiated using a combination of blunt dissection, sharp dissection, and the Harmonic scalpel, freeing omentum, and the transverse colon from the epigastrium. A right lower quadrant 5 mm trocar was inserted to allow improved access to adhesions. Lysis of adhesions was continued, freeing remaining omentum from the intra-abdominal wall, along with multiple additional loops of small bowel. Loops of bowel were inspected and no signs of enterotomy were present. Once all adhesions had been freed from the anterior abdominal wall, an epigastric 5 mm trocar was inserted using visual guidance with the laparoscope. A left subcostal 5 mm trocar was inserted using identical technique. The patient was placed in a steep reverse Trendelenburg position. The [facility] liver retractor was inserted through a small subxiphoid incision. This allowed retraction of the left lobe of liver, with noted massive hepatomegaly, and ensured that adequate exposure of the sleeve was obtainable. Once confirmed, the [facility] liver retractor was withdrawn from the undersurface of the liver, and the omentum was retracted into the upper abdomen. The ligament of Treitz was identified, and 200 cm of bowel were run distal to this landmark to confirm that no significant adhesions would hinder continuing gastric bypass. As no adhesions were identified, the right lower quadrant was inspected, and multiple interloop adhesions were identified, requiring additional lysis. A 45 minutes of lysis was necessary to adequately free loops of bowel from the anterior abdominal wall and to lyse interloop adhesions. The patient was returned to the reverse Trendelenburg position. The [facility] liver retractor was reestablished beneath the left lobe of liver, exposing the upper sleeve.   A site 4 cm distal to the gastroesophageal junction was chosen. The pars flaccida portion of the gastrohepatic ligament was incised, allowing atraumatic entry into the lesser sac and retrogastric area. A plane was developed between the undersurface of the proximal sleeve and underlying pancreas, proceeding from medial to lateral.  Adhesions were then taken down along the lateral aspect of the sleeve, freeing the omentum from the staple line. A 30 mL gastric pouch was constructed with two firings of a purple load linear stapler using Seamguard reinforcement material.  This  from the distal sleeve, along the proximal segment to allow creation of a pouch for gastric bypass. The patient was returned to the supine position. The omentum was retracted into the upper abdomen. The omentum was serially ligated and divided from its free edge to the antimesenteric border of the transverse colon using Harmonic scalpel. With the transverse colon elevated anteriorly and cephalad, the ligament of Treitz was reidentified. A 50 cm of bowel were measured distal to this landmark. The jejunum was divided at this point using a tan load linear stapler firing. The distal edge of the transected bowel was marked with a 0 Surgidac suture. The small-bowel mesentery was mobilized using the Harmonic scalpel. Once adequate mobility of the alimentary limb had been achieved, an additional 160 cm of bowel were measured distal to this transection site. This segment of bowel was brought alongside the biliopancreatic limb in side-to-side fashion. This orientation was maintained with 0 Surgidac stay sutures. The Harmonic scalpel was used to make an opening in the Garry limb and biliopancreatic limb and through these openings, a 60 mm tan load linear stapler was carefully inserted. After confirming correct insertion and orientation of the stapler, it was closed, fired, and removed. The staple line was noted to be hemostatic.   The remaining opening in the bowel was closed with a running 2-0 Polysorb suture. A tension-relieving, anti-obstruction suture was placed at the distal edge of the staple line. The mesenteric defect was closed with a running 0 Surgidac suture. The Garry limb was delivered into the left upper quadrant with care to avoid twisting of the bowel or its blood supply. The patient was returned to the reverse Trendelenburg position. The Garry limb was brought alongside the gastric pouch, and a running suture between the antimesenteric border of the Garry limb and the lateral aspect of the gastric pouch was constructed with the running 0 Surgidac suture. The Harmonic scalpel was used to make an opening in both the Garry limb and the gastric pouch and through these openings, a 30 mm tan load linear stapler was carefully inserted into each structure. After confirming correct insertion and orientation of the stapler, it was closed, fired, and removed. The staple line was noted be hemostatic. The remaining opening between the two structures was closed with a running 2-0 Polysorb suture after inserting a standard endoscope which was passed transorally, through the anastomosis, and into the efferent segment of the Garry limb. This suture line was imbricated with a running 0 Surgidac suture. The patient was returned to the supine position. An intestinal clamp was placed on the Garry limb distal to the gastroscope. Sterile saline was instilled into the upper abdomen. Intraoperative upper endoscopy was continued. The gastroscope was withdrawn into the gastric pouch, with insufflation using the gastroscope. Adequate pouch and Garry limb distention were noted. No intraluminal hemorrhage was identified. No insufflation air leak occurred. No difficulty passing the scope into the efferent segment of the Garry limb was encountered. The gastroscope was removed after decompressing the bowel, and the intestinal clamp was removed.   Sterile saline was evacuated from the upper abdomen. The redundant, afferent segment of the Garry limb was amputated with a tan load linear stapler firing after controlling the blood supply with the Harmonic scalpel. This segment of bowel was placed within a retrieval bag and removed from the patient's body. Edwards's space was closed with a running 0 Surgidac suture. A tension-relieving hitch suture was placed between the antimesenteric border of the Garry limb and the distal, excluded segment of the sleeve. A 19 mm Uzair drain was inserted into the abdominal space. This allowed to lie adjacent to the gastrojejunal anastomosis and brought out through the left subcostal 5 mm trocar site. It was secured to the skin with a 2-0 nylon suture. After confirming adequate hemostasis, the Juanjo liver retractor was removed, followed by closure of the 12 mm fascial defect using a 0 Vicryl  suture with a laparoscopic suture passer. Pneumoperitoneum was released, and all trocars were removed. All wounds were infiltrated with 0.5% Marcaine without epinephrine. All skin edges were reapproximated with a combination of subcuticular 4-0 Monocryl suture and Dermabond. The patient tolerated the procedure well. She was extubated in the operating room and transported to the recovery area in stable condition. The attending surgeon, Dr. Won Moran, was scrubbed and present for the entire procedure.         Akin Roe MD      [id number]  D:  01/14/2021 19:51  T:  01/15/2021 1:35  JOB #:  [id number]

## 2021-01-16 VITALS
HEIGHT: 68 IN | RESPIRATION RATE: 16 BRPM | SYSTOLIC BLOOD PRESSURE: 184 MMHG | OXYGEN SATURATION: 93 % | HEART RATE: 90 BPM | DIASTOLIC BLOOD PRESSURE: 90 MMHG | WEIGHT: 293 LBS | TEMPERATURE: 98.2 F | BODY MASS INDEX: 44.41 KG/M2

## 2021-01-16 LAB
ANION GAP SERPL CALC-SCNC: 6 MMOL/L (ref 5–15)
BASOPHILS # BLD: 0 K/UL (ref 0–0.1)
BASOPHILS NFR BLD: 0 % (ref 0–1)
BUN SERPL-MCNC: 6 MG/DL (ref 6–20)
BUN/CREAT SERPL: 10 (ref 12–20)
CALCIUM SERPL-MCNC: 9.8 MG/DL (ref 8.5–10.1)
CHLORIDE SERPL-SCNC: 96 MMOL/L (ref 97–108)
CO2 SERPL-SCNC: 34 MMOL/L (ref 21–32)
CREAT SERPL-MCNC: 0.58 MG/DL (ref 0.55–1.02)
DIFFERENTIAL METHOD BLD: NORMAL
EOSINOPHIL # BLD: 0 K/UL (ref 0–0.4)
EOSINOPHIL NFR BLD: 1 % (ref 0–7)
ERYTHROCYTE [DISTWIDTH] IN BLOOD BY AUTOMATED COUNT: 13.8 % (ref 11.5–14.5)
GLUCOSE SERPL-MCNC: 139 MG/DL (ref 65–100)
HCT VFR BLD AUTO: 38.5 % (ref 35–47)
HGB BLD-MCNC: 12.3 G/DL (ref 11.5–16)
IMM GRANULOCYTES # BLD AUTO: 0 K/UL (ref 0–0.04)
IMM GRANULOCYTES NFR BLD AUTO: 0 % (ref 0–0.5)
LYMPHOCYTES # BLD: 1.5 K/UL (ref 0.8–3.5)
LYMPHOCYTES NFR BLD: 20 % (ref 12–49)
MCH RBC QN AUTO: 28.7 PG (ref 26–34)
MCHC RBC AUTO-ENTMCNC: 31.9 G/DL (ref 30–36.5)
MCV RBC AUTO: 89.7 FL (ref 80–99)
MONOCYTES # BLD: 0.4 K/UL (ref 0–1)
MONOCYTES NFR BLD: 6 % (ref 5–13)
NEUTS SEG # BLD: 5.4 K/UL (ref 1.8–8)
NEUTS SEG NFR BLD: 73 % (ref 32–75)
NRBC # BLD: 0 K/UL (ref 0–0.01)
NRBC BLD-RTO: 0 PER 100 WBC
PLATELET # BLD AUTO: 217 K/UL (ref 150–400)
PMV BLD AUTO: 11.4 FL (ref 8.9–12.9)
POTASSIUM SERPL-SCNC: 3.4 MMOL/L (ref 3.5–5.1)
RBC # BLD AUTO: 4.29 M/UL (ref 3.8–5.2)
SODIUM SERPL-SCNC: 136 MMOL/L (ref 136–145)
WBC # BLD AUTO: 7.4 K/UL (ref 3.6–11)

## 2021-01-16 PROCEDURE — 74011250636 HC RX REV CODE- 250/636: Performed by: SURGERY

## 2021-01-16 PROCEDURE — 36415 COLL VENOUS BLD VENIPUNCTURE: CPT

## 2021-01-16 PROCEDURE — 85025 COMPLETE CBC W/AUTO DIFF WBC: CPT

## 2021-01-16 PROCEDURE — 80048 BASIC METABOLIC PNL TOTAL CA: CPT

## 2021-01-16 PROCEDURE — 99024 POSTOP FOLLOW-UP VISIT: CPT | Performed by: SURGERY

## 2021-01-16 PROCEDURE — 74011250637 HC RX REV CODE- 250/637: Performed by: SURGERY

## 2021-01-16 RX ADMIN — CITALOPRAM HYDROBROMIDE 40 MG: 20 TABLET ORAL at 09:02

## 2021-01-16 RX ADMIN — ACETAMINOPHEN 1000 MG: 500 TABLET ORAL at 06:27

## 2021-01-16 RX ADMIN — ARIPIPRAZOLE 10 MG: 10 TABLET ORAL at 09:05

## 2021-01-16 RX ADMIN — ACETAMINOPHEN 1000 MG: 500 TABLET ORAL at 11:51

## 2021-01-16 RX ADMIN — AMLODIPINE BESYLATE 10 MG: 5 TABLET ORAL at 09:02

## 2021-01-16 RX ADMIN — HYDROMORPHONE HYDROCHLORIDE 4 MG: 2 TABLET ORAL at 07:40

## 2021-01-16 RX ADMIN — GABAPENTIN 200 MG: 100 CAPSULE ORAL at 09:02

## 2021-01-16 RX ADMIN — ACETAMINOPHEN 1000 MG: 500 TABLET ORAL at 00:26

## 2021-01-16 RX ADMIN — LISINOPRIL 40 MG: 20 TABLET ORAL at 09:02

## 2021-01-16 RX ADMIN — ENOXAPARIN SODIUM 40 MG: 100 INJECTION SUBCUTANEOUS at 09:01

## 2021-01-16 RX ADMIN — HYDROMORPHONE HYDROCHLORIDE 0.5 MG: 1 INJECTION, SOLUTION INTRAMUSCULAR; INTRAVENOUS; SUBCUTANEOUS at 00:47

## 2021-01-16 RX ADMIN — HYDROMORPHONE HYDROCHLORIDE 4 MG: 2 TABLET ORAL at 11:51

## 2021-01-16 NOTE — PROGRESS NOTES
1125 Patient educated with discharge instructions and Rx. Patient verbalized understanding with adequate teach back. Patient signed copy of the discharge instructions that were then placed on the hard chart.

## 2021-01-16 NOTE — PROGRESS NOTES
Progress Note    Patient: Mack Harada MRN: [id number]  SSN: [id number]    YOB: 1965  Age: 54 y.o. Sex: female      Admit Date: 2021    2 Days Post-Op    Procedure:  Procedure(s):  LAPAROSCOPIC REVISION OF SLEEVE GASTRECTOMY TO GASTRIC BY PASS, EGD (E R A S), LYSIS OF ADHESIONS  ESOPHAGOGASTRODUODENOSCOPY (EGD)    Subjective:     Patient tolerating diet. She is up and ambulating. Pain under control     Objective:     Visit Vitals  BP (!) 184/90 (BP 1 Location: Left arm, BP Patient Position: At rest;Sitting)   Pulse 90   Temp 98.2 °F (36.8 °C)   Resp 16   Ht 5' 8\" (1.727 m)   Wt (!) 384 lb 6 oz (174.4 kg)   SpO2 93%   BMI 58.44 kg/m²       Temp (24hrs), Av.5 °F (36.9 °C), Min:98.2 °F (36.8 °C), Max:98.7 °F (37.1 °C)      Physical Exam:    Gen- Alert in NAD  Abd- S/incisional tenderness. Incisions intact  Neftaly serosanguinous     Data Review: images and reports reviewed    Lab Review: All lab results for the last 24 hours reviewed. Recent Results (from the past 24 hour(s))   CBC WITH AUTOMATED DIFF    Collection Time: 21  4:20 AM   Result Value Ref Range    WBC 7.4 3.6 - 11.0 K/uL    RBC 4.29 3.80 - 5.20 M/uL    HGB 12.3 11.5 - 16.0 g/dL    HCT 38.5 35.0 - 47.0 %    MCV 89.7 80.0 - 99.0 FL    MCH 28.7 26.0 - 34.0 PG    MCHC 31.9 30.0 - 36.5 g/dL    RDW 13.8 11.5 - 14.5 %    PLATELET 610 406 - 110 K/uL    MPV 11.4 8.9 - 12.9 FL    NRBC 0.0 0  WBC    ABSOLUTE NRBC 0.00 0.00 - 0.01 K/uL    NEUTROPHILS 73 32 - 75 %    LYMPHOCYTES 20 12 - 49 %    MONOCYTES 6 5 - 13 %    EOSINOPHILS 1 0 - 7 %    BASOPHILS 0 0 - 1 %    IMMATURE GRANULOCYTES 0 0.0 - 0.5 %    ABS. NEUTROPHILS 5.4 1.8 - 8.0 K/UL    ABS. LYMPHOCYTES 1.5 0.8 - 3.5 K/UL    ABS. MONOCYTES 0.4 0.0 - 1.0 K/UL    ABS. EOSINOPHILS 0.0 0.0 - 0.4 K/UL    ABS. BASOPHILS 0.0 0.0 - 0.1 K/UL    ABS. IMM.  GRANS. 0.0 0.00 - 0.04 K/UL    DF AUTOMATED     METABOLIC PANEL, BASIC    Collection Time: 21  4:20 AM   Result Value Ref Range Sodium 136 136 - 145 mmol/L    Potassium 3.4 (L) 3.5 - 5.1 mmol/L    Chloride 96 (L) 97 - 108 mmol/L    CO2 34 (H) 21 - 32 mmol/L    Anion gap 6 5 - 15 mmol/L    Glucose 139 (H) 65 - 100 mg/dL    BUN 6 6 - 20 MG/DL    Creatinine 0.58 0.55 - 1.02 MG/DL    BUN/Creatinine ratio 10 (L) 12 - 20      GFR est AA >60 >60 ml/min/1.73m2    GFR est non-AA >60 >60 ml/min/1.73m2    Calcium 9.8 8.5 - 10.1 MG/DL       Assessment:     Hospital Problems  Date Reviewed: 12/14/2020          Codes Class Noted POA    [facility] ICD-10-CM: E66.01  ICD-9-CM: 278.01  1/14/2021 Unknown              Plan/Recommendations/Medical Decision Making:   S/p revision of gastric bypass. Doing well   Remove drain  Discharge home.

## 2021-01-18 ENCOUNTER — VIRTUAL VISIT (OUTPATIENT)
Dept: SURGERY | Age: 56
End: 2021-01-18
Payer: MEDICARE

## 2021-01-18 ENCOUNTER — TELEPHONE (OUTPATIENT)
Dept: SURGERY | Age: 56
End: 2021-01-18

## 2021-01-18 VITALS — BODY MASS INDEX: 44.41 KG/M2 | HEIGHT: 68 IN | WEIGHT: 293 LBS

## 2021-01-18 DIAGNOSIS — M79.652 ACUTE PAIN OF LEFT THIGH: ICD-10-CM

## 2021-01-18 DIAGNOSIS — E66.01 MORBID OBESITY (HCC): ICD-10-CM

## 2021-01-18 DIAGNOSIS — Z09 FOLLOW-UP EXAMINATION AFTER ABDOMINAL SURGERY: Primary | ICD-10-CM

## 2021-01-18 PROCEDURE — 99024 POSTOP FOLLOW-UP VISIT: CPT | Performed by: NURSE PRACTITIONER

## 2021-01-18 NOTE — PROGRESS NOTES
1. Have you been to the ER, urgent care clinic since your last visit? Hospitalized since your last visit? No    2. Have you seen or consulted any other health care providers outside of the [address] [place] [address] since your last visit? Include any pap smears or colon screening.  No

## 2021-01-18 NOTE — TELEPHONE ENCOUNTER
I called the patient back after speaking to Esvin Alberts NP and she wants to have a virtual appointment with the patient this afternoon. I transferred her to the  to make her appointment. Pt in agreement.

## 2021-01-18 NOTE — TELEPHONE ENCOUNTER
Patient called me back and she said she had a pain in her left thigh that hurt really bad. She said she put a heating pad on her thigh, she gave herself her Lovenox injection and the pain is gone now. She is concerned about a blood clot. I told her I need to speak to Cammie Urrutia NP and I will call her back. Pt in agreement.

## 2021-01-18 NOTE — TELEPHONE ENCOUNTER
Patient called stating she had surgery last week and is experiencing sharp pain in her left leg and would like advice on what to do. Patient is concerned it may be a blood clot.

## 2021-01-19 ENCOUNTER — TELEPHONE (OUTPATIENT)
Dept: SURGERY | Age: 56
End: 2021-01-19

## 2021-01-19 NOTE — TELEPHONE ENCOUNTER
Patient identified with two patient identifiers. Patient has no current C/O leg pain confirmed most pain in abdomen. Patient still has not moved bowels. Patient asking will we be given her another prescription for pain medication. Patient informed no refill or alternate pain medication will be given at this time. Patient informed per Mj Bermeo she want her to purchase milk of magnesia over the counter take a dose now another later tonight and another in the morning. Patient confirmed she is up and walking. States she is drinking plenty of water. Discussed the concerns with constipation and increasing pain medication. Patient expressed understanding informed if still no movement she should return call. Patient in agreement.

## 2021-01-19 NOTE — PROGRESS NOTES
I was in the office while conducting this encounter. Consent:  She and/or her healthcare decision maker is aware that this patient-initiated Telehealth encounter is a billable service, with coverage as determined by her insurance carrier. She is aware that she may receive a bill and has provided verbal consent to proceed: No - Not billable    This virtual visit was conducted via [employer]. Pursuant to the emergency declaration under the [facility], [facility] waiver authority and the [employer] Act, this Virtual  Visit was conducted to reduce the patient's risk of exposure to COVID-19 and provide continuity of care for an established patient. Services were provided through a video synchronous discussion virtually to substitute for in-person clinic visit. Due to this being a TeleHealth evaluation, many elements of the physical examination are unable to be assessed. Total Time: minutes: 5-10 minutes. Chief Complaint   Patient presents with    Surgical Follow-up     4 days s/p sleeve to lap gastric bypass #[phone]       emily  4 days status post laparoscopic gastric bypass for treatment of morbid obesity. She called earlier this morning with complaints of acute pain in the left lateral thigh. Pain was sudden and she had been in bed. Pain was burning. She says she got up and took her Lovenox injection, walked and the pain resolved. Pain is radiating a bit from the left hip to the left lateral thigh. There is no redness and she does not know of any injury. She has no fever, chills, chest pain or shortness of breath  She does have an IVC filter in place and has been doing her Lovenox injections. She denies calf pain or tenderness  She has been spending most of her time in bed and gets up to walk throughout the day.   She is tolerating 40+ ounces of clear liquids and 2 protein shakes  No nausea or vomiting  She not had a bowel movement since surgery but is passing some gas    Visit Vitals  Ht 5' 8\" (1.727 m)   Wt (!) 380 lb 8 oz (172.6 kg)   BMI 57.85 kg/m²     She appears to be in no distress  Speech is clear breathing is unlabored  Mucous membranes appear moist  She appears to be in bed during this visit  I did request that she demonstrate that she can get up and walk and she did this  Left thigh is obese but no visible erythema or induration in the area that she identifies as the area of pain is at the IT band      ICD-10-CM ICD-9-CM    1. Follow-up examination after abdominal surgery  Z09 V67.09    2. Morbid obesity ([facility]  E66.01 278.01    3. BMI 50.0-59.9, adult ([facility]  Z68.43 V85.43    4. Acute pain of left thigh  M79.652 729.5      4-day status post laparoscopic gastric bypass for treatment of morbid obesity  Patient was reassured and I think this is more related to her being in bed and not as mobile  She does have an IVC filter in place and is on Lovenox  I have asked her to stay out of the bed during the day  She is to only be in bed at night for sleeping. She does have a comfortable chair she says she can use in the kitchen area. I have asked her to walk every hour.   She has gabapentin and she can take 2 tablets every 8 hours which will also help with this nerve type pain she is experiencing  Also suggested some topical arthritis pain relief ointment and also massage to the area  I advised her to use her MiraLAX daily for constipation  Continue with pulmonary toilet  Continue with bariatric liquids  She has an appointment scheduled for next week and she will reach out sooner with any concerns

## 2021-01-19 NOTE — TELEPHONE ENCOUNTER
Patient identified with two patient identifiers. Patient states she is currently taking dilaudid 4 mg as directed with no relief with abdominal pain and discomfort. Patient still has not moved bowels as discussed at visit on yesterday currently taking miralax and stool softener. States she is not passing gas much but belching. She has no difficulty swallowing. Patient has no C/O N&V, fever, swelling, or issues urinating. Patient informed I will discuss with provider and return call shortly.

## 2021-01-19 NOTE — PATIENT INSTRUCTIONS
Make an effort to be out of bed most of the day. The more you are up and about, walking and moving the better you will feel. For the pain in the left thigh you can use the gabapentin 2 tablets every 8 hours. Also a topical arthritis cream can be helpful, such as icy hot and some massage. Walk every hour and continue to do your breathing exercises Continue with the Lovenox shots daily Please call if you have any questions or concerns

## 2021-01-19 NOTE — TELEPHONE ENCOUNTER
Patient called back stating that she has not heard anything from the nurse and needs pain medication.

## 2021-01-22 ENCOUNTER — TELEPHONE (OUTPATIENT)
Dept: SURGERY | Age: 56
End: 2021-01-22

## 2021-01-22 NOTE — DISCHARGE SUMMARY
Physician Discharge Summary     Patient ID:  Anastacio Bennett  [id number]  54 y.o.  1965    Allergies: Patient has no known allergies. Admit Date: 1/14/2021    Discharge Date: 1/16/2021    * Admission Diagnoses: Morbid obesity ([facility] [E66.01]    * Discharge Diagnoses:    Hospital Problems as of 1/16/2021 Date Reviewed: 12/14/2020          Codes Class Noted - Resolved POA    Morbid obesity ([facility] ICD-10-CM: E66.01  ICD-9-CM: 278.01  1/14/2021 - Present Unknown               Admission Condition: Good    * Discharge Condition: good    * Procedures: Procedure(s):  LAPAROSCOPIC REVISION OF SLEEVE GASTRECTOMY TO GASTRIC BY PASS, EGD (E R A S), LYSIS OF ADHESIONS  ESOPHAGOGASTRODUODENOSCOPY (EGD)    * Hospital Course:   Normal hospital course for this procedure. Consults: None    Significant Diagnostic Studies: radiology: X-Ray: POD#1 UGI: no leak or obstruction    * Disposition: Home    Discharge Medications:   Discharge Medication List as of 1/16/2021 11:25 AM      START taking these medications    Details   HYDROmorphone (DILAUDID) 2 mg tablet Take 1-2 Tabs by mouth every six (6) hours as needed for Pain for up to 7 days. Max Daily Amount: 16 mg., Normal, Disp-25 Tab, R-0         CONTINUE these medications which have NOT CHANGED    Details   traZODone (DESYREL) 150 mg tablet Take 1 Tab by mouth nightly as needed for Sleep for up to 90 days. , Normal, Disp-90 Tab, R-0      citalopram (CeleXA) 40 mg tablet Take 1 Tab by mouth daily for 90 days. , Normal, Disp-90 Tab,R-0      ARIPiprazole (ABILIFY) 10 mg tablet Take 1 Tab by mouth daily for 90 days. Indications: additional medications to treat depression, Normal, Disp-90 Tab,R-0      ALPRAZolam (XANAX) 0.5 mg tablet Take 1 Tab by mouth three (3) times daily as needed for Anxiety for up to 90 days. Max Daily Amount: 1.5 mg., Normal, Disp-90 Tab,R-1      tiZANidine (ZANAFLEX) 4 mg capsule Take 4 mg by mouth three (3) times daily. , Historical Med      pravastatin (PRAVACHOL) 20 mg tablet Take 20 mg by mouth nightly., Historical Med      ketoconazole (NIZORAL) 2 % topical cream Apply  to affected area daily. , Historical Med      calcium citrate/vitamin D3 (CALCIUM CITRATE + D PO) Take 600 mg by mouth daily. , Historical Med      cyanocobalamin (VITAMIN B-12) 500 mcg tablet Take 500 mcg by mouth daily. , Historical Med      ergocalciferol (VITAMIN D2) 50,000 unit capsule Take 50,000 Units by mouth every seven (7) days. , Historical Med      amLODIPine (NORVASC) 10 mg tablet Take  by mouth daily. , Historical Med      lisinopril (PRINIVIL, ZESTRIL) 10 mg tablet Take 40 mg by mouth daily. , Historical Med      ondansetron (ZOFRAN ODT) 4 mg disintegrating tablet Take 1 Tab by mouth every eight (8) hours as needed for Nausea or Nausea or Vomiting (AFTER SURGERY). , Normal, Disp-20 Tab,R-0      enoxaparin (LOVENOX) 40 mg/0.4 mL 0.4 mL by SubCUTAneous route daily. AFTER SURGERY  Indications: blood clot in a deep vein of the extremities, Normal, Disp-14 Syringe,R-0      gabapentin (NEURONTIN) 100 mg capsule Take 1-2 Caps by mouth every eight (8) hours as needed for Pain (AFTER SURGERY). Max Daily Amount: 600 mg., Normal, Disp-30 Cap,R-0      polyethylene glycol (MIRALAX) 17 gram packet Take 1 Packet by mouth daily. Indications: constipation, Normal, Disp-100 Packet,R-3      pantoprazole (PROTONIX) 40 mg tablet Take 1 Tab by mouth daily. AFTER SURGERY, Normal, Disp-30 Tab,R-1      minocycline (DYNACIN) 100 mg tablet Take 100 mg by mouth two (2) times a day., Historical Med      olopatadine (PATANOL) 0.1 % ophthalmic solution Administer 2 Drops to both eyes two (2) times a day., Historical Med      bisacodyL (Dulcolax, bisacodyl,) 5 mg EC tablet Take 5 mg by mouth daily as needed for Constipation. , Historical Med      multivitamin (ONE A DAY) tablet Take 1 Tab by mouth daily. , Historical Med      albuterol (ProAir HFA) 90 mcg/actuation inhaler Take 2 Puffs by inhalation every four (4) hours as needed., Historical Med      naloxone 4 mg/actuation spry 4 mg by Nasal route as needed. , Print, Disp-1 Bottle, R-1      Cane lita Use as directed. , Print, Disp-1 Device, R-0      butalbital-acetaminophen-caffeine (FIORICET) -40 mg per tablet Take 1 Tab by mouth daily as needed., Historical Med         STOP taking these medications       meloxicam (MOBIC) 15 mg tablet Comments:   Reason for Stopping:         furosemide (LASIX) 20 mg tablet Comments:   Reason for Stopping:               * Follow-up Care/Patient Instructions:   Activity: No heavy lifting, pushing, pulling x 4 weeks  Diet: bariatric liquids  Wound Care: Keep wounds clean and dry    Follow-up Information     Follow up With Specialties Details Why Contact Info    Vincent Del Angel MD [place] [phone]             Future Appointments   Date Time Provider Ramon Cosme   1/28/2021 11:00 AM Frantz Francisco NP [facility] BS AMB   2/11/2021  2:00 PM Frantz Francisco NP [facility] BS AMB   2/25/2021  2:00 PM Frantz Francisco NP [facility] BS AMB       Signed:  Eamon Koch MD  1/21/2021  8:44 PM

## 2021-01-22 NOTE — TELEPHONE ENCOUNTER
Patient called me back and said she can come to the office on Monday at 9:20 am. I told her we will also be sending her upstair fr 2 liters of IV fluids. Pt in agreement.

## 2021-01-22 NOTE — TELEPHONE ENCOUNTER
I called the patient back and she said she has not moved her bowels and she has been taking the miralax and the MOM yesterday and today with no results, she said the Protein makes her vomit. I asked her how much liquid she is taking in and she said 38 ounces. I spoke with Eunice Holman NP and she said for her to take the Miralax every 2 hours until she moves her bowels. She also wants her to go to the [facility] on Monday and I have set her up an appointment for Monday at 11 am and am also setting her up to with Eunice Holman NP to be seen in the office. Pt then tells me she does not know if she can get a ride. She is going to call me back and let me know if she can come in on Monday. She said she [place] hour away. Will wait for her call back.

## 2021-01-25 ENCOUNTER — APPOINTMENT (OUTPATIENT)
Dept: GENERAL RADIOLOGY | Age: 56
DRG: 354 | End: 2021-01-25
Attending: EMERGENCY MEDICINE
Payer: MEDICARE

## 2021-01-25 ENCOUNTER — HOSPITAL ENCOUNTER (OUTPATIENT)
Dept: INFUSION THERAPY | Age: 56
Discharge: HOME | End: 2021-01-25

## 2021-01-25 ENCOUNTER — ANESTHESIA EVENT (OUTPATIENT)
Dept: SURGERY | Age: 56
DRG: 354 | End: 2021-01-25
Payer: MEDICARE

## 2021-01-25 ENCOUNTER — OFFICE VISIT (OUTPATIENT)
Dept: SURGERY | Age: 56
End: 2021-01-25
Payer: MEDICARE

## 2021-01-25 ENCOUNTER — ANESTHESIA (OUTPATIENT)
Dept: SURGERY | Age: 56
DRG: 354 | End: 2021-01-25
Payer: MEDICARE

## 2021-01-25 ENCOUNTER — HOSPITAL ENCOUNTER (INPATIENT)
Age: 56
LOS: 1 days | Discharge: STILL ADMITTED OR ADMINISTRATIVE | DRG: 354 | End: 2021-01-25
Attending: SURGERY | Admitting: SURGERY
Payer: MEDICARE

## 2021-01-25 ENCOUNTER — HOSPITAL ENCOUNTER (INPATIENT)
Age: 56
LOS: 4 days | Discharge: HOME | DRG: 354 | End: 2021-01-29
Attending: EMERGENCY MEDICINE | Admitting: SURGERY
Payer: MEDICARE

## 2021-01-25 ENCOUNTER — APPOINTMENT (OUTPATIENT)
Dept: CT IMAGING | Age: 56
DRG: 354 | End: 2021-01-25
Attending: EMERGENCY MEDICINE
Payer: MEDICARE

## 2021-01-25 VITALS
RESPIRATION RATE: 24 BRPM | WEIGHT: 293 LBS | OXYGEN SATURATION: 89 % | HEIGHT: 68 IN | SYSTOLIC BLOOD PRESSURE: 135 MMHG | HEART RATE: 112 BPM | TEMPERATURE: 99 F | DIASTOLIC BLOOD PRESSURE: 89 MMHG | BODY MASS INDEX: 44.41 KG/M2

## 2021-01-25 DIAGNOSIS — E66.01 MORBID OBESITY WITH BMI OF 50.0-59.9, ADULT (HCC): ICD-10-CM

## 2021-01-25 DIAGNOSIS — K46.0 INCARCERATED HERNIA: ICD-10-CM

## 2021-01-25 DIAGNOSIS — G89.18 POST-OP PAIN: ICD-10-CM

## 2021-01-25 DIAGNOSIS — K91.2 POSTOPERATIVE INTESTINAL MALABSORPTION: ICD-10-CM

## 2021-01-25 DIAGNOSIS — K59.01 SLOW TRANSIT CONSTIPATION: ICD-10-CM

## 2021-01-25 DIAGNOSIS — Z09 FOLLOW-UP EXAMINATION AFTER ABDOMINAL SURGERY: Primary | ICD-10-CM

## 2021-01-25 DIAGNOSIS — K56.609 SBO (SMALL BOWEL OBSTRUCTION) (HCC): Primary | ICD-10-CM

## 2021-01-25 DIAGNOSIS — K43.0 INCISIONAL HERNIA WITH OBSTRUCTION BUT NO GANGRENE: ICD-10-CM

## 2021-01-25 DIAGNOSIS — R11.2 NAUSEA AND VOMITING IN ADULT: ICD-10-CM

## 2021-01-25 DIAGNOSIS — R53.1 WEAKNESS: ICD-10-CM

## 2021-01-25 PROBLEM — K43.2 INCISIONAL HERNIA: Status: ACTIVE | Noted: 2021-01-25

## 2021-01-25 PROBLEM — E86.0 DEHYDRATION: Status: ACTIVE | Noted: 2021-01-25

## 2021-01-25 LAB
ABO + RH BLD: NORMAL
ALBUMIN SERPL-MCNC: 3.5 G/DL (ref 3.5–5)
ALBUMIN/GLOB SERPL: 0.6 {RATIO} (ref 1.1–2.2)
ALP SERPL-CCNC: 210 U/L (ref 45–117)
ALT SERPL-CCNC: 65 U/L (ref 12–78)
ANION GAP SERPL CALC-SCNC: 7 MMOL/L (ref 5–15)
AST SERPL-CCNC: 68 U/L (ref 15–37)
ATRIAL RATE: 109 BPM
BASOPHILS # BLD: 0.2 K/UL (ref 0–0.1)
BASOPHILS NFR BLD: 1 % (ref 0–1)
BILIRUB SERPL-MCNC: 1 MG/DL (ref 0.2–1)
BLOOD GROUP ANTIBODIES SERPL: NORMAL
BUN SERPL-MCNC: 50 MG/DL (ref 6–20)
BUN/CREAT SERPL: 25 (ref 12–20)
CALCIUM SERPL-MCNC: 10.1 MG/DL (ref 8.5–10.1)
CALCULATED P AXIS, ECG09: 2 DEGREES
CALCULATED R AXIS, ECG10: 18 DEGREES
CALCULATED T AXIS, ECG11: -28 DEGREES
CHLORIDE SERPL-SCNC: 81 MMOL/L (ref 97–108)
CO2 SERPL-SCNC: 42 MMOL/L (ref 21–32)
COMMENT, HOLDF: NORMAL
CREAT SERPL-MCNC: 1.97 MG/DL (ref 0.55–1.02)
DIAGNOSIS, 93000: NORMAL
DIFFERENTIAL METHOD BLD: ABNORMAL
EOSINOPHIL # BLD: 0 K/UL (ref 0–0.4)
EOSINOPHIL NFR BLD: 0 % (ref 0–7)
ERYTHROCYTE [DISTWIDTH] IN BLOOD BY AUTOMATED COUNT: 15 % (ref 11.5–14.5)
GLOBULIN SER CALC-MCNC: 5.7 G/DL (ref 2–4)
GLUCOSE SERPL-MCNC: 213 MG/DL (ref 65–100)
HCT VFR BLD AUTO: 40 % (ref 35–47)
HGB BLD-MCNC: 13 G/DL (ref 11.5–16)
IMM GRANULOCYTES # BLD AUTO: 0.8 K/UL (ref 0–0.04)
IMM GRANULOCYTES NFR BLD AUTO: 4 % (ref 0–0.5)
LYMPHOCYTES # BLD: 2.8 K/UL (ref 0.8–3.5)
LYMPHOCYTES NFR BLD: 14 % (ref 12–49)
MAGNESIUM SERPL-MCNC: 4.5 MG/DL (ref 1.6–2.4)
MCH RBC QN AUTO: 28.8 PG (ref 26–34)
MCHC RBC AUTO-ENTMCNC: 32.5 G/DL (ref 30–36.5)
MCV RBC AUTO: 88.7 FL (ref 80–99)
MONOCYTES # BLD: 1.2 K/UL (ref 0–1)
MONOCYTES NFR BLD: 6 % (ref 5–13)
NEUTS SEG # BLD: 15 K/UL (ref 1.8–8)
NEUTS SEG NFR BLD: 75 % (ref 32–75)
NRBC # BLD: 0.04 K/UL (ref 0–0.01)
NRBC BLD-RTO: 0.2 PER 100 WBC
P-R INTERVAL, ECG05: 162 MS
PLATELET # BLD AUTO: 527 K/UL (ref 150–400)
PMV BLD AUTO: 10.6 FL (ref 8.9–12.9)
POTASSIUM SERPL-SCNC: 2.7 MMOL/L (ref 3.5–5.1)
PROT SERPL-MCNC: 9.2 G/DL (ref 6.4–8.2)
Q-T INTERVAL, ECG07: 380 MS
QRS DURATION, ECG06: 88 MS
QTC CALCULATION (BEZET), ECG08: 511 MS
RBC # BLD AUTO: 4.51 M/UL (ref 3.8–5.2)
RBC MORPH BLD: ABNORMAL
SAMPLES BEING HELD,HOLD: NORMAL
SODIUM SERPL-SCNC: 130 MMOL/L (ref 136–145)
SPECIMEN EXP DATE BLD: NORMAL
TROPONIN I SERPL-MCNC: <0.05 NG/ML
VENTRICULAR RATE, ECG03: 109 BPM
WBC # BLD AUTO: 20 K/UL (ref 3.6–11)

## 2021-01-25 PROCEDURE — 77030003666 HC NDL SPINAL BD -A: Performed by: SURGERY

## 2021-01-25 PROCEDURE — 36415 COLL VENOUS BLD VENIPUNCTURE: CPT

## 2021-01-25 PROCEDURE — 83735 ASSAY OF MAGNESIUM: CPT

## 2021-01-25 PROCEDURE — 96365 THER/PROPH/DIAG IV INF INIT: CPT

## 2021-01-25 PROCEDURE — 74011250636 HC RX REV CODE- 250/636: Performed by: STUDENT IN AN ORGANIZED HEALTH CARE EDUCATION/TRAINING PROGRAM

## 2021-01-25 PROCEDURE — 99285 EMERGENCY DEPT VISIT HI MDM: CPT

## 2021-01-25 PROCEDURE — 74011250636 HC RX REV CODE- 250/636: Performed by: ANESTHESIOLOGY

## 2021-01-25 PROCEDURE — 74011000250 HC RX REV CODE- 250: Performed by: EMERGENCY MEDICINE

## 2021-01-25 PROCEDURE — 74011250636 HC RX REV CODE- 250/636: Performed by: EMERGENCY MEDICINE

## 2021-01-25 PROCEDURE — 74011000250 HC RX REV CODE- 250: Performed by: NURSE ANESTHETIST, CERTIFIED REGISTERED

## 2021-01-25 PROCEDURE — 65270000008 HC RM PRIVATE PEDIATRIC

## 2021-01-25 PROCEDURE — 74176 CT ABD & PELVIS W/O CONTRAST: CPT

## 2021-01-25 PROCEDURE — 77030008684 HC TU ET CUF COVD -B: Performed by: ANESTHESIOLOGY

## 2021-01-25 PROCEDURE — 77030018352 HC SHR COAG HARM2 J&J -E: Performed by: SURGERY

## 2021-01-25 PROCEDURE — 84484 ASSAY OF TROPONIN QUANT: CPT

## 2021-01-25 PROCEDURE — 77030010507 HC ADH SKN DERMBND J&J -B: Performed by: SURGERY

## 2021-01-25 PROCEDURE — 74018 RADEX ABDOMEN 1 VIEW: CPT

## 2021-01-25 PROCEDURE — 93005 ELECTROCARDIOGRAM TRACING: CPT

## 2021-01-25 PROCEDURE — 77030020829: Performed by: SURGERY

## 2021-01-25 PROCEDURE — 74011000258 HC RX REV CODE- 258: Performed by: NURSE ANESTHETIST, CERTIFIED REGISTERED

## 2021-01-25 PROCEDURE — 77030012770 HC TRCR OPT FX AMR -B: Performed by: SURGERY

## 2021-01-25 PROCEDURE — 2709999900 HC NON-CHARGEABLE SUPPLY: Performed by: SURGERY

## 2021-01-25 PROCEDURE — 74011000250 HC RX REV CODE- 250: Performed by: SURGERY

## 2021-01-25 PROCEDURE — 77030010031 HC SCIS ENDOSC MPLR J&J -C: Performed by: SURGERY

## 2021-01-25 PROCEDURE — 77030002966 HC SUT PDS J&J -A: Performed by: SURGERY

## 2021-01-25 PROCEDURE — 0WUF4JZ SUPPLEMENT ABDOMINAL WALL WITH SYNTHETIC SUBSTITUTE, PERCUTANEOUS ENDOSCOPIC APPROACH: ICD-10-PCS | Performed by: SURGERY

## 2021-01-25 PROCEDURE — 77030008608 HC TRCR ENDOSC SMTH AMR -B: Performed by: SURGERY

## 2021-01-25 PROCEDURE — 65270000029 HC RM PRIVATE

## 2021-01-25 PROCEDURE — 49655 PR LAP, INCISIONAL HERNIA REPAIR,INCARCERATED: CPT | Performed by: SURGERY

## 2021-01-25 PROCEDURE — 74011000250 HC RX REV CODE- 250: Performed by: STUDENT IN AN ORGANIZED HEALTH CARE EDUCATION/TRAINING PROGRAM

## 2021-01-25 PROCEDURE — 74011250636 HC RX REV CODE- 250/636: Performed by: SURGERY

## 2021-01-25 PROCEDURE — C1781 MESH (IMPLANTABLE): HCPCS | Performed by: SURGERY

## 2021-01-25 PROCEDURE — 77030025927 HC STPLR FIX SECURSTRP J&J -F: Performed by: SURGERY

## 2021-01-25 PROCEDURE — 76010000153 HC OR TIME 1.5 TO 2 HR: Performed by: SURGERY

## 2021-01-25 PROCEDURE — 99024 POSTOP FOLLOW-UP VISIT: CPT | Performed by: NURSE PRACTITIONER

## 2021-01-25 PROCEDURE — 77030034154 HC SHR COAG HARM ACE J&J -F: Performed by: SURGERY

## 2021-01-25 PROCEDURE — 80053 COMPREHEN METABOLIC PANEL: CPT

## 2021-01-25 PROCEDURE — 74011250636 HC RX REV CODE- 250/636: Performed by: NURSE ANESTHETIST, CERTIFIED REGISTERED

## 2021-01-25 PROCEDURE — 76210000017 HC OR PH I REC 1.5 TO 2 HR: Performed by: SURGERY

## 2021-01-25 PROCEDURE — 86901 BLOOD TYPING SEROLOGIC RH(D): CPT

## 2021-01-25 PROCEDURE — 96375 TX/PRO/DX INJ NEW DRUG ADDON: CPT

## 2021-01-25 PROCEDURE — 77030008756 HC TU IRR SUC STRY -B: Performed by: SURGERY

## 2021-01-25 PROCEDURE — 77030026438 HC STYL ET INTUB CARD -A: Performed by: ANESTHESIOLOGY

## 2021-01-25 PROCEDURE — 77030002933 HC SUT MCRYL J&J -A: Performed by: SURGERY

## 2021-01-25 PROCEDURE — 96366 THER/PROPH/DIAG IV INF ADDON: CPT

## 2021-01-25 PROCEDURE — 77030020263 HC SOL INJ SOD CL0.9% LFCR 1000ML: Performed by: SURGERY

## 2021-01-25 PROCEDURE — 77030020053 HC ELECTRD LAPSCP COVD -B: Performed by: SURGERY

## 2021-01-25 PROCEDURE — 76060000034 HC ANESTHESIA 1.5 TO 2 HR: Performed by: SURGERY

## 2021-01-25 PROCEDURE — 77030040361 HC SLV COMPR DVT MDII -B: Performed by: SURGERY

## 2021-01-25 PROCEDURE — 77030040830 HC CATH URETH FOL MDII -A: Performed by: SURGERY

## 2021-01-25 PROCEDURE — 96376 TX/PRO/DX INJ SAME DRUG ADON: CPT

## 2021-01-25 PROCEDURE — 85025 COMPLETE CBC W/AUTO DIFF WBC: CPT

## 2021-01-25 DEVICE — MESH W/ECHO PS 11.4CM CIR -- VENTRALIGHT ORDER BY CA: Type: IMPLANTABLE DEVICE | Site: ABDOMEN | Status: FUNCTIONAL

## 2021-01-25 RX ORDER — SODIUM CHLORIDE 0.9 % (FLUSH) 0.9 %
5-40 SYRINGE (ML) INJECTION AS NEEDED
Status: DISCONTINUED | OUTPATIENT
Start: 2021-01-25 | End: 2021-01-29 | Stop reason: HOSPADM

## 2021-01-25 RX ORDER — PROPOFOL 10 MG/ML
INJECTION, EMULSION INTRAVENOUS AS NEEDED
Status: DISCONTINUED | OUTPATIENT
Start: 2021-01-25 | End: 2021-01-25 | Stop reason: HOSPADM

## 2021-01-25 RX ORDER — SUCCINYLCHOLINE CHLORIDE 20 MG/ML
INJECTION INTRAMUSCULAR; INTRAVENOUS AS NEEDED
Status: DISCONTINUED | OUTPATIENT
Start: 2021-01-25 | End: 2021-01-25 | Stop reason: HOSPADM

## 2021-01-25 RX ORDER — SODIUM CHLORIDE 0.9 % (FLUSH) 0.9 %
5-40 SYRINGE (ML) INJECTION AS NEEDED
Status: DISCONTINUED | OUTPATIENT
Start: 2021-01-25 | End: 2021-01-25 | Stop reason: HOSPADM

## 2021-01-25 RX ORDER — ACETAMINOPHEN 650 MG/1
650 SUPPOSITORY RECTAL
Status: DISCONTINUED | OUTPATIENT
Start: 2021-01-25 | End: 2021-01-25 | Stop reason: SDUPTHER

## 2021-01-25 RX ORDER — SODIUM CHLORIDE 0.9 % (FLUSH) 0.9 %
5-40 SYRINGE (ML) INJECTION EVERY 8 HOURS
Status: CANCELLED | OUTPATIENT
Start: 2021-01-25

## 2021-01-25 RX ORDER — PHENYLEPHRINE HCL IN 0.9% NACL 0.4MG/10ML
SYRINGE (ML) INTRAVENOUS AS NEEDED
Status: DISCONTINUED | OUTPATIENT
Start: 2021-01-25 | End: 2021-01-25 | Stop reason: HOSPADM

## 2021-01-25 RX ORDER — HYDROMORPHONE HYDROCHLORIDE 2 MG/ML
INJECTION, SOLUTION INTRAMUSCULAR; INTRAVENOUS; SUBCUTANEOUS AS NEEDED
Status: DISCONTINUED | OUTPATIENT
Start: 2021-01-25 | End: 2021-01-25 | Stop reason: HOSPADM

## 2021-01-25 RX ORDER — BUPIVACAINE HYDROCHLORIDE AND EPINEPHRINE 5; 5 MG/ML; UG/ML
INJECTION, SOLUTION EPIDURAL; INTRACAUDAL; PERINEURAL AS NEEDED
Status: DISCONTINUED | OUTPATIENT
Start: 2021-01-25 | End: 2021-01-25 | Stop reason: HOSPADM

## 2021-01-25 RX ORDER — SODIUM CHLORIDE 0.9 % (FLUSH) 0.9 %
5-40 SYRINGE (ML) INJECTION EVERY 8 HOURS
Status: DISCONTINUED | OUTPATIENT
Start: 2021-01-25 | End: 2021-01-25 | Stop reason: HOSPADM

## 2021-01-25 RX ORDER — HYDROMORPHONE HYDROCHLORIDE 1 MG/ML
0.2 INJECTION, SOLUTION INTRAMUSCULAR; INTRAVENOUS; SUBCUTANEOUS
Status: DISCONTINUED | OUTPATIENT
Start: 2021-01-25 | End: 2021-01-25 | Stop reason: HOSPADM

## 2021-01-25 RX ORDER — POTASSIUM CHLORIDE 7.45 MG/ML
10 INJECTION INTRAVENOUS
Status: COMPLETED | OUTPATIENT
Start: 2021-01-25 | End: 2021-01-25

## 2021-01-25 RX ORDER — PRAVASTATIN SODIUM 20 MG/1
20 TABLET ORAL
Status: CANCELLED | OUTPATIENT
Start: 2021-01-25

## 2021-01-25 RX ORDER — HEPARIN SODIUM 5000 [USP'U]/ML
5000 INJECTION, SOLUTION INTRAVENOUS; SUBCUTANEOUS EVERY 8 HOURS
Status: DISCONTINUED | OUTPATIENT
Start: 2021-01-26 | End: 2021-01-29 | Stop reason: HOSPADM

## 2021-01-25 RX ORDER — ONDANSETRON 2 MG/ML
4 INJECTION INTRAMUSCULAR; INTRAVENOUS ONCE
Status: ACTIVE | OUTPATIENT
Start: 2021-01-25 | End: 2021-01-25

## 2021-01-25 RX ORDER — IPRATROPIUM BROMIDE AND ALBUTEROL SULFATE 2.5; .5 MG/3ML; MG/3ML
3 SOLUTION RESPIRATORY (INHALATION)
Status: DISCONTINUED | OUTPATIENT
Start: 2021-01-25 | End: 2021-01-29 | Stop reason: HOSPADM

## 2021-01-25 RX ORDER — SODIUM CHLORIDE 0.9 % (FLUSH) 0.9 %
5-40 SYRINGE (ML) INJECTION AS NEEDED
Status: CANCELLED | OUTPATIENT
Start: 2021-01-25

## 2021-01-25 RX ORDER — ENOXAPARIN SODIUM 100 MG/ML
40 INJECTION SUBCUTANEOUS EVERY 24 HOURS
Status: CANCELLED | OUTPATIENT
Start: 2021-01-25

## 2021-01-25 RX ORDER — SODIUM CHLORIDE, SODIUM LACTATE, POTASSIUM CHLORIDE, CALCIUM CHLORIDE 600; 310; 30; 20 MG/100ML; MG/100ML; MG/100ML; MG/100ML
1000 INJECTION, SOLUTION INTRAVENOUS CONTINUOUS
Status: DISCONTINUED | OUTPATIENT
Start: 2021-01-25 | End: 2021-01-26 | Stop reason: HOSPADM

## 2021-01-25 RX ORDER — LIDOCAINE HYDROCHLORIDE 10 MG/ML
0.1 INJECTION, SOLUTION EPIDURAL; INFILTRATION; INTRACAUDAL; PERINEURAL AS NEEDED
Status: DISCONTINUED | OUTPATIENT
Start: 2021-01-25 | End: 2021-01-25 | Stop reason: HOSPADM

## 2021-01-25 RX ORDER — MIDAZOLAM HYDROCHLORIDE 1 MG/ML
INJECTION, SOLUTION INTRAMUSCULAR; INTRAVENOUS AS NEEDED
Status: DISCONTINUED | OUTPATIENT
Start: 2021-01-25 | End: 2021-01-25 | Stop reason: HOSPADM

## 2021-01-25 RX ORDER — POTASSIUM CHLORIDE 7.45 MG/ML
INJECTION INTRAVENOUS AS NEEDED
Status: DISCONTINUED | OUTPATIENT
Start: 2021-01-25 | End: 2021-01-25 | Stop reason: HOSPADM

## 2021-01-25 RX ORDER — POLYETHYLENE GLYCOL 3350 17 G/17G
17 POWDER, FOR SOLUTION ORAL DAILY
Status: CANCELLED | OUTPATIENT
Start: 2021-01-26

## 2021-01-25 RX ORDER — HYDROMORPHONE HYDROCHLORIDE 1 MG/ML
0.5 INJECTION, SOLUTION INTRAMUSCULAR; INTRAVENOUS; SUBCUTANEOUS
Status: DISCONTINUED | OUTPATIENT
Start: 2021-01-25 | End: 2021-01-25 | Stop reason: HOSPADM

## 2021-01-25 RX ORDER — SODIUM CHLORIDE, SODIUM LACTATE, POTASSIUM CHLORIDE, CALCIUM CHLORIDE 600; 310; 30; 20 MG/100ML; MG/100ML; MG/100ML; MG/100ML
125 INJECTION, SOLUTION INTRAVENOUS CONTINUOUS
Status: CANCELLED | OUTPATIENT
Start: 2021-01-25

## 2021-01-25 RX ORDER — MIDAZOLAM HYDROCHLORIDE 1 MG/ML
1 INJECTION, SOLUTION INTRAMUSCULAR; INTRAVENOUS AS NEEDED
Status: DISCONTINUED | OUTPATIENT
Start: 2021-01-25 | End: 2021-01-25 | Stop reason: HOSPADM

## 2021-01-25 RX ORDER — ROCURONIUM BROMIDE 10 MG/ML
INJECTION, SOLUTION INTRAVENOUS AS NEEDED
Status: DISCONTINUED | OUTPATIENT
Start: 2021-01-25 | End: 2021-01-25 | Stop reason: HOSPADM

## 2021-01-25 RX ORDER — BISACODYL 5 MG
5 TABLET, DELAYED RELEASE (ENTERIC COATED) ORAL DAILY
Status: CANCELLED | OUTPATIENT
Start: 2021-01-25

## 2021-01-25 RX ORDER — AMLODIPINE BESYLATE 5 MG/1
10 TABLET ORAL DAILY
Status: DISCONTINUED | OUTPATIENT
Start: 2021-01-26 | End: 2021-01-29 | Stop reason: HOSPADM

## 2021-01-25 RX ORDER — ACETAMINOPHEN 325 MG/1
650 TABLET ORAL ONCE
Status: DISCONTINUED | OUTPATIENT
Start: 2021-01-25 | End: 2021-01-25 | Stop reason: HOSPADM

## 2021-01-25 RX ORDER — ONDANSETRON 2 MG/ML
4 INJECTION INTRAMUSCULAR; INTRAVENOUS AS NEEDED
Status: DISCONTINUED | OUTPATIENT
Start: 2021-01-25 | End: 2021-01-25 | Stop reason: HOSPADM

## 2021-01-25 RX ORDER — HYDROMORPHONE HYDROCHLORIDE 1 MG/ML
1 INJECTION, SOLUTION INTRAMUSCULAR; INTRAVENOUS; SUBCUTANEOUS
Status: DISCONTINUED | OUTPATIENT
Start: 2021-01-25 | End: 2021-01-29 | Stop reason: HOSPADM

## 2021-01-25 RX ORDER — PANTOPRAZOLE SODIUM 40 MG/10ML
40 INJECTION, POWDER, LYOPHILIZED, FOR SOLUTION INTRAVENOUS DAILY
Status: CANCELLED | OUTPATIENT
Start: 2021-01-25

## 2021-01-25 RX ORDER — AMLODIPINE BESYLATE 5 MG/1
10 TABLET ORAL DAILY
Status: CANCELLED | OUTPATIENT
Start: 2021-01-25

## 2021-01-25 RX ORDER — CYCLOBENZAPRINE HCL 10 MG
10 TABLET ORAL
Status: CANCELLED | OUTPATIENT
Start: 2021-01-25

## 2021-01-25 RX ORDER — ONDANSETRON 2 MG/ML
4 INJECTION INTRAMUSCULAR; INTRAVENOUS
Status: DISCONTINUED | OUTPATIENT
Start: 2021-01-25 | End: 2021-01-29 | Stop reason: HOSPADM

## 2021-01-25 RX ORDER — LIDOCAINE HYDROCHLORIDE 20 MG/ML
INJECTION, SOLUTION EPIDURAL; INFILTRATION; INTRACAUDAL; PERINEURAL AS NEEDED
Status: DISCONTINUED | OUTPATIENT
Start: 2021-01-25 | End: 2021-01-25 | Stop reason: HOSPADM

## 2021-01-25 RX ORDER — POTASSIUM CHLORIDE 7.45 MG/ML
10 INJECTION INTRAVENOUS AS NEEDED
Status: DISCONTINUED | OUTPATIENT
Start: 2021-01-25 | End: 2021-01-29 | Stop reason: HOSPADM

## 2021-01-25 RX ORDER — ACETAMINOPHEN 325 MG/1
650 TABLET ORAL
Status: DISCONTINUED | OUTPATIENT
Start: 2021-01-25 | End: 2021-01-25 | Stop reason: SDUPTHER

## 2021-01-25 RX ORDER — ALPRAZOLAM 0.25 MG/1
0.5 TABLET ORAL
Status: CANCELLED | OUTPATIENT
Start: 2021-01-25

## 2021-01-25 RX ORDER — FENTANYL CITRATE 50 UG/ML
25 INJECTION, SOLUTION INTRAMUSCULAR; INTRAVENOUS
Status: COMPLETED | OUTPATIENT
Start: 2021-01-25 | End: 2021-01-25

## 2021-01-25 RX ORDER — SODIUM CHLORIDE 0.9 % (FLUSH) 0.9 %
5-40 SYRINGE (ML) INJECTION EVERY 8 HOURS
Status: DISCONTINUED | OUTPATIENT
Start: 2021-01-25 | End: 2021-01-29 | Stop reason: HOSPADM

## 2021-01-25 RX ORDER — SODIUM CHLORIDE AND POTASSIUM CHLORIDE .9; .15 G/100ML; G/100ML
SOLUTION INTRAVENOUS CONTINUOUS
Status: DISCONTINUED | OUTPATIENT
Start: 2021-01-25 | End: 2021-01-26

## 2021-01-25 RX ORDER — NALOXONE HYDROCHLORIDE 0.4 MG/ML
0.4 INJECTION, SOLUTION INTRAMUSCULAR; INTRAVENOUS; SUBCUTANEOUS AS NEEDED
Status: CANCELLED | OUTPATIENT
Start: 2021-01-25

## 2021-01-25 RX ORDER — SODIUM CHLORIDE 9 MG/ML
INJECTION, SOLUTION INTRAVENOUS
Status: DISCONTINUED | OUTPATIENT
Start: 2021-01-25 | End: 2021-01-25 | Stop reason: HOSPADM

## 2021-01-25 RX ORDER — OXYCODONE AND ACETAMINOPHEN 5; 325 MG/1; MG/1
1 TABLET ORAL
Status: DISCONTINUED | OUTPATIENT
Start: 2021-01-25 | End: 2021-01-29 | Stop reason: HOSPADM

## 2021-01-25 RX ORDER — FACIAL-BODY WIPES
10 EACH TOPICAL DAILY
Status: CANCELLED | OUTPATIENT
Start: 2021-01-25

## 2021-01-25 RX ORDER — DEXMEDETOMIDINE HYDROCHLORIDE 100 UG/ML
INJECTION, SOLUTION INTRAVENOUS AS NEEDED
Status: DISCONTINUED | OUTPATIENT
Start: 2021-01-25 | End: 2021-01-25 | Stop reason: HOSPADM

## 2021-01-25 RX ORDER — KETOTIFEN FUMARATE 0.35 MG/ML
1 SOLUTION/ DROPS OPHTHALMIC 2 TIMES DAILY
Status: DISCONTINUED | OUTPATIENT
Start: 2021-01-26 | End: 2021-01-29 | Stop reason: HOSPADM

## 2021-01-25 RX ORDER — ACETAMINOPHEN 325 MG/1
650 TABLET ORAL
Status: CANCELLED | OUTPATIENT
Start: 2021-01-25

## 2021-01-25 RX ORDER — ARIPIPRAZOLE 10 MG/1
10 TABLET ORAL DAILY
Status: DISCONTINUED | OUTPATIENT
Start: 2021-01-26 | End: 2021-01-29 | Stop reason: HOSPADM

## 2021-01-25 RX ORDER — CITALOPRAM 20 MG/1
40 TABLET, FILM COATED ORAL DAILY
Status: CANCELLED | OUTPATIENT
Start: 2021-01-25

## 2021-01-25 RX ORDER — OXYCODONE AND ACETAMINOPHEN 10; 325 MG/1; MG/1
1 TABLET ORAL
Status: DISCONTINUED | OUTPATIENT
Start: 2021-01-25 | End: 2021-01-25 | Stop reason: SDUPTHER

## 2021-01-25 RX ORDER — TIZANIDINE 4 MG/1
4 TABLET ORAL 3 TIMES DAILY
Status: DISCONTINUED | OUTPATIENT
Start: 2021-01-26 | End: 2021-01-29 | Stop reason: HOSPADM

## 2021-01-25 RX ORDER — ONDANSETRON 2 MG/ML
INJECTION INTRAMUSCULAR; INTRAVENOUS AS NEEDED
Status: DISCONTINUED | OUTPATIENT
Start: 2021-01-25 | End: 2021-01-25 | Stop reason: HOSPADM

## 2021-01-25 RX ORDER — ONDANSETRON 4 MG/1
4 TABLET, ORALLY DISINTEGRATING ORAL
Status: CANCELLED | OUTPATIENT
Start: 2021-01-25

## 2021-01-25 RX ORDER — ALPRAZOLAM 0.25 MG/1
0.5 TABLET ORAL
Status: DISCONTINUED | OUTPATIENT
Start: 2021-01-25 | End: 2021-01-29 | Stop reason: HOSPADM

## 2021-01-25 RX ORDER — ENOXAPARIN SODIUM 100 MG/ML
40 INJECTION SUBCUTANEOUS DAILY
Status: CANCELLED | OUTPATIENT
Start: 2021-01-25

## 2021-01-25 RX ORDER — FENTANYL CITRATE 50 UG/ML
50 INJECTION, SOLUTION INTRAMUSCULAR; INTRAVENOUS AS NEEDED
Status: DISCONTINUED | OUTPATIENT
Start: 2021-01-25 | End: 2021-01-25 | Stop reason: HOSPADM

## 2021-01-25 RX ORDER — FENTANYL CITRATE 50 UG/ML
50 INJECTION, SOLUTION INTRAMUSCULAR; INTRAVENOUS
Status: DISCONTINUED | OUTPATIENT
Start: 2021-01-25 | End: 2021-01-25

## 2021-01-25 RX ORDER — GABAPENTIN 100 MG/1
200 CAPSULE ORAL
Status: CANCELLED | OUTPATIENT
Start: 2021-01-25

## 2021-01-25 RX ORDER — ACETAMINOPHEN 500 MG
500 TABLET ORAL
Status: DISCONTINUED | OUTPATIENT
Start: 2021-01-25 | End: 2021-01-26 | Stop reason: HOSPADM

## 2021-01-25 RX ORDER — FENTANYL CITRATE 50 UG/ML
INJECTION, SOLUTION INTRAMUSCULAR; INTRAVENOUS AS NEEDED
Status: DISCONTINUED | OUTPATIENT
Start: 2021-01-25 | End: 2021-01-25 | Stop reason: HOSPADM

## 2021-01-25 RX ORDER — ONDANSETRON 2 MG/ML
4 INJECTION INTRAMUSCULAR; INTRAVENOUS
Status: DISCONTINUED | OUTPATIENT
Start: 2021-01-25 | End: 2021-01-26 | Stop reason: HOSPADM

## 2021-01-25 RX ORDER — SODIUM CHLORIDE, SODIUM LACTATE, POTASSIUM CHLORIDE, CALCIUM CHLORIDE 600; 310; 30; 20 MG/100ML; MG/100ML; MG/100ML; MG/100ML
50 INJECTION, SOLUTION INTRAVENOUS CONTINUOUS
Status: DISCONTINUED | OUTPATIENT
Start: 2021-01-25 | End: 2021-01-25 | Stop reason: HOSPADM

## 2021-01-25 RX ORDER — ARIPIPRAZOLE 10 MG/1
10 TABLET ORAL DAILY
Status: CANCELLED | OUTPATIENT
Start: 2021-01-25

## 2021-01-25 RX ORDER — LISINOPRIL 20 MG/1
20 TABLET ORAL DAILY
Status: CANCELLED | OUTPATIENT
Start: 2021-01-26

## 2021-01-25 RX ADMIN — FENTANYL CITRATE 25 MCG: 50 INJECTION, SOLUTION INTRAMUSCULAR; INTRAVENOUS at 20:30

## 2021-01-25 RX ADMIN — HYDROMORPHONE HYDROCHLORIDE 0.2 MG: 1 INJECTION, SOLUTION INTRAMUSCULAR; INTRAVENOUS; SUBCUTANEOUS at 21:15

## 2021-01-25 RX ADMIN — ONDANSETRON HYDROCHLORIDE 4 MG: 2 INJECTION, SOLUTION INTRAMUSCULAR; INTRAVENOUS at 19:43

## 2021-01-25 RX ADMIN — POTASSIUM CHLORIDE 10 MEQ: 10 INJECTION, SOLUTION INTRAVENOUS at 18:33

## 2021-01-25 RX ADMIN — FENTANYL CITRATE 25 MCG: 50 INJECTION, SOLUTION INTRAMUSCULAR; INTRAVENOUS at 20:25

## 2021-01-25 RX ADMIN — FENTANYL CITRATE 50 MCG: 50 INJECTION, SOLUTION INTRAMUSCULAR; INTRAVENOUS at 16:07

## 2021-01-25 RX ADMIN — FENTANYL CITRATE 100 MCG: 50 INJECTION, SOLUTION INTRAMUSCULAR; INTRAVENOUS at 18:31

## 2021-01-25 RX ADMIN — ROCURONIUM BROMIDE 10 MG: 10 SOLUTION INTRAVENOUS at 18:31

## 2021-01-25 RX ADMIN — SUGAMMADEX 332 MG: 100 INJECTION, SOLUTION INTRAVENOUS at 19:57

## 2021-01-25 RX ADMIN — DEXMEDETOMIDINE HYDROCHLORIDE 8 MCG: 100 INJECTION, SOLUTION, CONCENTRATE INTRAVENOUS at 19:25

## 2021-01-25 RX ADMIN — HYDROMORPHONE HYDROCHLORIDE 0.2 MG: 1 INJECTION, SOLUTION INTRAMUSCULAR; INTRAVENOUS; SUBCUTANEOUS at 20:55

## 2021-01-25 RX ADMIN — LIDOCAINE HYDROCHLORIDE 100 MG: 20 INJECTION, SOLUTION EPIDURAL; INFILTRATION; INTRACAUDAL; PERINEURAL at 18:31

## 2021-01-25 RX ADMIN — MIDAZOLAM HYDROCHLORIDE 3 MG: 1 INJECTION, SOLUTION INTRAMUSCULAR; INTRAVENOUS at 18:26

## 2021-01-25 RX ADMIN — FENTANYL CITRATE 50 MCG: 50 INJECTION, SOLUTION INTRAMUSCULAR; INTRAVENOUS at 14:53

## 2021-01-25 RX ADMIN — FENTANYL CITRATE 25 MCG: 50 INJECTION, SOLUTION INTRAMUSCULAR; INTRAVENOUS at 20:35

## 2021-01-25 RX ADMIN — FENTANYL CITRATE 25 MCG: 50 INJECTION, SOLUTION INTRAMUSCULAR; INTRAVENOUS at 20:39

## 2021-01-25 RX ADMIN — POTASSIUM CHLORIDE 10 MEQ: 7.46 INJECTION, SOLUTION INTRAVENOUS at 15:57

## 2021-01-25 RX ADMIN — HYDROMORPHONE HYDROCHLORIDE 1 MG: 2 INJECTION, SOLUTION INTRAMUSCULAR; INTRAVENOUS; SUBCUTANEOUS at 20:09

## 2021-01-25 RX ADMIN — SODIUM CHLORIDE 1000 ML: 9 INJECTION, SOLUTION INTRAVENOUS at 17:25

## 2021-01-25 RX ADMIN — HYDROMORPHONE HYDROCHLORIDE 0.2 MG: 1 INJECTION, SOLUTION INTRAMUSCULAR; INTRAVENOUS; SUBCUTANEOUS at 21:25

## 2021-01-25 RX ADMIN — POTASSIUM CHLORIDE 10 MEQ: 7.46 INJECTION, SOLUTION INTRAVENOUS at 17:18

## 2021-01-25 RX ADMIN — Medication 80 MCG: at 18:40

## 2021-01-25 RX ADMIN — POTASSIUM CHLORIDE 10 MEQ: 10 INJECTION, SOLUTION INTRAVENOUS at 18:53

## 2021-01-25 RX ADMIN — HYDROMORPHONE HYDROCHLORIDE 1 MG: 1 INJECTION, SOLUTION INTRAMUSCULAR; INTRAVENOUS; SUBCUTANEOUS at 23:30

## 2021-01-25 RX ADMIN — SODIUM CHLORIDE, POTASSIUM CHLORIDE, SODIUM LACTATE AND CALCIUM CHLORIDE: 600; 310; 30; 20 INJECTION, SOLUTION INTRAVENOUS at 18:26

## 2021-01-25 RX ADMIN — SODIUM CHLORIDE: 900 INJECTION, SOLUTION INTRAVENOUS at 19:39

## 2021-01-25 RX ADMIN — HYDROMORPHONE HYDROCHLORIDE 0.2 MG: 1 INJECTION, SOLUTION INTRAMUSCULAR; INTRAVENOUS; SUBCUTANEOUS at 21:05

## 2021-01-25 RX ADMIN — SODIUM CHLORIDE, POTASSIUM CHLORIDE, SODIUM LACTATE AND CALCIUM CHLORIDE 1000 ML: 600; 310; 30; 20 INJECTION, SOLUTION INTRAVENOUS at 14:00

## 2021-01-25 RX ADMIN — SODIUM CHLORIDE AND POTASSIUM CHLORIDE 50 ML: 9; 1.49 INJECTION, SOLUTION INTRAVENOUS at 18:44

## 2021-01-25 RX ADMIN — Medication 80 MCG: at 18:38

## 2021-01-25 RX ADMIN — PHENYLEPHRINE HYDROCHLORIDE 20 MCG/MIN: 10 INJECTION INTRAVENOUS at 18:39

## 2021-01-25 RX ADMIN — SODIUM CHLORIDE AND POTASSIUM CHLORIDE: 9; 1.49 INJECTION, SOLUTION INTRAVENOUS at 20:40

## 2021-01-25 RX ADMIN — THIAMINE HYDROCHLORIDE: 100 INJECTION, SOLUTION INTRAMUSCULAR; INTRAVENOUS at 14:00

## 2021-01-25 RX ADMIN — MIDAZOLAM HYDROCHLORIDE 2 MG: 1 INJECTION, SOLUTION INTRAMUSCULAR; INTRAVENOUS at 18:31

## 2021-01-25 RX ADMIN — CEFAZOLIN SODIUM 3 G: 10 INJECTION, POWDER, FOR SOLUTION INTRAVENOUS at 18:46

## 2021-01-25 RX ADMIN — SUCCINYLCHOLINE CHLORIDE 200 MG: 20 INJECTION, SOLUTION INTRAMUSCULAR; INTRAVENOUS at 18:31

## 2021-01-25 RX ADMIN — HYDROMORPHONE HYDROCHLORIDE 0.2 MG: 1 INJECTION, SOLUTION INTRAMUSCULAR; INTRAVENOUS; SUBCUTANEOUS at 20:45

## 2021-01-25 RX ADMIN — PROPOFOL 200 MG: 10 INJECTION, EMULSION INTRAVENOUS at 18:31

## 2021-01-25 NOTE — ED NOTES
TRANSFER - OUT REPORT:    Verbal report given to DONTAE Marcelino(name) on Dante Alatorre  being transferred to OR Holding(unit) for routine post - op       Report consisted of patients Situation, Background, Assessment and   Recommendations(SBAR). Information from the following report(s) SBAR, ED Summary, [facility] and Recent Results was reviewed with the receiving nurse. Lines:   Peripheral IV 01/25/21 Left Antecubital (Active)   Site Assessment Clean 01/25/21 1449   Phlebitis Assessment 0 01/25/21 1449   Dressing Status Clean, dry, & intact 01/25/21 1449   Dressing Type Transparent 01/25/21 1449   Hub Color/Line Status Pink 01/25/21 1449   Action Taken Blood drawn 01/25/21 1449   Alcohol Cap Used Yes 01/25/21 1449        Opportunity for questions and clarification was provided.       Patient transported with:  Transportation

## 2021-01-25 NOTE — PROGRESS NOTES
[address] appointment scheduled today for Hydration Therapy, as patient has been admitted to the hospital post follow up surgical appointment.

## 2021-01-25 NOTE — ED NOTES
Consent witnessed via telephone with Dr. Elo Campos. Patient signed consent form and form placed at bedside to go to OR with patient.

## 2021-01-25 NOTE — PROGRESS NOTES
1. Have you been to the ER, urgent care clinic since your last visit? Hospitalized since your last visit? No    2. Have you seen or consulted any other health care providers outside of the [address] [place] [address] since your last visit? Include any pap smears or colon screening.  No

## 2021-01-25 NOTE — PROGRESS NOTES
CT reviewed. Abd wall hernia likely at trocar site for the camera. Not common. I have discussed with Dr. Kd Wan and patient and will plan dx laparoscopy, possible bowel resection, repair of incisional hernia. Missed lovenox dose yesterday and today. The reasons for surgery include: post op bowel obstruction from likely hernia    I explained the indications and benefits for diagnostic laparoscopy, possible bowel resection, abdominal wall hernia repair as well as the alternatives. I discussed the potential risks, including but not limited to: bleeding, superficial and deep surgical wound infection, bowel injuries, vascular injuries, DVT/PE, and complications with anesthesia including MI, stroke, and death. I answered all questions without making any guarantees. The patient indicates that they understand the risks and benefits and they would like to proceed.      Plan OR around 6:30    Valerie Nolan MD  Bariatric and General Surgeon  [facility]

## 2021-01-25 NOTE — ED PROVIDER NOTES
The history is provided by the patient. Constipation   This is a recurrent problem. Episode onset: since surgery. Associated symptoms include abdominal distention, vomiting and constipation. Risk factors include obesity (recent surgery). She has tried oral laxatives for the symptoms. Her past medical history is significant for abdominal surgery. Chest Pain (Angina)   This is a new problem. The problem has not changed since onset. The problem occurs daily. The pain is mild. The quality of the pain is described as burning. Associated symptoms include vomiting. Pertinent negatives include no shortness of breath. She has tried nothing for the symptoms.         Past Medical History:   Diagnosis Date    Anxiety 2017    Arthritis     Asthma     Back injury     Depression     Essential hypertension     GERD (gastroesophageal reflux disease)     Morbid obesity ([facility]     Sleep apnea in adult 3/6/2018    CPAP       Past Surgical History:   Procedure Laterality Date    HX APPENDECTOMY      HX  SECTION      HX COLONOSCOPY      HX GASTRECTOMY  2016    lap sleeve gastrectomy    HX HYSTERECTOMY      HX KNEE REPLACEMENT      left knee    HX ORTHOPAEDIC      rt knee partial replacement    HX ORTHOPAEDIC Right     LIGAMENT REPAIR    IR PLC IVC FILTER  2021         Family History:   Problem Relation Age of Onset    Stroke Mother     Heart Disease Mother         PACEMAKER    Alcohol abuse Father     Seizures Father     Anesth Problems Neg Hx        Social History     Socioeconomic History    Marital status: SINGLE     Spouse name: Not on file    Number of children: Not on file    Years of education: Not on file    Highest education level: Not on file   Occupational History    Occupation: disability      Comment: since  with back    Social Needs    Financial resource strain: Not on file    Food insecurity     Worry: Not on file     Inability: Not on file   Mango-Mate needs Medical: Not on file     Non-medical: Not on file   Tobacco Use    Smoking status: Former Smoker     Packs/day: 1.00     Years: 30.00     Pack years: 30.00     Quit date: 2020     Years since quittin.6    Smokeless tobacco: Never Used   Substance and Sexual Activity    Alcohol use: No    Drug use: No    Sexual activity: Not on file     Comment: post menopausal    Lifestyle    Physical activity     Days per week: Not on file     Minutes per session: Not on file    Stress: Not on file   Relationships    Social connections     Talks on phone: Not on file     Gets together: Not on file     Attends Sabianism service: Not on file     Active member of club or organization: Not on file     Attends meetings of clubs or organizations: Not on file     Relationship status: Not on file    Intimate partner violence     Fear of current or ex partner: Not on file     Emotionally abused: Not on file     Physically abused: Not on file     Forced sexual activity: Not on file   Other Topics Concern    Not on file   Social History Narrative    In the home with 15year old grand daughter and friend, Lencho Newman: Patient has no known allergies. Review of Systems   Respiratory: Negative for shortness of breath. Cardiovascular: Positive for chest pain. Gastrointestinal: Positive for abdominal distention, constipation and vomiting. All other systems reviewed and are negative. Vitals:    21 1149   BP: 137/79   Pulse: (!) 106   Resp: 20   Temp: 97.6 °F (36.4 °C)   SpO2: 93%            Physical Exam  Vitals signs and nursing note reviewed. Constitutional:       General: She is not in acute distress. Appearance: She is well-developed. HENT:      Head: Normocephalic and atraumatic. Eyes:      Conjunctiva/sclera: Conjunctivae normal.   Neck:      Musculoskeletal: Neck supple. Cardiovascular:      Rate and Rhythm: Regular rhythm. Tachycardia present.    Pulmonary:      Effort: Pulmonary effort is normal. No respiratory distress. Abdominal:      General: There is distension. Comments: Epigastric tympany to percussion. Well healing surgical incision sites. Musculoskeletal: Normal range of motion. General: No deformity. Skin:     General: Skin is warm and dry. Neurological:      Mental Status: She is alert. Cranial Nerves: No cranial nerve deficit. Psychiatric:         Behavior: Behavior normal.          MDM     54-year-old female presents with abdominal distention, pain, lack of bowel movement since surgical procedure performed to convert gastric sleeve to bypass. Dr. Isaias Bravo is her surgeon. She was sent from clinic with concern for dehydration and inability to tolerate p.o. and they requested KUB and basic lab work. White blood cell count is fairly dramatically elevated, CT scan ordered for more detailed evaluation to determine if this is a sign of leak or other complication or if this is secondary to dehydration. General surgery consulted and will see the patient in the emergency department. Procedures    EKG 1211: Rate 109, sinus tachycardia. Nonspecific TWA. Interpretation limited by artifact. 3:13 PM  Change of shift. Care of patient signed over to Dr Kisha Martinez. Bedside handoff complete. Awaiting completion of labs and imaging, surgery consultation and admission.

## 2021-01-25 NOTE — PROGRESS NOTES
TRANSFER - IN REPORT:    Verbal report received from SPIKERN(name) on Gato Gtz  being received from ED(unit) for ordered procedure      Report consisted of patients Situation, Background, Assessment and   Recommendations(SBAR). Information from the following report(s) SBAR, Kardex, ED Summary, Intake/Output, MAR, Recent Results and Pre Procedure Checklist was reviewed with the receiving nurse. Opportunity for questions and clarification was provided. Assessment completed upon patients arrival to unit and care assumed.

## 2021-01-25 NOTE — H&P
Dehydration/Nausea/Vomiting History and Physical    Subjective: This 54 y.o.  female presents with nausea , intermittent vomiting, abdominal post op pain and no BM x 11-12 days. Ms. Giovany Henry is 11 days status post gastric bypass. She was a revision from sleeve gastrectomy and also had extensive lysis of adhesions. She presented to the office today with complaints of nausea, vomiting, abdominal pain, weakness and no bowel movement for at least 11 days. She is not tolerating any protein shakes and in the last 24 hours has had less than 12 ounces of water. Reports she feels \"awful\". She has some shortness of breath when she is \"hit with the pain\". Since discharge she has been spending most of her time in bed. She is not walking regularly except to go to the bathroom. She says the Slim fast makes her vomit. She tried taking her medications and she vomited. The only thing she has been taking consistently has been a stool softener and she says her muscle relaxer. She has not been taking her blood pressure medication or her antidepressants. She has been using Lovenox daily but injecting in her thighs. She has not had a dose in 48 hours. She says she forgot to take it yesterday. She does have an IVC filter that was patent placed preoperatively. She denies any fever or chills and no left shoulder pain  While in the office she had no complaints of chest pain but she was having some intermittent sharp abdominal pain. She reports when she belches it \"smells like bowel\". No BM x11 days and no flatus  Voided x1 today and it was a small amount very dark  She did have a \"slight\" fall at home. Says she slid I believe out of the bed. She was originally scheduled to go to outpatient infusion center today for IV fluids however clinically admission was advised.   She was taken to admitting by wheelchair and then reportedly started having chest pain while in admitting so she was sent to the ER.    Past Medical History:   Diagnosis Date    Anxiety 2017    Arthritis     Asthma     Back injury     Depression     Essential hypertension     GERD (gastroesophageal reflux disease)     Morbid obesity ([facility] [address]     Sleep apnea in adult 3/6/2018    CPAP      Past Surgical History:   Procedure Laterality Date    HX APPENDECTOMY      HX  SECTION      HX COLONOSCOPY      HX GASTRECTOMY      lap sleeve gastrectomy    HX HYSTERECTOMY      HX KNEE REPLACEMENT      left knee    HX ORTHOPAEDIC      rt knee partial replacement    HX ORTHOPAEDIC Right     LIGAMENT REPAIR    IR PLC IVC FILTER  2021     Family History   Problem Relation Age of Onset    Stroke Mother     Heart Disease Mother         PACEMAKER    Alcohol abuse Father     Seizures Father     Anesth Problems Neg Hx        Social History     Tobacco Use    Smoking status: Former Smoker     Packs/day: 1.00     Years: 30.00     Pack years: 30.00     Quit date: 2020     Years since quittin.6    Smokeless tobacco: Never Used   Substance Use Topics    Alcohol use: No     No Known Allergies  Prior to Admission medications    Medication Sig Start Date End Date Taking? Authorizing Provider   enoxaparin (LOVENOX) 40 mg/0.4 mL 0.4 mL by SubCUTAneous route daily. AFTER SURGERY  Indications: blood clot in a deep vein of the extremities 12/10/20  Yes Bel Teran NP   polyethylene glycol (MIRALAX) 17 gram packet Take 1 Packet by mouth daily. Indications: constipation 12/10/20  Yes Bel Teran NP   citalopram (CeleXA) 40 mg tablet Take 1 Tab by mouth daily for 90 days. 20 Yes Usama Song NP   ARIPiprazole (ABILIFY) 10 mg tablet Take 1 Tab by mouth daily for 90 days. Indications: additional medications to treat depression 20 Yes Usama Song NP   tiZANidine (ZANAFLEX) 4 mg capsule Take 4 mg by mouth three (3) times daily.    Yes Provider, Historical   bisacodyL (Dulcolax, bisacodyl,) 5 mg EC tablet Take 5 mg by mouth daily as needed for Constipation. Yes Provider, Historical   Cane lita Use as directed. 3/14/16  Yes Rach Rubin NP   amLODIPine (NORVASC) 10 mg tablet Take  by mouth daily. Yes Provider, Historical   lisinopril (PRINIVIL, ZESTRIL) 10 mg tablet Take 40 mg by mouth daily. Yes Provider, Historical   traZODone (DESYREL) 150 mg tablet Take 1 Tab by mouth nightly as needed for Sleep for up to 90 days. 12/14/20 3/14/21  Jonh Dutta NP   ondansetron (ZOFRAN ODT) 4 mg disintegrating tablet Take 1 Tab by mouth every eight (8) hours as needed for Nausea or Nausea or Vomiting (AFTER SURGERY). 12/10/20   Susan Preciado NP   gabapentin (NEURONTIN) 100 mg capsule Take 1-2 Caps by mouth every eight (8) hours as needed for Pain (AFTER SURGERY). Max Daily Amount: 600 mg. 12/10/20   Susan Preciado NP   pantoprazole (PROTONIX) 40 mg tablet Take 1 Tab by mouth daily. AFTER SURGERY 12/10/20   Susan Preciado NP   ALPRAZolam Lamount Salon) 0.5 mg tablet Take 1 Tab by mouth three (3) times daily as needed for Anxiety for up to 90 days. Max Daily Amount: 1.5 mg. 11/9/20 2/7/21  Jonh Dutta NP   pravastatin (PRAVACHOL) 20 mg tablet Take 20 mg by mouth nightly. Provider, Historical   minocycline (DYNACIN) 100 mg tablet Take 100 mg by mouth two (2) times a day. Provider, Historical   ketoconazole (NIZORAL) 2 % topical cream Apply  to affected area daily. Provider, Historical   olopatadine (PATANOL) 0.1 % ophthalmic solution Administer 2 Drops to both eyes two (2) times a day. Provider, Historical   calcium citrate/vitamin D3 (CALCIUM CITRATE + D PO) Take 600 mg by mouth daily. Provider, Historical   cyanocobalamin (VITAMIN B-12) 500 mcg tablet Take 500 mcg by mouth daily. Provider, Historical   multivitamin (ONE A DAY) tablet Take 1 Tab by mouth daily.     Provider, Historical   albuterol (ProAir HFA) 90 mcg/actuation inhaler Take 2 Puffs by inhalation every four (4) hours as needed. Provider, Historical   naloxone 4 mg/actuation spry 4 mg by Nasal route as needed. 3/30/17   Tammy Miguel MD   ergocalciferol (VITAMIN D2) 50,000 unit capsule Take 50,000 Units by mouth every seven (7) days. Provider, Historical   butalbital-acetaminophen-caffeine (FIORICET) -40 mg per tablet Take 1 Tab by mouth daily as needed. Provider, Historical         Review of Systems   Constitutional: Positive for fatigue. Negative for fever. In bed most of the time    HENT: Negative for congestion. Respiratory: Positive for shortness of breath. Negative for cough, choking, chest tightness and wheezing. Cardiovascular: Negative for chest pain, palpitations and leg swelling. IVC filter in place   Gastrointestinal: Positive for abdominal distention, abdominal pain, constipation (no BM, no flatus x 11 days ), nausea and vomiting. Negative for anal bleeding, blood in stool, diarrhea and rectal pain. Vomits protein shakes   Vomits \"when I take all my pills\"   Tolerating clears   Drinking regular gatorade, some water and popcicles   No hematemesis    Genitourinary:        Urine is dark and small amount    Musculoskeletal: Positive for arthralgias, back pain, gait problem and myalgias. Uses cane   Prn wheelchair    Skin: Negative for rash and wound. Neurological: Positive for dizziness and weakness. Psychiatric/Behavioral: Negative for self-injury and suicidal ideas. The patient is not nervous/anxious. Not taking psych meds since discharge        Objective:     No data found. Physical Exam  Constitutional:       Appearance: She is obese. Comments: AA female in wheel chair   Here with adult female      HENT:      Mouth/Throat:      Mouth: Mucous membranes are dry. Eyes:      Extraocular Movements: Extraocular movements intact. Pupils: Pupils are equal, round, and reactive to light.       Comments: No nystagmus      Cardiovascular: Rate and Rhythm: Regular rhythm. Tachycardia present.   Pulmonary:      Effort: Pulmonary effort is normal.      Breath sounds: Normal breath sounds.      Comments: Slightly diminished bases   Abdominal:      General: There is distension.      Palpations: There is no mass.      Tenderness: There is abdominal tenderness. There is no right CVA tenderness, left CVA tenderness, guarding or rebound.      Comments: BS absent   Rounded, obese and mildly distended    Musculoskeletal:         General: No swelling.      Comments: In wheelchair   Was able to stand to be weighed    Skin:     General: Skin is warm.      Comments: Damp      Neurological:      Mental Status: She is oriented to person, place, and time.   Psychiatric:      Comments: Flat affect          Data Review:  To be done       Assessment:     Active Problems:    Dehydration (1/25/2021)      Nausea and vomiting (1/25/2021)     HTN     Obstipation     Morbid obesity BMI 55         Plan:     11 days status post revision of a laparoscopic sleeve gastrectomy to a gastric bypass with upper endoscopy and extensive lysis of adhesions  She is really doing very little at home and spending most of the time in bed  Protein intake is minimal and most days it sounds like nothing.  Clear liquid intake less than 20 ounces a day.  She not taking her medication.  She not had a bowel movement in almost 2 weeks and no flatus  My original plan was to send her to the outpatient infusion center for IV hydration however she is at greater risk for deterioration and I have concerns that she will continue to stay in bed at home.  She is clinically dehydrated  Recommended inpatient admission to Dr. Sanders service.  She requires hydration including a Goody bag, some baseline labs I am concerned about her kidney function.   Proceed with a KUB and get her on a bowel regimen  Aggressive pulmonary toilet and physical therapy for mobility  Will need to ensure that she is mobile, tolerating  bariatric liquid diet without nausea vomiting before sending her home. We will make Dr. Jeff Dumas aware once he is out of the operating room.

## 2021-01-25 NOTE — H&P
ADMIT NOTE    Attending addendum:  I was available to supervise the APC. I have reviewed the APC's note    OBJECTIVE:  Visit Vitals  BP (!) 142/95   Pulse (!) 103   Temp 97.8 °F (36.6 °C)   Resp 21   SpO2 93%     General: No acute distress, conversant  Eyes: PERRLA, no scleral icterus  HENT: Normocephalic without oral lesions  Neck: Trachea midline without LAD  Cardiac: Normal pulse rate and rhythm  Pulmonary: Symmetric chest rise with normal effort  GI: Distended, tender over hernia site  Skin: Warm without rash  Extremities: No edema or joint stiffness  Psych: Appropriate mood and affect    ASSESSMENT / Plan:    I agree with the APC's assessment and plan with the following additions/modifications:    CT with incisional hernia. Consent obtained. OR this evening. Please see note from earlier for details.       Signed By: Lester Agustin MD  Bariatric and General Surgeon  [address] [place] [address] [facility]    January 25, 2021

## 2021-01-25 NOTE — ED TRIAGE NOTES
Pt stated she has not had a bm in 11 days since her surgery, pt started having chest pain while at md office, +vomiting the other day, +sob, denies fever or chills , pt sent here for admission but sent here because she was having chest pain

## 2021-01-25 NOTE — ANESTHESIA PREPROCEDURE EVALUATION
Relevant Problems   No relevant active problems       Anesthetic History     PONV          Review of Systems / Medical History  Patient summary reviewed, nursing notes reviewed and pertinent labs reviewed    Pulmonary  Within defined limits      Sleep apnea    Asthma        Neuro/Psych   Within defined limits      Psychiatric history     Cardiovascular  Within defined limits  Hypertension                   GI/Hepatic/Renal  Within defined limits   GERD           Endo/Other  Within defined limits      Morbid obesity and arthritis     Other Findings              Physical Exam    Airway  Mallampati: II  TM Distance: > 6 cm  Neck ROM: normal range of motion   Mouth opening: Normal     Cardiovascular  Regular rate and rhythm,  S1 and S2 normal,  no murmur, click, rub, or gallop             Dental    Dentition: Poor dentition     Pulmonary  Breath sounds clear to auscultation               Abdominal  GI exam deferred       Other Findings            Anesthetic Plan    ASA: 4  Anesthesia type: general          Induction: Intravenous  Anesthetic plan and risks discussed with: Patient

## 2021-01-25 NOTE — PROGRESS NOTES
Chief Complaint   Patient presents with    Surgical Follow-up     11 days s/p Laparoscopic revision of sleeve gastrectomy to gastric bypass down 28.5 pounds     Jeanne Levi is 11 days sstatu post laparoscopic revision of a sleeve gastrectomy to a laparoscopic gastric bypass. She had extensive lysis of adhesions as well as an upper endoscopy. Patient was advised to come in to be seen today for an evaluation with concerns for dehydration. She says she is doing \"awful\". She is having some intermittent nausea and vomiting. Reports that her protein shakes make her nauseated and she throws them up. Yesterday she had 8 to 12 ounces of water and nothing else for the rest of the day. She is not taking her medication. She reports she throws it up. She says she has been taking her stool softener and muscle relaxer fairly consistently but nothing else. She is not had a bowel movement in 12 days. She is not passing any gas. She says she has tried milk of magnesia which she vomited and she is taken MiraLAX for several days without any response. She is not able to use a suppository or an enema on her own due to her size. She reports when she belches it \"smells like bowels\". She is reporting some shortness of breath when she has \"attacks of pain\". She has been spending most of her time in bed at home. She is not been walking except to go to the bathroom and then she gets back to bed. She has been doing her Lovenox injections every day and has been using her thighs. She did not you do a dose yesterday or today. She does have an IVC filter. She has no chest pain and no left shoulder pain  She voided this morning a small amount and she says it was quite dark.   She is getting in no protein  She has been tolerating some water and popsicles  She is not taking any of her bariatric vitamins  She is not taking pantoprazole  She is out of pain medication and is complaining of pain mostly mid abdomen around the umbilicus and to the right. Pain is with activity. She is also having some intermittent sharp pain which sounds more like gas. Having some intermittent pain in her left outer thigh that sounds like it is radiating from her glutes. Says she had a slight fall at home where she kind of slipped out of the bed. She denies loss of consciousness and does not think she hurt anything. Visit Vitals  /89   Pulse (!) 112   Temp 99 °F (37.2 °C)   Resp 24   Ht 5' 8\" (1.727 m)   Wt (!) 366 lb 8 oz (166.2 kg)   SpO2 (!) 89%   BMI 55.73 kg/m²     She is here with a family member and arrived in a wheelchair  She is alert and oriented x3 but appears fatigued  Mucous membranes are little dry, dark circles under her eyes  Chest is essentially clear to auscultation and palpation slightly diminished at the bases  Heart is regular rhythm and mild tachycardia in the one teens  Abdomen is rounded and somewhat distended, lap sites are clean, dry and intact. There is no erythema or induration. Bowel sounds are absent, tympanic to percussion, generalized tenderness mid abdomen and to the right. There is no rebound, some voluntary guarding  Extremities are quite dry but without edema, she has a cane but arrived via wheelchair      ICD-10-CM ICD-9-CM    1. Follow-up examination after abdominal surgery  Z09 V67.09    2. Postoperative intestinal malabsorption  K91.2 579.3    3. Morbid obesity with BMI of 50.0-59.9, adult ([facility])  E66.01 278.01     Z68.43 V85.43    4. Nausea and vomiting in adult  R11.2 787.01    5. Post-op pain  G89.18 338.18    6. Weakness  R53.1 780.79    7. Slow transit constipation  K59.01 564.01      11 days status post revision of a laparoscopic sleeve gastrectomy to a gastric bypass with upper endoscopy and extensive lysis of adhesions  She is really doing very little at home and spending most of the time in bed  Protein intake is minimal and most days it sounds like nothing.   Clear liquid intake less than 20 ounces a day. She not taking her medication. She not had a bowel movement in almost 2 weeks and no flatus  My original plan was to send her to the outpatient infusion center for IV hydration however she is at greater risk for deterioration and I have concerns that she will continue to stay in bed at home. She is clinically dehydrated  Recommended inpatient admission to Dr. Katie Souza service. She requires hydration including a Goody bag, some baseline labs I am concerned about her kidney function. Proceed with a KUB and get her on a bowel regimen  Aggressive pulmonary toilet and physical therapy for mobility  Will need to ensure that she is mobile, tolerating bariatric liquid diet without nausea vomiting before sending her home. We will make Dr. Katie Souza aware once he is out of the operating room.

## 2021-01-26 LAB
ANION GAP SERPL CALC-SCNC: 5 MMOL/L (ref 5–15)
BASOPHILS # BLD: 0 K/UL (ref 0–0.1)
BASOPHILS NFR BLD: 0 % (ref 0–1)
BUN SERPL-MCNC: 55 MG/DL (ref 6–20)
BUN/CREAT SERPL: 31 (ref 12–20)
CALCIUM SERPL-MCNC: 8.7 MG/DL (ref 8.5–10.1)
CHLORIDE SERPL-SCNC: 93 MMOL/L (ref 97–108)
CO2 SERPL-SCNC: 35 MMOL/L (ref 21–32)
CREAT SERPL-MCNC: 1.75 MG/DL (ref 0.55–1.02)
DIFFERENTIAL METHOD BLD: ABNORMAL
EOSINOPHIL # BLD: 0.2 K/UL (ref 0–0.4)
EOSINOPHIL NFR BLD: 1 % (ref 0–7)
ERYTHROCYTE [DISTWIDTH] IN BLOOD BY AUTOMATED COUNT: 15.5 % (ref 11.5–14.5)
GLUCOSE SERPL-MCNC: 167 MG/DL (ref 65–100)
HCT VFR BLD AUTO: 38.6 % (ref 35–47)
HGB BLD-MCNC: 11.7 G/DL (ref 11.5–16)
IMM GRANULOCYTES # BLD AUTO: 0 K/UL
IMM GRANULOCYTES NFR BLD AUTO: 0 %
LACTATE SERPL-SCNC: 0.8 MMOL/L (ref 0.4–2)
LYMPHOCYTES # BLD: 3.1 K/UL (ref 0.8–3.5)
LYMPHOCYTES NFR BLD: 19 % (ref 12–49)
MAGNESIUM SERPL-MCNC: 4.1 MG/DL (ref 1.6–2.4)
MCH RBC QN AUTO: 28.6 PG (ref 26–34)
MCHC RBC AUTO-ENTMCNC: 30.3 G/DL (ref 30–36.5)
MCV RBC AUTO: 94.4 FL (ref 80–99)
MONOCYTES # BLD: 0.2 K/UL (ref 0–1)
MONOCYTES NFR BLD: 1 % (ref 5–13)
NEUTS SEG # BLD: 12.7 K/UL (ref 1.8–8)
NEUTS SEG NFR BLD: 79 % (ref 32–75)
NRBC # BLD: 0.03 K/UL (ref 0–0.01)
NRBC BLD-RTO: 0.2 PER 100 WBC
PHOSPHATE SERPL-MCNC: 4.9 MG/DL (ref 2.6–4.7)
PLATELET # BLD AUTO: 450 K/UL (ref 150–400)
PLATELET COMMENTS,PCOM: ABNORMAL
PMV BLD AUTO: 10.4 FL (ref 8.9–12.9)
POTASSIUM SERPL-SCNC: 4.5 MMOL/L (ref 3.5–5.1)
RBC # BLD AUTO: 4.09 M/UL (ref 3.8–5.2)
RBC MORPH BLD: ABNORMAL
SODIUM SERPL-SCNC: 133 MMOL/L (ref 136–145)
WBC # BLD AUTO: 16.2 K/UL (ref 3.6–11)

## 2021-01-26 PROCEDURE — 85025 COMPLETE CBC W/AUTO DIFF WBC: CPT

## 2021-01-26 PROCEDURE — C9113 INJ PANTOPRAZOLE SODIUM, VIA: HCPCS | Performed by: SURGERY

## 2021-01-26 PROCEDURE — 74011250637 HC RX REV CODE- 250/637: Performed by: SURGERY

## 2021-01-26 PROCEDURE — 97161 PT EVAL LOW COMPLEX 20 MIN: CPT

## 2021-01-26 PROCEDURE — 83735 ASSAY OF MAGNESIUM: CPT

## 2021-01-26 PROCEDURE — 65270000029 HC RM PRIVATE

## 2021-01-26 PROCEDURE — 99024 POSTOP FOLLOW-UP VISIT: CPT | Performed by: SURGERY

## 2021-01-26 PROCEDURE — 83605 ASSAY OF LACTIC ACID: CPT

## 2021-01-26 PROCEDURE — 77030027138 HC INCENT SPIROMETER -A

## 2021-01-26 PROCEDURE — 77010033678 HC OXYGEN DAILY

## 2021-01-26 PROCEDURE — 94760 N-INVAS EAR/PLS OXIMETRY 1: CPT

## 2021-01-26 PROCEDURE — 36415 COLL VENOUS BLD VENIPUNCTURE: CPT

## 2021-01-26 PROCEDURE — 74011000250 HC RX REV CODE- 250: Performed by: SURGERY

## 2021-01-26 PROCEDURE — 97530 THERAPEUTIC ACTIVITIES: CPT

## 2021-01-26 PROCEDURE — 65270000008 HC RM PRIVATE PEDIATRIC

## 2021-01-26 PROCEDURE — 80048 BASIC METABOLIC PNL TOTAL CA: CPT

## 2021-01-26 PROCEDURE — 74011250636 HC RX REV CODE- 250/636: Performed by: SURGERY

## 2021-01-26 PROCEDURE — 84100 ASSAY OF PHOSPHORUS: CPT

## 2021-01-26 RX ORDER — SODIUM CHLORIDE 9 MG/ML
50 INJECTION, SOLUTION INTRAVENOUS CONTINUOUS
Status: DISCONTINUED | OUTPATIENT
Start: 2021-01-26 | End: 2021-01-29 | Stop reason: HOSPADM

## 2021-01-26 RX ORDER — OXYCODONE AND ACETAMINOPHEN 10; 325 MG/1; MG/1
1 TABLET ORAL
Status: DISCONTINUED | OUTPATIENT
Start: 2021-01-26 | End: 2021-01-29 | Stop reason: HOSPADM

## 2021-01-26 RX ADMIN — SODIUM CHLORIDE 150 ML/HR: 9 INJECTION, SOLUTION INTRAVENOUS at 08:38

## 2021-01-26 RX ADMIN — Medication 10 ML: at 22:12

## 2021-01-26 RX ADMIN — ARIPIPRAZOLE 10 MG: 10 TABLET ORAL at 08:53

## 2021-01-26 RX ADMIN — HYDROMORPHONE HYDROCHLORIDE 1 MG: 1 INJECTION, SOLUTION INTRAMUSCULAR; INTRAVENOUS; SUBCUTANEOUS at 16:50

## 2021-01-26 RX ADMIN — TIZANIDINE 4 MG: 4 TABLET ORAL at 16:42

## 2021-01-26 RX ADMIN — HEPARIN SODIUM 5000 UNITS: 5000 INJECTION INTRAVENOUS; SUBCUTANEOUS at 23:21

## 2021-01-26 RX ADMIN — HEPARIN SODIUM 5000 UNITS: 5000 INJECTION INTRAVENOUS; SUBCUTANEOUS at 16:42

## 2021-01-26 RX ADMIN — OXYCODONE HYDROCHLORIDE AND ACETAMINOPHEN 1 TABLET: 10; 325 TABLET ORAL at 20:13

## 2021-01-26 RX ADMIN — Medication 10 ML: at 05:43

## 2021-01-26 RX ADMIN — HEPARIN SODIUM 5000 UNITS: 5000 INJECTION INTRAVENOUS; SUBCUTANEOUS at 08:52

## 2021-01-26 RX ADMIN — HYDROMORPHONE HYDROCHLORIDE 1 MG: 1 INJECTION, SOLUTION INTRAMUSCULAR; INTRAVENOUS; SUBCUTANEOUS at 03:17

## 2021-01-26 RX ADMIN — HYDROMORPHONE HYDROCHLORIDE 1 MG: 1 INJECTION, SOLUTION INTRAMUSCULAR; INTRAVENOUS; SUBCUTANEOUS at 06:24

## 2021-01-26 RX ADMIN — SODIUM CHLORIDE 40 MG: 9 INJECTION, SOLUTION INTRAMUSCULAR; INTRAVENOUS; SUBCUTANEOUS at 08:52

## 2021-01-26 RX ADMIN — Medication 10 ML: at 16:50

## 2021-01-26 RX ADMIN — TIZANIDINE 4 MG: 4 TABLET ORAL at 08:52

## 2021-01-26 RX ADMIN — OXYCODONE HYDROCHLORIDE AND ACETAMINOPHEN 1 TABLET: 10; 325 TABLET ORAL at 08:52

## 2021-01-26 RX ADMIN — OXYCODONE HYDROCHLORIDE AND ACETAMINOPHEN 1 TABLET: 10; 325 TABLET ORAL at 13:54

## 2021-01-26 RX ADMIN — TIZANIDINE 4 MG: 4 TABLET ORAL at 22:11

## 2021-01-26 RX ADMIN — OXYCODONE AND ACETAMINOPHEN 1 TABLET: 5; 325 TABLET ORAL at 05:43

## 2021-01-26 RX ADMIN — AMLODIPINE BESYLATE 10 MG: 5 TABLET ORAL at 08:52

## 2021-01-26 NOTE — PROGRESS NOTES
4280 - Reason for Admission:   SBO                  RUR Score: 22 %             PCP: First and Last name: Elisha Moore   Name of Practice:    Are you a current patient: Yes/No:  Yes   Approximate date of last visit: Jan 2021   Can you participate in a virtual visit if needed: Yes    Do you (patient/family) have any concerns for transition/discharge? No            DME: Mobile wheelchair, cane    ADLs: Patient is primarily independent but uses mobile wheelchair when she goes out. Previous HH/SNF/rehab: No    ER Contacts: Angela Zapien - [phone]    Plan for utilizing home health: Not at this time    Current Advanced Directive/Advance Care Plan: No             Transition of Care Plan:   Transport home with family    Care Management Interventions  PCP Verified by CM: Yes  Mode of Transport at Discharge: Other (see comment)  MyChart Signup: No  Discharge Durable Medical Equipment: No  Health Maintenance Reviewed: Yes  Physical Therapy Consult: No  Occupational Therapy Consult: No  Speech Therapy Consult: No  Current Support Network:  Other  Confirm Follow Up Transport: Family  Discharge Location  Discharge Placement: Home

## 2021-01-26 NOTE — OP NOTES
OPERATIVE NOTE    Date of Procedure: 1/25/2021     Preoperative Diagnosis: Small bowel obstruction and abdominal wall hernia  Postoperative Diagnosis: INCISIONAL HERNIA      Procedure: Procedure(s):  DIAGNOSTIC LAPAROSCOPY, REDUCTION AND REPAIR OF INCARCERATED HERNIA        Surgeon: Valerie Nolan MD  Assistant(s): - They were critically important in the exposure and key portions of the case  Anesthesia: General   Indications: 75-year-old female who is 11 days out from sleeve to gastric bypass. The patient presented with dehydration today and abdominal pain. Labs showed severe dehydration. CT scan demonstrated Frias's hernia and SBO. Patient taken to the OR for reduction of hernia  Findings: Incarcerated, partially strangulated loop of small bowel at a previous 12 mm trocar site not from the recent surgery. After observation the bowel appeared viable. Description of Operation: Jeff Guillen was identified in the pre-operative holding area. Informed consent was obtained after a complete discussion of risks, benefits and alternatives to surgery were had with the patient. The patient was brought back to the operating room and placed under general endotracheal anesthesia in the supine position on the operating room table. The patient was then prepped and draped in the usual sterile fashion. A timeout was performed. We then injected local anesthetic in the left subcostal midclavicular space. We inserted a 5 mm trocar using the Optiview technique into her previous left 5 mm trocar site from her bypass. We safely entered the abdomen and achieved pneumoperitoneum. We observed the incisional hernia and a loop of bowel up to the midline. We then placed a 12 mm trocar caudad to this followed by another 5 mm trocar in the LLQ. The abdomen had moderate adhesions as expected postoperatively. We addressed the hernia. Using gentle blunt traction we reduce the hernia from the 2 cm hernia defect.   The bowel did not appear to be ischemic. It was observed for 30 minutes. I attempted to identify the Garry limb under the liver however adhesions were too great to safely proceed. The rest of the bowel was matted and did not lend itself to further exploration. Using the harmonic I took down the peritoneum around the hernia defect. We then bluntly remove the sac. I then used 2-0 PDS sutures in a transfascial suture passer to close the hernia defect. Given the uncontaminated surgical field I elected to place a 11 cm piece of Bard ventral light mesh with an echo deployment device. The mesh was placed intraperitoneal and deployed. I then used the secure strap device to affix the mesh to the abdominal wall in a \"double crown\" fashion with 1 cm spacing. The mesh laid well. Once again looked at the reduced bowel which appeared to be viable with peristalsis. We closed the 12 mm trocar with a 0 PDS suture and removed the trocars under direct visualization. The dermis was closed with 4-0 Monocryl followed by Dermabond for the skin. At the end of the procedure all instrument, needle, and sponge counts were correct. I was present and scrubbed throughout the entirety of the case. The patient awoke from anesthesia and was extubated without complication and sent to PACU in stable condition. Estimated Blood Loss: Minimal    Specimens: None    Complications: None    Implants:   Implant Name Type Inv. Item Serial No.  Lot No. LRB No. Used Action   MESH JOAQUIN DIA4. 5IN CIR W/ ECHO PS POS SYS VENTRALIGHT ST - SNA  MESH JOAQUIN DIA4. [place] ECHO PS POS SYS VENTRALIGHT ST NA BARD DAVOL_WD NA N/A 1 Implanted         Andre Dorado MD  Bariatric and General Surgeon  [facility]  1/25/2021

## 2021-01-26 NOTE — ROUTINE PROCESS
Patient: Sabine Choi MRN: [id number]  SSN: [id number] YOB: 1965  Age: 54 y.o. Sex: female Patient is status post Procedure(s): DIAGNOSTIC LAPAROSCOPY, REDUCTION AND REPAIR OF INCARCERATED HERNIA. Surgeon(s) and Role: 
   * Rg Hatch MD - Primary Local/Dose/Irrigation:  SEE EMAR Peripheral IV 01/25/21 Left Antecubital (Active) Site Assessment Clean 01/25/21 1449 Phlebitis Assessment 0 01/25/21 1449 Dressing Status Clean, dry, & intact 01/25/21 1449 Dressing Type Transparent 01/25/21 1449 Hub Color/Line Status Pink 01/25/21 1449 Action Taken Blood drawn 01/25/21 1449 Alcohol Cap Used Yes 01/25/21 1449 Airway - Endotracheal Tube 01/25/21 Oral (Active) Dressing/Packing:  Incision 01/25/21 Abdomen-Dressing/Treatment: Liquid /adhesive;Steri-strips;Gauze dressing/dressing sponge;Tegaderm/Transparent film dressing(TROCAR SITES x5) (01/25/21 1947) Splint/Cast:  ] Other:

## 2021-01-26 NOTE — PROGRESS NOTES
Problem: Mobility Impaired (Adult and Pediatric)  Goal: *Acute Goals and Plan of Care (Insert Text)  Description: FUNCTIONAL STATUS PRIOR TO ADMISSION: Pt reports use of SPC in home and motorized w/c in community prior to gastric bypass approx 11 days PTA. Since original surgery, pt notes decreased tolerance to activity and minimal mobilization. Wears CPAP at night. HOME SUPPORT PRIOR TO ADMISSION: The patient lived with friend and 15 y/o daughter. Physical Therapy Goals  Initiated 1/26/2021  1. Patient will move from supine to sit and sit to supine  and roll side to side in bed with modified independence within 7 day(s). 2.  Patient will transfer from bed to chair and chair to bed with supervision/set-up using the least restrictive device within 7 day(s). 3.  Patient will perform sit to stand with supervision/set-up within 7 day(s). 4.  Patient will ambulate with supervision/set-up for 50 feet with the least restrictive device within 7 day(s). 5.  Patient will ascend/descend 4 stairs with single handrail and cane with minimal assistance/contact guard assist within 7 day(s). Outcome: Not Met   PHYSICAL THERAPY EVALUATION  Patient: Rosetta Jiménez (62 y.o. female)  Date: 1/26/2021  Primary Diagnosis: Small bowel obstruction ([address] [O45.438]  Procedure(s) (LRB):  DIAGNOSTIC LAPAROSCOPY, REDUCTION AND REPAIR OF INCARCERATED HERNIA (N/A) 1 Day Post-Op   Precautions: fall       ASSESSMENT  Based on the objective data described below, the patient presents with post-op pain, decreased tolerance to activity, decreased indep with functional mobility s/p repair of incisional hernia POD#1. Of note, pt underwent gastric sleeve to bypass procedure approx 11 days PTA. Readmitted with incisional hernia and SBO. Pt tolerated standing and sidestepping with RW; SpO2 90's with 3L O2, HR up to low 100s with activity. Pt declined additional gait training this session due to pain and fatigue.  Educated pt on need to Chief Complaint/Follow-up on: Dm    Subjective:  POC blood glucose and insulin use reviewed.  it does appear that pt likes to snack, hence likely the 369 yesterday am. (hard to engage with the pt about dietary habits and ascertain snacking)  pt did have a 76 yesterday evening for which he was given dextrose, sugar than went to 151    per med review, pt is refusing medications as well such as statins, palvix.            MEDICATIONS  (STANDING):  aspirin  chewable 81 milliGRAM(s) Oral daily  atorvastatin 80 milliGRAM(s) Oral at bedtime  benztropine 1 milliGRAM(s) Oral two times a day  clopidogrel Tablet 75 milliGRAM(s) Oral daily  dextrose 5%. 1000 milliLiter(s) (50 mL/Hr) IV Continuous <Continuous>  dextrose 50% Injectable 12.5 Gram(s) IV Push once  dextrose 50% Injectable 25 Gram(s) IV Push once  dextrose 50% Injectable 25 Gram(s) IV Push once  diVALproex  milliGRAM(s) Oral two times a day  enoxaparin Injectable 40 milliGRAM(s) SubCutaneous every 24 hours  haloperidol     Tablet 20 milliGRAM(s) Oral at bedtime  insulin glargine Injectable (LANTUS) 24 Unit(s) SubCutaneous every morning  insulin lispro (HumaLOG) corrective regimen sliding scale   SubCutaneous three times a day before meals  insulin lispro (HumaLOG) corrective regimen sliding scale   SubCutaneous at bedtime  insulin lispro Injectable (HumaLOG) 10 Unit(s) SubCutaneous three times a day before meals  lisinopril 5 milliGRAM(s) Oral daily    MEDICATIONS  (PRN):  dextrose 40% Gel 15 Gram(s) Oral once PRN Blood Glucose LESS THAN 70 milliGRAM(s)/deciliter  glucagon  Injectable 1 milliGRAM(s) IntraMuscular once PRN Glucose LESS THAN 70 milligrams/deciliter  LORazepam   Injectable 2 milliGRAM(s) IV Push every 6 hours PRN Agitation  OLANZapine Injectable 5 milliGRAM(s) IntraMuscular every 6 hours PRN Severe Agitation      PHYSICAL EXAM:  VITALS: T(C): 36.4 (12-28-18 @ 05:36)  T(F): 97.6 (12-28-18 @ 05:36), Max: 99.1 (12-27-18 @ 10:49)  HR: 72 (12-28-18 @ 05:36) (66 - 120)  BP: 112/76 (12-28-18 @ 05:36) (93/53 - 125/46)  RR:  (16 - 18)  SpO2:  (85% - 100%)  Wt(kg): --  GENERAL: NAD, well-groomed, well-developed  EYES: No proptosis, no injection  HEENT:  Atraumatic, Normocephalic, moist mucous membranes  RESPIRATORY: Clear to auscultation bilaterally; No rales, rhonchi, wheezing, or rubs  CARDIOVASCULAR: Regular rate and rhythm; No murmurs; no peripheral edema  GI: Soft, nontender, non distended, normal bowel sounds      POCT Blood Glucose.: 212 mg/dL (12-28-18 @ 08:46)  POCT Blood Glucose.: 151 mg/dL (12-27-18 @ 22:37)  POCT Blood Glucose.: 156 mg/dL (12-27-18 @ 18:09)  POCT Blood Glucose.: 76 mg/dL (12-27-18 @ 16:57)  POCT Blood Glucose.: 82 mg/dL (12-27-18 @ 15:25)  POCT Blood Glucose.: 170 mg/dL (12-27-18 @ 12:28)  POCT Blood Glucose.: 369 mg/dL (12-27-18 @ 08:50)  POCT Blood Glucose.: 91 mg/dL (12-26-18 @ 21:34)  POCT Blood Glucose.: 226 mg/dL (12-26-18 @ 17:50)  POCT Blood Glucose.: 237 mg/dL (12-26-18 @ 12:59)  POCT Blood Glucose.: 192 mg/dL (12-26-18 @ 09:12)  POCT Blood Glucose.: 203 mg/dL (12-25-18 @ 21:37)  POCT Blood Glucose.: 421 mg/dL (12-25-18 @ 17:41)  POCT Blood Glucose.: 148 mg/dL (12-25-18 @ 13:18)  POCT Blood Glucose.: 321 mg/dL (12-25-18 @ 11:30)    12-28    138  |  103  |  7   ----------------------------<  215<H>  3.7   |  23  |  0.62    EGFR if : 158  EGFR if non : 136    Ca    8.7      12-28  Mg     1.8     12-28  Phos  3.9     12-28    TPro  6.0  /  Alb  3.3  /  TBili  0.3  /  DBili  x   /  AST  24  /  ALT  17  /  AlkPhos  57  12-28          Thyroid Function Tests:      Hemoglobin A1C, Whole Blood: 13.8 % <H> [4.0 - 5.6] (12-24-18 @ 07:15) increase mobilization as tolerated to prevent further complications of bedrest and facilitate increased safety and indep; pt verbalized understanding. Current Level of Function Impacting Discharge (mobility/balance): bed mob min/ mod A, sit to stand min A, sidestepping with RW min A    Other factors to consider for discharge: recent gastric bypass, currently requires O2, used motorized w/c for community mobility at baseline     Patient will benefit from skilled therapy intervention to address the above noted impairments. PLAN :  Recommendations and Planned Interventions: bed mobility training, transfer training, gait training, therapeutic exercises, patient and family training/education, and therapeutic activities      Frequency/Duration: Patient will be followed by physical therapy:  5 times a week to address goals. Recommendation for discharge: (in order for the patient to meet his/her long term goals)  To be determined: SNF [facility] progress    This discharge recommendation:  Has not yet been discussed the attending provider and/or case management    IF patient discharges home will need the following DME: to be determined (TBD); likely bariatric RW         SUBJECTIVE:   Patient stated I'm not sure how much I can do.     OBJECTIVE DATA SUMMARY:   HISTORY:    Past Medical History:   Diagnosis Date    Anxiety 2017    Arthritis     Asthma     Back injury     Depression     Essential hypertension     GERD (gastroesophageal reflux disease)     Morbid obesity ([facility] [address]     Sleep apnea in adult 3/6/2018    CPAP     Past Surgical History:   Procedure Laterality Date    HX APPENDECTOMY      HX  SECTION      HX COLONOSCOPY      HX GASTRECTOMY  2016    lap sleeve gastrectomy    HX GASTRECTOMY  2021    REVISION LAP GASTRECTOMY    HX HYSTERECTOMY      HX KNEE REPLACEMENT      left knee    HX ORTHOPAEDIC      rt knee partial replacement    HX ORTHOPAEDIC Right LIGAMENT REPAIR    IR PLC IVC FILTER  1/4/2021       Personal factors and/or comorbidities impacting plan of care: recent surgery, h/o OA, required use of motorized w/c in community at [address] [facility] [place]: Private residence  # Steps to Enter: 4  Rails to Enter: Yes  Hand Rails : (single HR)  One/Two Story Residence: One story  Living Alone: No(lives w friend and 15 y/o granddaughter)  Support Systems: Friends \ neighbors  Current DME Used/Available at Home: Cane, straight, Wheelchair, power    EXAMINATION/PRESENTATION/DECISION MAKING:       Range Of Motion:  AROM: Generally decreased, functional                       Strength:    Strength: Generally decreased, functional                    Tone & Sensation:   Tone: Normal              Sensation: Intact               Coordination:  Coordination: Within functional limits  Vision:      Functional Mobility:  Bed Mobility:  Rolling: Minimum assistance(use of bed rail)  Supine to Sit: Minimum assistance; Additional time(R side lying to sit)  Sit to Supine: Moderate assistance(sit to R side lying, assist for LEs)  Scooting: Supervision  Transfers:  Sit to Stand: Minimum assistance(elevated bed height to walker)  Stand to Sit: Contact guard assistance                       Balance:   Sitting: Intact  Standing: Impaired; With support  Standing - Static: Fair;Good;Constant support  Standing - Dynamic : Fair;Constant support  Ambulation/Gait Training:  Distance (ft): 3 Feet (ft)(to R, sidestepping only)  Assistive Device: Walker, rolling  Ambulation - Level of Assistance: Contact guard assistance(use of RW)        Gait Abnormalities: Decreased step clearance(side stepping only)        Base of Support: Widened        Step Length: Left shortened;Right shortened        Physical Therapy Evaluation Charge Determination   History Examination Presentation Decision-Making   HIGH Complexity :3+ comorbidities / personal factors will impact the outcome/ POC  MEDIUM Complexity : 3 Standardized tests and measures addressing body structure, function, activity limitation and / or participation in recreation  LOW Complexity : Stable, uncomplicated  LOW Complexity : FOTO score of       Based on the above components, the patient evaluation is determined to be of the following complexity level: LOW     Pain Rating:  Pt reports 10/10 abdominal pain; medicated at beginning of session    Activity Tolerance:   Fair    After treatment patient left in no apparent distress:   Supine in bed, Call bell within reach, and Side rails x 3    COMMUNICATION/EDUCATION:   The patients plan of care was discussed with: Registered nurse. Fall prevention education was provided and the patient/caregiver indicated understanding., Patient/family have participated as able in goal setting and plan of care. , and Patient/family agree to work toward stated goals and plan of care.     Thank you for this referral.  Bharat Nix, PT   Time Calculation: 50 mins

## 2021-01-26 NOTE — ANESTHESIA POSTPROCEDURE EVALUATION
Post-Anesthesia Evaluation and Assessment    Patient: Jeff Guillen MRN: [id number]  SSN: [id number]    YOB: 1965  Age: 54 y.o. Sex: female      I have evaluated the patient and they are stable and ready for discharge from the PACU. Cardiovascular Function/Vital Signs  Visit Vitals  /66   Pulse 88   Temp 37.4 °C (99.3 °F)   Resp 15   SpO2 97%       Patient is status post General anesthesia for Procedure(s):  DIAGNOSTIC LAPAROSCOPY, REDUCTION AND REPAIR OF INCARCERATED HERNIA. Nausea/Vomiting: None    Postoperative hydration reviewed and adequate. Pain:  Pain Scale 1: Numeric (0 - 10) (01/25/21 1730)  Pain Intensity 1: 7 (01/25/21 1730)   Managed    Neurological Status: At baseline    Mental Status, Level of Consciousness: Alert and  oriented to person, place, and time    Pulmonary Status:   O2 Device: Nasal cannula (01/25/21 2008)   Adequate oxygenation and airway patent    Complications related to anesthesia: None    Post-anesthesia assessment completed.  No concerns    Signed By: Timoteo Andres MD     January 25, 2021

## 2021-01-26 NOTE — PROGRESS NOTES
Patient remains in the bed. I encouraged her to ambulate. She is using the IS some but has desats in the upper 80s. Patient does not appear to be very motivated in terms of her care. We will need to give ample motivation.     Valerie Nolan MD  Bariatric and General Surgeon  [facility]

## 2021-01-26 NOTE — PROGRESS NOTES
Surgery Progress Note    1/26/2021    Admit Date: 1/25/2021    CC: Abd pain    POD: 1 dx laparoscopy, reduction and repair of incisional hernia    Subjective: Moderate pain, no nausea, no flatus    Constitutional: No fever or chills  Neurologic: No headache  Eyes: No scleral icterus or irritated eyes  Nose: No nasal pain or drainage  Mouth: No oral lesions or sore throat  Cardiac: No palpations or chest pain  Pulmonary: No cough or shortness or breath  Gastrointestinal: Nominal pain, no nausea  Genitourinary: No dysuria  Musculoskeletal: No muscle or joint tenderness  Skin: No rashes or lesions  Psychiatric: No anxiety or depressed mood    Objective:     Visit Vitals  /80 (BP 1 Location: Right arm, BP Patient Position: Sitting)   Pulse (!) 101   Temp 98.8 °F (37.1 °C)   Resp 20   SpO2 98%       General: No acute distress, conversant  Eyes: PERRLA, no scleral icterus  HENT: Normocephalic without oral lesions  Neck: Trachea midline without LAD  Cardiac: Normal pulse rate and rhythm  Pulmonary: Symmetric chest rise with normal effort  GI: Distended abdomen, wounds clean dry and intact with dressing in place  Skin: Warm without rash  Extremities: No edema or joint stiffness  Psych: Appropriate mood and affect    Labs, vital signs, and I/O reviewed. Assessment:     11year-old female status post gastric bypass with incisional hernia from remote surgery status post reduction and repair    Plan:   PRN pain control  IS  Cont IVF. Hypokalemia resolved. IHSAN improving. Cont rose today. Watch UOP. Cont high rate IVF with NS at 150. Hyponatremia and hypochloridemia improving. Bicarb decreasing as well. Lactate normal.  NPO, sips and chips until ROBF. Heparin. Hgb stable. Dilutional likely  No abx. WBC likely reactive from SBO and surgery.   Ambulate as much as able    Zarina Arzola MD  Bariatric and General Surgeon  [facility]

## 2021-01-26 NOTE — PERIOP NOTES
TRANSFER - OUT REPORT:    Verbal report given to Cas(name) on Lorena Roca  being transferred to 643(unit) for routine post - op       Report consisted of patients Situation, Background, Assessment and   Recommendations(SBAR). Time Pre op antibiotic given:1846  Anesthesia Stop time: 2013    Information from the following report(s) SBAR, OR Summary, Intake/Output, MAR, Recent Results and Cardiac Rhythm NSR. was reviewed with the receiving nurse. Opportunity for questions and clarification was provided. Is the patient on 02? YES       L/Min 2    Is the patient on a monitor? YES    Is the nurse transporting with the patient? YES    Surgical Waiting Area notified of patient's transfer from PACU? NO      The following personal items collected during your admission accompanied patient upon transfer:   Dental Appliance: Dental Appliances: None  Vision:    Hearing Aid:    Jewelry:    Clothing: Clothing: With patient  Other Valuables:  Other Valuables: Cane, With patient  Valuables sent to safe:

## 2021-01-27 LAB
ANION GAP SERPL CALC-SCNC: 5 MMOL/L (ref 5–15)
BASOPHILS # BLD: 0 K/UL (ref 0–0.1)
BASOPHILS NFR BLD: 0 % (ref 0–1)
BUN SERPL-MCNC: 45 MG/DL (ref 6–20)
BUN/CREAT SERPL: 45 (ref 12–20)
CALCIUM SERPL-MCNC: 8.5 MG/DL (ref 8.5–10.1)
CHLORIDE SERPL-SCNC: 98 MMOL/L (ref 97–108)
CO2 SERPL-SCNC: 33 MMOL/L (ref 21–32)
CREAT SERPL-MCNC: 1.01 MG/DL (ref 0.55–1.02)
DIFFERENTIAL METHOD BLD: ABNORMAL
EOSINOPHIL # BLD: 0 K/UL (ref 0–0.4)
EOSINOPHIL NFR BLD: 0 % (ref 0–7)
ERYTHROCYTE [DISTWIDTH] IN BLOOD BY AUTOMATED COUNT: 15.6 % (ref 11.5–14.5)
GLUCOSE SERPL-MCNC: 152 MG/DL (ref 65–100)
HCT VFR BLD AUTO: 34.5 % (ref 35–47)
HGB BLD-MCNC: 10.5 G/DL (ref 11.5–16)
IMM GRANULOCYTES # BLD AUTO: 0 K/UL
IMM GRANULOCYTES NFR BLD AUTO: 0 %
LYMPHOCYTES # BLD: 3 K/UL (ref 0.8–3.5)
LYMPHOCYTES NFR BLD: 19 % (ref 12–49)
MAGNESIUM SERPL-MCNC: 4.1 MG/DL (ref 1.6–2.4)
MCH RBC QN AUTO: 28.8 PG (ref 26–34)
MCHC RBC AUTO-ENTMCNC: 30.4 G/DL (ref 30–36.5)
MCV RBC AUTO: 94.8 FL (ref 80–99)
METAMYELOCYTES NFR BLD MANUAL: 1 %
MONOCYTES # BLD: 0.6 K/UL (ref 0–1)
MONOCYTES NFR BLD: 4 % (ref 5–13)
MYELOCYTES NFR BLD MANUAL: 1 %
NEUTS SEG # BLD: 11.7 K/UL (ref 1.8–8)
NEUTS SEG NFR BLD: 75 % (ref 32–75)
NRBC # BLD: 0 K/UL (ref 0–0.01)
NRBC BLD-RTO: 0 PER 100 WBC
PHOSPHATE SERPL-MCNC: 3 MG/DL (ref 2.6–4.7)
PLATELET # BLD AUTO: 406 K/UL (ref 150–400)
PLATELET COMMENTS,PCOM: ABNORMAL
PMV BLD AUTO: 10 FL (ref 8.9–12.9)
POTASSIUM SERPL-SCNC: 3.1 MMOL/L (ref 3.5–5.1)
RBC # BLD AUTO: 3.64 M/UL (ref 3.8–5.2)
RBC MORPH BLD: ABNORMAL
SODIUM SERPL-SCNC: 136 MMOL/L (ref 136–145)
WBC # BLD AUTO: 15.6 K/UL (ref 3.6–11)

## 2021-01-27 PROCEDURE — 65270000008 HC RM PRIVATE PEDIATRIC

## 2021-01-27 PROCEDURE — 65270000029 HC RM PRIVATE

## 2021-01-27 PROCEDURE — 74011250636 HC RX REV CODE- 250/636: Performed by: SURGERY

## 2021-01-27 PROCEDURE — 76937 US GUIDE VASCULAR ACCESS: CPT

## 2021-01-27 PROCEDURE — 84100 ASSAY OF PHOSPHORUS: CPT

## 2021-01-27 PROCEDURE — 83735 ASSAY OF MAGNESIUM: CPT

## 2021-01-27 PROCEDURE — C1751 CATH, INF, PER/CENT/MIDLINE: HCPCS

## 2021-01-27 PROCEDURE — 74011250637 HC RX REV CODE- 250/637: Performed by: SURGERY

## 2021-01-27 PROCEDURE — 97165 OT EVAL LOW COMPLEX 30 MIN: CPT

## 2021-01-27 PROCEDURE — 97535 SELF CARE MNGMENT TRAINING: CPT

## 2021-01-27 PROCEDURE — 74011000250 HC RX REV CODE- 250: Performed by: SURGERY

## 2021-01-27 PROCEDURE — 77030020365 HC SOL INJ SOD CL 0.9% 50ML

## 2021-01-27 PROCEDURE — 97116 GAIT TRAINING THERAPY: CPT

## 2021-01-27 PROCEDURE — 99024 POSTOP FOLLOW-UP VISIT: CPT | Performed by: SURGERY

## 2021-01-27 PROCEDURE — 97530 THERAPEUTIC ACTIVITIES: CPT

## 2021-01-27 PROCEDURE — 02HV33Z INSERTION OF INFUSION DEVICE INTO SUPERIOR VENA CAVA, PERCUTANEOUS APPROACH: ICD-10-PCS | Performed by: SURGERY

## 2021-01-27 PROCEDURE — 36573 INSJ PICC RS&I 5 YR+: CPT | Performed by: SURGERY

## 2021-01-27 PROCEDURE — 85025 COMPLETE CBC W/AUTO DIFF WBC: CPT

## 2021-01-27 PROCEDURE — 80048 BASIC METABOLIC PNL TOTAL CA: CPT

## 2021-01-27 PROCEDURE — 36415 COLL VENOUS BLD VENIPUNCTURE: CPT

## 2021-01-27 RX ORDER — FACIAL-BODY WIPES
10 EACH TOPICAL ONCE
Status: COMPLETED | OUTPATIENT
Start: 2021-01-27 | End: 2021-01-27

## 2021-01-27 RX ORDER — POTASSIUM CHLORIDE 14.9 MG/ML
10 INJECTION INTRAVENOUS ONCE
Status: COMPLETED | OUTPATIENT
Start: 2021-01-27 | End: 2021-01-28

## 2021-01-27 RX ORDER — POTASSIUM CHLORIDE 14.9 MG/ML
20 INJECTION INTRAVENOUS
Status: ACTIVE | OUTPATIENT
Start: 2021-01-27 | End: 2021-01-27

## 2021-01-27 RX ADMIN — TIZANIDINE 4 MG: 4 TABLET ORAL at 09:26

## 2021-01-27 RX ADMIN — HYDROMORPHONE HYDROCHLORIDE 1 MG: 1 INJECTION, SOLUTION INTRAMUSCULAR; INTRAVENOUS; SUBCUTANEOUS at 18:27

## 2021-01-27 RX ADMIN — TIZANIDINE 4 MG: 4 TABLET ORAL at 16:27

## 2021-01-27 RX ADMIN — OXYCODONE HYDROCHLORIDE AND ACETAMINOPHEN 1 TABLET: 10; 325 TABLET ORAL at 11:58

## 2021-01-27 RX ADMIN — OXYCODONE HYDROCHLORIDE AND ACETAMINOPHEN 1 TABLET: 10; 325 TABLET ORAL at 01:00

## 2021-01-27 RX ADMIN — HYDROMORPHONE HYDROCHLORIDE 1 MG: 1 INJECTION, SOLUTION INTRAMUSCULAR; INTRAVENOUS; SUBCUTANEOUS at 07:23

## 2021-01-27 RX ADMIN — BISACODYL 10 MG: 10 SUPPOSITORY RECTAL at 12:32

## 2021-01-27 RX ADMIN — Medication 10 ML: at 23:00

## 2021-01-27 RX ADMIN — POTASSIUM CHLORIDE 10 MEQ: 14.9 INJECTION, SOLUTION INTRAVENOUS at 21:39

## 2021-01-27 RX ADMIN — OXYCODONE HYDROCHLORIDE AND ACETAMINOPHEN 1 TABLET: 10; 325 TABLET ORAL at 03:24

## 2021-01-27 RX ADMIN — POTASSIUM CHLORIDE 10 MEQ: 14.9 INJECTION, SOLUTION INTRAVENOUS at 21:03

## 2021-01-27 RX ADMIN — OXYCODONE HYDROCHLORIDE AND ACETAMINOPHEN 1 TABLET: 10; 325 TABLET ORAL at 07:24

## 2021-01-27 RX ADMIN — TIZANIDINE 4 MG: 4 TABLET ORAL at 21:38

## 2021-01-27 RX ADMIN — HYDROMORPHONE HYDROCHLORIDE 1 MG: 1 INJECTION, SOLUTION INTRAMUSCULAR; INTRAVENOUS; SUBCUTANEOUS at 09:36

## 2021-01-27 RX ADMIN — POTASSIUM CHLORIDE 10 MEQ: 14.9 INJECTION, SOLUTION INTRAVENOUS at 20:58

## 2021-01-27 RX ADMIN — ALPRAZOLAM 0.5 MG: 0.25 TABLET ORAL at 01:00

## 2021-01-27 RX ADMIN — OXYCODONE HYDROCHLORIDE AND ACETAMINOPHEN 1 TABLET: 10; 325 TABLET ORAL at 16:04

## 2021-01-27 RX ADMIN — OXYCODONE HYDROCHLORIDE AND ACETAMINOPHEN 1 TABLET: 10; 325 TABLET ORAL at 20:57

## 2021-01-27 NOTE — PROGRESS NOTES
Transition of Care Plan   RUR- 23% High Risk   DISPOSITION: The disposition plan is home with home health PT per recommendation.  [facility] PT referral sent via [address] - patient accepted.  Transport: BLS at discharge    This afternoon CM met with patient to check in. Patient reported, \"I don't need any help I have my granddaughter at the house that helps me. \" CM encouraged patient to explore [facility] PT recommendations. CM submitted referrals via [employer] to the following places:    [facility] - patient approved  [address] [place] [address] - unable to accept  [facility] - unable to accept  [facility] - unable to accept  [facility] - unable to accept  [facility] - patient approved and  accpeted  [facility] - unable to accept    [facility] [phone] has accepted patient. - They would like to know patients d/c date. CM informed patient that patient was accepted to above agency. Patient asked CM to contact her roommate/honme owner - Monica Foster. [phone] to update him on patients d/c date and plan. CM contacted Mr. Agus Marcelino at 3:55pm to give him an update regarding patients status. CM will continue to follow. Transition of Care Plan:     The Plan for Transition of Care is related to the following treatment goals: Home Health PT    The Patient was provided with a choice of provider and agrees  with the discharge plan. Yes [x] No []    A Freedom of choice list was provided with basic dialogue that supports the patient's individualized plan of care/goals and shares the quality data associated with the providers.        Yes [x] No []    JAYY Whiteside, CRM

## 2021-01-27 NOTE — PROGRESS NOTES
Problem: Self Care Deficits Care Plan (Adult)  Goal: *Acute Goals and Plan of Care (Insert Text)  Description:   FUNCTIONAL STATUS PRIOR TO ADMISSION: Patient was modified independent using a single point cane for household mobility, utilizes power w/c for community mobility. Patient rewuired up to Min A for bathing in bed from granddaughter, reports Mod I for other basic ADLs. HOME SUPPORT: The patient lived with a roommate and her 15year old granddaughter who provides occasional assist.    Occupational Therapy Goals  Initiated 1/27/2021  1. Patient will perform grooming at the sink with supervision/set-up within 7 day(s). 2.  Patient will perform bathing with no more than minimal assistance within 7 day(s). 3.  Patient will perform lower body dressing with supervision/set-up & AE PRN within 7 day(s). 4.  Patient will perform toilet transfers with supervision/set-up within 7 day(s). 5.  Patient will perform all aspects of toileting with supervision/set-up within 7 day(s). 6.  Patient will participate in upper extremity therapeutic exercise/activities with supervision/set-up for 5 minutes within 7 day(s). 7.  Patient will utilize energy conservation techniques during functional activities with verbal and visual cues within 7 day(s). Outcome: Not Met    OCCUPATIONAL THERAPY EVALUATION  Patient: Deuce Nieves (54 y.o. female)  Date: 1/27/2021  Primary Diagnosis: [facility] [E91.621]  Procedure(s) (LRB):  DIAGNOSTIC LAPAROSCOPY, REDUCTION AND REPAIR OF INCARCERATED HERNIA (N/A) 2 Days Post-Op   Precautions: Fall       ASSESSMENT  Based on the objective data described below, the patient presents with decreased balance, activity tolerance, endurance, safety awareness, functional reach s/p admission for SBO, POD #2 s/p laparoscopic hernia repair.  PTA, patient Mod I-Min A at baseline, uses [facility] for household mobility, electric w/c for extended mobility, and brief assist for bathing from 15year old granddaughter. She now presents slightly below her baseline, requiring CGA for in-room mobility, motivated for OOB activity. She requires up to Mod A for lower body ADLs at this time, however anticipate good progress. Recommend d/c home with [address] [place], [place] [address]S [place] and family support PRN. Current Level of Function Impacting Discharge (ADLs/self-care): up to Mod A for ADLs, CGA for mobility    Functional Outcome Measure: The patient scored Total: 40/100 on the Barthel Index outcome measure which is indicative of 60% impaired ability to care for basic self needs/dependency on others; inferred >50% dependency on others for instrumental ADLs. Other factors to consider for discharge: fall risk, gastric bypass sx 12 days ago, family support     Patient will benefit from skilled therapy intervention to address the above noted impairments. PLAN :  Recommendations and Planned Interventions: self care training, functional mobility training, therapeutic exercise, balance training, therapeutic activities, endurance activities, patient education, home safety training, and family training/education    Frequency/Duration: Patient will be followed by occupational therapy 5 times a week to address goals. Recommendation for discharge: (in order for the patient to meet his/her long term goals)  Occupational therapy at least 2 days/week in the home     This discharge recommendation:  A follow-up discussion with the attending provider and/or case management is planned    IF patient discharges home will need the following DME: may benefit from grab bars and bariatric shower chair for bathing, ? Lower body AE (will trial)       SUBJECTIVE:   Patient stated Oh I'm read to get up and moving.     OBJECTIVE DATA SUMMARY:   HISTORY:   Past Medical History:   Diagnosis Date    Anxiety 8/24/2017    Arthritis     Asthma     Back injury     Depression     Essential hypertension     GERD (gastroesophageal reflux disease) Morbid obesity ([facility]     Sleep apnea in adult 3/6/2018    CPAP     Past Surgical History:   Procedure Laterality Date    HX APPENDECTOMY      HX  SECTION      HX COLONOSCOPY      HX GASTRECTOMY  2016    lap sleeve gastrectomy    HX GASTRECTOMY  2021    REVISION LAP GASTRECTOMY    HX HYSTERECTOMY      HX KNEE REPLACEMENT      left knee    HX ORTHOPAEDIC      rt knee partial replacement    HX ORTHOPAEDIC Right     LIGAMENT REPAIR    IR PLC IVC FILTER  2021       Expanded or extensive additional review of patient history:     Home Situation  Home Environment: Private residence  # Steps to Enter: 4  Rails to Enter: Yes  Hand Rails : (single HR)  One/Two Story Residence: One story  Living Alone: No(lives w friend and 15 y/o granddaughter)  Support Systems: Friends \ neighbors  Current DME Used/Available at Home: Cane, straight, Wheelchair, power, CPAP(CPAP at rest during the day and overnight)  Tub or Shower Type: Tub/Shower combination(was bathes in supine)    Hand dominance: Right    EXAMINATION OF PERFORMANCE DEFICITS:  Cognitive/Behavioral Status:  Neurologic State: Alert  Orientation Level: Oriented X4  Cognition: Appropriate decision making; Appropriate for age attention/concentration; Appropriate safety awareness; Follows commands  Perception: Appears intact  Perseveration: No perseveration noted  Safety/Judgement: Awareness of environment; Fall prevention    Skin: Appears intact    Edema: None    Hearing:       Vision/Perceptual:    Tracking: Able to track stimulus in all quadrants w/o difficulty                      Acuity: Within Defined Limits    Corrective Lenses: Reading glasses    Range of Motion:  AROM: Generally decreased, functional                         Strength:  Strength: Generally decreased, functional                Coordination:  Coordination: Within functional limits  Fine Motor Skills-Upper: Left Intact; Right Intact    Gross Motor Skills-Upper: Left Intact; Right Intact    Tone & Sensation:  Tone: Normal  Sensation: Impaired(feet>posterior calf, hypersensitive)                      Balance:  Sitting: Intact  Standing: Impaired; With support(SPC)  Standing - Static: Fair;Constant support  Standing - Dynamic : Fair;Constant support    Functional Mobility and Transfers for ADLs:  Bed Mobility:  Supine to Sit: Stand-by assistance  Sit to Supine: (up in chair)  Scooting: Stand-by assistance    Transfers:  Sit to Stand: Contact guard assistance  Stand to Sit: Contact guard assistance  Bathroom Mobility: Contact guard assistance  Toilet Transfer : Contact guard assistance    ADL Assessment:  Feeding: Independent    Oral Facial Hygiene/Grooming: Contact guard assistance    Bathing: Moderate assistance    Upper Body Dressing: Minimum assistance    Lower Body Dressing: Moderate assistance    Toileting: Moderate assistance                ADL Intervention and task modifications:       Grooming  Grooming Assistance: Contact guard assistance  Position Performed: Standing(seated rest break x1 d/t SOB)  Washing Face: Contact guard assistance  Brushing Teeth: Contact guard assistance                        Toileting  Bladder Hygiene: Total assistance (dependent)([place])    Cognitive Retraining  Safety/Judgement: Awareness of environment; Fall prevention      Functional Measure:  Barthel Index:    Bathin  Bladder: 0  Bowels: 5  Groomin  Dressin  Feeding: 10  Mobility: 0  Stairs: 0  Toilet Use: 5  Transfer (Bed to Chair and Back): 10  Total: 40/100        The Barthel ADL Index: Guidelines  1. The index should be used as a record of what a patient does, not as a record of what a patient could do. 2. The main aim is to establish degree of independence from any help, physical or verbal, however minor and for whatever reason. 3. The need for supervision renders the patient not independent. 4. A patient's performance should be established using the best available evidence.  Asking the patient, friends/relatives and nurses are the usual sources, but direct observation and common sense are also important. However direct testing is not needed. 5. Usually the patient's performance over the preceding 24-48 hours is important, but occasionally longer periods will be relevant. 6. Middle categories imply that the patient supplies over 50 per cent of the effort. 7. Use of aids to be independent is allowed. Norm Santamaria., Barthel, D.W. (9488). Functional evaluation: the Barthel Index. [address] (14)2. BRUNO Whitmore, Warren Hallman., Liset Hsu., Celine, [facility] (1999). Measuring the change indisability after inpatient rehabilitation; comparison of the responsiveness of the Barthel Index and Functional [place] Measure. Journal of Neurology, Neurosurgery, and Psychiatry, 66(4), 583-283. ARIELLE Aguayo, GÓMEZ Dowling, & Lord Leigh, MRjA. (2004.) Assessment of post-stroke quality of life in cost-effectiveness studies: The usefulness of the Barthel Index and the EuroQoL-5D.  Quality of Life Research, 15, 567-96         Occupational Therapy Evaluation Charge Determination   History Examination Decision-Making   LOW Complexity : Brief history review  LOW Complexity : 1-3 performance deficits relating to physical, cognitive , or psychosocial skils that result in activity limitations and / or participation restrictions  LOW Complexity : No comorbidities that affect functional and no verbal or physical assistance needed to complete eval tasks       Based on the above components, the patient evaluation is determined to be of the following complexity level: LOW   Pain Rating:  Intermittent L hip \"pinching\" reported, RN aware    Activity Tolerance:   Good and requires rest breaks    After treatment patient left in no apparent distress:    Sitting in chair and Call bell within reach    COMMUNICATION/EDUCATION:   The patients plan of care was discussed with: Physical therapist and Registered nurse. Home safety education was provided and the patient/caregiver indicated understanding., Patient/family have participated as able in goal setting and plan of care. , and Patient/family agree to work toward stated goals and plan of care. This patients plan of care is appropriate for delegation to [facility].     Thank you for this referral.  KRISTIAN Grier, OTR/L  Time Calculation: 32 mins

## 2021-01-27 NOTE — PROGRESS NOTES
Physician Progress Note      Juan J Berman  CSN #:                  [id number]  :                       1965  ADMIT DATE:       2021 10:48 AM  DISCH DATE:        2021 12:56 PM  RESPONDING  PROVIDER #:        Abigail GONZALEZ MD          QUERY TEXT:    Dear Dr/NP/PA Attending,    Pt admitted with dehydration and no BM several days. On admission pt noted to be 11 days post op gastric bypass revision from sleeve with extensive lysis of adhesions. On this admission patient noted to go back to OR for reduction and repair of incisional hernia. If possible, please document in progress notes and discharge summary:    The medical record reflects the following:  Risk Factors: 54 F on date of admission 11 days post op gastric bypass revision from sleeve with extensive lysis of adhesions  Clinical Indicators:    CT Abd   1. High-grade small bowel obstruction with transition point at a presumed  incarcerated small ventral hernia containing a loop of small bowel, with  decompressed distal small bowel loops and colon. No evidence of pneumatosis or  free intraperitoneal air. Liliane Anguiano NP H&P  Ms. Bran Shi is 11 days status post gastric bypass. She was a revision from sleeve gastrectomy and also had extensive lysis of adhesions. She presented to the office today with complaints of nausea, vomiting, abdominal pain, weakness and no bowel movement for at least 11 days. Attestation of H&P Tommy MARTIN  CT scan pending. Concern for postoperative small bowel obstruction. Continue fluid resuscitation, electrolyte repletion. Further management to be determined by CT findings. Elo Campos MD OP Note  Preoperative Diagnosis: Small bowel obstruction and abdominal wall hernia  Postoperative Diagnosis: INCISIONAL HERNIA  ? Procedure: Procedure(s):  DIAGNOSTIC LAPAROSCOPY, REDUCTION AND REPAIR OF INCARCERATED Lavada Daryl MARTIN PN   POD: 2 dx laparoscopy, reduction and repair of incisional hernia    Treatment: Admission to hospital and surgical treatment as above    Thank you    [address] [place] [address]  Clinical Documentation Improvement  [phone]  Options provided:  -- incisional hernia and SBO as an postoperative complication  -- incisional hernia and SBO as an expected/inherent condition that occurred postoperatively and not a complication  -- incisional hernia related to other incidental risk factor (please specify) occurring following surgery and not a complication, Please document incidental risk factor. -- Other - I will add my own diagnosis  -- Disagree - Not applicable / Not valid  -- Disagree - Clinically unable to determine / Unknown  -- Refer to Clinical Documentation Reviewer    PROVIDER RESPONSE TEXT:    incisional hernia and SBO are related to other incidental risk factor occurring following surgery and not a complication.     Query created by: Baltazar Melgar on 1/27/2021 4:01 PM      Electronically signed by:  Chrissie Duvall MD 1/27/2021 5:49 PM

## 2021-01-27 NOTE — PROGRESS NOTES
Occupational Therapy  01/27/21    Orders received, chart reviewed and patient evaluated by occupational therapy. Pending progression with skilled acute occupational therapy, recommend:  Occupational therapy at least 2 days/week in the home and family support PRN     Recommend with nursing patient to complete as able in order to maintain strength, endurance and independence: OOB to chair 3x/day and functional mobility to the bathroom with 1 assist & RW. Thank you for your assistance. Full evaluation to follow.     Thank you,   Alma Markham, OTD, OTR/L

## 2021-01-27 NOTE — PROGRESS NOTES
PICC Placement Note    PRE-PROCEDURE VERIFICATION  Correct Procedure: yes  Correct Site:  yes  Temperature: Temp: 98.7 °F (37.1 °C), Temperature Source: Temp Source: Oral  Recent Labs     01/27/21  0447   BUN 45*   CREA 1.01   *   WBC 15.6*     Allergies: Patient has no known allergies. Education materials for PICC Care given: yes. See Patient Education activity for further details. PICC Booklet placed at bedside: yes    PROCEDURE DETAIL  Consent was obtained and all questions were answered related to risks and benefits. A double lumen PICC line was inserted, as a sterile procedure using ultrasound and modified Seldinger technique for desire for reliable access. The following documentation is in addition to the PICC properties in the lines/airways flowsheet :  Lot #: [id number]  Lidocaine 1% administered intradermally :yes  Internal Catheter Total Length: 53 (cm) 2 cm out  Vein Selection for PICC:right basilic  Central Line Bundle followed yes  Complication Related to Insertion:no    The placement was verified by EKG, MAX P WAVE @ 53 (cm). PER EKG PICC TIP @ C/A junction.         Line is okay to use: yes    Vesna Malone RN

## 2021-01-27 NOTE — PROGRESS NOTES
Surgery Progress Note    1/27/2021    Admit Date: 1/25/2021    CC: Abd pain    POD: 2 dx laparoscopy, reduction and repair of incisional hernia    Subjective:     She had ample encouragement by surgery team and nurse yesterday to get out of bed. She sat up at the side of the bed. Still in moderate pain. On oxygen. No flatus yet. No nausea    Constitutional: No fever or chills  Neurologic: No headache  Eyes: No scleral icterus or irritated eyes  Nose: No nasal pain or drainage  Mouth: No oral lesions or sore throat  Cardiac: No palpations or chest pain  Pulmonary: No cough or shortness or breath  Gastrointestinal: Abdominal pain  Genitourinary: No dysuria  Musculoskeletal: No muscle or joint tenderness  Skin: No rashes or lesions  Psychiatric: No anxiety or depressed mood    Objective:     Visit Vitals  /68 (BP 1 Location: Left arm, BP Patient Position: At rest)   Pulse 98   Temp 97.7 °F (36.5 °C)   Resp 18   SpO2 96%       General: No acute distress, conversant  Eyes: PERRLA, no scleral icterus  HENT: Normocephalic without oral lesions  Neck: Trachea midline without LAD  Cardiac: Normal pulse rate and rhythm  Pulmonary: Symmetric chest rise with normal effort  GI: Distended abdomen, wounds clean dry and intact with dressing in place  Skin: Warm without rash  Extremities: No edema or joint stiffness  Psych: Appropriate mood and affect    Labs, vital signs, and I/O reviewed. Assessment:     54-year-old female status post gastric bypass with incisional hernia from remote surgery status post reduction and repair awaiting return of bowel function    Plan:   PRN pain control  IS  IHSAN resolved. QUANG Schwab this morning. Decrease IV fluids. PICC ordered for vascular access, she lost her peripheral IV and extremely hard stick, and possible future TPN. Hypokalemia, replace  Okay for bariatric clear liquid diet  Heparin ppx  No abx.  WBC slowly improving, afebrile  Ambulate as much as able  PT/OT  Case management for possible placement. Patient did not do well at home after discharge from bypass.     David Chino MD  Bariatric and General Surgeon  [facility]

## 2021-01-27 NOTE — PROGRESS NOTES
Problem: Mobility Impaired (Adult and Pediatric)  Goal: *Acute Goals and Plan of Care (Insert Text)  Description: FUNCTIONAL STATUS PRIOR TO ADMISSION: Pt reports use of SPC in home and motorized w/c in community prior to gastric bypass approx 11 days PTA. Since original surgery, pt notes decreased tolerance to activity and minimal mobilization. HOME SUPPORT PRIOR TO ADMISSION: The patient lived with friend and 15 y/o daughter. Physical Therapy Goals  Initiated 1/26/2021  1. Patient will move from supine to sit and sit to supine  and roll side to side in bed with modified independence within 7 day(s). 2.  Patient will transfer from bed to chair and chair to bed with supervision/set-up using the least restrictive device within 7 day(s). 3.  Patient will perform sit to stand with supervision/set-up within 7 day(s). 4.  Patient will ambulate with supervision/set-up for 50 feet with the least restrictive device within 7 day(s). 5.  Patient will ascend/descend 4 stairs with single handrail and cane with minimal assistance/contact guard assist within 7 day(s). Outcome: Progressing Towards Goal  PHYSICAL THERAPY TREATMENT  Patient: Siva Dodd (89 y.o. female)  Date: 1/27/2021  Diagnosis: Small bowel obstruction (HCC) [K56.609] Small bowel obstruction (HCC)  Procedure(s) (LRB):  DIAGNOSTIC LAPAROSCOPY, REDUCTION AND REPAIR OF INCARCERATED HERNIA (N/A) 2 Days Post-Op  Precautions:   Chart, physical therapy assessment, plan of care and goals were reviewed. ASSESSMENT  Patient continues with skilled PT services and is progressing towards goals. Received on 3L O2 and agreeable to work with therapy. She mobilized supine>sit with SBA, sit<>stand with CGA, and ambulated to/from bathroom with CGA suing SPC. Pt demonstrated slow, shuffled gait and required several seated rest breaks due to fatigue. She did report L hip pain which improved with mobility.   Recommending HH PT and assistance from family as needed once medically cleared for d/c. Current Level of Function Impacting Discharge (mobility/balance): CGA for transfers and gait    Other factors to consider for discharge: recent gastric sleeve to bypass procedure          PLAN :  Patient continues to benefit from skilled intervention to address the above impairments. Continue treatment per established plan of care. to address goals. Recommendation for discharge: (in order for the patient to meet his/her long term goals)  Physical therapy at least 2 days/week in the home AND ensure assist and/or supervision for safety with mobility and ADLs as needed    This discharge recommendation:  Has not yet been discussed the attending provider and/or case management    IF patient discharges home will need the following DME: patient owns DME required for discharge       SUBJECTIVE:   Patient stated You ladies are ready to help me, I love it.     OBJECTIVE DATA SUMMARY:   Critical Behavior:              Functional Mobility Training:  Bed Mobility:  Supine to Sit: Stand-by assistance  Sit to Supine: (up in chair)  Scooting: Stand-by assistance    Transfers:  Sit to Stand: Contact guard assistance  Stand to Sit: Contact guard assistance    Balance:  Sitting: Intact  Standing: Impaired; With support(SPC)  Standing - Static: Fair;Constant support  Standing - Dynamic : Fair;Constant support     Ambulation/Gait Training:  Distance (ft): 20 Feet (ft)(x2 reps)  Assistive Device: Gait belt;Cane, straight  Ambulation - Level of Assistance: Contact guard assistance(assist for line management)  Gait Abnormalities: Decreased step clearance;Shuffling gait;Trunk sway increased  Base of Support: Widened  Step Length: Right shortened;Left shortened    Activity Tolerance:   Fair on 3L    After treatment patient left in no apparent distress:   Sitting in chair and Call bell within reach    COMMUNICATION/COLLABORATION:   The patients plan of care was discussed with: Occupational therapist and Registered nurse.      Paramjit Glasgow, PT, DPT   Time Calculation: 39 mins

## 2021-01-28 LAB
ANION GAP SERPL CALC-SCNC: 8 MMOL/L (ref 5–15)
BASOPHILS # BLD: 0 K/UL (ref 0–0.1)
BASOPHILS NFR BLD: 0 % (ref 0–1)
BUN SERPL-MCNC: 39 MG/DL (ref 6–20)
BUN/CREAT SERPL: 35 (ref 12–20)
CALCIUM SERPL-MCNC: 8.8 MG/DL (ref 8.5–10.1)
CHLORIDE SERPL-SCNC: 97 MMOL/L (ref 97–108)
CO2 SERPL-SCNC: 30 MMOL/L (ref 21–32)
CREAT SERPL-MCNC: 1.1 MG/DL (ref 0.55–1.02)
DIFFERENTIAL METHOD BLD: ABNORMAL
EOSINOPHIL # BLD: 0.1 K/UL (ref 0–0.4)
EOSINOPHIL NFR BLD: 1 % (ref 0–7)
ERYTHROCYTE [DISTWIDTH] IN BLOOD BY AUTOMATED COUNT: 15.4 % (ref 11.5–14.5)
GLUCOSE SERPL-MCNC: 149 MG/DL (ref 65–100)
HCT VFR BLD AUTO: 34.9 % (ref 35–47)
HGB BLD-MCNC: 10.6 G/DL (ref 11.5–16)
IMM GRANULOCYTES # BLD AUTO: 0 K/UL
IMM GRANULOCYTES NFR BLD AUTO: 0 %
LYMPHOCYTES # BLD: 4 K/UL (ref 0.8–3.5)
LYMPHOCYTES NFR BLD: 27 % (ref 12–49)
MCH RBC QN AUTO: 28.6 PG (ref 26–34)
MCHC RBC AUTO-ENTMCNC: 30.4 G/DL (ref 30–36.5)
MCV RBC AUTO: 94.3 FL (ref 80–99)
METAMYELOCYTES NFR BLD MANUAL: 1 %
MONOCYTES # BLD: 1 K/UL (ref 0–1)
MONOCYTES NFR BLD: 7 % (ref 5–13)
NEUTS BAND NFR BLD MANUAL: 1 % (ref 0–6)
NEUTS SEG # BLD: 9.5 K/UL (ref 1.8–8)
NEUTS SEG NFR BLD: 63 % (ref 32–75)
NRBC # BLD: 0 K/UL (ref 0–0.01)
NRBC BLD-RTO: 0 PER 100 WBC
PLATELET # BLD AUTO: 439 K/UL (ref 150–400)
PMV BLD AUTO: 10.3 FL (ref 8.9–12.9)
POTASSIUM SERPL-SCNC: 3.6 MMOL/L (ref 3.5–5.1)
RBC # BLD AUTO: 3.7 M/UL (ref 3.8–5.2)
RBC MORPH BLD: ABNORMAL
RBC MORPH BLD: ABNORMAL
SODIUM SERPL-SCNC: 135 MMOL/L (ref 136–145)
WBC # BLD AUTO: 14.9 K/UL (ref 3.6–11)

## 2021-01-28 PROCEDURE — 77010033678 HC OXYGEN DAILY

## 2021-01-28 PROCEDURE — 74011250636 HC RX REV CODE- 250/636: Performed by: SURGERY

## 2021-01-28 PROCEDURE — 65270000029 HC RM PRIVATE

## 2021-01-28 PROCEDURE — C9113 INJ PANTOPRAZOLE SODIUM, VIA: HCPCS | Performed by: SURGERY

## 2021-01-28 PROCEDURE — 74011000250 HC RX REV CODE- 250: Performed by: SURGERY

## 2021-01-28 PROCEDURE — 85025 COMPLETE CBC W/AUTO DIFF WBC: CPT

## 2021-01-28 PROCEDURE — 80048 BASIC METABOLIC PNL TOTAL CA: CPT

## 2021-01-28 PROCEDURE — 36415 COLL VENOUS BLD VENIPUNCTURE: CPT

## 2021-01-28 PROCEDURE — 97535 SELF CARE MNGMENT TRAINING: CPT

## 2021-01-28 PROCEDURE — 74011250637 HC RX REV CODE- 250/637: Performed by: SURGERY

## 2021-01-28 PROCEDURE — 99024 POSTOP FOLLOW-UP VISIT: CPT | Performed by: SURGERY

## 2021-01-28 PROCEDURE — 65270000008 HC RM PRIVATE PEDIATRIC

## 2021-01-28 RX ADMIN — OXYCODONE HYDROCHLORIDE AND ACETAMINOPHEN 1 TABLET: 10; 325 TABLET ORAL at 06:36

## 2021-01-28 RX ADMIN — HYDROMORPHONE HYDROCHLORIDE 1 MG: 1 INJECTION, SOLUTION INTRAMUSCULAR; INTRAVENOUS; SUBCUTANEOUS at 01:15

## 2021-01-28 RX ADMIN — TIZANIDINE 4 MG: 4 TABLET ORAL at 09:35

## 2021-01-28 RX ADMIN — Medication 10 ML: at 21:35

## 2021-01-28 RX ADMIN — TIZANIDINE 4 MG: 4 TABLET ORAL at 15:12

## 2021-01-28 RX ADMIN — SODIUM CHLORIDE 40 MG: 9 INJECTION, SOLUTION INTRAMUSCULAR; INTRAVENOUS; SUBCUTANEOUS at 09:36

## 2021-01-28 RX ADMIN — TIZANIDINE 4 MG: 4 TABLET ORAL at 21:34

## 2021-01-28 RX ADMIN — HEPARIN SODIUM 5000 UNITS: 5000 INJECTION INTRAVENOUS; SUBCUTANEOUS at 01:00

## 2021-01-28 RX ADMIN — HEPARIN SODIUM 5000 UNITS: 5000 INJECTION INTRAVENOUS; SUBCUTANEOUS at 15:13

## 2021-01-28 RX ADMIN — POTASSIUM CHLORIDE 10 MEQ: 14.9 INJECTION, SOLUTION INTRAVENOUS at 00:00

## 2021-01-28 RX ADMIN — Medication 10 ML: at 07:38

## 2021-01-28 RX ADMIN — HEPARIN SODIUM 5000 UNITS: 5000 INJECTION INTRAVENOUS; SUBCUTANEOUS at 07:50

## 2021-01-28 RX ADMIN — KETOTIFEN FUMARATE 1 DROP: 0.35 SOLUTION/ DROPS OPHTHALMIC at 09:44

## 2021-01-28 RX ADMIN — Medication 10 ML: at 14:13

## 2021-01-28 RX ADMIN — AMLODIPINE BESYLATE 10 MG: 5 TABLET ORAL at 09:35

## 2021-01-28 RX ADMIN — OXYCODONE AND ACETAMINOPHEN 1 TABLET: 5; 325 TABLET ORAL at 10:31

## 2021-01-28 RX ADMIN — ARIPIPRAZOLE 10 MG: 10 TABLET ORAL at 14:12

## 2021-01-28 RX ADMIN — HYDROMORPHONE HYDROCHLORIDE 1 MG: 1 INJECTION, SOLUTION INTRAMUSCULAR; INTRAVENOUS; SUBCUTANEOUS at 21:35

## 2021-01-28 RX ADMIN — SODIUM CHLORIDE 100 ML/HR: 9 INJECTION, SOLUTION INTRAVENOUS at 03:38

## 2021-01-28 RX ADMIN — HYDROMORPHONE HYDROCHLORIDE 1 MG: 1 INJECTION, SOLUTION INTRAMUSCULAR; INTRAVENOUS; SUBCUTANEOUS at 07:35

## 2021-01-28 NOTE — PROGRESS NOTES
Transition of Care Plan   RUR- 24% Medium Risk   DISPOSITION: The disposition plan is home with home health- [facility] - patient accepted.  Transport: [facility] - pt has been placed on WILL CALL. [facility] [facility] the [facility] for [facility]. CM retrieved fax number to the [facility] location - [phone] so that the order for PT can be faxed and received. CM followed up with patient and patient reported that she would like to received continued care via [facility] [address] [facility]. CM contacted Franklin Anderson to update her. CM contacted [facility] via [employer] to update them of patients newest choice. CM will continue to follow. CM met with patient this afternoon to inquire about d/c. Patient reports that she was feeling a little better today and appeared to be in good spirits. CM sent an [facility] referral via [employer] for tomorrow's d/c. (time of d/c is unknown at this time. CM will continue to follow.     Aide Jackson, MSW, CRM

## 2021-01-28 NOTE — PROGRESS NOTES
Surgery Progress Note    1/28/2021    Admit Date: 1/25/2021    CC: Abd pain    POD: 3 dx laparoscopy, reduction and repair of incisional hernia    Subjective:     Weaning down oxygen. Pain better controlled. Tolerated clears. 3 BMs last night    Constitutional: No fever or chills  Neurologic: No headache  Eyes: No scleral icterus or irritated eyes  Nose: No nasal pain or drainage  Mouth: No oral lesions or sore throat  Cardiac: No palpations or chest pain  Pulmonary: No cough or shortness or breath  Gastrointestinal: Abdominal pain  Genitourinary: No dysuria  Musculoskeletal: No muscle or joint tenderness  Skin: No rashes or lesions  Psychiatric: No anxiety or depressed mood    Objective:     Visit Vitals  /76   Pulse 99   Temp 98.6 °F (37 °C)   Resp 18   SpO2 99%       General: No acute distress, conversant  Eyes: PERRLA, no scleral icterus  HENT: Normocephalic without oral lesions  Neck: Trachea midline without LAD  Cardiac: Normal pulse rate and rhythm  Pulmonary: Symmetric chest rise with normal effort  GI: Improved distension, wounds clean dry and intact  Skin: Warm without rash  Extremities: No edema or joint stiffness  Psych: Appropriate mood and affect    Labs, vital signs, and I/O reviewed. Assessment:     80-year-old female status post gastric bypass with incisional hernia from remote surgery status post reduction with ROBF    Plan:   PRN pain control  IS. Wean off oxygen  IHSAN resolved. Voiding. Decrease IVF  Efrain fulls today  Heparin ppx  WBC improving.  AF  Ambulate as much as able  PT/OT  CM for home PT  Possible [address] Best MD  Bariatric and General Surgeon  [facility]

## 2021-01-28 NOTE — PROGRESS NOTES
Problem: Self Care Deficits Care Plan (Adult)  Goal: *Acute Goals and Plan of Care (Insert Text)  Description:   FUNCTIONAL STATUS PRIOR TO ADMISSION: Patient was modified independent using a single point cane for household mobility, utilizes power w/c for community mobility. Patient rewuired up to Min A for bathing in bed from granddaughter, reports Mod I for other basic ADLs. HOME SUPPORT: The patient lived with a roommate and her 15year old granddaughter who provides occasional assist.    Occupational Therapy Goals  Initiated 1/27/2021  1. Patient will perform grooming at the sink with supervision/set-up within 7 day(s). 2.  Patient will perform bathing with no more than minimal assistance within 7 day(s). 3.  Patient will perform lower body dressing with supervision/set-up & AE PRN within 7 day(s). 4.  Patient will perform toilet transfers with supervision/set-up within 7 day(s). 5.  Patient will perform all aspects of toileting with supervision/set-up within 7 day(s). 6.  Patient will participate in upper extremity therapeutic exercise/activities with supervision/set-up for 5 minutes within 7 day(s). 7.  Patient will utilize energy conservation techniques during functional activities with verbal and visual cues within 7 day(s). Outcome: Progressing Towards Goal    OCCUPATIONAL THERAPY TREATMENT  Patient: Anastacio Bennett (55 y.o. female)  Date: 1/28/2021  Diagnosis: Small bowel obstruction (HCC) [K56.609] Small bowel obstruction (HCC)  Procedure(s) (LRB):  DIAGNOSTIC LAPAROSCOPY, REDUCTION AND REPAIR OF INCARCERATED HERNIA (N/A) 3 Days Post-Op  Precautions:    Chart, occupational therapy assessment, plan of care, and goals were reviewed. ASSESSMENT  Patient continues with skilled OT services and is progressing towards goals. Reports she is feeling better. Walked to bathroom with SPC at supervision level and transferred to [facility].  She reports considerable difficulty with bowel hygiene for toileting and for bathing do to body shape and incisional discomfort. Educated on use of toilet tongs. She states she uses hand towel to perform bowel hygiene when needed and may do this at discharge. Current Level of Function Impacting Discharge (ADLs): Max A for bowel hygiene    Other factors to consider for discharge: none         PLAN :  Patient continues to benefit from skilled intervention to address the above impairments. Continue treatment per established plan of care. to address goals. Recommend with staff: walk to bathroom    Recommend next OT session: continue POC    Recommendation for discharge: (in order for the patient to meet his/her long term goals)  Occupational therapy at least 2 days/week in the home     This discharge recommendation:  Has been made in collaboration with the attending provider and/or case management    IF patient discharges home will need the following DME: toilet tongs       SUBJECTIVE:   Patient stated I had trouble wiping myself after a BM.     OBJECTIVE DATA SUMMARY:   Cognitive/Behavioral Status:            Alert and oriented x 4          Functional Mobility and Transfers for ADLs:  Bed Mobility:       Transfers:  Sit to Stand: Supervision  Functional Transfers  Bathroom Mobility: Supervision/set up  Toilet Transfer : Supervision  Bed to Chair: Supervision    Balance:       ADL Intervention:       Grooming  Washing Hands: Supervision         Lower Body Bathing  Perineal  : Maximum assistance(unable to reach to get backside clean)              Toileting  Bladder Hygiene: Supervision  Bowel Hygiene: Maximum assistance             Pain:  Incisional discomfort    Activity Tolerance:   Good    After treatment patient left in no apparent distress:   Sitting in chair and Call bell within reach    COMMUNICATION/COLLABORATION:   The patients plan of care was discussed with: Registered nurseRj Roberts  Time Calculation: 33 mins

## 2021-01-28 NOTE — PROGRESS NOTES
0830- assumed care of patient. In bed without complaints at this time. 9411- standby assist to bathroom with cane. Encouraged patient to ambulate and stay in chair for breakfast; verbalized understanding.

## 2021-01-28 NOTE — PROGRESS NOTES
Bedside and Verbal shift change report given to Callie [address] (oncoming nurse) by Nba Williamson (offgoing nurse). Report included the following information SBAR, Kardex, Procedure Summary, Intake/Output, MAR, Recent Results, Cardiac Rhythm NSR and Dual Neuro Assessment.

## 2021-01-29 VITALS
BODY MASS INDEX: 57.32 KG/M2 | DIASTOLIC BLOOD PRESSURE: 86 MMHG | HEART RATE: 97 BPM | WEIGHT: 293 LBS | TEMPERATURE: 99.7 F | OXYGEN SATURATION: 95 % | RESPIRATION RATE: 20 BRPM | SYSTOLIC BLOOD PRESSURE: 135 MMHG

## 2021-01-29 PROCEDURE — C9113 INJ PANTOPRAZOLE SODIUM, VIA: HCPCS | Performed by: SURGERY

## 2021-01-29 PROCEDURE — 74011250637 HC RX REV CODE- 250/637: Performed by: SURGERY

## 2021-01-29 PROCEDURE — 74011250636 HC RX REV CODE- 250/636: Performed by: SURGERY

## 2021-01-29 PROCEDURE — 74011000250 HC RX REV CODE- 250: Performed by: SURGERY

## 2021-01-29 PROCEDURE — 99024 POSTOP FOLLOW-UP VISIT: CPT | Performed by: SURGERY

## 2021-01-29 RX ORDER — HYDROMORPHONE HYDROCHLORIDE 2 MG/1
2-4 TABLET ORAL
Qty: 18 TAB | Refills: 0 | Status: SHIPPED | OUTPATIENT
Start: 2021-01-29 | End: 2021-02-01

## 2021-01-29 RX ADMIN — OXYCODONE HYDROCHLORIDE AND ACETAMINOPHEN 1 TABLET: 10; 325 TABLET ORAL at 06:46

## 2021-01-29 RX ADMIN — TIZANIDINE 4 MG: 4 TABLET ORAL at 09:23

## 2021-01-29 RX ADMIN — SODIUM CHLORIDE 40 MG: 9 INJECTION, SOLUTION INTRAMUSCULAR; INTRAVENOUS; SUBCUTANEOUS at 09:22

## 2021-01-29 RX ADMIN — OXYCODONE HYDROCHLORIDE AND ACETAMINOPHEN 1 TABLET: 10; 325 TABLET ORAL at 00:40

## 2021-01-29 RX ADMIN — AMLODIPINE BESYLATE 10 MG: 5 TABLET ORAL at 09:22

## 2021-01-29 RX ADMIN — OXYCODONE HYDROCHLORIDE AND ACETAMINOPHEN 1 TABLET: 10; 325 TABLET ORAL at 13:07

## 2021-01-29 RX ADMIN — HYDROMORPHONE HYDROCHLORIDE 1 MG: 1 INJECTION, SOLUTION INTRAMUSCULAR; INTRAVENOUS; SUBCUTANEOUS at 02:46

## 2021-01-29 RX ADMIN — ARIPIPRAZOLE 10 MG: 10 TABLET ORAL at 09:32

## 2021-01-29 RX ADMIN — HEPARIN SODIUM 5000 UNITS: 5000 INJECTION INTRAVENOUS; SUBCUTANEOUS at 00:30

## 2021-01-29 RX ADMIN — HEPARIN SODIUM 5000 UNITS: 5000 INJECTION INTRAVENOUS; SUBCUTANEOUS at 09:23

## 2021-01-29 RX ADMIN — Medication 10 ML: at 02:46

## 2021-01-29 NOTE — DISCHARGE INSTRUCTIONS
Continue Lovenox at home until gone    Take Dilaudid for pain. Okay for Tylenol as well. Please transition to Tylenol alone. No NSAIDs    Advance to bariatric stage I diet, soft mushy, per handbook    Okay to shower. Tape will fall off with time. Pat dry after shower. Make sure to get stay out of bed and up in a chair during the daytime and ambulate as much as possible. Please call with any concerns. Follow-up with Phoenix Memos in 1 week virtually, not 2 weeks as originally discussed.   Please call office for appointment

## 2021-01-29 NOTE — PROGRESS NOTES
ZENON:    RUR 25%    Discharge today. [facility] to transport at 1:45pm.  RN notified.     Kra Hope RN/CRM  [phone]

## 2021-01-29 NOTE — PROGRESS NOTES
Surgery Progress Note    1/29/2021    Admit Date: 1/25/2021    CC: Abd pain    POD: 4 dx laparoscopy, reduction and repair of incisional hernia    Subjective:     Sitting up. Pain controlled. Eating. Off oxygen. Constitutional: No fever or chills  Neurologic: No headache  Eyes: No scleral icterus or irritated eyes  Nose: No nasal pain or drainage  Mouth: No oral lesions or sore throat  Cardiac: No palpations or chest pain  Pulmonary: No cough or shortness or breath  Gastrointestinal: Abdominal pain  Genitourinary: No dysuria  Musculoskeletal: No muscle or joint tenderness  Skin: No rashes or lesions  Psychiatric: No anxiety or depressed mood    Objective:     Visit Vitals  BP (!) 121/52 (BP 1 Location: Left lower arm, BP Patient Position: Sitting)   Pulse 97   Temp 98 °F (36.7 °C)   Resp 20   Wt (!) 376 lb 15.8 oz (171 kg)   SpO2 95%   BMI 57.32 kg/m²       General: No acute distress, conversant  Eyes: PERRLA, no scleral icterus  HENT: Normocephalic without oral lesions  Neck: Trachea midline without LAD  Cardiac: Normal pulse rate and rhythm  Pulmonary: Symmetric chest rise with normal effort  GI: Continued improved distension, wounds clean dry and intact  Skin: Warm without rash  Extremities: No edema or joint stiffness  Psych: Appropriate mood and affect    Labs, vital signs, and I/O reviewed. Assessment:     59-year-old female status post gastric bypass with incisional hernia from remote surgery status post reduction with ROBF    Plan:   PRN pain control  Off oxygen  Low rate IVF  Stage 1 post op diet  Heparin ppx. Resume home lovenox until gone  AF.   PT/OT  CM for home PT  DC home today    Sly Swan MD  Bariatric and General Surgeon  [facility]

## 2021-01-29 NOTE — PROGRESS NOTES
Bedside shift change report given to Elle rosales (oncoming nurse) by Wilton Booth (offgoing nurse).  Report included the following information SBAR, Kardex, Procedure Summary, Intake/Output, MAR, Recent Results and Cardiac Rhythm ST.

## 2021-02-01 ENCOUNTER — TELEPHONE (OUTPATIENT)
Dept: SURGERY | Age: 56
End: 2021-02-01

## 2021-02-01 DIAGNOSIS — K43.0 INCISIONAL HERNIA WITH OBSTRUCTION BUT NO GANGRENE: ICD-10-CM

## 2021-02-01 RX ORDER — HYDROMORPHONE HYDROCHLORIDE 2 MG/1
2 TABLET ORAL
Qty: 15 TAB | Refills: 0 | Status: SHIPPED | OUTPATIENT
Start: 2021-02-01 | End: 2021-02-06

## 2021-02-01 NOTE — TELEPHONE ENCOUNTER
Spoke with patient and having some postop abdominal pain with activity. She says she took her last Dilaudid this morning. She is been taking 1 tablet every 4 hours as well as some extra strength Tylenol. She has been up most of the day and her physical therapist is there with her now. She said no nausea or vomiting. Bowels are moving a couple of times a day. The therapist reports that her temperature is 99 and her blood pressure is 106/76. She has been taking metoprolol 50 mg. Patient says she is feeling much better she is getting in her fluids, voiding without difficulty and urine is light yellow. I will renew her Dilaudid and have asked her to do 1 tablet every 6-8 hours if needed she can continue with Exer strength Tylenol 2 tabs every 8 hours. She was encouraged to use her incentive spirometer and do her breathing exercises. Continue with her walking and being up most of the day. Continue with bariatric liquids/puréed diet. I have arranged a virtual follow-up with her for Thursday at 11:20 AM.  She is to follow-up sooner if she has a fever over 100.5 , not tolerating p.o. or worsening abdominal pain.   Patient expressed gratitude towards the care she has received thus far

## 2021-02-01 NOTE — TELEPHONE ENCOUNTER
I called the patient and she said she is having abdominal pain in the middle of her abdomen the same a when she was in the hospital. She said it is tight and sore, she said she is out bed and sitting at the kitchen table. She is passing gas and moving her bowels. She is tolerating pureed diet she is getting in 48 ounces of liquids. She said she has tried Tylenol and it is not helping her pain. I told her I will speak to NP and call her back.

## 2021-02-01 NOTE — DISCHARGE SUMMARY
Admit date: 1/25/2021   Admitting Provider: Delia Monge MD    Discharge date: 2/1/2021  Discharging Provider: Delia Monge MD      * Admission Diagnoses: Small bowel obstruction [facility]) [R28.353]    * Discharge Diagnoses:    Hospital Problems as of 1/29/2021 Date Reviewed: 1/25/2021          Codes Class Noted - Resolved POA    Incisional hernia ICD-10-CM: K43.2  ICD-9-CM: 553.21  1/25/2021 - Present Yes        * (Principal) Small bowel obstruction [facility]) ICD-10-CM: H24.099  ICD-9-CM: 560.9  1/25/2021 - Present Unknown              * Hospital Course: 66-year-old female presented almost 2 weeks after her laparoscopic gastric bypass with concern for abdominal distention and bowel obstruction. CT imaging demonstrated an incisional hernia. Intake labs demonstrated dehydration and leukocytosis with various metabolic deficiencies. The patient was resuscitated and taken to the OR for laparoscopic repair of incisional hernia with mesh. She was admitted to the floor postoperatively. She had return of bowel function and her diet was advanced. On postoperative day 4 she was discharged home. * Procedures:  Procedure(s):  DIAGNOSTIC LAPAROSCOPY, REDUCTION AND REPAIR OF INCARCERATED HERNIA      * Discharge Condition: good  * Disposition: Home    Discharge Medications:  Discharge Medication List as of 1/29/2021 11:16 AM      START taking these medications    Details   HYDROmorphone (DILAUDID) 2 mg tablet Take 1-2 Tabs by mouth every four (4) hours as needed for Pain for up to 3 days. Max Daily Amount: 24 mg., Normal, Disp-18 Tab, R-0         CONTINUE these medications which have NOT CHANGED    Details   traZODone (DESYREL) 150 mg tablet Take 1 Tab by mouth nightly as needed for Sleep for up to 90 days. , Normal, Disp-90 Tab, R-0      ondansetron (ZOFRAN ODT) 4 mg disintegrating tablet Take 1 Tab by mouth every eight (8) hours as needed for Nausea or Nausea or Vomiting (AFTER SURGERY). , Normal, Disp-20 Tab,R-0 enoxaparin (LOVENOX) 40 mg/0.4 mL 0.4 mL by SubCUTAneous route daily. AFTER SURGERY  Indications: blood clot in a deep vein of the extremities, Normal, Disp-14 Syringe,R-0      gabapentin (NEURONTIN) 100 mg capsule Take 1-2 Caps by mouth every eight (8) hours as needed for Pain (AFTER SURGERY). Max Daily Amount: 600 mg., Normal, Disp-30 Cap,R-0      polyethylene glycol (MIRALAX) 17 gram packet Take 1 Packet by mouth daily. Indications: constipation, Normal, Disp-100 Packet,R-3      pantoprazole (PROTONIX) 40 mg tablet Take 1 Tab by mouth daily. AFTER SURGERY, Normal, Disp-30 Tab,R-1      citalopram (CeleXA) 40 mg tablet Take 1 Tab by mouth daily for 90 days. , Normal, Disp-90 Tab,R-0      ARIPiprazole (ABILIFY) 10 mg tablet Take 1 Tab by mouth daily for 90 days. Indications: additional medications to treat depression, Normal, Disp-90 Tab,R-0      ALPRAZolam (XANAX) 0.5 mg tablet Take 1 Tab by mouth three (3) times daily as needed for Anxiety for up to 90 days. Max Daily Amount: 1.5 mg., Normal, Disp-90 Tab,R-1      tiZANidine (ZANAFLEX) 4 mg capsule Take 4 mg by mouth three (3) times daily. , Historical Med      pravastatin (PRAVACHOL) 20 mg tablet Take 20 mg by mouth nightly., Historical Med      minocycline (DYNACIN) 100 mg tablet Take 100 mg by mouth two (2) times a day., Historical Med      ketoconazole (NIZORAL) 2 % topical cream Apply  to affected area daily. , Historical Med      olopatadine (PATANOL) 0.1 % ophthalmic solution Administer 2 Drops to both eyes two (2) times a day., Historical Med      bisacodyL (Dulcolax, bisacodyl,) 5 mg EC tablet Take 5 mg by mouth daily as needed for Constipation. , Historical Med      calcium citrate/vitamin D3 (CALCIUM CITRATE + D PO) Take 600 mg by mouth daily. , Historical Med      cyanocobalamin (VITAMIN B-12) 500 mcg tablet Take 500 mcg by mouth daily. , Historical Med      multivitamin (ONE A DAY) tablet Take 1 Tab by mouth daily. , Historical Med      albuterol (ProAir HFA) 90 mcg/actuation inhaler Take 2 Puffs by inhalation every four (4) hours as needed., Historical Med      naloxone 4 mg/actuation spry 4 mg by Nasal route as needed. , Print, Disp-1 Bottle, R-1      ergocalciferol (VITAMIN D2) 50,000 unit capsule Take 50,000 Units by mouth every seven (7) days. , Historical Med      Consolidated Ti Use as directed. , Print, Disp-1 Device, R-0      amLODIPine (NORVASC) 10 mg tablet Take  by mouth daily. , Historical Med      lisinopril (PRINIVIL, ZESTRIL) 10 mg tablet Take 40 mg by mouth daily. , Historical Med      butalbital-acetaminophen-caffeine (FIORICET) -40 mg per tablet Take 1 Tab by mouth daily as needed., Historical Med             * Follow-up Care/Patient Instructions: Activity: Activity as tolerated  Diet: Efrain stage 1  Wound Care: As directed    Follow-up Information     Follow up With Specialties Details Why Kael Savage [address] [place] [address]. [address] [place] [address] Dr Roldan [address]  [phone]    Cornelius Corona MD Family Medicine   [address]  [phone]        In 1 week Follow up with Aakash Gresham in 1 week. Please call for virtual visit. [address]   [place] [address]  [phone]          Signed:  Luz Gillette MD  2/1/2021  1:22 PM

## 2021-02-01 NOTE — TELEPHONE ENCOUNTER
Pt is calling to check on the status of her refill medication to see if it has been sent or not. Please call pt back.

## 2021-02-03 ENCOUNTER — OFFICE VISIT (OUTPATIENT)
Dept: SURGERY | Age: 56
End: 2021-02-03
Payer: MEDICARE

## 2021-02-03 VITALS
HEART RATE: 112 BPM | RESPIRATION RATE: 20 BRPM | TEMPERATURE: 100 F | DIASTOLIC BLOOD PRESSURE: 76 MMHG | WEIGHT: 293 LBS | HEIGHT: 68 IN | BODY MASS INDEX: 44.41 KG/M2 | SYSTOLIC BLOOD PRESSURE: 151 MMHG | OXYGEN SATURATION: 93 %

## 2021-02-03 DIAGNOSIS — E66.01 MORBID OBESITY (HCC): Primary | ICD-10-CM

## 2021-02-03 DIAGNOSIS — T14.8XXA WOUND INFECTION: ICD-10-CM

## 2021-02-03 DIAGNOSIS — L08.9 WOUND INFECTION: ICD-10-CM

## 2021-02-03 DIAGNOSIS — Z09 FOLLOW-UP EXAMINATION AFTER ABDOMINAL SURGERY: ICD-10-CM

## 2021-02-03 PROCEDURE — 99024 POSTOP FOLLOW-UP VISIT: CPT | Performed by: NURSE PRACTITIONER

## 2021-02-03 RX ORDER — AMOXICILLIN AND CLAVULANATE POTASSIUM 875; 125 MG/1; MG/1
1 TABLET, FILM COATED ORAL 2 TIMES DAILY
Qty: 14 TAB | Refills: 0 | Status: SHIPPED | OUTPATIENT
Start: 2021-02-03 | End: 2021-02-10

## 2021-02-03 NOTE — PROGRESS NOTES
1. Have you been to the ER, urgent care clinic since your last visit? Hospitalized since your last visit? No    2. Have you seen or consulted any other health care providers outside of the [address] [place] [address] since your last visit? Include any pap smears or colon screening.  No

## 2021-02-03 NOTE — PATIENT INSTRUCTIONS
Wound Care: After Your Visit Your Care Instructions Taking good care of your wound at home will help it heal quickly and reduce your chance of infection. The doctor has checked you carefully, but problems can develop later. If you notice any problems or new symptoms, get medical treatment right away. Follow-up care is a key part of your treatment and safety. Be sure to make and go to all appointments, and call your doctor if you are having problems. It's also a good idea to know your test results and keep a list of the medicines you take. How can you care for yourself at home? · Clean the area with soap and water 2 times a day unless your doctor gives you different instructions. Don't use hydrogen peroxide or alcohol, which can slow healing. ¨ You may cover the wound with a thin layer of antibiotic ointment, such as bacitracin, and a nonstick bandage. ¨ Apply more ointment and replace the bandage as needed. · Take pain medicines exactly as directed. Some pain is normal with a wound, but do not ignore pain that is getting worse instead of better. You could have an infection. ¨ If the doctor gave you a prescription medicine for pain, take it as prescribed. ¨ If you are not taking a prescription pain medicine, ask your doctor if you can take an over-the-counter medicine. · Your doctor may have closed your wound with stitches (sutures), staples, or skin glue. ¨ If you have stitches, your doctor may remove them after several days to 2 weeks. Or you may have stitches that dissolve on their own. ¨ If you have staples, your doctor may remove them after 7 to 10 days. ¨ If your wound was closed with skin glue, the glue will wear off in a few days to 2 weeks. When should you call for help? Call your doctor now or seek immediate medical care if: 
· You have signs of infection, such as: 
¨ Increased pain, swelling, warmth, or redness near the wound. ¨ Red streaks leading from the wound. ¨ Pus draining from the wound. ¨ A fever. · You bleed so much from your incision that you soak one or more bandages over 2 to 4 hours. Watch closely for changes in your health, and be sure to contact your doctor if: · The wound is not getting better each day. Where can you learn more? Go to [facility] Enter Y474 in the search box to learn more about \"Wound Care: After Your Visit. \"  
© 3052-6745 [employer]wise, [employer]. Care instructions adapted under license by [employer] (which disclaims liability or warranty for this information). This care instruction is for use with your licensed healthcare professional. If you have questions about a medical condition or this instruction, always ask your healthcare professional. [address] any warranty or liability for your use of this information. Content Version: 74.0.585978; Last Revised: April 23, 2012

## 2021-02-03 NOTE — PROGRESS NOTES
3.5 weeks revision from sleeve gastrectomy to gastric bypass and 1/2 weeks from repair and reduction of incarcerated hernia. Pt reports doing well on liquids and soft foods. .    Patient complains of pain and tenderness at left lateral incision site  Pt reports no nausea and no vomiting  Sheis drinking approximately 48 oz of water daily  + BM  She is drinking and eating 50+ grams of protein daily. He presents today with complaints at the left lateral incision site. It is warm and red. Patient states that this morning it started draining pus. Patient has a temperature to 100. Visit Vitals  BP (!) 151/76   Pulse (!) 112   Temp 100 °F (37.8 °C)   Resp 20   Ht 5' 8\" (1.727 m)   Wt (!) 372 lb (168.7 kg)   SpO2 93%   BMI 56.56 kg/m²              Ms. Aurelio Nicholson has a reminder for a \"due or due soon\" health maintenance. I have asked that she contact her primary care provider for follow-up on this health maintenance. Physical Examination: General appearance - alert, well appearing, and in no distress,    Abdomen - soft, tender at left lateral incision site. Erythema noted. Incision draining serosanguineous and pus. Dr. Davey Maharaj open the wound with a small scissor. Half-inch iodoform gauze placed as a wick in the wound, covered with 4 x 4's and tape. A/P    Wound infection 3.5 weeks status revision from sleeve gastrectomy to gastric bypass and 1-1/2 weeks from reduction and repair of internal hernia. Wound care instructions given to patient. Pack wound daily, cover with 4 x 4 and tape  Augmentin prescribed to pharmacy take 1 tablet twice daily x7 days. Diet advanced to soft meats on Thursday. Focus on 50-60 grams of protein daily. Encouraged water intake to 64 oz of non-carbonated/no calorie beverages daily. Supplement with unflavored protein powder daily. Continue PPI  No lifting greater than 20 lbs. Canceled virtual follow-up appointment for Thursday.   Advised patient she should be seen in 1 week in the office. Pt verbalized understanding and questions were answered to the best of my knowledge and ability.         Lalitha Paez NP

## 2021-02-04 ENCOUNTER — TELEPHONE (OUTPATIENT)
Dept: SURGERY | Age: 56
End: 2021-02-04

## 2021-02-04 NOTE — TELEPHONE ENCOUNTER
Call center message 2/4/21 5:04AM    Message stated \"requesting Parisa Lopez, physical therapy needs to know  if they need a nurse to come to her  home to take of her wound\"

## 2021-02-04 NOTE — TELEPHONE ENCOUNTER
See office note from yesterday as patient was seen in the office. Azucena Roman NP said her  would be doing her wound care and Dr Whitmore instructed her yesterday on how to do the wound care.

## 2021-02-05 ENCOUNTER — APPOINTMENT (OUTPATIENT)
Dept: GENERAL RADIOLOGY | Age: 56
DRG: 862 | End: 2021-02-05
Attending: EMERGENCY MEDICINE
Payer: MEDICARE

## 2021-02-05 ENCOUNTER — TELEPHONE (OUTPATIENT)
Dept: SURGERY | Age: 56
End: 2021-02-05

## 2021-02-05 ENCOUNTER — HOSPITAL ENCOUNTER (INPATIENT)
Age: 56
LOS: 3 days | Discharge: HOME | DRG: 862 | End: 2021-02-09
Attending: EMERGENCY MEDICINE | Admitting: SURGERY
Payer: MEDICARE

## 2021-02-05 ENCOUNTER — APPOINTMENT (OUTPATIENT)
Dept: CT IMAGING | Age: 56
DRG: 862 | End: 2021-02-05
Attending: EMERGENCY MEDICINE
Payer: MEDICARE

## 2021-02-05 DIAGNOSIS — K65.1 INTRA-ABDOMINAL ABSCESS (HCC): Primary | ICD-10-CM

## 2021-02-05 LAB
ALBUMIN SERPL-MCNC: 2.7 G/DL (ref 3.5–5)
ALBUMIN/GLOB SERPL: 0.5 {RATIO} (ref 1.1–2.2)
ALP SERPL-CCNC: 146 U/L (ref 45–117)
ALT SERPL-CCNC: 22 U/L (ref 12–78)
ANION GAP SERPL CALC-SCNC: 7 MMOL/L (ref 5–15)
AST SERPL-CCNC: 12 U/L (ref 15–37)
BASOPHILS # BLD: 0 K/UL (ref 0–0.1)
BASOPHILS NFR BLD: 0 % (ref 0–1)
BILIRUB SERPL-MCNC: 0.4 MG/DL (ref 0.2–1)
BUN SERPL-MCNC: 6 MG/DL (ref 6–20)
BUN/CREAT SERPL: 13 (ref 12–20)
CALCIUM SERPL-MCNC: 9.1 MG/DL (ref 8.5–10.1)
CHLORIDE SERPL-SCNC: 107 MMOL/L (ref 97–108)
CO2 SERPL-SCNC: 26 MMOL/L (ref 21–32)
COMMENT, HOLDF: NORMAL
COVID-19 RAPID TEST, COVR: NOT DETECTED
CREAT SERPL-MCNC: 0.47 MG/DL (ref 0.55–1.02)
DIFFERENTIAL METHOD BLD: ABNORMAL
EOSINOPHIL # BLD: 0 K/UL (ref 0–0.4)
EOSINOPHIL NFR BLD: 0 % (ref 0–7)
ERYTHROCYTE [DISTWIDTH] IN BLOOD BY AUTOMATED COUNT: 15.6 % (ref 11.5–14.5)
GLOBULIN SER CALC-MCNC: 5.5 G/DL (ref 2–4)
GLUCOSE SERPL-MCNC: 127 MG/DL (ref 65–100)
HCT VFR BLD AUTO: 30.4 % (ref 35–47)
HGB BLD-MCNC: 9.5 G/DL (ref 11.5–16)
IMM GRANULOCYTES # BLD AUTO: 0.1 K/UL (ref 0–0.04)
IMM GRANULOCYTES NFR BLD AUTO: 1 % (ref 0–0.5)
LACTATE SERPL-SCNC: 0.8 MMOL/L (ref 0.4–2)
LIPASE SERPL-CCNC: 123 U/L (ref 73–393)
LYMPHOCYTES # BLD: 1.1 K/UL (ref 0.8–3.5)
LYMPHOCYTES NFR BLD: 12 % (ref 12–49)
MCH RBC QN AUTO: 27.9 PG (ref 26–34)
MCHC RBC AUTO-ENTMCNC: 31.3 G/DL (ref 30–36.5)
MCV RBC AUTO: 89.4 FL (ref 80–99)
MONOCYTES # BLD: 0.3 K/UL (ref 0–1)
MONOCYTES NFR BLD: 3 % (ref 5–13)
NEUTS SEG # BLD: 8.3 K/UL (ref 1.8–8)
NEUTS SEG NFR BLD: 85 % (ref 32–75)
NRBC # BLD: 0 K/UL (ref 0–0.01)
NRBC BLD-RTO: 0 PER 100 WBC
PLATELET # BLD AUTO: 485 K/UL (ref 150–400)
PMV BLD AUTO: 9.8 FL (ref 8.9–12.9)
POTASSIUM SERPL-SCNC: 3.2 MMOL/L (ref 3.5–5.1)
PROT SERPL-MCNC: 8.2 G/DL (ref 6.4–8.2)
RBC # BLD AUTO: 3.4 M/UL (ref 3.8–5.2)
SAMPLES BEING HELD,HOLD: NORMAL
SARS-COV-2, COV2: NORMAL
SODIUM SERPL-SCNC: 140 MMOL/L (ref 136–145)
SOURCE, COVRS: NORMAL
TROPONIN I SERPL-MCNC: <0.05 NG/ML
WBC # BLD AUTO: 9.8 K/UL (ref 3.6–11)

## 2021-02-05 PROCEDURE — 74011250636 HC RX REV CODE- 250/636: Performed by: EMERGENCY MEDICINE

## 2021-02-05 PROCEDURE — 87635 SARS-COV-2 COVID-19 AMP PRB: CPT

## 2021-02-05 PROCEDURE — 81001 URINALYSIS AUTO W/SCOPE: CPT

## 2021-02-05 PROCEDURE — 83690 ASSAY OF LIPASE: CPT

## 2021-02-05 PROCEDURE — 84484 ASSAY OF TROPONIN QUANT: CPT

## 2021-02-05 PROCEDURE — 96375 TX/PRO/DX INJ NEW DRUG ADDON: CPT

## 2021-02-05 PROCEDURE — 36415 COLL VENOUS BLD VENIPUNCTURE: CPT

## 2021-02-05 PROCEDURE — 96374 THER/PROPH/DIAG INJ IV PUSH: CPT

## 2021-02-05 PROCEDURE — 83605 ASSAY OF LACTIC ACID: CPT

## 2021-02-05 PROCEDURE — 93005 ELECTROCARDIOGRAM TRACING: CPT

## 2021-02-05 PROCEDURE — 74019 RADEX ABDOMEN 2 VIEWS: CPT

## 2021-02-05 PROCEDURE — 85025 COMPLETE CBC W/AUTO DIFF WBC: CPT

## 2021-02-05 PROCEDURE — 99285 EMERGENCY DEPT VISIT HI MDM: CPT

## 2021-02-05 PROCEDURE — 80053 COMPREHEN METABOLIC PANEL: CPT

## 2021-02-05 PROCEDURE — 87040 BLOOD CULTURE FOR BACTERIA: CPT

## 2021-02-05 PROCEDURE — 74011000636 HC RX REV CODE- 636: Performed by: RADIOLOGY

## 2021-02-05 RX ORDER — ONDANSETRON 2 MG/ML
4 INJECTION INTRAMUSCULAR; INTRAVENOUS
Status: COMPLETED | OUTPATIENT
Start: 2021-02-05 | End: 2021-02-05

## 2021-02-05 RX ORDER — HYDROMORPHONE HYDROCHLORIDE 1 MG/ML
1 INJECTION, SOLUTION INTRAMUSCULAR; INTRAVENOUS; SUBCUTANEOUS
Status: COMPLETED | OUTPATIENT
Start: 2021-02-05 | End: 2021-02-05

## 2021-02-05 RX ADMIN — SODIUM CHLORIDE 1000 ML: 9 INJECTION, SOLUTION INTRAVENOUS at 22:09

## 2021-02-05 RX ADMIN — ONDANSETRON 4 MG: 2 INJECTION INTRAMUSCULAR; INTRAVENOUS at 22:10

## 2021-02-05 RX ADMIN — IOPAMIDOL 30 ML: 755 INJECTION, SOLUTION INTRAVENOUS at 23:45

## 2021-02-05 RX ADMIN — IOHEXOL 50 ML: 240 INJECTION, SOLUTION INTRATHECAL; INTRAVASCULAR; INTRAVENOUS; ORAL at 23:45

## 2021-02-05 RX ADMIN — HYDROMORPHONE HYDROCHLORIDE 1 MG: 1 INJECTION, SOLUTION INTRAMUSCULAR; INTRAVENOUS; SUBCUTANEOUS at 22:10

## 2021-02-05 NOTE — ED TRIAGE NOTES
Pt arrives c/o chills and vomiting. Pt reports having bowel obstruction and gastric bypass last week.

## 2021-02-05 NOTE — TELEPHONE ENCOUNTER
I called the patient back after speaking to Dr Eliot Bradford and he said if she continues with the chills she needs to come to the ED to be checked out. She said she is able to drink a little bit of liquids, She said she is going to come to the ED.

## 2021-02-05 NOTE — TELEPHONE ENCOUNTER
I called the patient back and she said she is has been having chills all morning but the forehead thermometer says her temp is 97.7. I had her check it while I had her on the phone as well. She said her wound is still draining and it is the same color, does not have an odor. She said she is not able to drink the protein shakes at all as it is making her nauseated. She is able to drink other liquids, she said she keeps vomiting up Phlegm. I told her I will discuss with NP and call her back. Pt in agreement.

## 2021-02-06 ENCOUNTER — APPOINTMENT (OUTPATIENT)
Dept: CT IMAGING | Age: 56
DRG: 862 | End: 2021-02-06
Attending: EMERGENCY MEDICINE
Payer: MEDICARE

## 2021-02-06 ENCOUNTER — APPOINTMENT (OUTPATIENT)
Dept: GENERAL RADIOLOGY | Age: 56
DRG: 862 | End: 2021-02-06
Attending: SURGERY
Payer: MEDICARE

## 2021-02-06 PROBLEM — T81.43XA POSTOPERATIVE INTRA-ABDOMINAL ABSCESS: Status: ACTIVE | Noted: 2021-02-06

## 2021-02-06 LAB
APPEARANCE UR: CLEAR
ATRIAL RATE: 95 BPM
BACTERIA URNS QL MICRO: ABNORMAL /HPF
BILIRUB UR QL: NEGATIVE
CALCULATED P AXIS, ECG09: 61 DEGREES
CALCULATED R AXIS, ECG10: 21 DEGREES
CALCULATED T AXIS, ECG11: 58 DEGREES
COLOR UR: ABNORMAL
DIAGNOSIS, 93000: NORMAL
EPITH CASTS URNS QL MICRO: ABNORMAL /LPF
GLUCOSE UR STRIP.AUTO-MCNC: NEGATIVE MG/DL
HGB UR QL STRIP: NEGATIVE
HYALINE CASTS URNS QL MICRO: ABNORMAL /LPF (ref 0–5)
KETONES UR QL STRIP.AUTO: 15 MG/DL
LEUKOCYTE ESTERASE UR QL STRIP.AUTO: NEGATIVE
NITRITE UR QL STRIP.AUTO: NEGATIVE
P-R INTERVAL, ECG05: 194 MS
PH UR STRIP: 6 [PH] (ref 5–8)
PROT UR STRIP-MCNC: 30 MG/DL
Q-T INTERVAL, ECG07: 358 MS
QRS DURATION, ECG06: 88 MS
QTC CALCULATION (BEZET), ECG08: 449 MS
RBC #/AREA URNS HPF: ABNORMAL /HPF (ref 0–5)
SP GR UR REFRACTOMETRY: 1.01 (ref 1–1.03)
UROBILINOGEN UR QL STRIP.AUTO: 0.2 EU/DL (ref 0.2–1)
VENTRICULAR RATE, ECG03: 95 BPM
WBC URNS QL MICRO: ABNORMAL /HPF (ref 0–4)

## 2021-02-06 PROCEDURE — 74177 CT ABD & PELVIS W/CONTRAST: CPT

## 2021-02-06 PROCEDURE — 74011250636 HC RX REV CODE- 250/636: Performed by: ANESTHESIOLOGY

## 2021-02-06 PROCEDURE — 77010033678 HC OXYGEN DAILY

## 2021-02-06 PROCEDURE — 74011000258 HC RX REV CODE- 258: Performed by: EMERGENCY MEDICINE

## 2021-02-06 PROCEDURE — 96374 THER/PROPH/DIAG INJ IV PUSH: CPT

## 2021-02-06 PROCEDURE — 71045 X-RAY EXAM CHEST 1 VIEW: CPT

## 2021-02-06 PROCEDURE — 99024 POSTOP FOLLOW-UP VISIT: CPT | Performed by: SURGERY

## 2021-02-06 PROCEDURE — 74011250636 HC RX REV CODE- 250/636: Performed by: EMERGENCY MEDICINE

## 2021-02-06 PROCEDURE — 74011250636 HC RX REV CODE- 250/636: Performed by: SURGERY

## 2021-02-06 PROCEDURE — 96375 TX/PRO/DX INJ NEW DRUG ADDON: CPT

## 2021-02-06 PROCEDURE — 65270000029 HC RM PRIVATE

## 2021-02-06 PROCEDURE — 74011250637 HC RX REV CODE- 250/637: Performed by: SURGERY

## 2021-02-06 PROCEDURE — 74011000258 HC RX REV CODE- 258: Performed by: SURGERY

## 2021-02-06 RX ORDER — ACETAMINOPHEN 325 MG/1
650 TABLET ORAL
Status: DISCONTINUED | OUTPATIENT
Start: 2021-02-06 | End: 2021-02-09 | Stop reason: HOSPADM

## 2021-02-06 RX ORDER — ALBUTEROL SULFATE 0.83 MG/ML
2.5 SOLUTION RESPIRATORY (INHALATION)
Status: DISCONTINUED | OUTPATIENT
Start: 2021-02-06 | End: 2021-02-09 | Stop reason: HOSPADM

## 2021-02-06 RX ORDER — PRAVASTATIN SODIUM 20 MG/1
20 TABLET ORAL
Status: DISCONTINUED | OUTPATIENT
Start: 2021-02-06 | End: 2021-02-09 | Stop reason: HOSPADM

## 2021-02-06 RX ORDER — BISACODYL 5 MG
5 TABLET, DELAYED RELEASE (ENTERIC COATED) ORAL DAILY PRN
Status: DISCONTINUED | OUTPATIENT
Start: 2021-02-06 | End: 2021-02-09 | Stop reason: HOSPADM

## 2021-02-06 RX ORDER — ARIPIPRAZOLE 10 MG/1
10 TABLET ORAL DAILY
Status: DISCONTINUED | OUTPATIENT
Start: 2021-02-06 | End: 2021-02-09 | Stop reason: HOSPADM

## 2021-02-06 RX ORDER — ALPRAZOLAM 0.5 MG/1
0.5 TABLET ORAL
Status: DISCONTINUED | OUTPATIENT
Start: 2021-02-06 | End: 2021-02-09 | Stop reason: HOSPADM

## 2021-02-06 RX ORDER — VANCOMYCIN 2 GRAM/500 ML IN 0.9 % SODIUM CHLORIDE INTRAVENOUS
2000 ONCE
Status: COMPLETED | OUTPATIENT
Start: 2021-02-06 | End: 2021-02-06

## 2021-02-06 RX ORDER — GABAPENTIN 100 MG/1
100-200 CAPSULE ORAL
Status: DISCONTINUED | OUTPATIENT
Start: 2021-02-06 | End: 2021-02-09 | Stop reason: HOSPADM

## 2021-02-06 RX ORDER — VANCOMYCIN 1.75 GRAM/500 ML IN 0.9 % SODIUM CHLORIDE INTRAVENOUS
1750 EVERY 12 HOURS
Status: DISCONTINUED | OUTPATIENT
Start: 2021-02-06 | End: 2021-02-07

## 2021-02-06 RX ORDER — HYDROMORPHONE HYDROCHLORIDE 1 MG/ML
1 INJECTION, SOLUTION INTRAMUSCULAR; INTRAVENOUS; SUBCUTANEOUS
Status: DISCONTINUED | OUTPATIENT
Start: 2021-02-06 | End: 2021-02-09 | Stop reason: HOSPADM

## 2021-02-06 RX ORDER — MORPHINE SULFATE 2 MG/ML
4 INJECTION, SOLUTION INTRAMUSCULAR; INTRAVENOUS ONCE
Status: COMPLETED | OUTPATIENT
Start: 2021-02-06 | End: 2021-02-06

## 2021-02-06 RX ORDER — MIDAZOLAM HYDROCHLORIDE 1 MG/ML
INJECTION, SOLUTION INTRAMUSCULAR; INTRAVENOUS
Status: DISPENSED
Start: 2021-02-06 | End: 2021-02-06

## 2021-02-06 RX ORDER — MIDAZOLAM HYDROCHLORIDE 1 MG/ML
2 INJECTION, SOLUTION INTRAMUSCULAR; INTRAVENOUS
Status: COMPLETED | OUTPATIENT
Start: 2021-02-06 | End: 2021-02-06

## 2021-02-06 RX ORDER — SODIUM CHLORIDE, SODIUM LACTATE, POTASSIUM CHLORIDE, CALCIUM CHLORIDE 600; 310; 30; 20 MG/100ML; MG/100ML; MG/100ML; MG/100ML
125 INJECTION, SOLUTION INTRAVENOUS CONTINUOUS
Status: DISCONTINUED | OUTPATIENT
Start: 2021-02-06 | End: 2021-02-09 | Stop reason: HOSPADM

## 2021-02-06 RX ORDER — ONDANSETRON 2 MG/ML
4 INJECTION INTRAMUSCULAR; INTRAVENOUS
Status: DISCONTINUED | OUTPATIENT
Start: 2021-02-06 | End: 2021-02-09 | Stop reason: HOSPADM

## 2021-02-06 RX ADMIN — HYDROMORPHONE HYDROCHLORIDE 1 MG: 1 INJECTION, SOLUTION INTRAMUSCULAR; INTRAVENOUS; SUBCUTANEOUS at 10:41

## 2021-02-06 RX ADMIN — VANCOMYCIN HYDROCHLORIDE 1750 MG: 10 INJECTION, POWDER, LYOPHILIZED, FOR SOLUTION INTRAVENOUS at 22:12

## 2021-02-06 RX ADMIN — HYDROMORPHONE HYDROCHLORIDE 1 MG: 1 INJECTION, SOLUTION INTRAMUSCULAR; INTRAVENOUS; SUBCUTANEOUS at 14:59

## 2021-02-06 RX ADMIN — VANCOMYCIN HYDROCHLORIDE 2000 MG: 10 INJECTION, POWDER, LYOPHILIZED, FOR SOLUTION INTRAVENOUS at 10:58

## 2021-02-06 RX ADMIN — PIPERACILLIN AND TAZOBACTAM 3.38 G: 3; .375 INJECTION, POWDER, LYOPHILIZED, FOR SOLUTION INTRAVENOUS at 05:45

## 2021-02-06 RX ADMIN — MIDAZOLAM 2 MG: 1 INJECTION INTRAMUSCULAR; INTRAVENOUS at 06:32

## 2021-02-06 RX ADMIN — MORPHINE SULFATE 4 MG: 2 INJECTION, SOLUTION INTRAMUSCULAR; INTRAVENOUS at 03:25

## 2021-02-06 RX ADMIN — HYDROMORPHONE HYDROCHLORIDE 1 MG: 1 INJECTION, SOLUTION INTRAMUSCULAR; INTRAVENOUS; SUBCUTANEOUS at 18:45

## 2021-02-06 RX ADMIN — PIPERACILLIN AND TAZOBACTAM 3.38 G: 3; .375 INJECTION, POWDER, LYOPHILIZED, FOR SOLUTION INTRAVENOUS at 18:29

## 2021-02-06 RX ADMIN — SODIUM CHLORIDE, POTASSIUM CHLORIDE, SODIUM LACTATE AND CALCIUM CHLORIDE 125 ML/HR: 600; 310; 30; 20 INJECTION, SOLUTION INTRAVENOUS at 20:39

## 2021-02-06 RX ADMIN — SODIUM CHLORIDE, POTASSIUM CHLORIDE, SODIUM LACTATE AND CALCIUM CHLORIDE 125 ML/HR: 600; 310; 30; 20 INJECTION, SOLUTION INTRAVENOUS at 10:58

## 2021-02-06 RX ADMIN — PIPERACILLIN AND TAZOBACTAM 3.38 G: 3; .375 INJECTION, POWDER, LYOPHILIZED, FOR SOLUTION INTRAVENOUS at 20:38

## 2021-02-06 RX ADMIN — HYDROMORPHONE HYDROCHLORIDE 1 MG: 1 INJECTION, SOLUTION INTRAMUSCULAR; INTRAVENOUS; SUBCUTANEOUS at 21:47

## 2021-02-06 RX ADMIN — TRAZODONE HYDROCHLORIDE 150 MG: 100 TABLET ORAL at 21:47

## 2021-02-06 RX ADMIN — PRAVASTATIN SODIUM 20 MG: 20 TABLET ORAL at 21:47

## 2021-02-06 NOTE — PROGRESS NOTES
Central Line Procedure Note  Under ultrasound guidence    Indication:volume and cvp and pa monitoring    Risks, benefits, alternatives explained and patient agrees to proceed. Patient positioned in Trendelenburg. 7-Step Sterility Protocol followed. (cap, mask sterile gown, sterile gloves, large sterile sheet, hand hygiene, 2% chlorhexidine for cutaneous antisepsis)  5 mL 1% Lidocaine placed at insertion site. Left internal jugular cannulated x 1 attempt(s) utilizing the Seldinger technique. And  Live ultrasound  Guidance was used to visualise the vein. Venous cannulation confirmed with column drop test.    Catheter secured. Biopatch applied. Sterile Tegaderm placed. CXR pending.

## 2021-02-06 NOTE — PROGRESS NOTES
Orthopedic Spine Nursing Note      Patient Name: Naseem Carrillo    : 1965               MRN: [id number]    Admit Diagnosis: Postoperative intra-abdominal abscess [T81.43XA]    Surgery: * No surgery found *        Xray called for central line placement, wound dressing changed w/ ABD pad & tape.       Lines:   Peripheral IV 21 Right Antecubital (Active)       Signed by: Raquel Bui RN, BSN, [facility]                                            2021 at 7:34 AM

## 2021-02-06 NOTE — PROGRESS NOTES
Pharmacist Note - Vancomycin Dosing    Consult provided for this 54 y.o. female for indication of surgical site infection. Antibiotic regimen(s): zosyn+van  Patient on vancomycin PTA? NO     Recent Labs     21  2146   WBC 9.8   CREA 0.47*   BUN 6     Frequency of BMP: daily  Height: 172 cm  Weight: 164 kg  Est CrCl: >100 ml/min; Temp (24hrs), Av °F (36.7 °C), Min:97.4 °F (36.3 °C), Max:98.4 °F (36.9 °C)    Cultures:  2 blood NGTD    Goal trough = 15 - 20 mcg/mL    Therapy will be initiated with a loading dose of 2000 mg IV x 1 to be followed by a maintenance dose of 1750 mg IV every 12 hours. Pharmacy to follow patient daily and order levels / make dose adjustments as appropriate.

## 2021-02-06 NOTE — CONSULTS
Chief Complaint:  Intra-abdominal abscess    HPI:  53 yo woman with hx morbid obesity s/p gastric bypass complicated by bowel obstruction s/p ventral hernia repair presented to ER with nausea, vomiting and chills. Pt reported symptoms developed three days ago. She noted drainage from her LLQ incision site. She denied fever but does report chills. CT scan without IV contrast showed ascending colonic wall thickening but unable to characterize loculated collection given lack of IV contrast.  Her wound that is draining seropurulent fluid, however, is from left lower quadrant. No leukocytosis. Past Medical History:   Diagnosis Date    Anxiety 2017    Arthritis     Asthma     Back injury     Depression     Essential hypertension     GERD (gastroesophageal reflux disease)     Morbid obesity ([facility] [address]     Sleep apnea in adult 3/6/2018    CPAP       Past Surgical History:   Procedure Laterality Date    HX APPENDECTOMY      HX  SECTION      HX COLONOSCOPY      HX GASTRECTOMY  2016    lap sleeve gastrectomy    HX GASTRECTOMY  2021    REVISION LAP GASTRECTOMY    HX HERNIA REPAIR  2021    Incarcerated hernia repair Dr. Jayla Islas      left knee    HX LYSIS OF ADHESIONS  2021    Dr. Aaron Amin      rt knee partial replacement    HX ORTHOPAEDIC Right     LIGAMENT REPAIR    IR PLC IVC FILTER  2021    [place] LAP,DIAGNOSTIC ABDOMEN  2021    Dr. Callejas Chago       No current facility-administered medications on file prior to encounter. Current Outpatient Medications on File Prior to Encounter   Medication Sig Dispense Refill    amoxicillin-clavulanate (AUGMENTIN) 875-125 mg per tablet Take 1 Tab by mouth two (2) times a day for 7 days. 14 Tab 0    HYDROmorphone (DILAUDID) 2 mg tablet Take 1 Tab by mouth every six to eight (6-8) hours as needed for Pain for up to 5 days. Max Daily Amount: 8 mg.  Indications: excessive pain 15 Tab 0    traZODone (DESYREL) 150 mg tablet Take 1 Tab by mouth nightly as needed for Sleep for up to 90 days. 90 Tab 0    ondansetron (ZOFRAN ODT) 4 mg disintegrating tablet Take 1 Tab by mouth every eight (8) hours as needed for Nausea or Nausea or Vomiting (AFTER SURGERY). 20 Tab 0    enoxaparin (LOVENOX) 40 mg/0.4 mL 0.4 mL by SubCUTAneous route daily. AFTER SURGERY  Indications: blood clot in a deep vein of the extremities 14 Syringe 0    gabapentin (NEURONTIN) 100 mg capsule Take 1-2 Caps by mouth every eight (8) hours as needed for Pain (AFTER SURGERY). Max Daily Amount: 600 mg. 30 Cap 0    polyethylene glycol (MIRALAX) 17 gram packet Take 1 Packet by mouth daily. Indications: constipation 100 Packet 3    pantoprazole (PROTONIX) 40 mg tablet Take 1 Tab by mouth daily. AFTER SURGERY 30 Tab 1    citalopram (CeleXA) 40 mg tablet Take 1 Tab by mouth daily for 90 days. 90 Tab 0    ARIPiprazole (ABILIFY) 10 mg tablet Take 1 Tab by mouth daily for 90 days. Indications: additional medications to treat depression 90 Tab 0    ALPRAZolam (XANAX) 0.5 mg tablet Take 1 Tab by mouth three (3) times daily as needed for Anxiety for up to 90 days. Max Daily Amount: 1.5 mg. 90 Tab 1    tiZANidine (ZANAFLEX) 4 mg capsule Take 4 mg by mouth three (3) times daily.  pravastatin (PRAVACHOL) 20 mg tablet Take 20 mg by mouth nightly.  minocycline (DYNACIN) 100 mg tablet Take 100 mg by mouth two (2) times a day.  ketoconazole (NIZORAL) 2 % topical cream Apply  to affected area daily.  olopatadine (PATANOL) 0.1 % ophthalmic solution Administer 2 Drops to both eyes two (2) times a day.  bisacodyL (Dulcolax, bisacodyl,) 5 mg EC tablet Take 5 mg by mouth daily as needed for Constipation.  calcium citrate/vitamin D3 (CALCIUM CITRATE + D PO) Take 600 mg by mouth daily.  cyanocobalamin (VITAMIN B-12) 500 mcg tablet Take 500 mcg by mouth daily.       multivitamin (ONE A DAY) tablet Take 1 Tab by mouth daily.  albuterol (ProAir HFA) 90 mcg/actuation inhaler Take 2 Puffs by inhalation every four (4) hours as needed.  naloxone 4 mg/actuation spry 4 mg by Nasal route as needed. 1 Bottle 1    ergocalciferol (VITAMIN D2) 50,000 unit capsule Take 50,000 Units by mouth every seven (7) days.  Cane lita Use as directed. 1 Device 0    amLODIPine (NORVASC) 10 mg tablet Take  by mouth daily.  lisinopril (PRINIVIL, ZESTRIL) 10 mg tablet Take 40 mg by mouth daily.  butalbital-acetaminophen-caffeine (FIORICET) -40 mg per tablet Take 1 Tab by mouth daily as needed. No Known Allergies    Review of Systems - General ROS: negative  Psychological ROS: negative  Respiratory ROS: negative  Cardiovascular ROS: negative  Gastrointestinal ROS: positive for - abdominal pain and nausea/vomiting    Visit Vitals  BP (!) 156/98 (BP 1 Location: Right upper arm)   Pulse 91   Temp 98.2 °F (36.8 °C)   Resp 20   SpO2 93%         Physical Exam:    Gen:  NAD  Pulm:  Unlabored  Abd:  S/ND/mild TTP in LLQ  Wound:  LLQ wound with minimal seropurulent fluid and surrounding erythema. No fluctuance    Recent Results (from the past 24 hour(s))   CBC WITH AUTOMATED DIFF    Collection Time: 02/05/21  9:46 PM   Result Value Ref Range    WBC 9.8 3.6 - 11.0 K/uL    RBC 3.40 (L) 3.80 - 5.20 M/uL    HGB 9.5 (L) 11.5 - 16.0 g/dL    HCT 30.4 (L) 35.0 - 47.0 %    MCV 89.4 80.0 - 99.0 FL    MCH 27.9 26.0 - 34.0 PG    MCHC 31.3 30.0 - 36.5 g/dL    RDW 15.6 (H) 11.5 - 14.5 %    PLATELET 922 (H) 381 - 400 K/uL    MPV 9.8 8.9 - 12.9 FL    NRBC 0.0 0  WBC    ABSOLUTE NRBC 0.00 0.00 - 0.01 K/uL    NEUTROPHILS 85 (H) 32 - 75 %    LYMPHOCYTES 12 12 - 49 %    MONOCYTES 3 (L) 5 - 13 %    EOSINOPHILS 0 0 - 7 %    BASOPHILS 0 0 - 1 %    IMMATURE GRANULOCYTES 1 (H) 0.0 - 0.5 %    ABS. NEUTROPHILS 8.3 (H) 1.8 - 8.0 K/UL    ABS. LYMPHOCYTES 1.1 0.8 - 3.5 K/UL    ABS. MONOCYTES 0.3 0.0 - 1.0 K/UL    ABS.  EOSINOPHILS 0.0 0.0 - 0.4 K/UL    ABS. BASOPHILS 0.0 0.0 - 0.1 K/UL    ABS. IMM. GRANS. 0.1 (H) 0.00 - 0.04 K/UL    DF AUTOMATED     METABOLIC PANEL, COMPREHENSIVE    Collection Time: 02/05/21  9:46 PM   Result Value Ref Range    Sodium 140 136 - 145 mmol/L    Potassium 3.2 (L) 3.5 - 5.1 mmol/L    Chloride 107 97 - 108 mmol/L    CO2 26 21 - 32 mmol/L    Anion gap 7 5 - 15 mmol/L    Glucose 127 (H) 65 - 100 mg/dL    BUN 6 6 - 20 MG/DL    Creatinine 0.47 (L) 0.55 - 1.02 MG/DL    BUN/Creatinine ratio 13 12 - 20      GFR est AA >60 >60 ml/min/1.73m2    GFR est non-AA >60 >60 ml/min/1.73m2    Calcium 9.1 8.5 - 10.1 MG/DL    Bilirubin, total 0.4 0.2 - 1.0 MG/DL    ALT (SGPT) 22 12 - 78 U/L    AST (SGOT) 12 (L) 15 - 37 U/L    Alk. phosphatase 146 (H) 45 - 117 U/L    Protein, total 8.2 6.4 - 8.2 g/dL    Albumin 2.7 (L) 3.5 - 5.0 g/dL    Globulin 5.5 (H) 2.0 - 4.0 g/dL    A-G Ratio 0.5 (L) 1.1 - 2.2     LIPASE    Collection Time: 02/05/21  9:46 PM   Result Value Ref Range    Lipase 123 73 - 393 U/L   LACTIC ACID    Collection Time: 02/05/21  9:46 PM   Result Value Ref Range    Lactic acid 0.8 0.4 - 2.0 MMOL/L   TROPONIN I    Collection Time: 02/05/21  9:46 PM   Result Value Ref Range    Troponin-I, Qt. <0.05 <0.05 ng/mL   SARS-COV-2    Collection Time: 02/05/21  9:46 PM   Result Value Ref Range    SARS-CoV-2 Please find results under separate order     [address] [facility]    Collection Time: 02/05/21  9:46 PM   Result Value Ref Range    SAMPLES BEING HELD 1red. 1blu     COMMENT        Add-on orders for these samples will be processed based on acceptable specimen integrity and analyte stability, which may vary by analyte.    COVID-19 RAPID TEST    Collection Time: 02/05/21  9:46 PM   Result Value Ref Range    Specimen source Nasopharyngeal      COVID-19 rapid test Not detected NOTD     EKG, 12 LEAD, INITIAL    Collection Time: 02/05/21  9:56 PM   Result Value Ref Range    Ventricular Rate 95 BPM    Atrial Rate 95 BPM    P-R Interval 194 ms QRS Duration 88 ms    Q-T Interval 358 ms    QTC Calculation (Bezet) 449 ms    Calculated P Axis 61 degrees    Calculated R Axis 21 degrees    Calculated T Axis 58 degrees    Diagnosis       Normal sinus rhythm  When compared with ECG of 25-JAN-2021 12:11,  ST no longer depressed in Inferior leads  Nonspecific T wave abnormality no longer evident in Inferior leads  Nonspecific T wave abnormality, worse in Lateral leads  QT has shortened     URINALYSIS W/MICROSCOPIC    Collection Time: 02/05/21 11:30 PM   Result Value Ref Range    Color YELLOW/STRAW      Appearance CLEAR CLEAR      Specific gravity 1.015 1.003 - 1.030      pH (UA) 6.0 5.0 - 8.0      Protein 30 (A) NEG mg/dL    Glucose Negative NEG mg/dL    Ketone 15 (A) NEG mg/dL    Bilirubin Negative NEG      Blood Negative NEG      Urobilinogen 0.2 0.2 - 1.0 EU/dL    Nitrites Negative NEG      Leukocyte Esterase Negative NEG      WBC 0-4 0 - 4 /hpf    RBC 0-5 0 - 5 /hpf    Epithelial cells FEW FEW /lpf    Bacteria 1+ (A) NEG /hpf    Hyaline cast 0-2 0 - 5 /lpf       AP:  55 yo woman consulted for possible intra-abdominal abscess    - Intra-abdominal abscess:  Unable to fully characterize on CT based on patient body habitus and non-contrast.  No indication for operation or percutaneous drainage.    - Left lower abdominal wound abscess:  Likely source of her chills and pain. Looks like a surgical site infection which is opened  And draining.   No further intervention required at this time  - Admit for IV antibiotic  - Repeat CT scan with IV contrast tomorrow to evaluate for resolution  - Advance to GI lite diet  - Labs in am

## 2021-02-06 NOTE — PROGRESS NOTES
Med administration provided by nursing student, Kathrin Corea, was supervised by Jeremy Khoury RN while this pt's nurse was on lunch break.

## 2021-02-06 NOTE — H&P
Chief Complaint:  Intra-abdominal abscess    HPI:  53 yo woman with hx morbid obesity s/p gastric bypass complicated by bowel obstruction s/p ventral hernia repair presented to ER with nausea, vomiting and chills. Pt reported symptoms developed three days ago. She noted drainage from her LLQ incision site. She denied fever but does report chills. CT scan without IV contrast showed ascending colonic wall thickening but unable to characterize loculated collection given lack of IV contrast.  Her wound that is draining seropurulent fluid, however, is from left lower quadrant. No leukocytosis. Past Medical History:   Diagnosis Date    Anxiety 2017    Arthritis     Asthma     Back injury     Depression     Essential hypertension     GERD (gastroesophageal reflux disease)     Morbid obesity ([facility] [address]     Sleep apnea in adult 3/6/2018    CPAP       Past Surgical History:   Procedure Laterality Date    HX APPENDECTOMY      HX  SECTION      HX COLONOSCOPY      HX GASTRECTOMY  2016    lap sleeve gastrectomy    HX GASTRECTOMY  2021    REVISION LAP GASTRECTOMY    HX HERNIA REPAIR  2021    Incarcerated hernia repair Dr. Maurizio De Leon      left knee    HX LYSIS OF ADHESIONS  2021    Dr. Rajan Montes      rt knee partial replacement    HX ORTHOPAEDIC Right     LIGAMENT REPAIR    IR PLC IVC FILTER  2021    [place] LAP,DIAGNOSTIC ABDOMEN  2021    Dr. Jacque Melara       No current facility-administered medications on file prior to encounter. Current Outpatient Medications on File Prior to Encounter   Medication Sig Dispense Refill    amoxicillin-clavulanate (AUGMENTIN) 875-125 mg per tablet Take 1 Tab by mouth two (2) times a day for 7 days. 14 Tab 0    HYDROmorphone (DILAUDID) 2 mg tablet Take 1 Tab by mouth every six to eight (6-8) hours as needed for Pain for up to 5 days. Max Daily Amount: 8 mg.  Indications: excessive pain 15 Tab 0    traZODone (DESYREL) 150 mg tablet Take 1 Tab by mouth nightly as needed for Sleep for up to 90 days. 90 Tab 0    ondansetron (ZOFRAN ODT) 4 mg disintegrating tablet Take 1 Tab by mouth every eight (8) hours as needed for Nausea or Nausea or Vomiting (AFTER SURGERY). 20 Tab 0    enoxaparin (LOVENOX) 40 mg/0.4 mL 0.4 mL by SubCUTAneous route daily. AFTER SURGERY  Indications: blood clot in a deep vein of the extremities 14 Syringe 0    gabapentin (NEURONTIN) 100 mg capsule Take 1-2 Caps by mouth every eight (8) hours as needed for Pain (AFTER SURGERY). Max Daily Amount: 600 mg. 30 Cap 0    polyethylene glycol (MIRALAX) 17 gram packet Take 1 Packet by mouth daily. Indications: constipation 100 Packet 3    pantoprazole (PROTONIX) 40 mg tablet Take 1 Tab by mouth daily. AFTER SURGERY 30 Tab 1    citalopram (CeleXA) 40 mg tablet Take 1 Tab by mouth daily for 90 days. 90 Tab 0    ARIPiprazole (ABILIFY) 10 mg tablet Take 1 Tab by mouth daily for 90 days. Indications: additional medications to treat depression 90 Tab 0    ALPRAZolam (XANAX) 0.5 mg tablet Take 1 Tab by mouth three (3) times daily as needed for Anxiety for up to 90 days. Max Daily Amount: 1.5 mg. 90 Tab 1    tiZANidine (ZANAFLEX) 4 mg capsule Take 4 mg by mouth three (3) times daily.  pravastatin (PRAVACHOL) 20 mg tablet Take 20 mg by mouth nightly.  minocycline (DYNACIN) 100 mg tablet Take 100 mg by mouth two (2) times a day.  ketoconazole (NIZORAL) 2 % topical cream Apply  to affected area daily.  olopatadine (PATANOL) 0.1 % ophthalmic solution Administer 2 Drops to both eyes two (2) times a day.  bisacodyL (Dulcolax, bisacodyl,) 5 mg EC tablet Take 5 mg by mouth daily as needed for Constipation.  calcium citrate/vitamin D3 (CALCIUM CITRATE + D PO) Take 600 mg by mouth daily.  cyanocobalamin (VITAMIN B-12) 500 mcg tablet Take 500 mcg by mouth daily.       multivitamin (ONE A DAY) tablet Take 1 Tab by mouth daily.  albuterol (ProAir HFA) 90 mcg/actuation inhaler Take 2 Puffs by inhalation every four (4) hours as needed.  naloxone 4 mg/actuation spry 4 mg by Nasal route as needed. 1 Bottle 1    ergocalciferol (VITAMIN D2) 50,000 unit capsule Take 50,000 Units by mouth every seven (7) days.  Cane lita Use as directed. 1 Device 0    amLODIPine (NORVASC) 10 mg tablet Take  by mouth daily.  lisinopril (PRINIVIL, ZESTRIL) 10 mg tablet Take 40 mg by mouth daily.  butalbital-acetaminophen-caffeine (FIORICET) -40 mg per tablet Take 1 Tab by mouth daily as needed. No Known Allergies    Review of Systems - General ROS: negative  Psychological ROS: negative  Respiratory ROS: negative  Cardiovascular ROS: negative  Gastrointestinal ROS: positive for - abdominal pain and nausea/vomiting    Visit Vitals  BP (!) 156/98 (BP 1 Location: Right upper arm)   Pulse 91   Temp 98.2 °F (36.8 °C)   Resp 20   SpO2 93%         Physical Exam:    Gen:  NAD  Pulm:  Unlabored  Abd:  S/ND/mild TTP in LLQ  Wound:  LLQ wound with minimal seropurulent fluid and surrounding erythema. No fluctuance    Recent Results (from the past 24 hour(s))   CBC WITH AUTOMATED DIFF    Collection Time: 02/05/21  9:46 PM   Result Value Ref Range    WBC 9.8 3.6 - 11.0 K/uL    RBC 3.40 (L) 3.80 - 5.20 M/uL    HGB 9.5 (L) 11.5 - 16.0 g/dL    HCT 30.4 (L) 35.0 - 47.0 %    MCV 89.4 80.0 - 99.0 FL    MCH 27.9 26.0 - 34.0 PG    MCHC 31.3 30.0 - 36.5 g/dL    RDW 15.6 (H) 11.5 - 14.5 %    PLATELET 202 (H) 962 - 400 K/uL    MPV 9.8 8.9 - 12.9 FL    NRBC 0.0 0  WBC    ABSOLUTE NRBC 0.00 0.00 - 0.01 K/uL    NEUTROPHILS 85 (H) 32 - 75 %    LYMPHOCYTES 12 12 - 49 %    MONOCYTES 3 (L) 5 - 13 %    EOSINOPHILS 0 0 - 7 %    BASOPHILS 0 0 - 1 %    IMMATURE GRANULOCYTES 1 (H) 0.0 - 0.5 %    ABS. NEUTROPHILS 8.3 (H) 1.8 - 8.0 K/UL    ABS. LYMPHOCYTES 1.1 0.8 - 3.5 K/UL    ABS. MONOCYTES 0.3 0.0 - 1.0 K/UL    ABS.  EOSINOPHILS 0.0 0.0 - 0.4 K/UL    ABS. BASOPHILS 0.0 0.0 - 0.1 K/UL    ABS. IMM. GRANS. 0.1 (H) 0.00 - 0.04 K/UL    DF AUTOMATED     METABOLIC PANEL, COMPREHENSIVE    Collection Time: 02/05/21  9:46 PM   Result Value Ref Range    Sodium 140 136 - 145 mmol/L    Potassium 3.2 (L) 3.5 - 5.1 mmol/L    Chloride 107 97 - 108 mmol/L    CO2 26 21 - 32 mmol/L    Anion gap 7 5 - 15 mmol/L    Glucose 127 (H) 65 - 100 mg/dL    BUN 6 6 - 20 MG/DL    Creatinine 0.47 (L) 0.55 - 1.02 MG/DL    BUN/Creatinine ratio 13 12 - 20      GFR est AA >60 >60 ml/min/1.73m2    GFR est non-AA >60 >60 ml/min/1.73m2    Calcium 9.1 8.5 - 10.1 MG/DL    Bilirubin, total 0.4 0.2 - 1.0 MG/DL    ALT (SGPT) 22 12 - 78 U/L    AST (SGOT) 12 (L) 15 - 37 U/L    Alk. phosphatase 146 (H) 45 - 117 U/L    Protein, total 8.2 6.4 - 8.2 g/dL    Albumin 2.7 (L) 3.5 - 5.0 g/dL    Globulin 5.5 (H) 2.0 - 4.0 g/dL    A-G Ratio 0.5 (L) 1.1 - 2.2     LIPASE    Collection Time: 02/05/21  9:46 PM   Result Value Ref Range    Lipase 123 73 - 393 U/L   LACTIC ACID    Collection Time: 02/05/21  9:46 PM   Result Value Ref Range    Lactic acid 0.8 0.4 - 2.0 MMOL/L   TROPONIN I    Collection Time: 02/05/21  9:46 PM   Result Value Ref Range    Troponin-I, Qt. <0.05 <0.05 ng/mL   SARS-COV-2    Collection Time: 02/05/21  9:46 PM   Result Value Ref Range    SARS-CoV-2 Please find results under separate order     [address] [facility]    Collection Time: 02/05/21  9:46 PM   Result Value Ref Range    SAMPLES BEING HELD 1red. 1blu     COMMENT        Add-on orders for these samples will be processed based on acceptable specimen integrity and analyte stability, which may vary by analyte.    COVID-19 RAPID TEST    Collection Time: 02/05/21  9:46 PM   Result Value Ref Range    Specimen source Nasopharyngeal      COVID-19 rapid test Not detected NOTD     EKG, 12 LEAD, INITIAL    Collection Time: 02/05/21  9:56 PM   Result Value Ref Range    Ventricular Rate 95 BPM    Atrial Rate 95 BPM    P-R Interval 194 ms QRS Duration 88 ms    Q-T Interval 358 ms    QTC Calculation (Bezet) 449 ms    Calculated P Axis 61 degrees    Calculated R Axis 21 degrees    Calculated T Axis 58 degrees    Diagnosis       Normal sinus rhythm  When compared with ECG of 25-JAN-2021 12:11,  ST no longer depressed in Inferior leads  Nonspecific T wave abnormality no longer evident in Inferior leads  Nonspecific T wave abnormality, worse in Lateral leads  QT has shortened     URINALYSIS W/MICROSCOPIC    Collection Time: 02/05/21 11:30 PM   Result Value Ref Range    Color YELLOW/STRAW      Appearance CLEAR CLEAR      Specific gravity 1.015 1.003 - 1.030      pH (UA) 6.0 5.0 - 8.0      Protein 30 (A) NEG mg/dL    Glucose Negative NEG mg/dL    Ketone 15 (A) NEG mg/dL    Bilirubin Negative NEG      Blood Negative NEG      Urobilinogen 0.2 0.2 - 1.0 EU/dL    Nitrites Negative NEG      Leukocyte Esterase Negative NEG      WBC 0-4 0 - 4 /hpf    RBC 0-5 0 - 5 /hpf    Epithelial cells FEW FEW /lpf    Bacteria 1+ (A) NEG /hpf    Hyaline cast 0-2 0 - 5 /lpf       AP:  55 yo woman consulted for possible intra-abdominal abscess    - Intra-abdominal abscess:  Unable to fully characterize on CT based on patient body habitus and non-contrast.  No indication for operation or percutaneous drainage.    - Left lower abdominal wound abscess:  Likely source of her chills and pain. Looks like a surgical site infection which is opened  And draining.   No further intervention required at this time  - Admit for IV antibiotic  - Repeat CT scan with IV contrast tomorrow to evaluate for resolution  - Advance to GI lite diet  - Labs in am

## 2021-02-06 NOTE — ED NOTES
10 PM  Change of shift. Care of patient taken over from Dr. Josefa Mcclendon; H&P reviewed, bedside handoff complete. Awaiting labs and CT of the abdomen. Laboratory studies show no evidence of sepsis. IV access was very limited and the patient underwent CT of the abdomen pelvis with oral contrast only. CT shows thickening of the colonic wall to the hepatic flexure. There was a loculated fluid collection concerning for abscess at the hepatic flexure as well. The patient does have a draining wound in her left upper quadrant. I will start her on Zosyn and vancomycin as it is not clear if this is intra-abdominal or of skin origin. I spoke with Dr. Lauren Keen who agreed to admit the patient. Patient is comfortable and agreeable to plan of care.     Anant Wyatt MD

## 2021-02-06 NOTE — ROUTINE PROCESS
TRANSFER - OUT REPORT: 
 
Verbal report given to [facility] (name) on Maddy Nelson  being transferred to (unit) for routine progression of care Report consisted of patients Situation, Background, Assessment and  
Recommendations(SBAR). Information from the following report(s) SBAR, Kardex, ED Summary, [facility] and Recent Results was reviewed with the receiving nurse. Lines:  
Peripheral IV 02/06/21 Right Antecubital (Active) Opportunity for questions and clarification was provided. Patient transported with: 
 [employer]

## 2021-02-06 NOTE — ED PROVIDER NOTES
HPI       49y F here with abd pain and vomiting. Had gastric bypass last month complicated by a small bowel obstruction a short time after. Today is having chills. No reported fever. Has diffuse pain. Has a port site that got infected in the past few days and is on oral abx for this (doesn't recall the name). Moving her bowels normally. No chest pain. No trouble breathing. No rash or skin changes.      Past Medical History:   Diagnosis Date    Anxiety 2017    Arthritis     Asthma     Back injury     Depression     Essential hypertension     GERD (gastroesophageal reflux disease)     Morbid obesity ([facility] [address]     Sleep apnea in adult 3/6/2018    CPAP       Past Surgical History:   Procedure Laterality Date    HX APPENDECTOMY      HX  SECTION      HX COLONOSCOPY      HX GASTRECTOMY  2016    lap sleeve gastrectomy    HX GASTRECTOMY  2021    REVISION LAP GASTRECTOMY    HX HERNIA REPAIR  2021    Incarcerated hernia repair Dr. Negra Patel HX KNEE REPLACEMENT      left knee    HX LYSIS OF ADHESIONS  2021    Dr. Cathy Ramirez      rt knee partial replacement    HX ORTHOPAEDIC Right     LIGAMENT REPAIR    IR PLC IVC FILTER  2021    MD LAP,DIAGNOSTIC ABDOMEN  2021    Dr. Toin Solorzano         Family History:   Problem Relation Age of Onset    Stroke Mother     Heart Disease Mother         PACEMAKER    Alcohol abuse Father     Seizures Father     Anesth Problems Neg Hx        Social History     Socioeconomic History    Marital status: SINGLE     Spouse name: Not on file    Number of children: Not on file    Years of education: Not on file    Highest education level: Not on file   Occupational History    Occupation: disability      Comment: since  with back    Social Needs    Financial resource strain: Not on file    Food insecurity     Worry: Not on file     Inability: Not on file    Transportation needs     Medical: Not on file     Non-medical: Not on file   Tobacco Use    Smoking status: Former Smoker     Packs/day: 1.00     Years: 30.00     Pack years: 30.00     Quit date: 2020     Years since quittin.6    Smokeless tobacco: Never Used   Substance and Sexual Activity    Alcohol use: No    Drug use: No    Sexual activity: Not on file     Comment: post menopausal    Lifestyle    Physical activity     Days per week: Not on file     Minutes per session: Not on file    Stress: Not on file   Relationships    Social connections     Talks on phone: Not on file     Gets together: Not on file     Attends Orthodoxy service: Not on file     Active member of club or organization: Not on file     Attends meetings of clubs or organizations: Not on file     Relationship status: Not on file    Intimate partner violence     Fear of current or ex partner: Not on file     Emotionally abused: Not on file     Physically abused: Not on file     Forced sexual activity: Not on file   Other Topics Concern    Not on file   Social History Narrative    In the home with 15year old grand daughter and friend, Lencho Newman: Patient has no known allergies. Review of Systems   Review of Systems   Constitutional: (-) weight loss. HEENT: (-) stiff neck   Eyes: (-) discharge. Respiratory: (-) cough. Cardiovascular: (-) syncope. Gastrointestinal: (-) blood in stool. Genitourinary: (-) hematuria. Musculoskeletal: (-) myalgias. Neurological: (-) seizure. Skin: (-) petechiae  Lymph/Immunologic: (-) enlarged lymph nodes  All other systems reviewed and are negative. Vitals:    21 1516 21 2035   BP: (!) 180/100 (!) 171/86   Pulse: 97 96   Resp: 22 20   Temp: 97.4 °F (36.3 °C) 98.4 °F (36.9 °C)   SpO2: 99% 96%            Physical Exam Nursing note and vitals reviewed. Constitutional: oriented to person, place, and time. appears well-developed and well-nourished. No distress.   Head: Normocephalic and atraumatic. Sclera anicteric  Nose: No rhinorrhea  Mouth/Throat: Oropharynx is clear and moist. Pharynx normal  Eyes: Conjunctivae are normal. Pupils are equal, round, and reactive to light. Right eye exhibits no discharge. Left eye exhibits no discharge. Neck: Painless normal range of motion. Neck supple. No LAD. Cardiovascular: Normal rate, regular rhythm, normal heart sounds and intact distal pulses. Exam reveals no gallop and no friction rub. No murmur heard. Pulmonary/Chest:  No respiratory distress. No wheezes. No rales. No rhonchi. No increased work of breathing. No accessory muscle use. Good air exchange throughout. Abdominal: soft, diffusely tender, left port site with small amount of yellow, purulent drainage. no rebound or guarding. No hepatosplenomegaly. Normal bowel sounds throughout. Back: no tenderness to palpation, no deformities, no CVA tenderness  Extremities/Musculoskeletal: Normal range of motion. no tenderness. No edema. Distal extremities are neurovasc intact. Lymphadenopathy:   No adenopathy. Neurological:  Alert and oriented to person, place, and time. Coordination normal. CN 2-12 intact. Motor and sensory function intact. Skin: Skin is warm and dry. No rash noted. No pallor. MDM 55y F here with chills, abd pain, and vomiting. Recent gastric bypass. Will need labs, fluids, and imaging. Procedures      ED EKG interpretation:  Rhythm: normal sinus rhythm; and regular . Rate (approx.): 95; Axis: normal; P wave: normal; QRS interval: normal ; ST/T wave: normal;  This EKG was interpreted by Vicki Gonzalez MD,ED Provider.

## 2021-02-07 LAB
ANION GAP SERPL CALC-SCNC: 7 MMOL/L (ref 5–15)
BUN SERPL-MCNC: 5 MG/DL (ref 6–20)
BUN/CREAT SERPL: 9 (ref 12–20)
CALCIUM SERPL-MCNC: 8.7 MG/DL (ref 8.5–10.1)
CHLORIDE SERPL-SCNC: 106 MMOL/L (ref 97–108)
CO2 SERPL-SCNC: 30 MMOL/L (ref 21–32)
CREAT SERPL-MCNC: 0.58 MG/DL (ref 0.55–1.02)
ERYTHROCYTE [DISTWIDTH] IN BLOOD BY AUTOMATED COUNT: 15.4 % (ref 11.5–14.5)
GLUCOSE SERPL-MCNC: 117 MG/DL (ref 65–100)
HCT VFR BLD AUTO: 29.8 % (ref 35–47)
HGB BLD-MCNC: 9 G/DL (ref 11.5–16)
MCH RBC QN AUTO: 28.2 PG (ref 26–34)
MCHC RBC AUTO-ENTMCNC: 30.2 G/DL (ref 30–36.5)
MCV RBC AUTO: 93.4 FL (ref 80–99)
NRBC # BLD: 0 K/UL (ref 0–0.01)
NRBC BLD-RTO: 0 PER 100 WBC
PLATELET # BLD AUTO: 431 K/UL (ref 150–400)
PMV BLD AUTO: 9.2 FL (ref 8.9–12.9)
POTASSIUM SERPL-SCNC: 3.2 MMOL/L (ref 3.5–5.1)
RBC # BLD AUTO: 3.19 M/UL (ref 3.8–5.2)
SODIUM SERPL-SCNC: 143 MMOL/L (ref 136–145)
WBC # BLD AUTO: 9 K/UL (ref 3.6–11)

## 2021-02-07 PROCEDURE — 85027 COMPLETE CBC AUTOMATED: CPT

## 2021-02-07 PROCEDURE — 99024 POSTOP FOLLOW-UP VISIT: CPT | Performed by: SURGERY

## 2021-02-07 PROCEDURE — 36415 COLL VENOUS BLD VENIPUNCTURE: CPT

## 2021-02-07 PROCEDURE — 74011250636 HC RX REV CODE- 250/636: Performed by: SURGERY

## 2021-02-07 PROCEDURE — 65270000029 HC RM PRIVATE

## 2021-02-07 PROCEDURE — C9113 INJ PANTOPRAZOLE SODIUM, VIA: HCPCS | Performed by: SURGERY

## 2021-02-07 PROCEDURE — 74011000250 HC RX REV CODE- 250: Performed by: SURGERY

## 2021-02-07 PROCEDURE — 74011250637 HC RX REV CODE- 250/637: Performed by: SURGERY

## 2021-02-07 PROCEDURE — 80048 BASIC METABOLIC PNL TOTAL CA: CPT

## 2021-02-07 PROCEDURE — 74011000258 HC RX REV CODE- 258: Performed by: SURGERY

## 2021-02-07 RX ORDER — DOCUSATE SODIUM 100 MG/1
100 CAPSULE, LIQUID FILLED ORAL 2 TIMES DAILY
Status: DISCONTINUED | OUTPATIENT
Start: 2021-02-07 | End: 2021-02-09 | Stop reason: HOSPADM

## 2021-02-07 RX ORDER — VANCOMYCIN 2 GRAM/500 ML IN 0.9 % SODIUM CHLORIDE INTRAVENOUS
2000 EVERY 12 HOURS
Status: DISCONTINUED | OUTPATIENT
Start: 2021-02-07 | End: 2021-02-08

## 2021-02-07 RX ORDER — FACIAL-BODY WIPES
10 EACH TOPICAL DAILY PRN
Status: DISCONTINUED | OUTPATIENT
Start: 2021-02-07 | End: 2021-02-09 | Stop reason: HOSPADM

## 2021-02-07 RX ADMIN — SODIUM CHLORIDE, POTASSIUM CHLORIDE, SODIUM LACTATE AND CALCIUM CHLORIDE 125 ML/HR: 600; 310; 30; 20 INJECTION, SOLUTION INTRAVENOUS at 12:27

## 2021-02-07 RX ADMIN — DOCUSATE SODIUM 100 MG: 100 CAPSULE, LIQUID FILLED ORAL at 19:16

## 2021-02-07 RX ADMIN — HYDROMORPHONE HYDROCHLORIDE 1 MG: 1 INJECTION, SOLUTION INTRAMUSCULAR; INTRAVENOUS; SUBCUTANEOUS at 10:32

## 2021-02-07 RX ADMIN — DOCUSATE SODIUM 100 MG: 100 CAPSULE, LIQUID FILLED ORAL at 13:37

## 2021-02-07 RX ADMIN — PIPERACILLIN AND TAZOBACTAM 3.38 G: 3; .375 INJECTION, POWDER, LYOPHILIZED, FOR SOLUTION INTRAVENOUS at 19:16

## 2021-02-07 RX ADMIN — PRAVASTATIN SODIUM 20 MG: 20 TABLET ORAL at 21:27

## 2021-02-07 RX ADMIN — HYDROMORPHONE HYDROCHLORIDE 1 MG: 1 INJECTION, SOLUTION INTRAMUSCULAR; INTRAVENOUS; SUBCUTANEOUS at 13:35

## 2021-02-07 RX ADMIN — HYDROMORPHONE HYDROCHLORIDE 1 MG: 1 INJECTION, SOLUTION INTRAMUSCULAR; INTRAVENOUS; SUBCUTANEOUS at 04:15

## 2021-02-07 RX ADMIN — HYDROMORPHONE HYDROCHLORIDE 1 MG: 1 INJECTION, SOLUTION INTRAMUSCULAR; INTRAVENOUS; SUBCUTANEOUS at 07:29

## 2021-02-07 RX ADMIN — ARIPIPRAZOLE 10 MG: 10 TABLET ORAL at 10:32

## 2021-02-07 RX ADMIN — HYDROMORPHONE HYDROCHLORIDE 1 MG: 1 INJECTION, SOLUTION INTRAMUSCULAR; INTRAVENOUS; SUBCUTANEOUS at 19:16

## 2021-02-07 RX ADMIN — VANCOMYCIN HYDROCHLORIDE 2000 MG: 10 INJECTION, POWDER, LYOPHILIZED, FOR SOLUTION INTRAVENOUS at 21:29

## 2021-02-07 RX ADMIN — HYDROMORPHONE HYDROCHLORIDE 1 MG: 1 INJECTION, SOLUTION INTRAMUSCULAR; INTRAVENOUS; SUBCUTANEOUS at 16:40

## 2021-02-07 RX ADMIN — PIPERACILLIN AND TAZOBACTAM 3.38 G: 3; .375 INJECTION, POWDER, LYOPHILIZED, FOR SOLUTION INTRAVENOUS at 03:57

## 2021-02-07 RX ADMIN — HYDROMORPHONE HYDROCHLORIDE 1 MG: 1 INJECTION, SOLUTION INTRAMUSCULAR; INTRAVENOUS; SUBCUTANEOUS at 01:15

## 2021-02-07 RX ADMIN — SODIUM CHLORIDE 40 MG: 9 INJECTION, SOLUTION INTRAMUSCULAR; INTRAVENOUS; SUBCUTANEOUS at 05:35

## 2021-02-07 RX ADMIN — VANCOMYCIN HYDROCHLORIDE 2000 MG: 10 INJECTION, POWDER, LYOPHILIZED, FOR SOLUTION INTRAVENOUS at 10:38

## 2021-02-07 RX ADMIN — TRAZODONE HYDROCHLORIDE 150 MG: 100 TABLET ORAL at 21:27

## 2021-02-07 RX ADMIN — PIPERACILLIN AND TAZOBACTAM 3.38 G: 3; .375 INJECTION, POWDER, LYOPHILIZED, FOR SOLUTION INTRAVENOUS at 12:26

## 2021-02-07 RX ADMIN — HYDROMORPHONE HYDROCHLORIDE 1 MG: 1 INJECTION, SOLUTION INTRAMUSCULAR; INTRAVENOUS; SUBCUTANEOUS at 22:29

## 2021-02-07 NOTE — PROGRESS NOTES
Problem: Pain  Goal: *Control of Pain  Outcome: Progressing Towards Goal     Problem: Falls - Risk of  Goal: *Absence of Falls  Description: Document Soco Fall Risk and appropriate interventions in the flowsheet.   Outcome: Progressing Towards Goal  Note: Fall Risk Interventions:

## 2021-02-07 NOTE — PROGRESS NOTES
Orthopedic Spine Nursing Dual Skin Assessment      Primary Nurse Fidelia Montana RN and Sallie Santo RN performed a dual skin assessment on patient Milton Su, admitted on 2/5/2021  8:26 PM, found with Impairment noted- see wound doc flow sheet, and with a Mark Score: 20. Wound care consult will be placed if appropriate.       Signed by: Fidelia Montana RN, BSN, [facility]                       2/6/2021 at 10:47 PM

## 2021-02-07 NOTE — PROGRESS NOTES
Day #2 of Vancomycin  Indication:  LL abdominal wound abscess  -s/p lap gastric bypass complicated by SBO  Current regimen:  1750mg q12h  Abx regimen:  Vanc, Zosyn  ID Following ?: NO  Concomitant nephrotoxic drugs (requires more frequent monitoring): None  Frequency of BMP?: daily through 2/10    Recent Labs     21  0018 21  2146   WBC 9.0 9.8   CREA 0.58 0.47*   BUN 5* 6     Est CrCl: >100 ml/min; UO: n/a ml/kg/hr  Temp (24hrs), Av.6 °F (37 °C), Min:98.5 °F (36.9 °C), Max:98.7 °F (37.1 °C)    Cultures:    blood:     Goal trough 10-15 mcg/ml    Recent trough history (date/time/level/dose/action taken):  N/a    Plan: Change to 2000 mg (14 mg/kg) IV q12h for a trough ~ 13 mcg/ml . Pharmacy to follow patient daily and order levels / make dose adjustments as appropriate.

## 2021-02-07 NOTE — PROGRESS NOTES
Progress Note    Patient: Roland Rodriguez MRN: [id number]  SSN: [id number]    YOB: 1965  Age: 54 y.o. Sex: female      Admit Date: 2021    Abdominal wall abscess    Subjective:     No acute surgical issues. PT overall is feeling better. No fever or chills. No leukocytosis. There is still some seropurulent drainage from left lower abdominal wound    Objective:     Visit Vitals  /79 (BP 1 Location: Left upper arm, BP Patient Position: At rest)   Pulse 88   Temp 98.7 °F (37.1 °C)   Resp 18   Wt 315 lb 6.4 oz (143.1 kg)   SpO2 95%   BMI 47.96 kg/m²       Temp (24hrs), Av.6 °F (37 °C), Min:98.5 °F (36.9 °C), Max:98.7 °F (37.1 °C)        Physical Exam:    Gen:  NAD  Pulm:  Unlabored  Abd:  S/ND/mild TTP in LLQ  Wound:  5 mild seropurulent fluid expressed from LLQ port incision. There is induration and mild erythema surrounding port site.     Recent Results (from the past 24 hour(s))   CBC W/O DIFF    Collection Time: 21 12:18 AM   Result Value Ref Range    WBC 9.0 3.6 - 11.0 K/uL    RBC 3.19 (L) 3.80 - 5.20 M/uL    HGB 9.0 (L) 11.5 - 16.0 g/dL    HCT 29.8 (L) 35.0 - 47.0 %    MCV 93.4 80.0 - 99.0 FL    MCH 28.2 26.0 - 34.0 PG    MCHC 30.2 30.0 - 36.5 g/dL    RDW 15.4 (H) 11.5 - 14.5 %    PLATELET 126 (H) 397 - 400 K/uL    MPV 9.2 8.9 - 12.9 FL    NRBC 0.0 0  WBC    ABSOLUTE NRBC 0.00 0.00 - 2.90 K/uL   METABOLIC PANEL, BASIC    Collection Time: 21 12:18 AM   Result Value Ref Range    Sodium 143 136 - 145 mmol/L    Potassium 3.2 (L) 3.5 - 5.1 mmol/L    Chloride 106 97 - 108 mmol/L    CO2 30 21 - 32 mmol/L    Anion gap 7 5 - 15 mmol/L    Glucose 117 (H) 65 - 100 mg/dL    BUN 5 (L) 6 - 20 MG/DL    Creatinine 0.58 0.55 - 1.02 MG/DL    BUN/Creatinine ratio 9 (L) 12 - 20      GFR est AA >60 >60 ml/min/1.73m2    GFR est non-AA >60 >60 ml/min/1.73m2    Calcium 8.7 8.5 - 10.1 MG/DL           Assessment:     Hospital Problems  Date Reviewed: 2021          Codes Class Noted POA    Postoperative intra-abdominal abscess ICD-10-CM: T81.43XA  ICD-9-CM: 998.59, 567.22  2/6/2021 Unknown              Plan/Recommendations/Medical Decision Making:     - Continue diet  - Abscess wall and possible intra-abdominal abscess: Will repeat CT scan tomorrow with iv contrast to better characterize right intra-abdominal fluid collection  - Continue antibiotic therapy  - Continue local wound care to LLQ wound  - Possible DC home tomorrow with oral antibiotic if CT scan showed no intra-abdominal abscess  - Out of bed.   Encourage ambulation  - Bowel regiment

## 2021-02-08 ENCOUNTER — APPOINTMENT (OUTPATIENT)
Dept: CT IMAGING | Age: 56
DRG: 862 | End: 2021-02-08
Attending: SURGERY
Payer: MEDICARE

## 2021-02-08 LAB
ANION GAP SERPL CALC-SCNC: 5 MMOL/L (ref 5–15)
BUN SERPL-MCNC: 4 MG/DL (ref 6–20)
BUN/CREAT SERPL: 7 (ref 12–20)
CALCIUM SERPL-MCNC: 8.8 MG/DL (ref 8.5–10.1)
CHLORIDE SERPL-SCNC: 105 MMOL/L (ref 97–108)
CO2 SERPL-SCNC: 32 MMOL/L (ref 21–32)
CREAT SERPL-MCNC: 0.57 MG/DL (ref 0.55–1.02)
DATE LAST DOSE: ABNORMAL
GLUCOSE SERPL-MCNC: 110 MG/DL (ref 65–100)
POTASSIUM SERPL-SCNC: 3.1 MMOL/L (ref 3.5–5.1)
REPORTED DOSE,DOSE: ABNORMAL UNITS
REPORTED DOSE/TIME,TMG: ABNORMAL
SODIUM SERPL-SCNC: 142 MMOL/L (ref 136–145)
VANCOMYCIN TROUGH SERPL-MCNC: 19.8 UG/ML (ref 5–10)

## 2021-02-08 PROCEDURE — C9113 INJ PANTOPRAZOLE SODIUM, VIA: HCPCS | Performed by: SURGERY

## 2021-02-08 PROCEDURE — 80048 BASIC METABOLIC PNL TOTAL CA: CPT

## 2021-02-08 PROCEDURE — 74177 CT ABD & PELVIS W/CONTRAST: CPT

## 2021-02-08 PROCEDURE — 74011000636 HC RX REV CODE- 636: Performed by: SURGERY

## 2021-02-08 PROCEDURE — 74011000250 HC RX REV CODE- 250: Performed by: SURGERY

## 2021-02-08 PROCEDURE — 80202 ASSAY OF VANCOMYCIN: CPT

## 2021-02-08 PROCEDURE — 65270000029 HC RM PRIVATE

## 2021-02-08 PROCEDURE — 99024 POSTOP FOLLOW-UP VISIT: CPT | Performed by: SURGERY

## 2021-02-08 PROCEDURE — 74011000258 HC RX REV CODE- 258: Performed by: SURGERY

## 2021-02-08 PROCEDURE — 74011250636 HC RX REV CODE- 250/636: Performed by: SURGERY

## 2021-02-08 PROCEDURE — 36415 COLL VENOUS BLD VENIPUNCTURE: CPT

## 2021-02-08 PROCEDURE — 74011250637 HC RX REV CODE- 250/637: Performed by: SURGERY

## 2021-02-08 RX ORDER — VANCOMYCIN 2 GRAM/500 ML IN 0.9 % SODIUM CHLORIDE INTRAVENOUS
2000
Status: DISCONTINUED | OUTPATIENT
Start: 2021-02-09 | End: 2021-02-09

## 2021-02-08 RX ADMIN — SODIUM CHLORIDE 40 MG: 9 INJECTION, SOLUTION INTRAMUSCULAR; INTRAVENOUS; SUBCUTANEOUS at 05:23

## 2021-02-08 RX ADMIN — DOCUSATE SODIUM 100 MG: 100 CAPSULE, LIQUID FILLED ORAL at 18:03

## 2021-02-08 RX ADMIN — DOCUSATE SODIUM 100 MG: 100 CAPSULE, LIQUID FILLED ORAL at 09:12

## 2021-02-08 RX ADMIN — HYDROMORPHONE HYDROCHLORIDE 1 MG: 1 INJECTION, SOLUTION INTRAMUSCULAR; INTRAVENOUS; SUBCUTANEOUS at 21:10

## 2021-02-08 RX ADMIN — IOHEXOL 50 ML: 240 INJECTION, SOLUTION INTRATHECAL; INTRAVASCULAR; INTRAVENOUS; ORAL at 13:33

## 2021-02-08 RX ADMIN — HYDROMORPHONE HYDROCHLORIDE 1 MG: 1 INJECTION, SOLUTION INTRAMUSCULAR; INTRAVENOUS; SUBCUTANEOUS at 11:41

## 2021-02-08 RX ADMIN — PIPERACILLIN AND TAZOBACTAM 3.38 G: 3; .375 INJECTION, POWDER, LYOPHILIZED, FOR SOLUTION INTRAVENOUS at 04:47

## 2021-02-08 RX ADMIN — PIPERACILLIN AND TAZOBACTAM 3.38 G: 3; .375 INJECTION, POWDER, LYOPHILIZED, FOR SOLUTION INTRAVENOUS at 12:35

## 2021-02-08 RX ADMIN — VANCOMYCIN HYDROCHLORIDE 2000 MG: 10 INJECTION, POWDER, LYOPHILIZED, FOR SOLUTION INTRAVENOUS at 22:17

## 2021-02-08 RX ADMIN — ARIPIPRAZOLE 10 MG: 10 TABLET ORAL at 09:13

## 2021-02-08 RX ADMIN — HYDROMORPHONE HYDROCHLORIDE 1 MG: 1 INJECTION, SOLUTION INTRAMUSCULAR; INTRAVENOUS; SUBCUTANEOUS at 04:47

## 2021-02-08 RX ADMIN — HYDROMORPHONE HYDROCHLORIDE 1 MG: 1 INJECTION, SOLUTION INTRAMUSCULAR; INTRAVENOUS; SUBCUTANEOUS at 01:38

## 2021-02-08 RX ADMIN — VANCOMYCIN HYDROCHLORIDE 2000 MG: 10 INJECTION, POWDER, LYOPHILIZED, FOR SOLUTION INTRAVENOUS at 10:40

## 2021-02-08 RX ADMIN — HYDROMORPHONE HYDROCHLORIDE 1 MG: 1 INJECTION, SOLUTION INTRAMUSCULAR; INTRAVENOUS; SUBCUTANEOUS at 18:03

## 2021-02-08 RX ADMIN — HYDROMORPHONE HYDROCHLORIDE 1 MG: 1 INJECTION, SOLUTION INTRAMUSCULAR; INTRAVENOUS; SUBCUTANEOUS at 08:14

## 2021-02-08 RX ADMIN — PIPERACILLIN AND TAZOBACTAM 3.38 G: 3; .375 INJECTION, POWDER, LYOPHILIZED, FOR SOLUTION INTRAVENOUS at 21:10

## 2021-02-08 RX ADMIN — IOPAMIDOL 100 ML: 755 INJECTION, SOLUTION INTRAVENOUS at 13:33

## 2021-02-08 RX ADMIN — BISACODYL 10 MG: 10 SUPPOSITORY RECTAL at 15:30

## 2021-02-08 RX ADMIN — PRAVASTATIN SODIUM 20 MG: 20 TABLET ORAL at 21:10

## 2021-02-08 RX ADMIN — HYDROMORPHONE HYDROCHLORIDE 1 MG: 1 INJECTION, SOLUTION INTRAMUSCULAR; INTRAVENOUS; SUBCUTANEOUS at 14:45

## 2021-02-08 NOTE — PROGRESS NOTES
Progress Note    Patient: Anastacio Bennett MRN: [id number]  [id number]: [id number]    YOB: 1965  Age: 54 y.o. Sex: female      Admit Date: 2021      Subjective:     Patient denies ongoing fever or chills. She notes mild drainage from left upper quadrant trocar scar. She is tolerating her diet. She is hungry. Objective:     Visit Vitals  BP (!) 167/92 (BP 1 Location: Left upper arm, BP Patient Position: At rest)   Pulse 91   Temp 98.8 °F (37.1 °C)   Resp 18   Ht 5' 8\" (1.727 m)   Wt (!) 364 lb 11.2 oz (165.4 kg)   SpO2 97%   BMI 55.45 kg/m²       Temp (24hrs), Av.7 °F (37.1 °C), Min:98.2 °F (36.8 °C), Max:98.9 °F (37.2 °C)      Physical Exam:    ABDOMEN: Obese, nondistended, soft. Left-sided trocar incision with small amount of serosanguineous drainage; cover sponge applied. Appropriate incisional pain with palpation. Data Review: Vital signs, ins/outs, Labs    Lab Review:   Recent Results (from the past 24 hour(s))   METABOLIC PANEL, BASIC    Collection Time: 21 12:58 AM   Result Value Ref Range    Sodium 142 136 - 145 mmol/L    Potassium 3.1 (L) 3.5 - 5.1 mmol/L    Chloride 105 97 - 108 mmol/L    CO2 32 21 - 32 mmol/L    Anion gap 5 5 - 15 mmol/L    Glucose 110 (H) 65 - 100 mg/dL    BUN 4 (L) 6 - 20 MG/DL    Creatinine 0.57 0.55 - 1.02 MG/DL    BUN/Creatinine ratio 7 (L) 12 - 20      GFR est AA >60 >60 ml/min/1.73m2    GFR est non-AA >60 >60 ml/min/1.73m2    Calcium 8.8 8.5 - 10.1 MG/DL         Assessment:     Hospital Problems  Date Reviewed: 2021          Codes Class Noted POA    Postoperative intra-abdominal abscess ICD-10-CM: T81.43XA  ICD-9-CM: 998.59, 567.22  2021 Unknown            Afebrile, normal white blood cell count; on my review of CT scan, no obvious right upper quadrant collection. Plan/Recommendations/Medical Decision Making:     Continue intravenous antibiotics for now.   Interval CT scan today-if negative, will discharge to home on oral antibiotics; she has follow-up appointment on 2/11 with us.

## 2021-02-08 NOTE — PROGRESS NOTES
Interval CT scan with small right-sided fluid collection without rim enhancement; white blood cell count is normal, no further fever or chills. No indication for percutaneous drainage. She notes she has not had a bowel movement in at least 3 days; suppository administered, likely ready for discharge in morning after bowel function improves. Patient in agreement.

## 2021-02-08 NOTE — DISCHARGE INSTRUCTIONS
[facility] at [facility]  Bariatric Surgery Discharge Instructions     Procedure Laparoscopic gastric bypass     Future Appointments   Date Time Provider Ramon Carmelina   2/11/2021  2:00 PM Josy Garcia NP [facility] YOVANY AMB   2/25/2021  2:00 PM Josy Garcia NP [facility] BS AMB         Contact Information:    [facility] at [facility], [address], [address]  [phone]    After Hours and Weekends  [phone] On Call Surgeon    Non Emergent Medical Needs  Call during office hours or send a message via My Chart   (messages returned during business hours)    DIET    Please remember that you are on PUREED DIET until your next appointment. Do not advance to the next phase until advised by your surgeon or Nurse Practitioner. Refer to the Bariatric Handbook for detailed information. TO PREVENT DEHYDRATION:  consume 48-64 ounces of liquids daily. At least 48 ounces of that should come from water, Crystal Light, sugar free popsicles, sugar free gelatin or other calorie-free, sugar-free, caffeine free and noncarbonated beverages. Do not drink with a straw.  Sip, sip, sip throughout the day   Main priority is to stay hydrated   Aim for 60 grams of protein every day. Most of your protein will come from shakes. Refer to the Bariatric Handbook for detailed information.  Add additional protein supplements to meet protein needs (protein powder, clear protein such as protein water, non-fat dry milk powder, NO protein bars at this at this stage)     MEDICATIONS & VITAMINS     Pre-surgery medications should be reviewed with your Bariatric provider and taken as prescribed    Take no more than 2 pills at a time and wait 15-20 minutes between pills       Pain Medication  The first few days home, you may require narcotic pain medication to manage your pain. Take this medication only as prescribed.   If your pain is mild to moderate, try taking Acetaminophen (Tylenol) 500 mg 1-2 tablets every 8 hours or as directed by your provider. Avoid taking antiinflammatory medications (NSAID'S) such as Ibuprofen (Motrin, Advil) or Naproxen (Aleve). These medications can be harmful to your stomach and cause bleeding and ulcers. There is a complete list of NSAID medications to AVOID in your handbook. Abdominal support (Spanx or body shaper) and heat (heating pad on low setting) are very helpful in managing pain after surgery. Acid Reducing (\"heartburn/reflux\") Medication   Acid reducing medicine should have been prescribed at your pre-surgery visit. It is recommended you take this medication every day even if you have no symptoms of reflux or heartburn. If you were previously on a medication for reflux/heartburn you should continue the medication daily. *It is common to experience reflux or heartburn after sleeve gastrectomy. These symptoms can usually be managed with medication, diet and behavior changes. In most cases symptoms improve or resolve after a few weeks to a couple of months. Nausea Medication  You should have been prescribed medication for nausea at your pre-surgery visit. If you are experiencing nausea, please take the medication as prescribed to try and get relief. If the nausea medication is not effective, please call your surgeon's office. Constipation   Constipation can be caused by pain medication and reduced food and water intake. Drink at least 64 oz. fluid. OK to use OTC medications such as Benefiber, Milk of Magnesia, Dulcolax, Miralax, senna       Vitamins   Calcium Citrate with Vitamin D-3 - Take 1200--1500 mg  each day. Divide doses throughout the day. Do not take more than 600 mg at one time. Take at least 2 hours before or after your multivitamin and/or iron supplement.   Multivitamin containing Iron - 2 multivitamins with 100% Daily Value of Iron, Folic Acid and Thiamine   Vitamin D-3 - Take 3000 IU  per day  Vitamin B-12 - Oral or Sublingual: 350-500 mcg/day OR 1000 mcg Monthly intramuscular shot        ACTIVITY     Be active. Sit up as much as possible. Walk often. Walking and/or foot exercises will help prevent blood clots.  Continue to sip liquids throughout the day   Continue to use your incentive spirometer 4 to 5 times per day   Keep your incisions clean and dry to prevent infection.  Showering is ok. No submersion in water for 2 weeks (No tubs, pools, etc.)   Weight lifting restrictions:  10 lbs. for the first 2 weeks, 20 lbs. for the next 4 to 6 weeks   Cover sponge to left-sided draining wound. TOP REASONS TO CONTACT YOUR SURGEONS'S OFFICE     You have severe pain or discomfort unrelieved by pain medication.  You have been vomiting for more than 24 hours. Call sooner if you are unable to drink any fluids.  Temperature rises above 101 degrees.  You have persistent nausea and/or vomiting.  You are unable to swallow liquids    Increased swelling, redness, or drainage from your incision sites.

## 2021-02-08 NOTE — PROGRESS NOTES
Floor to  oral contrast from CT; notified floor to wait until they receive a call from CT detailing the exact time of when to start the oral contrast.

## 2021-02-09 VITALS
WEIGHT: 293 LBS | DIASTOLIC BLOOD PRESSURE: 91 MMHG | TEMPERATURE: 98.9 F | OXYGEN SATURATION: 97 % | RESPIRATION RATE: 18 BRPM | BODY MASS INDEX: 44.41 KG/M2 | HEIGHT: 68 IN | SYSTOLIC BLOOD PRESSURE: 161 MMHG | HEART RATE: 83 BPM

## 2021-02-09 LAB
ANION GAP SERPL CALC-SCNC: 9 MMOL/L (ref 5–15)
BUN SERPL-MCNC: 3 MG/DL (ref 6–20)
BUN/CREAT SERPL: 5 (ref 12–20)
CALCIUM SERPL-MCNC: 9.1 MG/DL (ref 8.5–10.1)
CHLORIDE SERPL-SCNC: 103 MMOL/L (ref 97–108)
CO2 SERPL-SCNC: 32 MMOL/L (ref 21–32)
CREAT SERPL-MCNC: 0.55 MG/DL (ref 0.55–1.02)
GLUCOSE SERPL-MCNC: 119 MG/DL (ref 65–100)
POTASSIUM SERPL-SCNC: 3 MMOL/L (ref 3.5–5.1)
SODIUM SERPL-SCNC: 144 MMOL/L (ref 136–145)

## 2021-02-09 PROCEDURE — 74011000258 HC RX REV CODE- 258: Performed by: SURGERY

## 2021-02-09 PROCEDURE — 74011250636 HC RX REV CODE- 250/636: Performed by: SURGERY

## 2021-02-09 PROCEDURE — 74011250637 HC RX REV CODE- 250/637: Performed by: SURGERY

## 2021-02-09 PROCEDURE — 99024 POSTOP FOLLOW-UP VISIT: CPT | Performed by: SURGERY

## 2021-02-09 PROCEDURE — C9113 INJ PANTOPRAZOLE SODIUM, VIA: HCPCS | Performed by: SURGERY

## 2021-02-09 PROCEDURE — 80048 BASIC METABOLIC PNL TOTAL CA: CPT

## 2021-02-09 PROCEDURE — 74011000250 HC RX REV CODE- 250: Performed by: SURGERY

## 2021-02-09 PROCEDURE — 36415 COLL VENOUS BLD VENIPUNCTURE: CPT

## 2021-02-09 RX ORDER — POLYETHYLENE GLYCOL 3350 17 G/17G
17 POWDER, FOR SOLUTION ORAL 2 TIMES DAILY
Status: DISCONTINUED | OUTPATIENT
Start: 2021-02-09 | End: 2021-02-09 | Stop reason: HOSPADM

## 2021-02-09 RX ADMIN — SODIUM CHLORIDE 40 MG: 9 INJECTION, SOLUTION INTRAMUSCULAR; INTRAVENOUS; SUBCUTANEOUS at 05:15

## 2021-02-09 RX ADMIN — HYDROMORPHONE HYDROCHLORIDE 1 MG: 1 INJECTION, SOLUTION INTRAMUSCULAR; INTRAVENOUS; SUBCUTANEOUS at 00:26

## 2021-02-09 RX ADMIN — SODIUM PHOSPHATE, DIBASIC AND SODIUM PHOSPHATE, MONOBASIC 1 ENEMA: 7; 19 ENEMA RECTAL at 10:23

## 2021-02-09 RX ADMIN — POLYETHYLENE GLYCOL 3350 17 G: 17 POWDER, FOR SOLUTION ORAL at 10:23

## 2021-02-09 RX ADMIN — PIPERACILLIN AND TAZOBACTAM 3.38 G: 3; .375 INJECTION, POWDER, LYOPHILIZED, FOR SOLUTION INTRAVENOUS at 04:53

## 2021-02-09 RX ADMIN — SODIUM CHLORIDE, POTASSIUM CHLORIDE, SODIUM LACTATE AND CALCIUM CHLORIDE 125 ML/HR: 600; 310; 30; 20 INJECTION, SOLUTION INTRAVENOUS at 06:28

## 2021-02-09 RX ADMIN — HYDROMORPHONE HYDROCHLORIDE 1 MG: 1 INJECTION, SOLUTION INTRAMUSCULAR; INTRAVENOUS; SUBCUTANEOUS at 03:55

## 2021-02-09 RX ADMIN — HYDROMORPHONE HYDROCHLORIDE 1 MG: 1 INJECTION, SOLUTION INTRAMUSCULAR; INTRAVENOUS; SUBCUTANEOUS at 07:35

## 2021-02-09 RX ADMIN — ARIPIPRAZOLE 10 MG: 10 TABLET ORAL at 08:42

## 2021-02-09 RX ADMIN — BISACODYL 10 MG: 10 SUPPOSITORY RECTAL at 05:13

## 2021-02-09 RX ADMIN — DOCUSATE SODIUM 100 MG: 100 CAPSULE, LIQUID FILLED ORAL at 08:41

## 2021-02-09 NOTE — PROGRESS NOTES
ZENON-Home with Home Health  RUR-22%    Patient will be discharging home today and will resume HH with [id number] CM sent updated orders via allscripts. No further CM needs.      Chrissy Woods MS

## 2021-02-09 NOTE — PROGRESS NOTES
Tolerating bariatric 2-week diet. Still no bowel movement after 2 suppositories. Will restart MiraLAX and add fleets enema. Hopefully ready for discharge later today after bowel function more normalized. Continue diet, increase ambulation, discontinue IV antibiotics.

## 2021-02-09 NOTE — PROGRESS NOTES
Problem: Pain  Goal: *Control of Pain  Outcome: Resolved/Met     Problem: Falls - Risk of  Goal: *Absence of Falls  Description: Document Soco Fall Risk and appropriate interventions in the flowsheet.   2/9/2021 1627 by Tosha BROWNE  Outcome: Resolved/Met  2/9/2021 1412 by Tosha BROWNE  Outcome: Progressing Towards Goal  Note: Fall Risk Interventions:                 Elimination Interventions: Patient to call for help with toileting needs

## 2021-02-09 NOTE — PROGRESS NOTES
Problem: Falls - Risk of  Goal: *Absence of Falls  Description: Document Donna Remy Fall Risk and appropriate interventions in the flowsheet.   Outcome: Progressing Towards Goal  Note: Fall Risk Interventions:                 Elimination Interventions: Patient to call for help with toileting needs

## 2021-02-09 NOTE — PROGRESS NOTES
Pharmacist Note - Vancomycin Dosing  Therapy day 3  Indication: wound abscess  Current regimen: 2000 mg IV q12h    Recent Labs     02/08/21  0058 02/07/21  0018   WBC  --  9.0   CREA 0.57 0.58   BUN 4* 5*       A Trough Level resulted at 19.8 mcg/mL which was obtained 11.5 hrs post-dose. The extrapolated \"true\" trough is approximately 19.2 mcg/mL based on the patient's known kinetics. Goal trough: 10 - 15 mcg/mL     Plan: Change to 2000 mg IV q18h for an anticipated trough level of 10.5 mcg/ml . Pharmacy will continue to monitor this patient daily for changes in clinical status and renal function.

## 2021-02-09 NOTE — PROGRESS NOTES
Cm reviewed MD note and patient is anticipating discharge tomorrow morning. Patient stated she has transportation home and would like to resume home health PT with [facility] - [email or web address]. There were no other concerns expressed by patient.

## 2021-02-10 LAB
BACTERIA SPEC CULT: NORMAL
SERVICE CMNT-IMP: NORMAL

## 2021-02-10 NOTE — TELEPHONE ENCOUNTER
Spoke with Brian with [address] [facility] regarding pain medication. Per Dr. Jesús Galindo Tylenol is okay. Patient has follow up appointment with Марина Andre NP.tomorrow.

## 2021-02-11 ENCOUNTER — VIRTUAL VISIT (OUTPATIENT)
Dept: SURGERY | Age: 56
End: 2021-02-11
Payer: MEDICARE

## 2021-02-11 VITALS — HEIGHT: 68 IN | BODY MASS INDEX: 44.41 KG/M2 | WEIGHT: 293 LBS | HEART RATE: 73 BPM

## 2021-02-11 DIAGNOSIS — G89.18 POST-OP PAIN: ICD-10-CM

## 2021-02-11 DIAGNOSIS — R11.0 NAUSEA: ICD-10-CM

## 2021-02-11 DIAGNOSIS — Z09 FOLLOW-UP EXAMINATION AFTER ABDOMINAL SURGERY: Primary | ICD-10-CM

## 2021-02-11 DIAGNOSIS — E66.01 MORBID OBESITY (HCC): ICD-10-CM

## 2021-02-11 PROCEDURE — 99024 POSTOP FOLLOW-UP VISIT: CPT | Performed by: NURSE PRACTITIONER

## 2021-02-11 RX ORDER — GABAPENTIN 100 MG/1
100-200 CAPSULE ORAL
Qty: 30 CAP | Refills: 0 | Status: SHIPPED | OUTPATIENT
Start: 2021-02-11 | End: 2021-02-25 | Stop reason: ALTCHOICE

## 2021-02-11 RX ORDER — ONDANSETRON 4 MG/1
4 TABLET, ORALLY DISINTEGRATING ORAL
Qty: 20 TAB | Refills: 0 | Status: SHIPPED | OUTPATIENT
Start: 2021-02-11 | End: 2021-02-25 | Stop reason: SDUPTHER

## 2021-02-11 NOTE — PROGRESS NOTES
1. Have you been to the ER, urgent care clinic since your last visit? Hospitalized since your last visit? 2/5/2021 Post op intra abdominal abscess    2. Have you seen or consulted any other health care providers outside of the [address] [place] [address] since your last visit? Include any pap smears or colon screening. No    Home health coming out once a week for dressing change. Family members are doing it the other days. Patient states she vomited this morning after taking her medication.

## 2021-02-11 NOTE — PROGRESS NOTES
I was in the office while conducting this encounter. Consent:  She and/or her healthcare decision maker is aware that this patient-initiated Telehealth encounter is a billable service, with coverage as determined by her insurance carrier. She is aware that she may receive a bill and has provided verbal consent to proceed: No - Not billable    This virtual visit was conducted via [employer]. Pursuant to the emergency declaration under the [facility], [facility] waiver authority and the [employer] Act, this Virtual  Visit was conducted to reduce the patient's risk of exposure to COVID-19 and provide continuity of care for an established patient. Services were provided through a video synchronous discussion virtually to substitute for in-person clinic visit. Due to this being a TeleHealth evaluation, many elements of the physical examination are unable to be assessed. Total Time: minutes: 11-20 minutes. Chief Complaint   Patient presents with    Surgical Follow-up     28 days (1/14/2021) S/P Lap Revision of Sleeve gastrectomy to gastric bypass, and extensive RITCHIE  - Virtual Visit #[phone]    Surgical Follow-up     17 days (1/25/2021) S/P Diag. Lap. Reduction and repair of incarcerated hernia       Gene Dy 4-week status post revision of a laparoscopic sleeve gastrectomy to laparoscopic gastric bypass for treatment of severe GERD, large hiatal hernia and obesity. 1 week postop she was readmitted with a bowel obstruction and incarcerated ventral hernia. She was again readmitted last week with fever and chills and concern for wound infection. She has been home a couple of days and her primary complaint is abdominal pain. She is asking for pain medication. She has been taking Tylenol every 8 hours without significant relief.   She denies fevers or chills  She is voiding without difficulty and is already had about 48 ounces of water so far today  She said she is moving her bowels most days and she is not constipated  She has some mild nausea  She threw up this morning after taking a handful of her medications and eating oatmeal.  She has had nothing else to eat today. Yesterday she ate only 1 time and had one slice of bread (not toasted) with chicken salad. She tolerated the bread and chicken salad yesterday. Sounds like she is eating typically once a day. She cannot stand the protein shake that she has and is waiting to go to the store to  something else. She has been up and out of bed. She is walking in her home. Visit Vitals  Pulse 73   Ht 5' 8\" (1.727 m)   Wt (!) 364 lb (165.1 kg)   BMI 55.35 kg/m²     She is up and out of bed for the visit  She appears to be dressed in regular clothes, not pajamas and her hair has been fixed  Speech is clear mucous membranes appear moist  Breathing is unlabored  Abdomen appears soft and is obese, wound site appears clean and dry. There is no periwound erythema or induration and the gauze pad appears dry. She is ambulating independently    ICD-10-CM ICD-9-CM    1. Follow-up examination after abdominal surgery  Z09 V67.09    2. Morbid obesity ([address]  E66.01 278.01    3. BMI 50.0-59.9, adult ([facility]  Z68.43 V85.43    4. Nausea  R11.0 787.02    5. Post-op pain  G89.18 338.18 gabapentin (NEURONTIN) 100 mg capsule     4-week status post revision of a sleeve gastrectomy to laparoscopic gastric bypass for treatment of severe GERD, hiatal hernia and obesity  Complicated by an incarcerated abdominal wall hernia with subsequent readmission for wound infection  For pain: Abdominal support, heating pad and extra strength Tylenol 2 tabs every 8 hours. She never got the gabapentin prescription filled a month ago so I reordered gabapentin 100 mg 1-2 tabs every 8 hours as needed pain #30 with no additional refills.   Advised she was not getting anything else for pain to minimize the risk of worsening her constipation, causing drowsiness and wanted to avoid anything that would keep her in the bed. Reviewed her diet and she is okay to advance to stage II soft texture but advised to eat a quarter to half a cup portion 4-5 times a day versus once a day. Reviewed protein options again. Continue to aim for 64 ounces or more of clear liquids daily  Family member plan on going to the store so she has a list for them  I also renewed the Zofran as she could not find her prescription  Abdominal wound does not appear infected. She can shower and wash as normal using mild soap and water. Pat the area dry and then cover with a gauze pad.   We will touch base with her early next week and just check in with her and see how she is doing  She continues to be at high risk for readmission and will need close follow-up

## 2021-02-11 NOTE — PATIENT INSTRUCTIONS
the gabapentin for pain from the pharmacy and the zofran for nausea Try and find a protein shake that you can tolerate as we discussed Continue with wound care every day and ok to shower and wash as normal  
 
Moist Meats What is this diet? ? This phase adds moist meats in addition to foods in Soft & Mushy ? Lean protein sources What new foods can I eat? 
? Moist meat and poultry ? Moist fish and seafood Shopping Lists Protein - include with every meal 
? Tuna packed in water ? [place] (canned, frozen, fresh) ? White flaky fish (annika, cod, flounder, tilapia) ? Canned chicken packed in water ? 96-99% fat free thinly sliced deli meat (ham, turkey, chicken) ? Silken Tofu  
? Skinless turkey or chicken (prepare to a soft texture) ? Lean ground meat ? Lean pork (cooked until very tender, cut into small pieces) Sample Meal Plan:  Moist Meats Breakfast ½ cup soft cooked eggs (2) 16 grams protein Snack (optional) Low-fat string cheese 5 grams protein Lunch 2 slices of lean deli turkey (2 oz.), ¼ cup soft fruit or ½ cup tuna salad made with low-fat mayonnaise 14 grams protein 14 grams protein Snack (optional) Greek yogurt or low-fat cottage cheese or low-fat string cheese 8-15 grams protein Dinner Soft/flaky fish (2 oz.) ¼ cup soft cooked vegetables 14 grams protein Key Points ? AVOID REHEATING meats as it gets too dry ? Continue to drink 48-64 ounces of low calorie, non-carbonated, sugar free beverages between meals. ? Eat 3 meals per day ? Measure each meal to HALF cup per meal 
? Aim for 60 grams of protein every day. Try food sources of protein first.  
? Continue to supplement with protein shakes/powder to meet protein goals. ? Take small bites. Try eating with smaller utensils (baby spoon, cocktail fork). ? Chew food thoroughly ? Allow about 30 minutes to eat a meal.  Eating too fast may cause nausea or vomiting. ? Stop eating as soon as you feel full. Overeating may stretch your stomach's capacity and prevent desired weight loss. ? Do not drink liquids during meals and 30 minutes after meals. Drinking with meals may cause nausea or vomiting. ? Add one new food at a time ? Take vitamins daily ? No lifting greater than 40 lbs. ? You can do light jogging and walking. ? You may go into a pool. What to do if you are constipated: You may  take Milk of Magnesia. Take 2 Tablespoons followed by 16 oz of water then 2 hours later take another 2 tablespoons. If  milk of magnesia does not work then take [place] or Miralax over the counter. Keep in mind that the Benefiber or Miralax may take a day or two to work. If all of the above do not work try a Fleets enema and follow the directions on the box. If you are not able to tolerate liquids or soft foods. Please call our office  [phone] If you have vomiting and persistent epigastric pain or chest pain. You should call our office, the doctor on-call or go to the emergency room.

## 2021-02-15 NOTE — DISCHARGE SUMMARY
Physician Discharge Summary     Patient ID:  Madhuri Reardon  [id number]  54 y.o.  1965    Allergies: Patient has no known allergies. Admit Date: 2/5/2021    Discharge Date: 2/9/2021    * Admission Diagnoses: Postoperative intra-abdominal abscess [T81.43XA]    * Discharge Diagnoses:    Hospital Problems as of 2/9/2021 Date Reviewed: 1/25/2021          Codes Class Noted - Resolved POA    Postoperative intra-abdominal abscess ICD-10-CM: T81.43XA  ICD-9-CM: 998.59, 567.22  2/6/2021 - Present Unknown               Admission Condition: Fair    * Discharge Condition: improved    * Procedures: * No surgery found [facility] Course:   She was admitted and resuscitated. Intravenous antibiotics were started. Bariatric diet was continued. White blood cell count was normal.  Interval CT scan did not suggest abdominal abscess. She was complaining of constipation, and this was managed through suppositories and enema. She was discharged home on 2/9 tolerating bariatric diet, afebrile, satisfactory pain control. Consults: None    Significant Diagnostic Studies: radiology: CT scan: Possible right-sided abdominal abscess    * Disposition: Home    Discharge Medications:   Discharge Medication List as of 2/9/2021  3:01 PM      CONTINUE these medications which have NOT CHANGED    Details   amoxicillin-clavulanate (AUGMENTIN) 875-125 mg per tablet Take 1 Tab by mouth two (2) times a day for 7 days. , Normal, Disp-14 Tab, R-0      traZODone (DESYREL) 150 mg tablet Take 1 Tab by mouth nightly as needed for Sleep for up to 90 days. , Normal, Disp-90 Tab, R-0      polyethylene glycol (MIRALAX) 17 gram packet Take 1 Packet by mouth daily. Indications: constipation, Normal, Disp-100 Packet,R-3      pantoprazole (PROTONIX) 40 mg tablet Take 1 Tab by mouth daily.  AFTER SURGERY, Normal, Disp-30 Tab,R-1      ondansetron (ZOFRAN ODT) 4 mg disintegrating tablet Take 1 Tab by mouth every eight (8) hours as needed for Nausea or Nausea or Vomiting (AFTER SURGERY). , Normal, Disp-20 Tab,R-0      enoxaparin (LOVENOX) 40 mg/0.4 mL 0.4 mL by SubCUTAneous route daily. AFTER SURGERY  Indications: blood clot in a deep vein of the extremities, Normal, Disp-14 Syringe,R-0      gabapentin (NEURONTIN) 100 mg capsule Take 1-2 Caps by mouth every eight (8) hours as needed for Pain (AFTER SURGERY). Max Daily Amount: 600 mg., Normal, Disp-30 Cap,R-0      citalopram (CeleXA) 40 mg tablet Take 1 Tab by mouth daily for 90 days. , Normal, Disp-90 Tab,R-0      ARIPiprazole (ABILIFY) 10 mg tablet Take 1 Tab by mouth daily for 90 days. Indications: additional medications to treat depression, Normal, Disp-90 Tab,R-0      tiZANidine (ZANAFLEX) 4 mg capsule Take 4 mg by mouth three (3) times daily. , Historical Med      pravastatin (PRAVACHOL) 20 mg tablet Take 20 mg by mouth nightly., Historical Med      minocycline (DYNACIN) 100 mg tablet Take 100 mg by mouth two (2) times a day., Historical Med      ketoconazole (NIZORAL) 2 % topical cream Apply  to affected area daily. , Historical Med      olopatadine (PATANOL) 0.1 % ophthalmic solution Administer 2 Drops to both eyes two (2) times a day., Historical Med      bisacodyL (Dulcolax, bisacodyl,) 5 mg EC tablet Take 5 mg by mouth daily as needed for Constipation. , Historical Med      calcium citrate/vitamin D3 (CALCIUM CITRATE + D PO) Take 600 mg by mouth daily. , Historical Med      cyanocobalamin (VITAMIN B-12) 500 mcg tablet Take 500 mcg by mouth daily. , Historical Med      multivitamin (ONE A DAY) tablet Take 1 Tab by mouth daily. , Historical Med      albuterol (ProAir HFA) 90 mcg/actuation inhaler Take 2 Puffs by inhalation every four (4) hours as needed., Historical Med      naloxone 4 mg/actuation spry 4 mg by Nasal route as needed. , Print, Disp-1 Bottle, R-1      ergocalciferol (VITAMIN D2) 50,000 unit capsule Take 50,000 Units by mouth every seven (7) days. , Historical Med      Consolidated Ti Use as directed. , Print, Disp-1 Device, R-0      amLODIPine (NORVASC) 10 mg tablet Take  by mouth daily. , Historical Med      lisinopril (PRINIVIL, ZESTRIL) 10 mg tablet Take 40 mg by mouth daily. , Historical Med      butalbital-acetaminophen-caffeine (FIORICET) -40 mg per tablet Take 1 Tab by mouth daily as needed., Historical Med         STOP taking these medications       HYDROmorphone (DILAUDID) 2 mg tablet Comments:   Reason for Stopping:         ALPRAZolam (XANAX) 0.5 mg tablet Comments:   Reason for Stopping:               * Follow-up Care/Patient Instructions: Activity: No heavy lifting, pushing, pulling x 3 days  Diet: pureed bariatric diet  Wound Care: Keep wounds clean and dry    Follow-up Information     Follow up With Specialties Details Why Contact Info    Cee John MD [facility] [address]      Dave Willis NP General Surgery On 2/11/2021 Virtual post-op appt on Feb 11 @ [address] [place] [address]  [phone]      [facility]  [phone]        Follow-up tests/labs:   NP appointment on 2/11.     Signed:  Amanda Rose MD  2/15/2021  9:41 AM

## 2021-02-19 ENCOUNTER — TELEPHONE (OUTPATIENT)
Dept: SURGERY | Age: 56
End: 2021-02-19

## 2021-02-19 NOTE — TELEPHONE ENCOUNTER
Patient called me back and she said last night she had a migraine and she vomited due to her headache. She said she is feeling really good and is glad about that. She said her vitamin was real big and wanted to know if she could do a Gummie and I said no, needs to wait about 3 months, I told her to break her vitamin in half if too big. Pt states she will and again said she feels really good.

## 2021-02-19 NOTE — TELEPHONE ENCOUNTER
I called the patient and I left her a voice mail to call me back so I can see how she is doing since her last visit. will await callback.

## 2021-02-19 NOTE — TELEPHONE ENCOUNTER
----- Message from Marianne Dutta NP sent at 2/18/2021  4:02 PM EST -----  Regarding: check in  Can you please check in with her.   If any issues, Dora Sorenson is around on Friday - thanks KETTY

## 2021-02-23 ENCOUNTER — TELEPHONE (OUTPATIENT)
Dept: BEHAVIORAL/MENTAL HEALTH CLINIC | Age: 56
End: 2021-02-23

## 2021-02-23 DIAGNOSIS — F41.1 GENERALIZED ANXIETY DISORDER: Primary | ICD-10-CM

## 2021-02-23 DIAGNOSIS — Z51.81 MEDICATION MONITORING ENCOUNTER: ICD-10-CM

## 2021-02-23 RX ORDER — ALPRAZOLAM 0.5 MG/1
0.5 TABLET ORAL
Qty: 60 TAB | Refills: 0 | Status: SHIPPED | OUTPATIENT
Start: 2021-02-23 | End: 2021-03-25

## 2021-02-24 ENCOUNTER — TELEPHONE (OUTPATIENT)
Dept: BEHAVIORAL/MENTAL HEALTH CLINIC | Age: 56
End: 2021-02-24

## 2021-02-25 ENCOUNTER — VIRTUAL VISIT (OUTPATIENT)
Dept: SURGERY | Age: 56
End: 2021-02-25
Payer: MEDICARE

## 2021-02-25 VITALS — HEIGHT: 68 IN | WEIGHT: 293 LBS | BODY MASS INDEX: 44.41 KG/M2

## 2021-02-25 DIAGNOSIS — K91.2 POSTOPERATIVE INTESTINAL MALABSORPTION: ICD-10-CM

## 2021-02-25 DIAGNOSIS — R11.0 NAUSEA: ICD-10-CM

## 2021-02-25 DIAGNOSIS — Z09 FOLLOW-UP EXAMINATION AFTER ABDOMINAL SURGERY: Primary | ICD-10-CM

## 2021-02-25 PROCEDURE — 99024 POSTOP FOLLOW-UP VISIT: CPT | Performed by: NURSE PRACTITIONER

## 2021-02-25 RX ORDER — ONDANSETRON 4 MG/1
4 TABLET, ORALLY DISINTEGRATING ORAL
Qty: 30 TAB | Refills: 1 | Status: SHIPPED | OUTPATIENT
Start: 2021-02-25 | End: 2021-03-25

## 2021-02-25 NOTE — PATIENT INSTRUCTIONS
Solid Textured Foods 
 
What is this diet? 
? This phase is a slow reintroduction of textured food, starting with those easiest tolerated 
? Supplement with protein shakes/powders as needed 
 
What foods can I eat? 
? Lean Protein  
? Vegetables 
? Fruit 
? Low Fat Dairy 
 
Choose foods wisely.  Think about the nutritional benefit of the food.   
Protein first and at each meal.  Include produce (vegetables and/or fruits) at each meal.   
Limit or eliminate \"filler\" foods such as breads, pasta, potatoes, rice, crackers, pretzels, and the like. 
Avoid eating and drinking together, there just isn't enough room! 
You may continue protein supplementation if needed to meet your daily protein goals. 
Many patients use a protein shake or bar to replace a meal.  There are a variety of protein supplements listed in your handbook.   
 
Remember, the dietician is available for additional support.  For an appointment, simply call or e-mail Adrienne Jones RD at [phone] or [email or web address]. 
 
Take your vitamins every day, attend support group and keep your regular follow-up appointments. 
Ultimately, success depends on you.  Choose to use your tool and we will guide you along the way!

## 2021-02-25 NOTE — PROGRESS NOTES
1. Have you been to the ER, urgent care clinic since your last visit? Hospitalized since your last visit? No    2. Have you seen or consulted any other health care providers outside of the [address] [place] [address] since your last visit? Include any pap smears or colon screening.  No

## 2021-02-25 NOTE — PROGRESS NOTES
I was in the office while conducting this encounter. Consent:  She and/or her healthcare decision maker is aware that this patient-initiated Telehealth encounter is a billable service, with coverage as determined by her insurance carrier. She is aware that she may receive a bill and has provided verbal consent to proceed: No - Not billable    This virtual visit was conducted via [employer]. Pursuant to the emergency declaration under the [facility], [facility] waiver authority and the [employer] Act, this Virtual  Visit was conducted to reduce the patient's risk of exposure to COVID-19 and provide continuity of care for an established patient. Services were provided through a video synchronous discussion virtually to substitute for in-person clinic visit. Due to this being a TeleHealth evaluation, many elements of the physical examination are unable to be assessed. Total Time: minutes: 11-20 minutes. Chief Complaint   Patient presents with    Post OP Follow Up     1\25\21 DIAGNOSTIC LAPAROSCOPY, REDUCTION AND REPAIR OF INCARCERATED HERNIA  [phone]       Bernice Sever is about 6 weeks status post revision of a sleeve gastrectomy to gastric bypass for treatment of morbid obesity and severe GERD. And she is about a month status post diagnostic laparoscopy with repair of an incarcerated hernia. She says she is \"doing great\". The gabapentin worked wonders and took care of her abdominal pain. Abdominal pain has resolved and she is no longer requiring pain medication. She said she has been doing her exercises and walking every day.   No fever, chills, chest pain or shortness of breath  Abdominal wound is healed and closed completely  She has no drainage from the area and no pain  Bowels are moving daily and she says she is regular  Voiding without difficulty  She has started using the boost for protein supplement MONITOR response to TREATMENT. again  She is tolerating soft stage II diet and was pleased that she was able to tolerate a chicken wing  She has been breaking her multivitamin in half because it is a little bit big and she can tolerate it in that form  She is having some intermittent nausea but gets relief with Zofran is requesting a refill  Visit Vitals  Ht 5' 8\" (1.727 m)   Wt 332 lb (150.6 kg)   BMI 50.48 kg/m²     She looks much better today  She is smiling and mood is much improved  Mucous membranes appear moist  Speech is clear breathing is unlabored  Abdomen is obese appears soft and is well-healed      ICD-10-CM ICD-9-CM    1. Follow-up examination after abdominal surgery  Z09 V67.09    2. Postoperative intestinal malabsorption  K91.2 579.3    3. BMI 50.0-59.9, adult ([facility]  Z68.43 V85.43    4. Nausea  R11.0 787.02 ondansetron (ZOFRAN ODT) 4 mg disintegrating tablet       Seems to have finally turned the corner and is doing much better  Postoperative pain is resolved and wound has healed  She seems to be much more active and up and about  She may advance her diet as tolerated to regular texture and advised her to continue using her protein supplement every day  Reviewed vitamins and she will continue her bariatric vitamins daily  Daily walking and her exercises that she has  Zofran renewed for nausea take as needed  Follow-up in 6 weeks sooner if necessary  Noemi Cabrera verbalized understanding and questions were answered to the best of my knowledge and ability. Diet and activity  educational materials were provided.

## 2021-03-10 ENCOUNTER — VIRTUAL VISIT (OUTPATIENT)
Dept: SURGERY | Age: 56
End: 2021-03-10
Payer: MEDICARE

## 2021-03-10 ENCOUNTER — TELEPHONE (OUTPATIENT)
Dept: SURGERY | Age: 56
End: 2021-03-10

## 2021-03-10 DIAGNOSIS — R10.10 PAIN OF UPPER ABDOMEN: ICD-10-CM

## 2021-03-10 DIAGNOSIS — Z09 FOLLOW-UP EXAMINATION AFTER ABDOMINAL SURGERY: Primary | ICD-10-CM

## 2021-03-10 PROCEDURE — 99024 POSTOP FOLLOW-UP VISIT: CPT | Performed by: NURSE PRACTITIONER

## 2021-03-10 RX ORDER — LISINOPRIL 40 MG/1
40 TABLET ORAL DAILY
COMMUNITY
Start: 2021-02-01

## 2021-03-10 RX ORDER — METOPROLOL SUCCINATE 50 MG/1
50 TABLET, EXTENDED RELEASE ORAL DAILY
COMMUNITY
Start: 2021-02-01

## 2021-03-10 RX ORDER — METFORMIN HYDROCHLORIDE 500 MG/1
TABLET ORAL
COMMUNITY
Start: 2021-02-01

## 2021-03-10 NOTE — PROGRESS NOTES
Spoke with patient on the phone because virtual visit [address]. She states that she is having upper abdominal pain. She has been having this pain since surgery. She was last seen approximately 2 weeks ago and had no complaints of pain. She states she did not have pain because she was taking the gabapentin at that time but now that she is stopped the gabapentin she is in pain. Unable to see and evaluate the area that is in pain. Will have patient come into the office and be seen face-to-face on Friday. Advised if pain got worse she could go to the ER.   Patient in agreement

## 2021-03-10 NOTE — TELEPHONE ENCOUNTER
Cristal Carr  [phone]    Pt says she has a knot in the middle of their stomach. Had surgery 1/14 and 1/25. Asking for a call back about issue please.

## 2021-03-10 NOTE — TELEPHONE ENCOUNTER
I called the patient and she said she is still having abdominal pain in the middle of her abdomen she said it has been there since surgery. She said it is constant and the only thing that has helped with that is Gabapentin. Per NP's last office note pain was resolved and no longer needed Gabapentin. I have put her in today for VV with Ana Barton NP to discuss. Pt in agreement.

## 2021-03-10 NOTE — PROGRESS NOTES
Patient c/o abdominal pain. S/p diagnostic laparoscopy, reduction and repair of incarcerated hernia on 1/25/2021.      1. Have you been to the ER, urgent care clinic since your last visit? Hospitalized since your last visit? Yes, [facility] for abdominal pain. 2. Have you seen or consulted any other health care providers outside of the [address] [place] [address] since your last visit? Include any pap smears or colon screening.  No

## 2021-03-12 ENCOUNTER — APPOINTMENT (OUTPATIENT)
Dept: CT IMAGING | Age: 56
DRG: 857 | End: 2021-03-12
Attending: EMERGENCY MEDICINE
Payer: MEDICARE

## 2021-03-12 ENCOUNTER — HOSPITAL ENCOUNTER (INPATIENT)
Age: 56
LOS: 4 days | Discharge: HOME HEALTH | DRG: 857 | End: 2021-03-16
Attending: EMERGENCY MEDICINE | Admitting: SURGERY
Payer: MEDICARE

## 2021-03-12 ENCOUNTER — OFFICE VISIT (OUTPATIENT)
Dept: SURGERY | Age: 56
End: 2021-03-12
Payer: MEDICARE

## 2021-03-12 VITALS
HEART RATE: 110 BPM | TEMPERATURE: 99 F | OXYGEN SATURATION: 92 % | RESPIRATION RATE: 20 BRPM | BODY MASS INDEX: 44.41 KG/M2 | DIASTOLIC BLOOD PRESSURE: 98 MMHG | SYSTOLIC BLOOD PRESSURE: 177 MMHG | HEIGHT: 68 IN | WEIGHT: 293 LBS

## 2021-03-12 DIAGNOSIS — R10.10 PAIN OF UPPER ABDOMEN: ICD-10-CM

## 2021-03-12 DIAGNOSIS — L03.90 CELLULITIS, UNSPECIFIED CELLULITIS SITE: Primary | ICD-10-CM

## 2021-03-12 DIAGNOSIS — L02.91 ABSCESS: ICD-10-CM

## 2021-03-12 DIAGNOSIS — Z09 POSTOPERATIVE EXAMINATION: ICD-10-CM

## 2021-03-12 DIAGNOSIS — L03.311 CELLULITIS OF ABDOMINAL WALL: ICD-10-CM

## 2021-03-12 DIAGNOSIS — Z98.84 S/P GASTRIC BYPASS: ICD-10-CM

## 2021-03-12 LAB
ALBUMIN SERPL-MCNC: 3.6 G/DL (ref 3.5–5)
ALBUMIN/GLOB SERPL: 0.6 {RATIO} (ref 1.1–2.2)
ALP SERPL-CCNC: 181 U/L (ref 45–117)
ALT SERPL-CCNC: 45 U/L (ref 12–78)
ANION GAP SERPL CALC-SCNC: 4 MMOL/L (ref 5–15)
APPEARANCE UR: CLEAR
AST SERPL-CCNC: 35 U/L (ref 15–37)
BACTERIA URNS QL MICRO: NEGATIVE /HPF
BASOPHILS # BLD: 0 K/UL (ref 0–0.1)
BASOPHILS NFR BLD: 0 % (ref 0–1)
BILIRUB SERPL-MCNC: 0.7 MG/DL (ref 0.2–1)
BILIRUB UR QL: NEGATIVE
BUN SERPL-MCNC: 4 MG/DL (ref 6–20)
BUN/CREAT SERPL: 7 (ref 12–20)
CALCIUM SERPL-MCNC: 10.1 MG/DL (ref 8.5–10.1)
CHLORIDE SERPL-SCNC: 103 MMOL/L (ref 97–108)
CO2 SERPL-SCNC: 29 MMOL/L (ref 21–32)
COLOR UR: ABNORMAL
CREAT SERPL-MCNC: 0.6 MG/DL (ref 0.55–1.02)
DIFFERENTIAL METHOD BLD: ABNORMAL
EOSINOPHIL # BLD: 0 K/UL (ref 0–0.4)
EOSINOPHIL NFR BLD: 0 % (ref 0–7)
EPITH CASTS URNS QL MICRO: ABNORMAL /LPF
ERYTHROCYTE [DISTWIDTH] IN BLOOD BY AUTOMATED COUNT: 15.1 % (ref 11.5–14.5)
GLOBULIN SER CALC-MCNC: 5.6 G/DL (ref 2–4)
GLUCOSE BLD STRIP.AUTO-MCNC: 113 MG/DL (ref 65–100)
GLUCOSE BLD STRIP.AUTO-MCNC: 96 MG/DL (ref 65–100)
GLUCOSE SERPL-MCNC: 108 MG/DL (ref 65–100)
GLUCOSE UR STRIP.AUTO-MCNC: NEGATIVE MG/DL
HCT VFR BLD AUTO: 31.9 % (ref 35–47)
HGB BLD-MCNC: 10.1 G/DL (ref 11.5–16)
HGB UR QL STRIP: ABNORMAL
HYALINE CASTS URNS QL MICRO: ABNORMAL /LPF (ref 0–5)
IMM GRANULOCYTES # BLD AUTO: 0.1 K/UL (ref 0–0.04)
IMM GRANULOCYTES NFR BLD AUTO: 1 % (ref 0–0.5)
KETONES UR QL STRIP.AUTO: NEGATIVE MG/DL
LACTATE SERPL-SCNC: 1.3 MMOL/L (ref 0.4–2)
LEUKOCYTE ESTERASE UR QL STRIP.AUTO: NEGATIVE
LYMPHOCYTES # BLD: 1.6 K/UL (ref 0.8–3.5)
LYMPHOCYTES NFR BLD: 19 % (ref 12–49)
MCH RBC QN AUTO: 27.4 PG (ref 26–34)
MCHC RBC AUTO-ENTMCNC: 31.7 G/DL (ref 30–36.5)
MCV RBC AUTO: 86.7 FL (ref 80–99)
MONOCYTES # BLD: 0.3 K/UL (ref 0–1)
MONOCYTES NFR BLD: 4 % (ref 5–13)
NEUTS SEG # BLD: 6.3 K/UL (ref 1.8–8)
NEUTS SEG NFR BLD: 76 % (ref 32–75)
NITRITE UR QL STRIP.AUTO: NEGATIVE
NRBC # BLD: 0 K/UL (ref 0–0.01)
NRBC BLD-RTO: 0 PER 100 WBC
PH UR STRIP: 7 [PH] (ref 5–8)
PLATELET # BLD AUTO: 401 K/UL (ref 150–400)
PMV BLD AUTO: 10.1 FL (ref 8.9–12.9)
POTASSIUM SERPL-SCNC: 3.1 MMOL/L (ref 3.5–5.1)
PROT SERPL-MCNC: 9.2 G/DL (ref 6.4–8.2)
PROT UR STRIP-MCNC: 30 MG/DL
RBC # BLD AUTO: 3.68 M/UL (ref 3.8–5.2)
RBC #/AREA URNS HPF: ABNORMAL /HPF (ref 0–5)
SERVICE CMNT-IMP: ABNORMAL
SERVICE CMNT-IMP: NORMAL
SODIUM SERPL-SCNC: 136 MMOL/L (ref 136–145)
SP GR UR REFRACTOMETRY: 1.03 (ref 1–1.03)
UROBILINOGEN UR QL STRIP.AUTO: 1 EU/DL (ref 0.2–1)
WBC # BLD AUTO: 8.2 K/UL (ref 3.6–11)
WBC URNS QL MICRO: ABNORMAL /HPF (ref 0–4)

## 2021-03-12 PROCEDURE — 74177 CT ABD & PELVIS W/CONTRAST: CPT

## 2021-03-12 PROCEDURE — 74011250636 HC RX REV CODE- 250/636: Performed by: SURGERY

## 2021-03-12 PROCEDURE — 87040 BLOOD CULTURE FOR BACTERIA: CPT

## 2021-03-12 PROCEDURE — 85025 COMPLETE CBC W/AUTO DIFF WBC: CPT

## 2021-03-12 PROCEDURE — 82962 GLUCOSE BLOOD TEST: CPT

## 2021-03-12 PROCEDURE — 96375 TX/PRO/DX INJ NEW DRUG ADDON: CPT

## 2021-03-12 PROCEDURE — 74011250637 HC RX REV CODE- 250/637: Performed by: SURGERY

## 2021-03-12 PROCEDURE — 99285 EMERGENCY DEPT VISIT HI MDM: CPT

## 2021-03-12 PROCEDURE — 83605 ASSAY OF LACTIC ACID: CPT

## 2021-03-12 PROCEDURE — 99024 POSTOP FOLLOW-UP VISIT: CPT | Performed by: NURSE PRACTITIONER

## 2021-03-12 PROCEDURE — 80053 COMPREHEN METABOLIC PANEL: CPT

## 2021-03-12 PROCEDURE — 74011000250 HC RX REV CODE- 250: Performed by: SURGERY

## 2021-03-12 PROCEDURE — 74011250636 HC RX REV CODE- 250/636: Performed by: EMERGENCY MEDICINE

## 2021-03-12 PROCEDURE — 99024 POSTOP FOLLOW-UP VISIT: CPT | Performed by: SURGERY

## 2021-03-12 PROCEDURE — 74011000258 HC RX REV CODE- 258: Performed by: SURGERY

## 2021-03-12 PROCEDURE — 36415 COLL VENOUS BLD VENIPUNCTURE: CPT

## 2021-03-12 PROCEDURE — 96374 THER/PROPH/DIAG INJ IV PUSH: CPT

## 2021-03-12 PROCEDURE — 74011000258 HC RX REV CODE- 258: Performed by: EMERGENCY MEDICINE

## 2021-03-12 PROCEDURE — 74011000636 HC RX REV CODE- 636: Performed by: RADIOLOGY

## 2021-03-12 PROCEDURE — 81001 URINALYSIS AUTO W/SCOPE: CPT

## 2021-03-12 PROCEDURE — 65270000029 HC RM PRIVATE

## 2021-03-12 RX ORDER — PANTOPRAZOLE SODIUM 40 MG/1
40 TABLET, DELAYED RELEASE ORAL DAILY
Status: DISCONTINUED | OUTPATIENT
Start: 2021-03-13 | End: 2021-03-16 | Stop reason: HOSPADM

## 2021-03-12 RX ORDER — PROMETHAZINE HYDROCHLORIDE 25 MG/1
12.5 TABLET ORAL
Status: DISCONTINUED | OUTPATIENT
Start: 2021-03-12 | End: 2021-03-16 | Stop reason: HOSPADM

## 2021-03-12 RX ORDER — MORPHINE SULFATE 2 MG/ML
2 INJECTION, SOLUTION INTRAMUSCULAR; INTRAVENOUS
Status: COMPLETED | OUTPATIENT
Start: 2021-03-12 | End: 2021-03-12

## 2021-03-12 RX ORDER — POTASSIUM CHLORIDE 7.45 MG/ML
10 INJECTION INTRAVENOUS AS NEEDED
Status: DISCONTINUED | OUTPATIENT
Start: 2021-03-12 | End: 2021-03-16 | Stop reason: HOSPADM

## 2021-03-12 RX ORDER — ONDANSETRON 4 MG/1
4 TABLET, ORALLY DISINTEGRATING ORAL
Status: DISCONTINUED | OUTPATIENT
Start: 2021-03-12 | End: 2021-03-13 | Stop reason: SDUPTHER

## 2021-03-12 RX ORDER — ACETAMINOPHEN 325 MG/1
650 TABLET ORAL
Status: DISCONTINUED | OUTPATIENT
Start: 2021-03-12 | End: 2021-03-16 | Stop reason: HOSPADM

## 2021-03-12 RX ORDER — SODIUM CHLORIDE 0.9 % (FLUSH) 0.9 %
5-40 SYRINGE (ML) INJECTION EVERY 8 HOURS
Status: DISCONTINUED | OUTPATIENT
Start: 2021-03-12 | End: 2021-03-16 | Stop reason: HOSPADM

## 2021-03-12 RX ORDER — ALPRAZOLAM 0.5 MG/1
0.5 TABLET ORAL
Status: DISCONTINUED | OUTPATIENT
Start: 2021-03-12 | End: 2021-03-16 | Stop reason: HOSPADM

## 2021-03-12 RX ORDER — VANCOMYCIN 2 GRAM/500 ML IN 0.9 % SODIUM CHLORIDE INTRAVENOUS
2000
Status: DISCONTINUED | OUTPATIENT
Start: 2021-03-13 | End: 2021-03-16 | Stop reason: DRUGHIGH

## 2021-03-12 RX ORDER — SODIUM CHLORIDE 9 MG/ML
25 INJECTION, SOLUTION INTRAVENOUS CONTINUOUS
Status: DISCONTINUED | OUTPATIENT
Start: 2021-03-12 | End: 2021-03-16 | Stop reason: HOSPADM

## 2021-03-12 RX ORDER — DEXTROSE 50 % IN WATER (D50W) INTRAVENOUS SYRINGE
25-50 AS NEEDED
Status: DISCONTINUED | OUTPATIENT
Start: 2021-03-12 | End: 2021-03-16 | Stop reason: HOSPADM

## 2021-03-12 RX ORDER — POLYETHYLENE GLYCOL 3350 17 G/17G
17 POWDER, FOR SOLUTION ORAL DAILY
Status: DISCONTINUED | OUTPATIENT
Start: 2021-03-13 | End: 2021-03-16 | Stop reason: HOSPADM

## 2021-03-12 RX ORDER — POTASSIUM CHLORIDE 14.9 MG/ML
20 INJECTION INTRAVENOUS
Status: DISCONTINUED | OUTPATIENT
Start: 2021-03-12 | End: 2021-03-12 | Stop reason: SDUPTHER

## 2021-03-12 RX ORDER — LISINOPRIL 10 MG/1
40 TABLET ORAL DAILY
Status: DISCONTINUED | OUTPATIENT
Start: 2021-03-13 | End: 2021-03-16 | Stop reason: HOSPADM

## 2021-03-12 RX ORDER — IPRATROPIUM BROMIDE AND ALBUTEROL SULFATE 2.5; .5 MG/3ML; MG/3ML
3 SOLUTION RESPIRATORY (INHALATION)
Status: DISCONTINUED | OUTPATIENT
Start: 2021-03-12 | End: 2021-03-16 | Stop reason: HOSPADM

## 2021-03-12 RX ORDER — ACETAMINOPHEN 650 MG/1
650 SUPPOSITORY RECTAL
Status: DISCONTINUED | OUTPATIENT
Start: 2021-03-12 | End: 2021-03-16 | Stop reason: HOSPADM

## 2021-03-12 RX ORDER — OXYCODONE AND ACETAMINOPHEN 5; 325 MG/1; MG/1
1 TABLET ORAL
Status: DISCONTINUED | OUTPATIENT
Start: 2021-03-12 | End: 2021-03-16 | Stop reason: HOSPADM

## 2021-03-12 RX ORDER — BISACODYL 5 MG
5 TABLET, DELAYED RELEASE (ENTERIC COATED) ORAL DAILY PRN
Status: DISCONTINUED | OUTPATIENT
Start: 2021-03-12 | End: 2021-03-16 | Stop reason: HOSPADM

## 2021-03-12 RX ORDER — BUTALBITAL, ACETAMINOPHEN AND CAFFEINE 50; 325; 40 MG/1; MG/1; MG/1
1 TABLET ORAL
Status: DISCONTINUED | OUTPATIENT
Start: 2021-03-12 | End: 2021-03-16 | Stop reason: HOSPADM

## 2021-03-12 RX ORDER — POTASSIUM CHLORIDE 7.45 MG/ML
10 INJECTION INTRAVENOUS
Status: COMPLETED | OUTPATIENT
Start: 2021-03-13 | End: 2021-03-13

## 2021-03-12 RX ORDER — KETOTIFEN FUMARATE 0.35 MG/ML
1 SOLUTION/ DROPS OPHTHALMIC 2 TIMES DAILY
Status: DISCONTINUED | OUTPATIENT
Start: 2021-03-12 | End: 2021-03-16 | Stop reason: HOSPADM

## 2021-03-12 RX ORDER — VANCOMYCIN 2 GRAM/500 ML IN 0.9 % SODIUM CHLORIDE INTRAVENOUS
2000 ONCE
Status: COMPLETED | OUTPATIENT
Start: 2021-03-12 | End: 2021-03-13

## 2021-03-12 RX ORDER — METOPROLOL SUCCINATE 50 MG/1
50 TABLET, EXTENDED RELEASE ORAL DAILY
Status: DISCONTINUED | OUTPATIENT
Start: 2021-03-13 | End: 2021-03-16 | Stop reason: HOSPADM

## 2021-03-12 RX ORDER — LANOLIN ALCOHOL/MO/W.PET/CERES
500 CREAM (GRAM) TOPICAL DAILY
Status: DISCONTINUED | OUTPATIENT
Start: 2021-03-13 | End: 2021-03-16 | Stop reason: HOSPADM

## 2021-03-12 RX ORDER — SODIUM CHLORIDE 0.9 % (FLUSH) 0.9 %
5-40 SYRINGE (ML) INJECTION AS NEEDED
Status: DISCONTINUED | OUTPATIENT
Start: 2021-03-12 | End: 2021-03-16 | Stop reason: HOSPADM

## 2021-03-12 RX ORDER — MAGNESIUM SULFATE 100 %
4 CRYSTALS MISCELLANEOUS AS NEEDED
Status: DISCONTINUED | OUTPATIENT
Start: 2021-03-12 | End: 2021-03-16 | Stop reason: HOSPADM

## 2021-03-12 RX ORDER — ENOXAPARIN SODIUM 100 MG/ML
40 INJECTION SUBCUTANEOUS DAILY
Status: DISCONTINUED | OUTPATIENT
Start: 2021-03-13 | End: 2021-03-12

## 2021-03-12 RX ORDER — POLYETHYLENE GLYCOL 3350 17 G/17G
17 POWDER, FOR SOLUTION ORAL DAILY PRN
Status: DISCONTINUED | OUTPATIENT
Start: 2021-03-12 | End: 2021-03-16 | Stop reason: HOSPADM

## 2021-03-12 RX ORDER — POTASSIUM CHLORIDE 7.45 MG/ML
10 INJECTION INTRAVENOUS
Status: DISCONTINUED | OUTPATIENT
Start: 2021-03-12 | End: 2021-03-12

## 2021-03-12 RX ORDER — INSULIN LISPRO 100 [IU]/ML
INJECTION, SOLUTION INTRAVENOUS; SUBCUTANEOUS
Status: DISCONTINUED | OUTPATIENT
Start: 2021-03-12 | End: 2021-03-16 | Stop reason: HOSPADM

## 2021-03-12 RX ORDER — ONDANSETRON 2 MG/ML
4 INJECTION INTRAMUSCULAR; INTRAVENOUS
Status: DISCONTINUED | OUTPATIENT
Start: 2021-03-12 | End: 2021-03-16 | Stop reason: HOSPADM

## 2021-03-12 RX ORDER — AMLODIPINE BESYLATE 5 MG/1
10 TABLET ORAL DAILY
Status: DISCONTINUED | OUTPATIENT
Start: 2021-03-13 | End: 2021-03-16 | Stop reason: HOSPADM

## 2021-03-12 RX ORDER — TIZANIDINE 4 MG/1
4 TABLET ORAL
Status: DISCONTINUED | OUTPATIENT
Start: 2021-03-12 | End: 2021-03-16 | Stop reason: HOSPADM

## 2021-03-12 RX ORDER — ONDANSETRON 2 MG/ML
4 INJECTION INTRAMUSCULAR; INTRAVENOUS
Status: COMPLETED | OUTPATIENT
Start: 2021-03-12 | End: 2021-03-12

## 2021-03-12 RX ORDER — HYDROMORPHONE HYDROCHLORIDE 1 MG/ML
1 INJECTION, SOLUTION INTRAMUSCULAR; INTRAVENOUS; SUBCUTANEOUS
Status: DISCONTINUED | OUTPATIENT
Start: 2021-03-12 | End: 2021-03-16 | Stop reason: HOSPADM

## 2021-03-12 RX ORDER — PRAVASTATIN SODIUM 20 MG/1
20 TABLET ORAL
Status: DISCONTINUED | OUTPATIENT
Start: 2021-03-12 | End: 2021-03-16 | Stop reason: HOSPADM

## 2021-03-12 RX ADMIN — Medication 10 ML: at 18:21

## 2021-03-12 RX ADMIN — TRAZODONE HYDROCHLORIDE 150 MG: 100 TABLET ORAL at 22:35

## 2021-03-12 RX ADMIN — TIZANIDINE 4 MG: 4 TABLET ORAL at 22:35

## 2021-03-12 RX ADMIN — CEFTRIAXONE SODIUM 1 G: 1 INJECTION, POWDER, FOR SOLUTION INTRAMUSCULAR; INTRAVENOUS at 17:22

## 2021-03-12 RX ADMIN — PRAVASTATIN SODIUM 20 MG: 20 TABLET ORAL at 22:35

## 2021-03-12 RX ADMIN — KETOTIFEN FUMARATE 1 DROP: 0.35 SOLUTION/ DROPS OPHTHALMIC at 18:14

## 2021-03-12 RX ADMIN — ONDANSETRON 4 MG: 2 INJECTION INTRAMUSCULAR; INTRAVENOUS at 17:20

## 2021-03-12 RX ADMIN — OXYCODONE HYDROCHLORIDE AND ACETAMINOPHEN 1 TABLET: 5; 325 TABLET ORAL at 20:40

## 2021-03-12 RX ADMIN — MORPHINE SULFATE 2 MG: 2 INJECTION, SOLUTION INTRAMUSCULAR; INTRAVENOUS at 17:18

## 2021-03-12 RX ADMIN — SODIUM CHLORIDE 1000 ML: 9 INJECTION, SOLUTION INTRAVENOUS at 17:24

## 2021-03-12 RX ADMIN — Medication 10 ML: at 22:35

## 2021-03-12 RX ADMIN — POTASSIUM CHLORIDE 10 MEQ: 7.46 INJECTION, SOLUTION INTRAVENOUS at 20:53

## 2021-03-12 RX ADMIN — POTASSIUM CHLORIDE 10 MEQ: 7.46 INJECTION, SOLUTION INTRAVENOUS at 18:53

## 2021-03-12 RX ADMIN — SODIUM CHLORIDE 25 ML/HR: 9 INJECTION, SOLUTION INTRAVENOUS at 20:52

## 2021-03-12 RX ADMIN — IOPAMIDOL 100 ML: 755 INJECTION, SOLUTION INTRAVENOUS at 14:08

## 2021-03-12 RX ADMIN — PIPERACILLIN AND TAZOBACTAM 3.38 G: 3; .375 INJECTION, POWDER, LYOPHILIZED, FOR SOLUTION INTRAVENOUS at 18:16

## 2021-03-12 NOTE — PROGRESS NOTES
Admission Medication Reconciliation: In progress:    Unable to speak with patient face to face at this time due to general isolation precautions in the ED related to COVID-19 pandemic. Left voicemail for patient @ [phone], awaiting return call and will update once additional information becomes available. Thank you for allowing me to participate in the care of your patient. Liborio Edmond PharmD, RN # [phone]       [facility] pharmacy benefit data reflects medications filled and processed through the patient's insurance, however   this data does NOT capture whether the medication was picked up or is currently being taken by the patient. Allergies:  Patient has no known allergies. Significant PMH/Disease States:   Past Medical History:   Diagnosis Date    Anxiety 8/24/2017    Arthritis     Asthma     Back injury     Depression     Essential hypertension     GERD (gastroesophageal reflux disease)     Morbid obesity ([address]     Sleep apnea in adult 3/6/2018    CPAP     Chief Complaint for this Admission:    Chief Complaint   Patient presents with    Post OP Complication    Referral Request     Prior to Admission Medications:   Prior to Admission Medications   Prescriptions Last Dose Informant Taking? ALPRAZolam (XANAX) 0.5 mg tablet   No   Sig: Take 1 Tab by mouth two (2) times daily as needed for Anxiety for up to 30 days. Max Daily Amount: 1 mg.   albuterol (ProAir HFA) 90 mcg/actuation inhaler   No   Sig: Take 2 Puffs by inhalation every four (4) hours as needed. amLODIPine (NORVASC) 10 mg tablet   No   Sig: Take 10 mg by mouth daily. bisacodyL (Dulcolax, bisacodyl,) 5 mg EC tablet   No   Sig: Take 5 mg by mouth daily as needed for Constipation. butalbital-acetaminophen-caffeine (FIORICET) -40 mg per tablet   No   Sig: Take 1 Tab by mouth daily as needed. calcium citrate/vitamin D3 (CALCIUM CITRATE + D PO)   No   Sig: Take 600 mg by mouth daily.    cyanocobalamin (VITAMIN B-12) 500 mcg tablet   No   Sig: Take 500 mcg by mouth daily. ergocalciferol (VITAMIN D2) 50,000 unit capsule   No   Sig: Take 50,000 Units by mouth every seven (7) days. ketoconazole (NIZORAL) 2 % topical cream   No   Sig: Apply  to affected area daily. lisinopriL (PRINIVIL, ZESTRIL) 40 mg tablet   No   Sig: Take 40 mg by mouth daily. metFORMIN (GLUCOPHAGE) 500 mg tablet   No   metoprolol succinate (TOPROL-XL) 50 mg XL tablet   No   Sig: Take 50 mg by mouth daily. multivitamin (ONE A DAY) tablet   No   Sig: Take 1 Tab by mouth daily. naloxone 4 mg/actuation spry   No   Si mg by Nasal route as needed. olopatadine (PATANOL) 0.1 % ophthalmic solution   No   Sig: Administer 2 Drops to both eyes two (2) times a day. ondansetron (ZOFRAN ODT) 4 mg disintegrating tablet   No   Sig: Take 1 Tab by mouth every eight (8) hours as needed for Nausea or Nausea or Vomiting. pantoprazole (PROTONIX) 40 mg tablet   No   Sig: Take 1 Tab by mouth daily. AFTER SURGERY   polyethylene glycol (MIRALAX) 17 gram packet   No   Sig: Take 1 Packet by mouth daily. Indications: constipation   pravastatin (PRAVACHOL) 20 mg tablet   No   Sig: Take 20 mg by mouth nightly. tiZANidine (ZANAFLEX) 4 mg capsule   No   Sig: Take 4 mg by mouth three (3) times daily. traZODone (DESYREL) 150 mg tablet   No   Sig: Take 1 Tab by mouth nightly as needed for Sleep for up to 90 days. Facility-Administered Medications: None     Please contact the main inpatient pharmacy with any questions or concerns at [phone] and we will direct you to the clinical pharmacist covering this patient's care while in-house.    MANUELA Brown

## 2021-03-12 NOTE — PROGRESS NOTES
1. Have you been to the ER, urgent care clinic since your last visit? Hospitalized since your last visit? No    2. Have you seen or consulted any other health care providers outside of the [address] [place] [address] since your last visit? Include any pap smears or colon screening.  No

## 2021-03-12 NOTE — ED NOTES
Pt ambulatory to ED from  with c/o abdominal pain onset after surgery 1/25/21 to repair hernia and bowel obstruction. S/P gastric bypass on 1/14/21. Referred to ED by Dr DYLON Whitmore pending admission for post op complication. Denies N/V/D, fever or dysuria. Reports surgical site reddened.

## 2021-03-12 NOTE — H&P
General Surgery History and Physical    CC: Redness of abd wall    History of Present Illness:      Day Angel is a 55 y.o. female status post sleeve to bypass two months ago complicated by incisional hernia and SBO requiring lap hernia repair with mesh. She then had an incision infection. She now presents with worsening midline redness and pain over the last 48 hours. She says pain is dull and 4/10. No drainage. No radiation. No fever or chills. Pressing areas provokes pain, relaxation improves pain. She is tolerating the bariatric diet.      Past Medical History:   Diagnosis Date   • Anxiety 2017   • Arthritis    • Asthma    • Back injury    • Depression    • Essential hypertension    • GERD (gastroesophageal reflux disease)    • Morbid obesity (HCC)    • Sleep apnea in adult 3/6/2018    CPAP     Past Surgical History:   Procedure Laterality Date   • HX APPENDECTOMY     • HX  SECTION     • HX COLONOSCOPY     • HX GASTRECTOMY      lap sleeve gastrectomy   • HX HERNIA REPAIR  2021    Incarcerated hernia repair Dr. Brock Whitmore    • HX HYSTERECTOMY     • HX KNEE REPLACEMENT      left knee   • HX LAP GASTRIC BYPASS  2021    REVISION SLEEVE TO BYPASS - Tommy    • HX LYSIS OF ADHESIONS  2021    Dr. Sanders    • HX ORTHOPAEDIC      rt knee partial replacement   • HX ORTHOPAEDIC Right     LIGAMENT REPAIR   • IR PLC IVC FILTER  2021   • [place] LAP,DIAGNOSTIC ABDOMEN  2021    Dr. Brock Whitmore      Family History   Problem Relation Age of Onset   • Stroke Mother    • Heart Disease Mother         PACEMAKER   • Alcohol abuse Father    • Seizures Father    • Anesth Problems Neg Hx      Social History     Socioeconomic History   • Marital status: SINGLE     Spouse name: Not on file   • Number of children: Not on file   • Years of education: Not on file   • Highest education level: Not on file   Occupational History   • Occupation: disability      Comment: since  with back   Tobacco Use    Smoking status: Former Smoker     Packs/day: 1.00     Years: 30.00     Pack years: 30.00     Quit date: 2020     Years since quittin.7    Smokeless tobacco: Never Used   Substance and Sexual Activity    Alcohol use: No    Drug use: No   Social History Narrative    In the home with 15year old grand daughter and friend, Maria C Laughlin       Prior to Admission medications    Medication Sig Start Date End Date Taking? Authorizing Provider   metFORMIN (GLUCOPHAGE) 500 mg tablet  21   Provider, Historical   lisinopriL (PRINIVIL, ZESTRIL) 40 mg tablet Take 40 mg by mouth daily. 21   Provider, Historical   metoprolol succinate (TOPROL-XL) 50 mg XL tablet Take 50 mg by mouth daily. 21   Provider, Historical   ondansetron (ZOFRAN ODT) 4 mg disintegrating tablet Take 1 Tab by mouth every eight (8) hours as needed for Nausea or Nausea or Vomiting. 21   Cameron Khan NP   ALPRAZolam Jelly Mehta) 0.5 mg tablet Take 1 Tab by mouth two (2) times daily as needed for Anxiety for up to 30 days. Max Daily Amount: 1 mg. 2/23/21 3/25/21  Jeff Kwon NP   traZODone (DESYREL) 150 mg tablet Take 1 Tab by mouth nightly as needed for Sleep for up to 90 days. 12/14/20 3/14/21  Jeff Kwon NP   polyethylene glycol (MIRALAX) 17 gram packet Take 1 Packet by mouth daily. Indications: constipation 12/10/20   Cameron Khan NP   pantoprazole (PROTONIX) 40 mg tablet Take 1 Tab by mouth daily. AFTER SURGERY 12/10/20   Cameron Khan NP   tiZANidine (ZANAFLEX) 4 mg capsule Take 4 mg by mouth three (3) times daily. Provider, Historical   pravastatin (PRAVACHOL) 20 mg tablet Take 20 mg by mouth nightly. Provider, Historical   ketoconazole (NIZORAL) 2 % topical cream Apply  to affected area daily. Provider, Historical   olopatadine (PATANOL) 0.1 % ophthalmic solution Administer 2 Drops to both eyes two (2) times a day.     Provider, Historical   bisacodyL (Dulcolax, bisacodyl,) 5 mg EC tablet Take 5 mg by mouth daily as needed for Constipation. Provider, Historical   calcium citrate/vitamin D3 (CALCIUM CITRATE + D PO) Take 600 mg by mouth daily. Provider, Historical   cyanocobalamin (VITAMIN B-12) 500 mcg tablet Take 500 mcg by mouth daily. Provider, Historical   multivitamin (ONE A DAY) tablet Take 1 Tab by mouth daily. Provider, Historical   albuterol (ProAir HFA) 90 mcg/actuation inhaler Take 2 Puffs by inhalation every four (4) hours as needed. Provider, Historical   naloxone 4 mg/actuation spry 4 mg by Nasal route as needed. 3/30/17   Annika Nava MD   ergocalciferol (VITAMIN D2) 50,000 unit capsule Take 50,000 Units by mouth every seven (7) days. Provider, Historical   Cane lita Use as directed. 3/14/16   Kam Abraham NP   amLODIPine (NORVASC) 10 mg tablet Take 10 mg by mouth daily. Provider, Historical   lisinopril (PRINIVIL, ZESTRIL) 10 mg tablet Take 40 mg by mouth daily. Provider, Historical   butalbital-acetaminophen-caffeine (FIORICET) -40 mg per tablet Take 1 Tab by mouth daily as needed.     Provider, Historical     No Known Allergies    Review of Systems:  Constitutional: No fever or chills  Neurologic: No headache  Eyes: No scleral icterus or irritated eyes  Nose: No nasal pain or drainage  Mouth: No oral lesions or sore throat  Cardiac: No palpations or chest pain  Pulmonary: No cough or shortness or breath  Gastrointestinal: No nausea, emesis, diarrhea, or constipation  Genitourinary: No dysuria  Musculoskeletal: No muscle or joint tenderness  Skin: Redness of skin  Psychiatric: No anxiety or depressed mood    Physical Exam:   Temp (24hrs), Av.1 °F (37.3 °C), Min:99 °F (37.2 °C), Max:99.2 °F (37.3 °C)      Visit Vitals  BP (!) 160/75   Pulse (!) 101   Temp 99.2 °F (37.3 °C)   Resp 29   Ht 5' 8\" (1.727 m)   Wt 349 lb (158.3 kg)   SpO2 98%   BMI 53.07 kg/m²     General: No acute distress, conversant  Eyes: PERRLA, no scleral icterus  HENT: Normocephalic without oral lesions  Neck: Trachea midline without LAD  Cardiac: Normal pulse rate and rhythm  Pulmonary: Symmetric chest rise with normal effort  GI: Soft, obese, lateral incision healed. Midline stab site with 5 X 5 cm erythema and warmth without obvious sign of fluid collection  Skin: Warm without rash  Extremities: No edema or joint stiffness  Psych: Appropriate mood and affect    Assessment:     55 y/o F with concern for post op mesh infection after incisional hernia after sleeve to bypass    Plan:   PRN pain control  Is. Duonebs  Start home meds  Hypokalemia-replace. Cr good. Efrain regular diet  Vanc and zosyn. WBC normal. CT reviewed. No gas. Some rim enhancement. Plan US aspiration with rads and gram stain and culture  Lovenox after procedure. Admit to floor.       Signed By: Isaac Cervantes MD  Bariatric and General Surgeon  [facility]    March 12, 2021

## 2021-03-12 NOTE — PROGRESS NOTES
History and Physical    Subjective:     Naseem Carrillo is a 54 y.o.  female who presents with abdominal pain. She is status post revision from sleeve gastrectomy to [facility] Gastric bypass 2 months ago. She was readmitted 11 days later and underwent reduction and repair of incarcerated hernia. She presents today with complaints of upper abdominal pain. She states she has been having the pain since surgery. She noted this morning that the area was starting to become red. Denies nausea vomiting fevers or chills.       Past Medical History:   Diagnosis Date    Anxiety 2017    Arthritis     Asthma     Back injury     Depression     Essential hypertension     GERD (gastroesophageal reflux disease)     Morbid obesity ([address]     Sleep apnea in adult 3/6/2018    CPAP      Past Surgical History:   Procedure Laterality Date    HX APPENDECTOMY      HX  SECTION      HX COLONOSCOPY      HX GASTRECTOMY      lap sleeve gastrectomy    HX HERNIA REPAIR  2021    Incarcerated hernia repair Dr. Leach Pea      left knee    HX LAP GASTRIC BYPASS  2021    REVISION SLEEVE TO BYPASS - Tommy     HX LYSIS OF ADHESIONS  2021    Dr. Dwain Mckinney      rt knee partial replacement    HX ORTHOPAEDIC Right     LIGAMENT REPAIR    IR PLC IVC FILTER  2021    [place] LAP,DIAGNOSTIC ABDOMEN  2021    Dr. Tonia Castro     Family History   Problem Relation Age of Onset    Stroke Mother     Heart Disease Mother         PACEMAKER    Alcohol abuse Father     Seizures Father     Anesth Problems Neg Hx       Social History     Tobacco Use    Smoking status: Former Smoker     Packs/day: 1.00     Years: 30.00     Pack years: 30.00     Quit date: 2020     Years since quittin.7    Smokeless tobacco: Never Used   Substance Use Topics    Alcohol use: No       Prior to Admission medications   Medication Sig Start Date End Date Taking? Authorizing Provider   metFORMIN (GLUCOPHAGE) 500 mg tablet  2/1/21  Yes Provider, Historical   lisinopriL (PRINIVIL, ZESTRIL) 40 mg tablet Take 40 mg by mouth daily. 2/1/21  Yes Provider, Historical   metoprolol succinate (TOPROL-XL) 50 mg XL tablet Take 50 mg by mouth daily. 2/1/21  Yes Provider, Historical   ondansetron (ZOFRAN ODT) 4 mg disintegrating tablet Take 1 Tab by mouth every eight (8) hours as needed for Nausea or Nausea or Vomiting. 2/25/21  Yes Delilah Abdul NP   ALPRAZolam (XANAX) 0.5 mg tablet Take 1 Tab by mouth two (2) times daily as needed for Anxiety for up to 30 days. Max Daily Amount: 1 mg. 2/23/21 3/25/21 Yes Day Goss NP   traZODone (DESYREL) 150 mg tablet Take 1 Tab by mouth nightly as needed for Sleep for up to 90 days. 12/14/20 3/14/21 Yes Day Goss NP   polyethylene glycol (MIRALAX) 17 gram packet Take 1 Packet by mouth daily. Indications: constipation 12/10/20  Yes Delilah Abdul NP   pantoprazole (PROTONIX) 40 mg tablet Take 1 Tab by mouth daily. AFTER SURGERY 12/10/20  Yes Delilah Abdul NP   tiZANidine (ZANAFLEX) 4 mg capsule Take 4 mg by mouth three (3) times daily.   Yes Provider, Historical   pravastatin (PRAVACHOL) 20 mg tablet Take 20 mg by mouth nightly.   Yes Provider, Historical   ketoconazole (NIZORAL) 2 % topical cream Apply  to affected area daily.   Yes Provider, Historical   olopatadine (PATANOL) 0.1 % ophthalmic solution Administer 2 Drops to both eyes two (2) times a day.   Yes Provider, Historical   bisacodyL (Dulcolax, bisacodyl,) 5 mg EC tablet Take 5 mg by mouth daily as needed for Constipation.   Yes Provider, Historical   calcium citrate/vitamin D3 (CALCIUM CITRATE + D PO) Take 600 mg by mouth daily.   Yes Provider, Historical   cyanocobalamin (VITAMIN B-12) 500 mcg tablet Take 500 mcg by mouth daily.   Yes Provider, Historical   multivitamin (ONE A DAY) tablet Take 1 Tab by mouth  daily. Yes Provider, Historical   albuterol (ProAir HFA) 90 mcg/actuation inhaler Take 2 Puffs by inhalation every four (4) hours as needed. Yes Provider, Historical   naloxone 4 mg/actuation spry 4 mg by Nasal route as needed. 3/30/17  Yes Alex Chaparro MD   ergocalciferol (VITAMIN D2) 50,000 unit capsule Take 50,000 Units by mouth every seven (7) days. Yes Provider, Historical   Cane lita Use as directed. 3/14/16  Yes Shital Cummings NP   amLODIPine (NORVASC) 10 mg tablet Take 10 mg by mouth daily. Yes Provider, Historical   butalbital-acetaminophen-caffeine (FIORICET) -40 mg per tablet Take 1 Tab by mouth daily as needed. Yes Provider, Historical   lisinopril (PRINIVIL, ZESTRIL) 10 mg tablet Take 40 mg by mouth daily. Provider, Historical     No Known Allergies     Review of Systems:  A comprehensive review of systems was negative except for that written in the History of Present Illness. Objective:       Physical Exam:   Visit Vitals  BP (!) 177/98   Pulse (!) 110   Temp 99 °F (37.2 °C)   Resp 20   Ht 5' 8\" (1.727 m)   Wt 349 lb 8 oz (158.5 kg)   SpO2 92%   BMI 53.14 kg/m²     General appearance: alert, cooperative  Abdomen: tenderness noted upper abdomen, area of erythema approximately 8 cm x 4 cm, hard to touch. Data Review:   No results found for this or any previous visit (from the past 24 hour(s)). Assessment:     Mack Harada is a 54 y.o.  female who presents with abdominal pain. She is status post revision from sleeve gastrectomy to [facility] Gastric bypass 2 months ago. She was readmitted 11 days later and underwent reduction and repair of incarcerated hernia. 8 weeks post op with cellulitis. Plan:     Attempted to get outpatient CT stat today. [place] able [facility] Family unable to take her. Attempted to direct admit patient, no beds available  Patient sent to the emergency room  ABd/Pel Ct   Labs.    NPO  Further plan per Dr. John Randall.    Deepak Kelley, NP

## 2021-03-12 NOTE — PROGRESS NOTES
Pharmacist Note - Vancomycin Dosing    Consult provided for this 54 y.o. female for indication of SSTI  -s/p sleeve to bypass complicated by incisional hernia and SBO requiring lap hernia repair with mesh. Antibiotic regimen(s): Vanc + Zosyn    Recent Labs     21  1349   WBC 8.2   CREA 0.60   BUN 4*     Frequency of BMP: daily x 3  Height: 173 cm  Weight: 158 kg  Est CrCl: >100 ml/min  Temp (24hrs), Av.1 °F (37.3 °C), Min:99 °F (37.2 °C), Max:99.2 °F (37.3 °C)    Goal trough = 10 - 15 mcg/mL    Therapy will be initiated with a loading dose of 2000 mg IV x 1(max dose) to be followed by a maintenance dose of 2000 mg IV every 18 hours (will start 12 hours after first dose due to insufficient load)  Of note:  Vanc 2 grams q12h produced a trough of 19.2 mcg/mL  on recent admission  Pharmacy to follow patient daily and order levels / make dose adjustments as appropriate.

## 2021-03-12 NOTE — ED PROVIDER NOTES
This is a 63-year-old female with history of gastric bypass sleeve and presents with abdominal pain. She is status post revision of a sleeve gastrectomy 2 months ago. She subsequently had an incarcerated hernia which was repaired. Now she has upper abdominal pain which has been present to some degree since surgery. Is been no fever or chills. She developed redness and swelling midway between the umbilicus and xiphoid this morning when she noticed it. She has not been vomiting. There have been no stool changes. No urinary symptoms. Her primary complaint right now is abdominal pain.            Past Medical History:   Diagnosis Date    Anxiety 2017    Arthritis     Asthma     Back injury     Depression     Essential hypertension     GERD (gastroesophageal reflux disease)     Morbid obesity ([facility] [address]     Sleep apnea in adult 3/6/2018    CPAP       Past Surgical History:   Procedure Laterality Date    HX APPENDECTOMY      HX  SECTION      HX COLONOSCOPY      HX GASTRECTOMY  2016    lap sleeve gastrectomy    HX HERNIA REPAIR  2021    Incarcerated hernia repair Dr. Luis Gaffney      left knee    HX LAP GASTRIC BYPASS  2021    REVISION SLEEVE TO BYPASS - Tommy     HX LYSIS OF ADHESIONS  2021    Dr. Riccardo Cadet      rt knee partial replacement    HX ORTHOPAEDIC Right     LIGAMENT REPAIR    IR [facility] IVC FILTER  2021    [place] LAP,DIAGNOSTIC ABDOMEN  2021    Dr. Wiggins January         Family History:   Problem Relation Age of Onset    Stroke Mother     Heart Disease Mother         PACEMAKER    Alcohol abuse Father     Seizures Father     Anesth Problems Neg Hx        Social History     Socioeconomic History    Marital status: SINGLE     Spouse name: Not on file    Number of children: Not on file    Years of education: Not on file    Highest education level: Not on file   Occupational History    Occupation: disability      Comment: since  with back      Ely Ho strain: Not on file    Food insecurity     Worry: Not on file     Inability: Not on file   [place] needs     Medical: Not on file     Non-medical: Not on file   Tobacco Use    Smoking status: Former Smoker     Packs/day: 1.00     Years: 30.00     Pack years: 30.00     Quit date: 2020     Years since quittin.7    Smokeless tobacco: Never Used   Substance and Sexual Activity    Alcohol use: No    Drug use: No    Sexual activity: Not on file     Comment: post menopausal    Lifestyle    Physical activity     Days per week: Not on file     Minutes per session: Not on file    Stress: Not on file   Relationships    Social connections     Talks on phone: Not on file     Gets together: Not on file     Attends Tenriism service: Not on file     Active member of club or organization: Not on file     Attends meetings of clubs or organizations: Not on file     Relationship status: Not on file    Intimate partner violence     Fear of current or ex partner: Not on file     Emotionally abused: Not on file     Physically abused: Not on file     Forced sexual activity: Not on file   Other Topics Concern    Not on file   Social History Narrative    In the home with 15year old grand daughter and friend, Catia Truong: Patient has no known allergies. Review of Systems   Constitutional: Negative for activity change, appetite change, chills, fatigue and fever. HENT: Negative for ear pain, facial swelling, sore throat and trouble swallowing. Eyes: Negative for pain, discharge and visual disturbance. Respiratory: Negative for chest tightness, shortness of breath and wheezing. Cardiovascular: Negative for chest pain and palpitations. Gastrointestinal: Positive for abdominal distention and abdominal pain. Negative for blood in stool, nausea and vomiting.    Genitourinary: Negative for difficulty urinating, flank pain and hematuria. Musculoskeletal: Negative for arthralgias, joint swelling, myalgias and neck pain. Skin: Negative for color change and rash. Neurological: Negative for dizziness, weakness, numbness and headaches. Hematological: Negative for adenopathy. Does not bruise/bleed easily. Psychiatric/Behavioral: Negative for behavioral problems, confusion and sleep disturbance. All other systems reviewed and are negative. Vitals:    03/12/21 1332   BP: (!) 154/90   Pulse: (!) 105   Resp: 18   Temp: 99.2 °F (37.3 °C)   SpO2: 95%   Weight: 158.3 kg (349 lb)   Height: 5' 8\" (1.727 m)            Physical Exam  Vitals signs and nursing note reviewed. Constitutional:       General: She is in acute distress ( Moderate distress with pain in the mid upper abdomen. ). Appearance: She is well-developed. She is obese. She is not ill-appearing, toxic-appearing or diaphoretic. Comments: Patient is complaining of mid upper abdominal pain. Vital signs are as noted. He is morbidly obese. HENT:      Head: Normocephalic and atraumatic. Nose: Nose normal.   Eyes:      General: No scleral icterus. Conjunctiva/sclera: Conjunctivae normal.      Pupils: Pupils are equal, round, and reactive to light. Neck:      Musculoskeletal: Normal range of motion and neck supple. Thyroid: No thyromegaly. Vascular: No JVD. Trachea: No tracheal deviation. Comments: No carotid bruits noted. Cardiovascular:      Rate and Rhythm: Normal rate and regular rhythm. Heart sounds: Normal heart sounds. No murmur. No friction rub. No gallop. Pulmonary:      Effort: Pulmonary effort is normal. No respiratory distress. Breath sounds: Normal breath sounds. No wheezing or rales. Chest:      Chest wall: No tenderness. Abdominal:      General: Bowel sounds are normal. There is distension. Palpations: Abdomen is soft. There is no mass. Tenderness:  There is abdominal tenderness. There is no guarding or rebound. Comments: The patient's abdomen is a bit distended but this is normal for her. There is an area of erythema midway between the umbilicus and the xiphoid that is approximately 8 cm plus in diameter. It has distinct margins and feels a bit firm although there is some slight fluctuance noted. There is no drainage from the same noted. The remainder of the abdomen is soft and nontender. Musculoskeletal: Normal range of motion. General: No tenderness. Lymphadenopathy:      Cervical: No cervical adenopathy. Skin:     General: Skin is warm and dry. Findings: No erythema or rash. Neurological:      Mental Status: She is alert and oriented to person, place, and time. Cranial Nerves: No cranial nerve deficit. Coordination: Coordination normal.      Deep Tendon Reflexes: Reflexes are normal and symmetric. Psychiatric:         Behavior: Behavior normal.         Thought Content: Thought content normal.         Judgment: Judgment normal.          MDM  Number of Diagnoses or Management Options     Amount and/or Complexity of Data Reviewed  Clinical lab tests: ordered and reviewed  Tests in the radiology section of CPT®: ordered and reviewed  Decide to obtain previous medical records or to obtain history from someone other than the patient: yes  Review and summarize past medical records: yes  Discuss the patient with other providers: yes  Independent visualization of images, tracings, or specimens: yes    Risk of Complications, Morbidity, and/or Mortality  Presenting problems: high  Diagnostic procedures: high  Management options: high    Patient Progress  Patient progress: stable         Procedures    The laboratory findings have been discussed with surgery. The patient is just come back from CT scan and surgery is aware. 5:03 PM    There is subcutaneous fluid in the abdominal wall in the area of erythema and swelling.   Dr. Debi Avila is aware. He is going to admit and will call and speak with the patient directly. I have informed the patient. She will be admitted to the surgical service.

## 2021-03-13 ENCOUNTER — APPOINTMENT (OUTPATIENT)
Dept: ULTRASOUND IMAGING | Age: 56
DRG: 857 | End: 2021-03-13
Attending: SURGERY
Payer: MEDICARE

## 2021-03-13 LAB
ANION GAP SERPL CALC-SCNC: 10 MMOL/L (ref 5–15)
BASOPHILS # BLD: 0 K/UL (ref 0–0.1)
BASOPHILS NFR BLD: 0 % (ref 0–1)
BUN SERPL-MCNC: 3 MG/DL (ref 6–20)
BUN/CREAT SERPL: 6 (ref 12–20)
CALCIUM SERPL-MCNC: 9.3 MG/DL (ref 8.5–10.1)
CHLORIDE SERPL-SCNC: 100 MMOL/L (ref 97–108)
CO2 SERPL-SCNC: 29 MMOL/L (ref 21–32)
CREAT SERPL-MCNC: 0.54 MG/DL (ref 0.55–1.02)
DIFFERENTIAL METHOD BLD: ABNORMAL
EOSINOPHIL # BLD: 0.1 K/UL (ref 0–0.4)
EOSINOPHIL NFR BLD: 1 % (ref 0–7)
ERYTHROCYTE [DISTWIDTH] IN BLOOD BY AUTOMATED COUNT: 15.2 % (ref 11.5–14.5)
GLUCOSE BLD STRIP.AUTO-MCNC: 119 MG/DL (ref 65–100)
GLUCOSE BLD STRIP.AUTO-MCNC: 120 MG/DL (ref 65–100)
GLUCOSE BLD STRIP.AUTO-MCNC: 123 MG/DL (ref 65–100)
GLUCOSE BLD STRIP.AUTO-MCNC: 140 MG/DL (ref 65–100)
GLUCOSE SERPL-MCNC: 118 MG/DL (ref 65–100)
HCT VFR BLD AUTO: 30.3 % (ref 35–47)
HGB BLD-MCNC: 9.3 G/DL (ref 11.5–16)
IMM GRANULOCYTES # BLD AUTO: 0 K/UL (ref 0–0.04)
IMM GRANULOCYTES NFR BLD AUTO: 0 % (ref 0–0.5)
LYMPHOCYTES # BLD: 1.7 K/UL (ref 0.8–3.5)
LYMPHOCYTES NFR BLD: 26 % (ref 12–49)
MCH RBC QN AUTO: 27.3 PG (ref 26–34)
MCHC RBC AUTO-ENTMCNC: 30.7 G/DL (ref 30–36.5)
MCV RBC AUTO: 88.9 FL (ref 80–99)
MONOCYTES # BLD: 0.4 K/UL (ref 0–1)
MONOCYTES NFR BLD: 6 % (ref 5–13)
NEUTS SEG # BLD: 4.5 K/UL (ref 1.8–8)
NEUTS SEG NFR BLD: 67 % (ref 32–75)
NRBC # BLD: 0 K/UL (ref 0–0.01)
NRBC BLD-RTO: 0 PER 100 WBC
PLATELET # BLD AUTO: 343 K/UL (ref 150–400)
PMV BLD AUTO: 10 FL (ref 8.9–12.9)
POTASSIUM SERPL-SCNC: 2.9 MMOL/L (ref 3.5–5.1)
RBC # BLD AUTO: 3.41 M/UL (ref 3.8–5.2)
SERVICE CMNT-IMP: ABNORMAL
SODIUM SERPL-SCNC: 139 MMOL/L (ref 136–145)
WBC # BLD AUTO: 6.7 K/UL (ref 3.6–11)

## 2021-03-13 PROCEDURE — 77030014115

## 2021-03-13 PROCEDURE — 0J983ZZ DRAINAGE OF ABDOMEN SUBCUTANEOUS TISSUE AND FASCIA, PERCUTANEOUS APPROACH: ICD-10-PCS | Performed by: RADIOLOGY

## 2021-03-13 PROCEDURE — C1729 CATH, DRAINAGE: HCPCS

## 2021-03-13 PROCEDURE — 74011000258 HC RX REV CODE- 258: Performed by: SURGERY

## 2021-03-13 PROCEDURE — 10005 FNA BX W/US GDN 1ST LES: CPT

## 2021-03-13 PROCEDURE — 87205 SMEAR GRAM STAIN: CPT

## 2021-03-13 PROCEDURE — 80048 BASIC METABOLIC PNL TOTAL CA: CPT

## 2021-03-13 PROCEDURE — 65270000029 HC RM PRIVATE

## 2021-03-13 PROCEDURE — 36415 COLL VENOUS BLD VENIPUNCTURE: CPT

## 2021-03-13 PROCEDURE — 74011250636 HC RX REV CODE- 250/636: Performed by: SURGERY

## 2021-03-13 PROCEDURE — 99231 SBSQ HOSP IP/OBS SF/LOW 25: CPT | Performed by: SURGERY

## 2021-03-13 PROCEDURE — 87186 SC STD MICRODIL/AGAR DIL: CPT

## 2021-03-13 PROCEDURE — 87077 CULTURE AEROBIC IDENTIFY: CPT

## 2021-03-13 PROCEDURE — 82962 GLUCOSE BLOOD TEST: CPT

## 2021-03-13 PROCEDURE — 74011250637 HC RX REV CODE- 250/637: Performed by: SURGERY

## 2021-03-13 PROCEDURE — 85025 COMPLETE CBC W/AUTO DIFF WBC: CPT

## 2021-03-13 RX ORDER — POTASSIUM CHLORIDE 7.45 MG/ML
10 INJECTION INTRAVENOUS
Status: COMPLETED | OUTPATIENT
Start: 2021-03-13 | End: 2021-03-14

## 2021-03-13 RX ADMIN — PIPERACILLIN AND TAZOBACTAM 3.38 G: 3; .375 INJECTION, POWDER, LYOPHILIZED, FOR SOLUTION INTRAVENOUS at 03:17

## 2021-03-13 RX ADMIN — OXYCODONE HYDROCHLORIDE AND ACETAMINOPHEN 1 TABLET: 5; 325 TABLET ORAL at 13:23

## 2021-03-13 RX ADMIN — POTASSIUM CHLORIDE 10 MEQ: 10 INJECTION, SOLUTION INTRAVENOUS at 09:47

## 2021-03-13 RX ADMIN — AMLODIPINE BESYLATE 10 MG: 5 TABLET ORAL at 08:38

## 2021-03-13 RX ADMIN — PIPERACILLIN AND TAZOBACTAM 3.38 G: 3; .375 INJECTION, POWDER, LYOPHILIZED, FOR SOLUTION INTRAVENOUS at 17:47

## 2021-03-13 RX ADMIN — HYDROMORPHONE HYDROCHLORIDE 1 MG: 1 INJECTION, SOLUTION INTRAMUSCULAR; INTRAVENOUS; SUBCUTANEOUS at 04:04

## 2021-03-13 RX ADMIN — HYDROMORPHONE HYDROCHLORIDE 1 MG: 1 INJECTION, SOLUTION INTRAMUSCULAR; INTRAVENOUS; SUBCUTANEOUS at 07:39

## 2021-03-13 RX ADMIN — POTASSIUM CHLORIDE 10 MEQ: 10 INJECTION, SOLUTION INTRAVENOUS at 12:10

## 2021-03-13 RX ADMIN — LISINOPRIL 40 MG: 10 TABLET ORAL at 08:37

## 2021-03-13 RX ADMIN — VANCOMYCIN HYDROCHLORIDE 2000 MG: 10 INJECTION, POWDER, LYOPHILIZED, FOR SOLUTION INTRAVENOUS at 00:58

## 2021-03-13 RX ADMIN — PANTOPRAZOLE SODIUM 40 MG: 40 TABLET, DELAYED RELEASE ORAL at 08:38

## 2021-03-13 RX ADMIN — OXYCODONE HYDROCHLORIDE AND ACETAMINOPHEN 1 TABLET: 5; 325 TABLET ORAL at 22:09

## 2021-03-13 RX ADMIN — BUTALBITAL, ACETAMINOPHEN, AND CAFFEINE 1 TABLET: 50; 325; 40 TABLET ORAL at 03:25

## 2021-03-13 RX ADMIN — POTASSIUM CHLORIDE 10 MEQ: 7.46 INJECTION, SOLUTION INTRAVENOUS at 03:54

## 2021-03-13 RX ADMIN — POTASSIUM CHLORIDE 10 MEQ: 10 INJECTION, SOLUTION INTRAVENOUS at 13:24

## 2021-03-13 RX ADMIN — HYDROMORPHONE HYDROCHLORIDE 1 MG: 1 INJECTION, SOLUTION INTRAMUSCULAR; INTRAVENOUS; SUBCUTANEOUS at 14:54

## 2021-03-13 RX ADMIN — KETOTIFEN FUMARATE 1 DROP: 0.35 SOLUTION/ DROPS OPHTHALMIC at 11:10

## 2021-03-13 RX ADMIN — POTASSIUM CHLORIDE 10 MEQ: 7.46 INJECTION, SOLUTION INTRAVENOUS at 01:40

## 2021-03-13 RX ADMIN — OXYCODONE HYDROCHLORIDE AND ACETAMINOPHEN 1 TABLET: 5; 325 TABLET ORAL at 02:27

## 2021-03-13 RX ADMIN — POLYETHYLENE GLYCOL 3350 17 G: 17 POWDER, FOR SOLUTION ORAL at 08:37

## 2021-03-13 RX ADMIN — OXYCODONE HYDROCHLORIDE AND ACETAMINOPHEN 1 TABLET: 5; 325 TABLET ORAL at 17:47

## 2021-03-13 RX ADMIN — KETOTIFEN FUMARATE 1 DROP: 0.35 SOLUTION/ DROPS OPHTHALMIC at 17:02

## 2021-03-13 RX ADMIN — OXYCODONE HYDROCHLORIDE AND ACETAMINOPHEN 1 TABLET: 5; 325 TABLET ORAL at 08:38

## 2021-03-13 RX ADMIN — METOPROLOL SUCCINATE 50 MG: 50 TABLET, EXTENDED RELEASE ORAL at 08:38

## 2021-03-13 RX ADMIN — HYDROMORPHONE HYDROCHLORIDE 1 MG: 1 INJECTION, SOLUTION INTRAMUSCULAR; INTRAVENOUS; SUBCUTANEOUS at 19:38

## 2021-03-13 RX ADMIN — HYDROMORPHONE HYDROCHLORIDE 1 MG: 1 INJECTION, SOLUTION INTRAMUSCULAR; INTRAVENOUS; SUBCUTANEOUS at 01:02

## 2021-03-13 RX ADMIN — VANCOMYCIN HYDROCHLORIDE 2000 MG: 10 INJECTION, POWDER, LYOPHILIZED, FOR SOLUTION INTRAVENOUS at 17:47

## 2021-03-13 RX ADMIN — PIPERACILLIN AND TAZOBACTAM 3.38 G: 3; .375 INJECTION, POWDER, LYOPHILIZED, FOR SOLUTION INTRAVENOUS at 09:47

## 2021-03-13 RX ADMIN — POTASSIUM CHLORIDE 10 MEQ: 10 INJECTION, SOLUTION INTRAVENOUS at 11:09

## 2021-03-13 RX ADMIN — CYANOCOBALAMIN TAB 500 MCG 500 MCG: 500 TAB at 08:38

## 2021-03-13 RX ADMIN — PRAVASTATIN SODIUM 20 MG: 20 TABLET ORAL at 22:09

## 2021-03-13 RX ADMIN — BUTALBITAL, ACETAMINOPHEN, AND CAFFEINE 1 TABLET: 50; 325; 40 TABLET ORAL at 12:04

## 2021-03-13 NOTE — PROGRESS NOTES
I have reviewed and agree with the charting of senior nursing student, [facility]. Bedside shift change report given to Rodriguez Hammond RN (oncoming nurse) by Aldair Matt RN (offgoing nurse). Report included the following information SBAR, Kardex, Intake/Output, MAR and Recent Results.

## 2021-03-13 NOTE — PROGRESS NOTES
Bedside and Verbal shift change report given to Diane You RN (oncoming nurse) by Perez Khoury RN (offgoing nurse). Report included the following information SBAR, Kardex, ED Summary, Intake/Output and MAR.

## 2021-03-13 NOTE — PROGRESS NOTES
Progress Note    Patient: Madhuri Reardon MRN: [id number]  SSN: [id number]    YOB: 1965  Age: 54 y.o. Sex: female      Admit Date: 3/12/2021    * No surgery found *    Procedure:      Subjective:     Patient without complaints. She has U/S guided drainage of her fluid collection today. Objective:     Visit Vitals  BP (!) 168/79   Pulse 89   Temp 98.3 °F (36.8 °C)   Resp 16   Ht 5' 8\" (1.727 m)   Wt 349 lb (158.3 kg)   SpO2 92%   BMI 53.07 kg/m²       Temp (24hrs), Av.5 °F (36.9 °C), Min:98.1 °F (36.7 °C), Max:99.2 °F (37.3 °C)      Physical Exam:    Gen- Alert in NAD   Lungs- CTA  H-RRR  Abd- S/ND/  Some erythema and swelling at the abdominal wall. Data Review: images and reports reviewed  U/S- Ultrasound-guided drainage of anterior abdominal subcutaneous fluid yielding 20  cc of purulent fluid. Lab Review: All lab results for the last 24 hours reviewed.   Recent Results (from the past 24 hour(s))   GLUCOSE, POC    Collection Time: 21  6:01 PM   Result Value Ref Range    Glucose (POC) 96 65 - 100 mg/dL    Performed by [address] [place] [address], POC    Collection Time: 21  9:35 PM   Result Value Ref Range    Glucose (POC) 113 (H) 65 - 100 mg/dL    Performed by Sheron [facility]  PCT    GLUCOSE, POC    Collection Time: 21  6:31 AM   Result Value Ref Range    Glucose (POC) 123 (H) 65 - 100 mg/dL    Performed by Sheron Charline  PCT    METABOLIC PANEL, BASIC    Collection Time: 21  6:53 AM   Result Value Ref Range    Sodium 139 136 - 145 mmol/L    Potassium 2.9 (L) 3.5 - 5.1 mmol/L    Chloride 100 97 - 108 mmol/L    CO2 29 21 - 32 mmol/L    Anion gap 10 5 - 15 mmol/L    Glucose 118 (H) 65 - 100 mg/dL    BUN 3 (L) 6 - 20 MG/DL    Creatinine 0.54 (L) 0.55 - 1.02 MG/DL    BUN/Creatinine ratio 6 (L) 12 - 20      GFR est AA >60 >60 ml/min/1.73m2    GFR est non-AA >60 >60 ml/min/1.73m2    Calcium 9.3 8.5 - 10.1 MG/DL   CBC WITH AUTOMATED DIFF    Collection Time: 21  6:53 AM Result Value Ref Range    WBC 6.7 3.6 - 11.0 K/uL    RBC 3.41 (L) 3.80 - 5.20 M/uL    HGB 9.3 (L) 11.5 - 16.0 g/dL    HCT 30.3 (L) 35.0 - 47.0 %    MCV 88.9 80.0 - 99.0 FL    MCH 27.3 26.0 - 34.0 PG    MCHC 30.7 30.0 - 36.5 g/dL    RDW 15.2 (H) 11.5 - 14.5 %    PLATELET 471 159 - 713 K/uL    MPV 10.0 8.9 - 12.9 FL    NRBC 0.0 0  WBC    ABSOLUTE NRBC 0.00 0.00 - 0.01 K/uL    NEUTROPHILS 67 32 - 75 %    LYMPHOCYTES 26 12 - 49 %    MONOCYTES 6 5 - 13 %    EOSINOPHILS 1 0 - 7 %    BASOPHILS 0 0 - 1 %    IMMATURE GRANULOCYTES 0 0.0 - 0.5 %    ABS. NEUTROPHILS 4.5 1.8 - 8.0 K/UL    ABS. LYMPHOCYTES 1.7 0.8 - 3.5 K/UL    ABS. MONOCYTES 0.4 0.0 - 1.0 K/UL    ABS. EOSINOPHILS 0.1 0.0 - 0.4 K/UL    ABS. BASOPHILS 0.0 0.0 - 0.1 K/UL    ABS. IMM. GRANS. 0.0 0.00 - 0.04 K/UL    DF AUTOMATED     CULTURE, BODY FLUID W GRAM STAIN    Collection Time: 03/13/21 10:40 AM    Specimen: Abdomen   Result Value Ref Range    Special Requests: NO SPECIAL REQUESTS      GRAM STAIN OCCASIONAL WBCS SEEN      GRAM STAIN FEW GRAM POSITIVE COCCI      Culture result: PENDING    GLUCOSE, POC    Collection Time: 03/13/21 11:40 AM   Result Value Ref Range    Glucose (POC) 140 (H) 65 - 100 mg/dL    Performed by [address], POC    Collection Time: 03/13/21  4:01 PM   Result Value Ref Range    Glucose (POC) 120 (H) 65 - 100 mg/dL    Performed by Jcarlos Manrqiuez:     Hospital Problems  Date Reviewed: 1/25/2021          Codes Class Noted POA    Cellulitis ICD-10-CM: L03.90  ICD-9-CM: 682.9  3/12/2021 Unknown              Plan/Recommendations/Medical Decision Making:    S/p U/S guided aspiration  DOing well  Await culture results. Continue IV abX -   Ok to eat for now.    Lovenox

## 2021-03-13 NOTE — ROUTINE PROCESS
TRANSFER - OUT REPORT: 
 
Verbal report given to DONTAE Cano(name) on Deuce Masker  being transferred to [address] [place] [address] for routine progression of care Report consisted of patients Situation, Background, Assessment and  
Recommendations(SBAR). Information from the following report(s) SBAR, Kardex and ED Summary was reviewed with the receiving nurse. Lines:  
Peripheral IV 03/12/21 Left Antecubital (Active) Peripheral IV 03/12/21 Left Forearm (Active) Site Assessment Clean, dry, & intact 03/12/21 1905 Phlebitis Assessment 0 03/12/21 1905 Infiltration Assessment 0 03/12/21 1905 Dressing Status Clean, dry, & intact 03/12/21 1905 Dressing Type Transparent 03/12/21 1905 Hub Color/Line Status Pink 03/12/21 1905 Opportunity for questions and clarification was provided. Patient transported with: 
 [employer]

## 2021-03-14 LAB
ANION GAP SERPL CALC-SCNC: 9 MMOL/L (ref 5–15)
BASOPHILS # BLD: 0 K/UL (ref 0–0.1)
BASOPHILS NFR BLD: 1 % (ref 0–1)
BUN SERPL-MCNC: 2 MG/DL (ref 6–20)
BUN/CREAT SERPL: 4 (ref 12–20)
CALCIUM SERPL-MCNC: 10.1 MG/DL (ref 8.5–10.1)
CHLORIDE SERPL-SCNC: 98 MMOL/L (ref 97–108)
CO2 SERPL-SCNC: 32 MMOL/L (ref 21–32)
CREAT SERPL-MCNC: 0.5 MG/DL (ref 0.55–1.02)
DIFFERENTIAL METHOD BLD: ABNORMAL
EOSINOPHIL # BLD: 0.1 K/UL (ref 0–0.4)
EOSINOPHIL NFR BLD: 1 % (ref 0–7)
ERYTHROCYTE [DISTWIDTH] IN BLOOD BY AUTOMATED COUNT: 15.2 % (ref 11.5–14.5)
GLUCOSE BLD STRIP.AUTO-MCNC: 120 MG/DL (ref 65–100)
GLUCOSE BLD STRIP.AUTO-MCNC: 132 MG/DL (ref 65–100)
GLUCOSE BLD STRIP.AUTO-MCNC: 133 MG/DL (ref 65–100)
GLUCOSE BLD STRIP.AUTO-MCNC: 136 MG/DL (ref 65–100)
GLUCOSE SERPL-MCNC: 112 MG/DL (ref 65–100)
HCT VFR BLD AUTO: 32.6 % (ref 35–47)
HGB BLD-MCNC: 10.1 G/DL (ref 11.5–16)
IMM GRANULOCYTES # BLD AUTO: 0 K/UL (ref 0–0.04)
IMM GRANULOCYTES NFR BLD AUTO: 0 % (ref 0–0.5)
LYMPHOCYTES # BLD: 1.8 K/UL (ref 0.8–3.5)
LYMPHOCYTES NFR BLD: 23 % (ref 12–49)
MAGNESIUM SERPL-MCNC: 1.9 MG/DL (ref 1.6–2.4)
MCH RBC QN AUTO: 27.1 PG (ref 26–34)
MCHC RBC AUTO-ENTMCNC: 31 G/DL (ref 30–36.5)
MCV RBC AUTO: 87.4 FL (ref 80–99)
MONOCYTES # BLD: 0.4 K/UL (ref 0–1)
MONOCYTES NFR BLD: 5 % (ref 5–13)
NEUTS SEG # BLD: 5.6 K/UL (ref 1.8–8)
NEUTS SEG NFR BLD: 70 % (ref 32–75)
NRBC # BLD: 0 K/UL (ref 0–0.01)
NRBC BLD-RTO: 0 PER 100 WBC
PLATELET # BLD AUTO: 401 K/UL (ref 150–400)
PMV BLD AUTO: 10 FL (ref 8.9–12.9)
POTASSIUM SERPL-SCNC: 3 MMOL/L (ref 3.5–5.1)
RBC # BLD AUTO: 3.73 M/UL (ref 3.8–5.2)
SERVICE CMNT-IMP: ABNORMAL
SODIUM SERPL-SCNC: 139 MMOL/L (ref 136–145)
WBC # BLD AUTO: 7.9 K/UL (ref 3.6–11)

## 2021-03-14 PROCEDURE — 82962 GLUCOSE BLOOD TEST: CPT

## 2021-03-14 PROCEDURE — 80048 BASIC METABOLIC PNL TOTAL CA: CPT

## 2021-03-14 PROCEDURE — 85025 COMPLETE CBC W/AUTO DIFF WBC: CPT

## 2021-03-14 PROCEDURE — 74011000258 HC RX REV CODE- 258: Performed by: SURGERY

## 2021-03-14 PROCEDURE — 83735 ASSAY OF MAGNESIUM: CPT

## 2021-03-14 PROCEDURE — 74011250636 HC RX REV CODE- 250/636: Performed by: SURGERY

## 2021-03-14 PROCEDURE — 74011250637 HC RX REV CODE- 250/637: Performed by: SURGERY

## 2021-03-14 PROCEDURE — 36415 COLL VENOUS BLD VENIPUNCTURE: CPT

## 2021-03-14 PROCEDURE — 99231 SBSQ HOSP IP/OBS SF/LOW 25: CPT | Performed by: SURGERY

## 2021-03-14 PROCEDURE — 65270000029 HC RM PRIVATE

## 2021-03-14 RX ORDER — POTASSIUM CHLORIDE 7.45 MG/ML
10 INJECTION INTRAVENOUS
Status: COMPLETED | OUTPATIENT
Start: 2021-03-14 | End: 2021-03-14

## 2021-03-14 RX ADMIN — CYANOCOBALAMIN TAB 500 MCG 500 MCG: 500 TAB at 09:17

## 2021-03-14 RX ADMIN — OXYCODONE HYDROCHLORIDE AND ACETAMINOPHEN 1 TABLET: 5; 325 TABLET ORAL at 15:21

## 2021-03-14 RX ADMIN — PIPERACILLIN AND TAZOBACTAM 3.38 G: 3; .375 INJECTION, POWDER, LYOPHILIZED, FOR SOLUTION INTRAVENOUS at 03:20

## 2021-03-14 RX ADMIN — PIPERACILLIN AND TAZOBACTAM 3.38 G: 3; .375 INJECTION, POWDER, LYOPHILIZED, FOR SOLUTION INTRAVENOUS at 18:00

## 2021-03-14 RX ADMIN — POTASSIUM CHLORIDE 10 MEQ: 7.46 INJECTION, SOLUTION INTRAVENOUS at 18:00

## 2021-03-14 RX ADMIN — HYDROMORPHONE HYDROCHLORIDE 1 MG: 1 INJECTION, SOLUTION INTRAMUSCULAR; INTRAVENOUS; SUBCUTANEOUS at 14:11

## 2021-03-14 RX ADMIN — POTASSIUM CHLORIDE 10 MEQ: 10 INJECTION, SOLUTION INTRAVENOUS at 14:15

## 2021-03-14 RX ADMIN — LISINOPRIL 40 MG: 10 TABLET ORAL at 09:17

## 2021-03-14 RX ADMIN — PIPERACILLIN AND TAZOBACTAM 3.38 G: 3; .375 INJECTION, POWDER, LYOPHILIZED, FOR SOLUTION INTRAVENOUS at 11:13

## 2021-03-14 RX ADMIN — HYDROMORPHONE HYDROCHLORIDE 1 MG: 1 INJECTION, SOLUTION INTRAMUSCULAR; INTRAVENOUS; SUBCUTANEOUS at 18:00

## 2021-03-14 RX ADMIN — OXYCODONE HYDROCHLORIDE AND ACETAMINOPHEN 1 TABLET: 5; 325 TABLET ORAL at 23:40

## 2021-03-14 RX ADMIN — KETOTIFEN FUMARATE 1 DROP: 0.35 SOLUTION/ DROPS OPHTHALMIC at 18:00

## 2021-03-14 RX ADMIN — HYDROMORPHONE HYDROCHLORIDE 1 MG: 1 INJECTION, SOLUTION INTRAMUSCULAR; INTRAVENOUS; SUBCUTANEOUS at 21:35

## 2021-03-14 RX ADMIN — PANTOPRAZOLE SODIUM 40 MG: 40 TABLET, DELAYED RELEASE ORAL at 09:17

## 2021-03-14 RX ADMIN — METOPROLOL SUCCINATE 50 MG: 50 TABLET, EXTENDED RELEASE ORAL at 09:17

## 2021-03-14 RX ADMIN — VANCOMYCIN HYDROCHLORIDE 2000 MG: 10 INJECTION, POWDER, LYOPHILIZED, FOR SOLUTION INTRAVENOUS at 11:22

## 2021-03-14 RX ADMIN — POTASSIUM CHLORIDE 10 MEQ: 10 INJECTION, SOLUTION INTRAVENOUS at 16:20

## 2021-03-14 RX ADMIN — HYDROMORPHONE HYDROCHLORIDE 1 MG: 1 INJECTION, SOLUTION INTRAMUSCULAR; INTRAVENOUS; SUBCUTANEOUS at 05:17

## 2021-03-14 RX ADMIN — AMLODIPINE BESYLATE 10 MG: 5 TABLET ORAL at 09:17

## 2021-03-14 RX ADMIN — POTASSIUM CHLORIDE 10 MEQ: 10 INJECTION, SOLUTION INTRAVENOUS at 15:00

## 2021-03-14 RX ADMIN — OXYCODONE HYDROCHLORIDE AND ACETAMINOPHEN 1 TABLET: 5; 325 TABLET ORAL at 07:19

## 2021-03-14 RX ADMIN — OXYCODONE HYDROCHLORIDE AND ACETAMINOPHEN 1 TABLET: 5; 325 TABLET ORAL at 19:22

## 2021-03-14 RX ADMIN — POLYETHYLENE GLYCOL 3350 17 G: 17 POWDER, FOR SOLUTION ORAL at 09:18

## 2021-03-14 RX ADMIN — POTASSIUM CHLORIDE 10 MEQ: 10 INJECTION, SOLUTION INTRAVENOUS at 15:21

## 2021-03-14 RX ADMIN — PRAVASTATIN SODIUM 20 MG: 20 TABLET ORAL at 21:35

## 2021-03-14 RX ADMIN — KETOTIFEN FUMARATE 1 DROP: 0.35 SOLUTION/ DROPS OPHTHALMIC at 09:18

## 2021-03-14 RX ADMIN — OXYCODONE HYDROCHLORIDE AND ACETAMINOPHEN 1 TABLET: 5; 325 TABLET ORAL at 03:19

## 2021-03-14 RX ADMIN — OXYCODONE HYDROCHLORIDE AND ACETAMINOPHEN 1 TABLET: 5; 325 TABLET ORAL at 11:13

## 2021-03-14 NOTE — PROGRESS NOTES
Progress Note    Patient: Brunilda Denis MRN: [id number]  SSN: [id number]    YOB: 1965  Age: 54 y.o. Sex: female      Admit Date: 3/12/2021    * No surgery found *    Procedure:      Subjective:     Patient still complaining of pain at the infection site. Otherwise she is tolerating a diet and feeling well. She has been walking some. Objective:     Visit Vitals  BP (!) 149/82   Pulse 82   Temp 98.8 °F (37.1 °C)   Resp 18   Ht 5' 8\" (1.727 m)   Wt 349 lb (158.3 kg)   SpO2 91%   BMI 53.07 kg/m²       Temp (24hrs), Av.6 °F (37 °C), Min:98.1 °F (36.7 °C), Max:99 °F (37.2 °C)      Physical Exam:    General alert in no acute distress  Lungs clear bilaterally  Heart regular rate and rhythm  Abdomen soft with mild tenderness at the site of the infection minimal erythema    Data Review: images and reports reviewed    Lab Review: All lab results for the last 24 hours reviewed. Recent Results (from the past 24 hour(s))   GLUCOSE, POC    Collection Time: 21 10:08 PM   Result Value Ref Range    Glucose (POC) 119 (H) 65 - 100 mg/dL    Performed by Zunilda Vance    CBC WITH AUTOMATED DIFF    Collection Time: 21  3:07 AM   Result Value Ref Range    WBC 7.9 3.6 - 11.0 K/uL    RBC 3.73 (L) 3.80 - 5.20 M/uL    HGB 10.1 (L) 11.5 - 16.0 g/dL    HCT 32.6 (L) 35.0 - 47.0 %    MCV 87.4 80.0 - 99.0 FL    MCH 27.1 26.0 - 34.0 PG    MCHC 31.0 30.0 - 36.5 g/dL    RDW 15.2 (H) 11.5 - 14.5 %    PLATELET 328 (H) 018 - 400 K/uL    MPV 10.0 8.9 - 12.9 FL    NRBC 0.0 0  WBC    ABSOLUTE NRBC 0.00 0.00 - 0.01 K/uL    NEUTROPHILS 70 32 - 75 %    LYMPHOCYTES 23 12 - 49 %    MONOCYTES 5 5 - 13 %    EOSINOPHILS 1 0 - 7 %    BASOPHILS 1 0 - 1 %    IMMATURE GRANULOCYTES 0 0.0 - 0.5 %    ABS. NEUTROPHILS 5.6 1.8 - 8.0 K/UL    ABS. LYMPHOCYTES 1.8 0.8 - 3.5 K/UL    ABS. MONOCYTES 0.4 0.0 - 1.0 K/UL    ABS. EOSINOPHILS 0.1 0.0 - 0.4 K/UL    ABS. BASOPHILS 0.0 0.0 - 0.1 K/UL    ABS. IMM.  GRANS. 0.0 0.00 - 0.04 K/UL    DF AUTOMATED     METABOLIC PANEL, BASIC    Collection Time: 03/14/21  3:07 AM   Result Value Ref Range    Sodium 139 136 - 145 mmol/L    Potassium 3.0 (L) 3.5 - 5.1 mmol/L    Chloride 98 97 - 108 mmol/L    CO2 32 21 - 32 mmol/L    Anion gap 9 5 - 15 mmol/L    Glucose 112 (H) 65 - 100 mg/dL    BUN 2 (L) 6 - 20 MG/DL    Creatinine 0.50 (L) 0.55 - 1.02 MG/DL    BUN/Creatinine ratio 4 (L) 12 - 20      GFR est AA >60 >60 ml/min/1.73m2    GFR est non-AA >60 >60 ml/min/1.73m2    Calcium 10.1 8.5 - 10.1 MG/DL   MAGNESIUM    Collection Time: 03/14/21  3:07 AM   Result Value Ref Range    Magnesium 1.9 1.6 - 2.4 mg/dL   GLUCOSE, POC    Collection Time: 03/14/21  6:41 AM   Result Value Ref Range    Glucose (POC) 133 (H) 65 - 100 mg/dL    Performed by Omari Lee  PCT    GLUCOSE, POC    Collection Time: 03/14/21 11:09 AM   Result Value Ref Range    Glucose (POC) 136 (H) 65 - 100 mg/dL    Performed by [address], POC    Collection Time: 03/14/21  4:18 PM   Result Value Ref Range    Glucose (POC) 132 (H) 65 - 100 mg/dL    Performed by Alejo Gilford      CX-LIGHT STREPTOCOCCUS CONSTELLATUS   Assessment:     Hospital Problems  Date Reviewed: 1/25/2021          Codes Class Noted POA    Cellulitis ICD-10-CM: L03.90  ICD-9-CM: 682.9  3/12/2021 Unknown              Plan/Recommendations/Medical Decision Making:   Overall seems to be improving. Continue IV antibiotics  Awaiting final culture results with antibiotic recommendations.   Continue diet  Hypokalemia replete IV

## 2021-03-14 NOTE — PROGRESS NOTES
System downtime was enacted for one hour to accomodate the ending of Daylight Saving Time at 2:00 a.m. Clinical documentation has been captured on paper to be scanned into the system.  Medications administered during this time have been entered when the system became available.'

## 2021-03-15 LAB
ANION GAP SERPL CALC-SCNC: 7 MMOL/L (ref 5–15)
BASOPHILS # BLD: 0 K/UL (ref 0–0.1)
BASOPHILS NFR BLD: 1 % (ref 0–1)
BUN SERPL-MCNC: 3 MG/DL (ref 6–20)
BUN/CREAT SERPL: 6 (ref 12–20)
CALCIUM SERPL-MCNC: 10.2 MG/DL (ref 8.5–10.1)
CHLORIDE SERPL-SCNC: 100 MMOL/L (ref 97–108)
CO2 SERPL-SCNC: 31 MMOL/L (ref 21–32)
CREAT SERPL-MCNC: 0.54 MG/DL (ref 0.55–1.02)
DIFFERENTIAL METHOD BLD: ABNORMAL
EOSINOPHIL # BLD: 0.1 K/UL (ref 0–0.4)
EOSINOPHIL NFR BLD: 2 % (ref 0–7)
ERYTHROCYTE [DISTWIDTH] IN BLOOD BY AUTOMATED COUNT: 15.4 % (ref 11.5–14.5)
GLUCOSE BLD STRIP.AUTO-MCNC: 112 MG/DL (ref 65–100)
GLUCOSE BLD STRIP.AUTO-MCNC: 123 MG/DL (ref 65–100)
GLUCOSE BLD STRIP.AUTO-MCNC: 124 MG/DL (ref 65–100)
GLUCOSE BLD STRIP.AUTO-MCNC: 132 MG/DL (ref 65–100)
GLUCOSE SERPL-MCNC: 120 MG/DL (ref 65–100)
HCT VFR BLD AUTO: 32.9 % (ref 35–47)
HGB BLD-MCNC: 10.3 G/DL (ref 11.5–16)
IMM GRANULOCYTES # BLD AUTO: 0.1 K/UL (ref 0–0.04)
IMM GRANULOCYTES NFR BLD AUTO: 1 % (ref 0–0.5)
LYMPHOCYTES # BLD: 2.1 K/UL (ref 0.8–3.5)
LYMPHOCYTES NFR BLD: 27 % (ref 12–49)
MCH RBC QN AUTO: 27.2 PG (ref 26–34)
MCHC RBC AUTO-ENTMCNC: 31.3 G/DL (ref 30–36.5)
MCV RBC AUTO: 86.8 FL (ref 80–99)
MONOCYTES # BLD: 0.4 K/UL (ref 0–1)
MONOCYTES NFR BLD: 5 % (ref 5–13)
NEUTS SEG # BLD: 5.2 K/UL (ref 1.8–8)
NEUTS SEG NFR BLD: 64 % (ref 32–75)
NRBC # BLD: 0 K/UL (ref 0–0.01)
NRBC BLD-RTO: 0 PER 100 WBC
PLATELET # BLD AUTO: 401 K/UL (ref 150–400)
PMV BLD AUTO: 9.8 FL (ref 8.9–12.9)
POTASSIUM SERPL-SCNC: 3.5 MMOL/L (ref 3.5–5.1)
RBC # BLD AUTO: 3.79 M/UL (ref 3.8–5.2)
SERVICE CMNT-IMP: ABNORMAL
SODIUM SERPL-SCNC: 138 MMOL/L (ref 136–145)
WBC # BLD AUTO: 8 K/UL (ref 3.6–11)

## 2021-03-15 PROCEDURE — 74011250636 HC RX REV CODE- 250/636: Performed by: SURGERY

## 2021-03-15 PROCEDURE — 10140 I&D HMTMA SEROMA/FLUID COLLJ: CPT | Performed by: SURGERY

## 2021-03-15 PROCEDURE — 82962 GLUCOSE BLOOD TEST: CPT

## 2021-03-15 PROCEDURE — 65270000029 HC RM PRIVATE

## 2021-03-15 PROCEDURE — 80202 ASSAY OF VANCOMYCIN: CPT

## 2021-03-15 PROCEDURE — 0K9K0ZX DRAINAGE OF RIGHT ABDOMEN MUSCLE, OPEN APPROACH, DIAGNOSTIC: ICD-10-PCS | Performed by: SURGERY

## 2021-03-15 PROCEDURE — 85025 COMPLETE CBC W/AUTO DIFF WBC: CPT

## 2021-03-15 PROCEDURE — 80048 BASIC METABOLIC PNL TOTAL CA: CPT

## 2021-03-15 PROCEDURE — 36415 COLL VENOUS BLD VENIPUNCTURE: CPT

## 2021-03-15 PROCEDURE — 74011250637 HC RX REV CODE- 250/637: Performed by: SURGERY

## 2021-03-15 PROCEDURE — 0K9L0ZX DRAINAGE OF LEFT ABDOMEN MUSCLE, OPEN APPROACH, DIAGNOSTIC: ICD-10-PCS | Performed by: SURGERY

## 2021-03-15 PROCEDURE — 74011000258 HC RX REV CODE- 258: Performed by: SURGERY

## 2021-03-15 RX ORDER — HYDROMORPHONE HYDROCHLORIDE 2 MG/1
2 TABLET ORAL
Status: DISCONTINUED | OUTPATIENT
Start: 2021-03-15 | End: 2021-03-16 | Stop reason: HOSPADM

## 2021-03-15 RX ORDER — LANOLIN ALCOHOL/MO/W.PET/CERES
3 CREAM (GRAM) TOPICAL
Status: DISCONTINUED | OUTPATIENT
Start: 2021-03-15 | End: 2021-03-16 | Stop reason: HOSPADM

## 2021-03-15 RX ADMIN — Medication 3 MG: at 22:48

## 2021-03-15 RX ADMIN — CYANOCOBALAMIN TAB 500 MCG 500 MCG: 500 TAB at 09:12

## 2021-03-15 RX ADMIN — HYDROMORPHONE HYDROCHLORIDE 1 MG: 1 INJECTION, SOLUTION INTRAMUSCULAR; INTRAVENOUS; SUBCUTANEOUS at 03:14

## 2021-03-15 RX ADMIN — OXYCODONE HYDROCHLORIDE AND ACETAMINOPHEN 1 TABLET: 5; 325 TABLET ORAL at 09:12

## 2021-03-15 RX ADMIN — PIPERACILLIN AND TAZOBACTAM 3.38 G: 3; .375 INJECTION, POWDER, LYOPHILIZED, FOR SOLUTION INTRAVENOUS at 10:38

## 2021-03-15 RX ADMIN — Medication 10 ML: at 06:36

## 2021-03-15 RX ADMIN — BUTALBITAL, ACETAMINOPHEN, AND CAFFEINE 1 TABLET: 50; 325; 40 TABLET ORAL at 22:52

## 2021-03-15 RX ADMIN — METOPROLOL SUCCINATE 50 MG: 50 TABLET, EXTENDED RELEASE ORAL at 09:11

## 2021-03-15 RX ADMIN — KETOTIFEN FUMARATE 1 DROP: 0.35 SOLUTION/ DROPS OPHTHALMIC at 17:32

## 2021-03-15 RX ADMIN — PANTOPRAZOLE SODIUM 40 MG: 40 TABLET, DELAYED RELEASE ORAL at 09:11

## 2021-03-15 RX ADMIN — POLYETHYLENE GLYCOL 3350 17 G: 17 POWDER, FOR SOLUTION ORAL at 09:11

## 2021-03-15 RX ADMIN — VANCOMYCIN HYDROCHLORIDE 2000 MG: 10 INJECTION, POWDER, LYOPHILIZED, FOR SOLUTION INTRAVENOUS at 06:29

## 2021-03-15 RX ADMIN — AMLODIPINE BESYLATE 10 MG: 5 TABLET ORAL at 09:11

## 2021-03-15 RX ADMIN — HYDROMORPHONE HYDROCHLORIDE 1 MG: 1 INJECTION, SOLUTION INTRAMUSCULAR; INTRAVENOUS; SUBCUTANEOUS at 08:07

## 2021-03-15 RX ADMIN — OXYCODONE HYDROCHLORIDE AND ACETAMINOPHEN 1 TABLET: 5; 325 TABLET ORAL at 13:06

## 2021-03-15 RX ADMIN — PRAVASTATIN SODIUM 20 MG: 20 TABLET ORAL at 22:48

## 2021-03-15 RX ADMIN — KETOTIFEN FUMARATE 1 DROP: 0.35 SOLUTION/ DROPS OPHTHALMIC at 10:40

## 2021-03-15 RX ADMIN — LISINOPRIL 40 MG: 10 TABLET ORAL at 09:11

## 2021-03-15 RX ADMIN — HYDROMORPHONE HYDROCHLORIDE 1 MG: 1 INJECTION, SOLUTION INTRAMUSCULAR; INTRAVENOUS; SUBCUTANEOUS at 10:38

## 2021-03-15 RX ADMIN — PIPERACILLIN AND TAZOBACTAM 3.38 G: 3; .375 INJECTION, POWDER, LYOPHILIZED, FOR SOLUTION INTRAVENOUS at 03:14

## 2021-03-15 RX ADMIN — HYDROMORPHONE HYDROCHLORIDE 1 MG: 1 INJECTION, SOLUTION INTRAMUSCULAR; INTRAVENOUS; SUBCUTANEOUS at 15:29

## 2021-03-15 RX ADMIN — SODIUM CHLORIDE 25 ML/HR: 9 INJECTION, SOLUTION INTRAVENOUS at 22:55

## 2021-03-15 RX ADMIN — HYDROMORPHONE HYDROCHLORIDE 2 MG: 2 TABLET ORAL at 19:49

## 2021-03-15 NOTE — PROGRESS NOTES
TRANSITIONS OF CARE PLAN:   1. RUR: 23%  2. DESTINATION: Home; with HH. Referral sent to [facility] will resume care; pending the pt's DC. 3. TRANSPORT: sister; Pete Penaloza [phone]  4. Code Status: Full code     Reason for Admission:  Cellulitis                      RUR Score:    23%              PCP: First and Last name:   Gemini Lopez MD     Name of Practice:    Are you a current patient: Yes/No: Yes   Approximate date of last visit: Month ago   Can you participate in a virtual visit if needed: Yes     Do you (patient/family) have any concerns for transition/discharge? No concerns presented to the CM                 Plan for utilizing home health: The pt has a consult pending for [facility] \"wound care\". Current Advanced Directive/Advance Care Plan:  Full Code  No ACP on file     Healthcare Decision Maker:   Click here to complete [address] including selection of the Healthcare Decision Maker Relationship (ie \"Primary\")              Transition of Care Plan:          The cm met the pt at bedside to conduct initial assessment. The pt presented as aox4. The pt presented as a good historian. The pt confirmed her demographics and insurance providers. The pt utilizes [facility] to fill her scripts. The pt has a hx with [facility]. The pt has selected to resume care with [facility] for wound care, pending dc. Referral submitted and pending. Per pt, she is independent with completing ADLs and IADLs. The pt utilizes a cane to ambulate. The pt stated that her sister would provide transport at [place]. The pt currently lives with her 15year old child in a 1 level home. The pt's disposition is home, once medically stable. The cm will continue to follow the pt. Care Management Interventions  PCP Verified by CM: Yes  Palliative Care Criteria Met (RRAT>21 & CHF Dx)?: No  Mode of Transport at Discharge:  Other (see comment)(Sister; diana Kellyyony Cox [phone])  Transition of Care Consult (CM Consult): Discharge Planning, Home Health(PHILLY referral sent to [facility] for Wound care )  [address]: No  Reason Outside [place]: Patient already serviced by other home care/hospice agency  [facility] Signup: No  Discharge Durable Medical Equipment: No(Motorized wheelchair, cane)  Physical Therapy Consult: No  Occupational Therapy Consult: No  Speech Therapy Consult: No  Current Support Network: Other(The pt lives with her 15year old child. )  Confirm Follow Up Transport: Friends(Dejon VIRI;[phone])  The Patient and/or Patient Representative was Provided with a Choice of Provider and Agrees with the Discharge Plan?: Yes  Freedom of Choice List was Provided with Basic Dialogue that Supports the Patient's Individualized Plan of Care/Goals, Treatment Preferences and Shares the Quality Data Associated with the Providers?: Yes  Discharge Location  Discharge Placement: Home with home health(The pt lives in a 1 level home with 5 exterior stairs to enter the home.  )     Medicare pt has received, reviewed, and signed 2nd IM letter informing them of their right to appeal the discharge. Signed copy has been placed on pt bedside chart.     CM: [address]. MSW,   [phone]

## 2021-03-15 NOTE — PROGRESS NOTES
Progress Note    Patient: Arthur Mason MRN: [id number]  SSN: [id number]    YOB: 1965  Age: 54 y.o. Sex: female      Admit Date: 3/12/2021      Procedure:  ID 3/15    Subjective:     New drainage from midline wound. Bloody / purulent. Pain controlled. No fevers. Objective:     Visit Vitals  BP (!) 159/93 (BP 1 Location: Right arm, BP Patient Position: At rest)   Pulse 100   Temp 98.4 °F (36.9 °C)   Resp 16   Ht 5' 8\" (1.727 m)   Wt 349 lb (158.3 kg)   SpO2 96%   BMI 53.07 kg/m²       Temp (24hrs), Av.6 °F (37 °C), Min:98.4 °F (36.9 °C), Max:98.8 °F (37.1 °C)      Physical Exam:    Visit Vitals  BP (!) 159/93 (BP 1 Location: Right arm, BP Patient Position: At rest)   Pulse 100   Temp 98.4 °F (36.9 °C)   Resp 16   Ht 5' 8\" (1.727 m)   Wt 349 lb (158.3 kg)   SpO2 96%   BMI 53.07 kg/m²     General: No acute distress, conversant  Eyes: PERRLA, no scleral icterus  HENT: Normocephalic without oral lesions  Neck: Trachea midline without LAD  Cardiac: Normal pulse rate and rhythm  Pulmonary: Symmetric chest rise with normal effort  GI: Soft, midline with bloody / purulent drainage, improved erythema, significant fullness  Skin: Warm without rash  Extremities: No edema or joint stiffness  Psych: Appropriate mood and affect          Lab Review: All lab results for the last 24 hours reviewed.   Recent Results (from the past 24 hour(s))   GLUCOSE, POC    Collection Time: 21 11:09 AM   Result Value Ref Range    Glucose (POC) 136 (H) 65 - 100 mg/dL    Performed by [address], POC    Collection Time: 21  4:18 PM   Result Value Ref Range    Glucose (POC) 132 (H) 65 - 100 mg/dL    Performed by [address], POC    Collection Time: 21  9:26 PM   Result Value Ref Range    Glucose (POC) 120 (H) 65 - 100 mg/dL    Performed by [facility] [place]    CBC WITH AUTOMATED DIFF    Collection Time: 03/15/21  3:08 AM   Result Value Ref Range    WBC 8.0 3.6 - 11.0 K/uL RBC 3.79 (L) 3.80 - 5.20 M/uL    HGB 10.3 (L) 11.5 - 16.0 g/dL    HCT 32.9 (L) 35.0 - 47.0 %    MCV 86.8 80.0 - 99.0 FL    MCH 27.2 26.0 - 34.0 PG    MCHC 31.3 30.0 - 36.5 g/dL    RDW 15.4 (H) 11.5 - 14.5 %    PLATELET 802 (H) 845 - 400 K/uL    MPV 9.8 8.9 - 12.9 FL    NRBC 0.0 0  WBC    ABSOLUTE NRBC 0.00 0.00 - 0.01 K/uL    NEUTROPHILS 64 32 - 75 %    LYMPHOCYTES 27 12 - 49 %    MONOCYTES 5 5 - 13 %    EOSINOPHILS 2 0 - 7 %    BASOPHILS 1 0 - 1 %    IMMATURE GRANULOCYTES 1 (H) 0.0 - 0.5 %    ABS. NEUTROPHILS 5.2 1.8 - 8.0 K/UL    ABS. LYMPHOCYTES 2.1 0.8 - 3.5 K/UL    ABS. MONOCYTES 0.4 0.0 - 1.0 K/UL    ABS. EOSINOPHILS 0.1 0.0 - 0.4 K/UL    ABS. BASOPHILS 0.0 0.0 - 0.1 K/UL    ABS. IMM. GRANS. 0.1 (H) 0.00 - 0.04 K/UL    DF AUTOMATED     METABOLIC PANEL, BASIC    Collection Time: 03/15/21  3:08 AM   Result Value Ref Range    Sodium 138 136 - 145 mmol/L    Potassium 3.5 3.5 - 5.1 mmol/L    Chloride 100 97 - 108 mmol/L    CO2 31 21 - 32 mmol/L    Anion gap 7 5 - 15 mmol/L    Glucose 120 (H) 65 - 100 mg/dL    BUN 3 (L) 6 - 20 MG/DL    Creatinine 0.54 (L) 0.55 - 1.02 MG/DL    BUN/Creatinine ratio 6 (L) 12 - 20      GFR est AA >60 >60 ml/min/1.73m2    GFR est non-AA >60 >60 ml/min/1.73m2    Calcium 10.2 (H) 8.5 - 10.1 MG/DL   GLUCOSE, POC    Collection Time: 03/15/21  6:29 AM   Result Value Ref Range    Glucose (POC) 124 (H) 65 - 100 mg/dL    Performed by Eulas Woodward      CX-LIGHT STREPTOCOCCUS CONSTELLATUS   Assessment:     Hospital Problems  Date Reviewed: 1/25/2021          Codes Class Noted POA    Cellulitis ICD-10-CM: L03.90  ICD-9-CM: 682.9  3/12/2021 Unknown              Plan/Recommendations/Medical Decision Making:   Wound needs to be opened today  Cont diet    Continue IV antibiotics. AF. ID consult to eval abx duration and IV vs oral. Surgical site infection and fluid over mesh. Polypropylene mesh in place improved fluid collection over mesh. Possible seroma vs infection. No gas.  Status post ID of abd wall. No mesh felt during I and D and hernia was closed with suture during case so possibly muscular barrier between mesh and surg site infection. Plan would be to try abx, re-image, and hope to salvage mesh vs need to cut down on mesh vs explantation in future. Awaiting final culture results with antibiotic recommendations.     Lovenox  BID wound packing to midline  Possibly Ariana Trujillo MD  Bariatric and General Surgeon  [facility]

## 2021-03-15 NOTE — CONSULTS
Infectious Disease Consult    Today's Date: 3/15/2021   Admit Date: 3/12/2021    Impression:   Post op intra abdominal wound infection  Morbid obesity  S/p laparoscopic sleeve gastrectomy at [facility] (2016')  S/p revision of sleeve gastrectomy to gastric bypass (1/14)  S/p ventral hernia repair with mesh (1/25)  S/p I & D of abdominal wound (3/15)  - wound cx (3/13): GPC, STREPTOCOCCUS CONSTELLATUS    Blood cx (3/12) no growth so far     Normal WBC, T-max 99    Constipation  - no BM since the admission. Continue with bowel regimen per primary team    Plan:   - Continue with IV zosyn and vancomycin until cx finalized; identification and sensitivity pending. Adjust ABX prn    Fever work up if temp >= 100.4    Anti-infectives:   · Augmentin 2/3 - 2/10  · IV Rocephin 3/12 x 1  · IV Zosyn 2/6-2/9, 3/15 - present  · IV vancomycin 2/6-2/9, 3/13 - present    Subjective:   Date of Consultation:  March 15, 2021  Referring Physician: Tremayne Jimenez MD    Patient is a 54 y.o. female was admitted to the hospital with increase of abdominal pain. Pt has a hx of laparoscopic sleeve gastrectomy at [facility] in 2016 with fair weight loss but regain of all previously lost weight. Pt had multiple abdominal procedures since 1/2021. Pt was seen by Dr. Bernard Chaudhary with worsening of GERD symptoms. Pt had laparoscopic revision of sleeve gastrectomy to gastric bypass, RITCHIE, and intraoperative upper endoscopy on 1/14/2021 and was discharged 1/16/2021. Pt was readmitted to the hospital on 1/25 with SBO (no BM for 11 days since discharged from the hospital) and abdominal wall hernia. Pt had laparoscopic reduction and repair of incarcerated hernia with mesh upon admission. Pt was discharged on 2/1. Pt returned to the hospital with nausea, vomiting, chills, and drainage from her incision site on 2/6, was treated for intra-abdominal abscess, was discharged home with po Augmentin on 2/15/2021.     Pt was readmitted to the hospital on 3/12 with increase of redness of midline and incision and pain. Pt had I & D of abdominal wound on 3/15. The wound cs from 3/13 grew GPC and streptococcus constellatus. Pt has a hx of GERD, hypertension, hyperlipidemia, JACKIE, asthma, chronic headache, and DJD. Reports no known antibiotic allergies. Hx of smoking, quit last 2020. No hx of alcohol intake. ID team was consulted for post op mesh infection management.   Patient Active Problem List   Diagnosis Code    Low back pain M54.5    Encounter for long-term (current) use of other medications Z79.899    HA (headache) R51.9    Chronic pain syndrome G89.4    DJD (degenerative joint disease), lumbar M47.816    Right knee pain M25.561    Right knee DJD M17.11    Chronic headache R51.9, G89.29    Obesity, morbid (HCC) E66.01    Gastroesophageal reflux disease without esophagitis K21.9    Mild intermittent asthma without complication V72.05    Essential hypertension I10    Hypercholesteremia E78.00    Anxiety F41.9    Depression F32.9    Sleep apnea in adult G47.30    Morbid obesity (HCC) E66.01    Dehydration E86.0    Nausea and vomiting R11.2    Incisional hernia K43.2    Small bowel obstruction (HCC) K56.609    Postoperative intra-abdominal abscess T81.43XA    Cellulitis L03.90     Past Medical History:   Diagnosis Date    Anxiety 2017    Arthritis     Asthma     Back injury     Depression     Essential hypertension     GERD (gastroesophageal reflux disease)     Morbid obesity (HCC)     Sleep apnea in adult 3/6/2018    CPAP      Family History   Problem Relation Age of Onset    Stroke Mother     Heart Disease Mother         PACEMAKER    Alcohol abuse Father     Seizures Father     Anesth Problems Neg Hx       Social History     Tobacco Use    Smoking status: Former Smoker     Packs/day: 1.00     Years: 30.00     Pack years: 30.00     Quit date: 2020     Years since quittin.7    Smokeless tobacco: Never Used   Substance Use Topics    Alcohol use: No     Past Surgical History:   Procedure Laterality Date    HX APPENDECTOMY      HX  SECTION      HX COLONOSCOPY      HX GASTRECTOMY  2016    lap sleeve gastrectomy    HX HERNIA REPAIR  2021    Incarcerated hernia repair Dr. Ruperto Brady      left knee    HX LAP GASTRIC BYPASS  2021    REVISION SLEEVE TO BYPASS - Tommy     HX LYSIS OF ADHESIONS  2021    Dr. Margie Alfred      rt knee partial replacement    HX ORTHOPAEDIC Right     LIGAMENT REPAIR    IR PLC IVC FILTER  2021    NC LAP,DIAGNOSTIC ABDOMEN  2021    Dr. Johnna Mcnulty      Prior to Admission medications    Medication Sig Start Date End Date Taking? Authorizing Provider   metFORMIN (GLUCOPHAGE) 500 mg tablet  21   Provider, Historical   lisinopriL (PRINIVIL, ZESTRIL) 40 mg tablet Take 40 mg by mouth daily. 21   Provider, Historical   metoprolol succinate (TOPROL-XL) 50 mg XL tablet Take 50 mg by mouth daily. 21   Provider, Historical   ondansetron (ZOFRAN ODT) 4 mg disintegrating tablet Take 1 Tab by mouth every eight (8) hours as needed for Nausea or Nausea or Vomiting. 21   Mona Saleh NP   ALPRAZolam Carlynn Remak) 0.5 mg tablet Take 1 Tab by mouth two (2) times daily as needed for Anxiety for up to 30 days. Max Daily Amount: 1 mg. 2/23/21 3/25/21  Hermelindo Capellan NP   traZODone (DESYREL) 150 mg tablet Take 1 Tab by mouth nightly as needed for Sleep for up to 90 days. 12/14/20 3/14/21  Hermelindo Capellan NP   polyethylene glycol (MIRALAX) 17 gram packet Take 1 Packet by mouth daily. Indications: constipation 12/10/20   Mona Saleh NP   pantoprazole (PROTONIX) 40 mg tablet Take 1 Tab by mouth daily. AFTER SURGERY 12/10/20   Mona Saleh NP   tiZANidine (ZANAFLEX) 4 mg capsule Take 4 mg by mouth three (3) times daily.     Provider, Historical   pravastatin (PRAVACHOL) 20 mg tablet Take 20 mg by mouth nightly. Provider, Historical   ketoconazole (NIZORAL) 2 % topical cream Apply  to affected area daily. Provider, Historical   olopatadine (PATANOL) 0.1 % ophthalmic solution Administer 2 Drops to both eyes two (2) times a day. Provider, Historical   bisacodyL (Dulcolax, bisacodyl,) 5 mg EC tablet Take 5 mg by mouth daily as needed for Constipation. Provider, Historical   calcium citrate/vitamin D3 (CALCIUM CITRATE + D PO) Take 600 mg by mouth daily. Provider, Historical   cyanocobalamin (VITAMIN B-12) 500 mcg tablet Take 500 mcg by mouth daily. Provider, Historical   multivitamin (ONE A DAY) tablet Take 1 Tab by mouth daily. Provider, Historical   albuterol (ProAir HFA) 90 mcg/actuation inhaler Take 2 Puffs by inhalation every four (4) hours as needed. Provider, Historical   naloxone 4 mg/actuation spry 4 mg by Nasal route as needed. 3/30/17   Dillan Kraus MD   ergocalciferol (VITAMIN D2) 50,000 unit capsule Take 50,000 Units by mouth every seven (7) days. Provider, Historical   amLODIPine (NORVASC) 10 mg tablet Take 10 mg by mouth daily. Provider, Historical   butalbital-acetaminophen-caffeine (FIORICET) -40 mg per tablet Take 1 Tab by mouth daily as needed. Provider, Historical     a  No Known Allergies     REVIEW OF SYSTEMS:     Total of 12 systems reviewed as follows:   I am not able to complete the review of systems because:    The patient is intubated and sedated    The patient has altered mental status due to his acute medical problems    The patient has baseline aphasia from prior stroke(s)    The patient has baseline dementia and is not reliable historian                 POSITIVE= underlined text  Negative = text not underlined  General:  fever, chills, sweats, generalized weakness, weight loss/gain,      loss of appetite   Eyes:    blurred vision, eye pain, loss of vision, double vision  ENT:    rhinorrhea, pharyngitis   Respiratory:   cough, sputum production, SOB, wheezing, BERMAN, pleuritic pain   Cardiology:   chest pain, palpitations, orthopnea, PND, edema, syncope   Gastrointestinal:  abdominal pain , N/V, dysphagia, diarrhea, constipation, bleeding   Genitourinary:  frequency, urgency, dysuria, hematuria, incontinence   Muskuloskeletal :  arthralgia, myalgia   Hematology:  easy bruising, nose or gum bleeding, lymphadenopathy   Dermatological: rash, ulceration, pruritis   Endocrine:   hot flashes or polydipsia   Neurological:  headache, dizziness, confusion, focal weakness, paresthesia,     Speech difficulties, memory loss, gait disturbance  Psychological: Feelings of anxiety, depression, agitation    Objective:     Visit Vitals  BP (!) 159/93 (BP 1 Location: Right arm, BP Patient Position: At rest)   Pulse 100   Temp 98.4 °F (36.9 °C)   Resp 16   Ht 5' 8\" (1.727 m)   Wt 158.3 kg (349 lb)   SpO2 96%   BMI 53.07 kg/m²     Temp (24hrs), Av.6 °F (37 °C), Min:98.4 °F (36.9 °C), Max:98.8 °F (37.1 °C)       Lines:  Peripheral line    PHYSICAL EXAM:   General:    Morbidly obese, Alert, cooperative, no distress, appears stated age. HEENT: Atraumatic, anicteric sclerae, pink conjunctivae     No oral ulcers, mucosa moist, throat clear  Neck:  Supple, symmetrical,  thyroid: non tender  Lungs:   Clear to auscultation bilaterally. No Wheezing or Rhonchi. No rales. Chest wall:  No tenderness  No Accessory muscle use. Heart:   Regular  rhythm,  No  murmur   No edema  Abdomen:   Obese, tender around incision site. distended. Unable to appreciate bowel sounds  Extremities: No cyanosis. No clubbing  Skin:     Not pale. Not Jaundiced  No rashes, bloody drainage in the middle of the abdominal dressing  Psych:  Good insight. Not depressed. Not anxious or agitated. Neurologic: EOMs intact. No facial asymmetry. No aphasia or slurred speech. Symmetrical strength, Alert and oriented X 4.        Data Review:     CBC:  Recent Labs     03/15/21  0308 21  8541 03/13/21  0653   WBC 8.0 7.9 6.7   GRANS 64 70 67   MONOS 5 5 6   EOS 2 1 1   ANEU 5.2 5.6 4.5   ABL 2.1 1.8 1.7   HGB 10.3* 10.1* 9.3*   HCT 32.9* 32.6* 30.3*   * 401* 343       BMP:  Recent Labs     03/15/21  0308 03/14/21  0307 03/13/21  0653   CREA 0.54* 0.50* 0.54*   BUN 3* 2* 3*    139 139   K 3.5 3.0* 2.9*    98 100   CO2 31 32 29   AGAP 7 9 10   * 112* 118*       LFTS:  Recent Labs     03/12/21  1349   TBILI 0.7   ALT 45   *   TP 9.2*   ALB 3.6       Microbiology:     All Micro Results     Procedure Component Value Units Date/Time    CULTURE, BLOOD, PAIRED [[id number]] Collected: 03/12/21 1449    Order Status: Completed Specimen: Blood Updated: 03/15/21 0646     Special Requests: NO SPECIAL REQUESTS        Culture result: NO GROWTH 3 DAYS       CULTURE, BODY FLUID Margurite Bill STAIN [[id number]]  (Abnormal) Collected: 03/13/21 1140    Order Status: Completed Specimen: Abdomen Updated: 03/14/21 1343     Special Requests: *LOOK FOR ANAEROBES, ALSO*     GRAM STAIN OCCASIONAL WBCS SEEN         FEW GRAM POSITIVE COCCI        Culture result:       LIGHT STREPTOCOCCUS CONSTELLATUS          CULTURE, [place] STAIN [[id number]] Collected: 03/13/21 1040    Order Status: Canceled Specimen: Wound from Abdomen     Mehul Abraham [[id number]] Collected: 03/13/21 1040    Order Status: Canceled Specimen: Abdomen     CULTURE, ANAEROBIC [[id number]] Collected: 03/13/21 1040    Order Status: Canceled Specimen: Abdomen             Signed By: Eda Cortes NP     March 15, 2021

## 2021-03-15 NOTE — PROGRESS NOTES
0745: patient on toilet. During report, pt stated she felt a pop at the infected lap site. RN found an oozing lap site. Covered the lap site with dry dressing. Purulent drainage noticed.

## 2021-03-15 NOTE — PROGRESS NOTES
Spoke with the patient about pain medication as the patient has needed IV dilaudid and PO percocet throughout the shift. The patient stated that she does not feel like the percocet is doing anything for her pain. This RN placed a page to Dr. Thad King office.

## 2021-03-15 NOTE — ROUTINE PROCESS
Bedside shift change report given to Amee (oncoming nurse) by Chinedu Franco RN (offgoing nurse). Report included the following information SBAR, Kardex, Intake/Output, MAR and Recent Results.

## 2021-03-15 NOTE — PROCEDURES
PROCEDURE NOTE    Date of Procedure: 3/15/21    Preoperative Diagnosis: Surgical site infection  Postoperative Diagnosis: same    Procedure: Incision and drainage of abdominal wound    Surgeon: Gilberto Madden MD    Assistant(s): None    Anesthesia: 1% lidocaine with 1/100,000 epinephrine    Indications: 55 y/o F with draining of abdominal wound status post VHR with mesh    Findings: Bloody/ purulent material expressed. Palpation of muscle did not demonstrate mesh or obviously open hernia defect. Description of Operation: Jeanne Levi was identified. Informed consent was obtained after a complete discussion of risks, benefits and alternatives to surgery were had with the patient. The patient was then prepped and draped in the usual sterile fashion. The patient was injected with local anesthesia. A 2.5 cm incision was made and purulence and hematoma was evacuated. Incision and dissection was continued though skin, subcutaneous tissue and down to the anterior rectus muscle. The pocket was irrigated with NS. I palpated the wound down to the abd wall and was unable to feel a large hernia defect or mesh. I then packed the wound with iodoform gauze followed by gauze and tape to the skin. At the end of the procedure all instrument, needle, and sponge counts were correct.       Estimated Blood Loss: 10cc    Specimens: None    Complications: None    Gilberto Madden MD  Bariatric and General Surgeon  [facility]  3/15/2021

## 2021-03-16 ENCOUNTER — TELEPHONE (OUTPATIENT)
Dept: SURGERY | Age: 56
End: 2021-03-16

## 2021-03-16 VITALS
SYSTOLIC BLOOD PRESSURE: 144 MMHG | HEIGHT: 68 IN | DIASTOLIC BLOOD PRESSURE: 69 MMHG | RESPIRATION RATE: 18 BRPM | HEART RATE: 90 BPM | WEIGHT: 293 LBS | OXYGEN SATURATION: 96 % | BODY MASS INDEX: 44.41 KG/M2 | TEMPERATURE: 99.1 F

## 2021-03-16 LAB
BACTERIA SPEC CULT: ABNORMAL
BACTERIA SPEC CULT: ABNORMAL
DATE LAST DOSE: NORMAL
GLUCOSE BLD STRIP.AUTO-MCNC: 136 MG/DL (ref 65–100)
GLUCOSE BLD STRIP.AUTO-MCNC: 139 MG/DL (ref 65–100)
GRAM STN SPEC: ABNORMAL
REPORTED DOSE,DOSE: NORMAL UNITS
REPORTED DOSE/TIME,TMG: NORMAL
SERVICE CMNT-IMP: ABNORMAL
VANCOMYCIN TROUGH SERPL-MCNC: 5.9 UG/ML (ref 5–10)

## 2021-03-16 PROCEDURE — 74011250636 HC RX REV CODE- 250/636: Performed by: SURGERY

## 2021-03-16 PROCEDURE — 82962 GLUCOSE BLOOD TEST: CPT

## 2021-03-16 PROCEDURE — 74011250636 HC RX REV CODE- 250/636: Performed by: INTERNAL MEDICINE

## 2021-03-16 PROCEDURE — 74011250637 HC RX REV CODE- 250/637: Performed by: SURGERY

## 2021-03-16 PROCEDURE — 99024 POSTOP FOLLOW-UP VISIT: CPT | Performed by: SURGERY

## 2021-03-16 RX ORDER — VANCOMYCIN 2 GRAM/500 ML IN 0.9 % SODIUM CHLORIDE INTRAVENOUS
2000 EVERY 12 HOURS
Status: DISCONTINUED | OUTPATIENT
Start: 2021-03-16 | End: 2021-03-16 | Stop reason: HOSPADM

## 2021-03-16 RX ORDER — AMOXICILLIN 500 MG/1
500 CAPSULE ORAL 3 TIMES DAILY
Qty: 42 CAP | Refills: 0 | Status: SHIPPED | OUTPATIENT
Start: 2021-03-16 | End: 2021-03-30

## 2021-03-16 RX ORDER — POLYETHYLENE GLYCOL 3350 17 G/17G
17 POWDER, FOR SOLUTION ORAL DAILY
Qty: 14 PACKET | Refills: 1 | Status: SHIPPED | OUTPATIENT
Start: 2021-03-16 | End: 2021-10-04

## 2021-03-16 RX ORDER — LIDOCAINE HYDROCHLORIDE 20 MG/ML
JELLY TOPICAL AS NEEDED
Status: DISCONTINUED | OUTPATIENT
Start: 2021-03-16 | End: 2021-03-16 | Stop reason: HOSPADM

## 2021-03-16 RX ORDER — HYDROMORPHONE HYDROCHLORIDE 2 MG/1
2-4 TABLET ORAL
Qty: 18 TAB | Refills: 0 | Status: SHIPPED | OUTPATIENT
Start: 2021-03-16 | End: 2021-03-19

## 2021-03-16 RX ADMIN — HYDROMORPHONE HYDROCHLORIDE 1 MG: 1 INJECTION, SOLUTION INTRAMUSCULAR; INTRAVENOUS; SUBCUTANEOUS at 00:23

## 2021-03-16 RX ADMIN — CYANOCOBALAMIN TAB 500 MCG 500 MCG: 500 TAB at 09:56

## 2021-03-16 RX ADMIN — HYDROMORPHONE HYDROCHLORIDE 2 MG: 2 TABLET ORAL at 10:02

## 2021-03-16 RX ADMIN — VANCOMYCIN HYDROCHLORIDE 2000 MG: 10 INJECTION, POWDER, LYOPHILIZED, FOR SOLUTION INTRAVENOUS at 11:57

## 2021-03-16 RX ADMIN — HYDROMORPHONE HYDROCHLORIDE 1 MG: 1 INJECTION, SOLUTION INTRAMUSCULAR; INTRAVENOUS; SUBCUTANEOUS at 12:50

## 2021-03-16 RX ADMIN — OXYCODONE HYDROCHLORIDE AND ACETAMINOPHEN 1 TABLET: 5; 325 TABLET ORAL at 14:56

## 2021-03-16 RX ADMIN — PANTOPRAZOLE SODIUM 40 MG: 40 TABLET, DELAYED RELEASE ORAL at 09:56

## 2021-03-16 RX ADMIN — LISINOPRIL 40 MG: 10 TABLET ORAL at 09:56

## 2021-03-16 RX ADMIN — VANCOMYCIN HYDROCHLORIDE 2000 MG: 10 INJECTION, POWDER, LYOPHILIZED, FOR SOLUTION INTRAVENOUS at 00:14

## 2021-03-16 RX ADMIN — AMLODIPINE BESYLATE 10 MG: 5 TABLET ORAL at 09:56

## 2021-03-16 RX ADMIN — POLYETHYLENE GLYCOL 3350 17 G: 17 POWDER, FOR SOLUTION ORAL at 09:55

## 2021-03-16 RX ADMIN — METOPROLOL SUCCINATE 50 MG: 50 TABLET, EXTENDED RELEASE ORAL at 09:55

## 2021-03-16 RX ADMIN — KETOTIFEN FUMARATE 1 DROP: 0.35 SOLUTION/ DROPS OPHTHALMIC at 09:58

## 2021-03-16 NOTE — PROGRESS NOTES
Physician Progress Note      PATIENTMerlin Sol  CSN #:                  [id number]  :                       1965  ADMIT DATE:       3/12/2021 4:29 PM  [address] [place] DATE:  RESPONDING  PROVIDER #:        David Silva MD          QUERY TEXT:    Dear Surgeon,    Patient was admitted with a surgical site infection, noted to have serum blood glucose of 108-120 and POC blood glucose of  during admission. Pt has hypoglycemic protocol and SSI ordered, with Metformin listed as a PTA medication in the H&P. Pt had a HgbA1c of 7.7 on 1020. If possible, please document in progress notes and discharge summary if you are evaluating and/or treating any of the following: The medical record reflects the following:  Risk Factors: Morbid obesity w/ BMI of 53.07, h/o of use of Metformin  Clinical Indicators:  - sBG on chemistry during admission = 108-120  - POC BG during admission = , all but one reading >112  - HgbA1c = 7.7 on 12/10/20  - per H&P PTA medication list - Metformin  Treatment: Sliding scale insulin, hypoglycemic protocol, Accuchecks q6h & PRN, serial lab monitoring    Thank-you,  Rosales Jack RN, [facility]  Clinical   [phone]  Options provided:  -- Diabetes type 2  -- Diabetes type 1  -- Diabetes type 2 with hyperglycemia  -- Diabetes type 1 with hyperglycemia  -- Elevated blood glucose values not clinically significant  -- Other - I will add my own diagnosis  -- Disagree - Not applicable / Not valid  -- Disagree - Clinically unable to determine / Unknown  -- Refer to Clinical Documentation Reviewer    PROVIDER RESPONSE TEXT:    This patient has diabetes type 2. Query created by: Sherly Leiva on 3/15/2021 4:00 PM      QUERY TEXT:    Dear Surgeon,    Patient was admitted with a surgical site infection. Per Procedure note dated 3/15/21, documentation of I&D of abdominal wound status post [facility] with mesh.     To accurately reflect the procedure performed please further specify the depth of tissue incised and drained: The medical record reflects the following:  Risk Factors: Surgical site infection, Incision and drainage of abdominal wound  Clinical Indicators:  - Procedure note 3/15: Description of Operation: Joe Thompson was identified. Informed consent was obtained after a complete discussion of risks, benefits and alternatives to surgery were had with the patient. ?  The patient was then prepped and draped in the usual sterile fashion. The patient was injected with local anesthesia. ? A 2.5 cm incision was made and purulence and hematoma was evacuated. The pocket was irrigated with NS. I palpated the wound down to the abd wall and was unable to feel a large hernia defect or mesh. I then packed the wound with iodoform gauze followed by gauze and tape to the skin. Findings: Bloody/ purulent material expressed. Palpation of muscle did not demonstrate mesh or obviously open hernia defect. ?  Treatment: ncision and drainage of abdominal wound    Thank-you,  Aldo Nunes RN, [facility] [place], operated by [facility]  Clinical   [phone]  Options provided:  -- Subcutaneous tissue  -- Fascia  -- Muscle  -- Other - I will add my own diagnosis  -- Disagree - Not applicable / Not valid  -- Disagree - Clinically unable to determine / Unknown  -- Refer to Clinical Documentation Reviewer    PROVIDER RESPONSE TEXT:    Addendum to 3/15/21 procedure note - The depth of the drainage to abdomen was down to and including muscle.     Query created by: Jose Hemphill on 3/15/2021 4:12 PM      Electronically signed by:  Glenda Curtis MD 3/16/2021 8:48 AM

## 2021-03-16 NOTE — PROGRESS NOTES
Patient discharge education and wound care provided at this time. This RN stressed the importance of completing her wound care BID and how she will need to complete a minimum of one dsg change a day. The patient verbalized understanding and teach-back was used to ensure wound care understanding. Patient sister to drive her home at this time. PCT took patient down for discharge.

## 2021-03-16 NOTE — PROGRESS NOTES
Wound care completed at this time. Patient pre-medicated with IV dilaudid. During wound care patient moaned, winced, and grabbed towards her stomach. Patient expressed worry about doing wound care at home. This RN to page Dr. Girish Cho PM  Spoke with Dr. Emilio Colby about wound care. He is to order lidocaine jelly to assist with wound care. Additionally, Gildardo Rivera is awaiting ID rec for PO abx for home. This RN to update the patient. 2:40 PM  Page placed to Gildardo Rivera office to get ABX ordered for patients dc.    3:12 PM  Spoke with Lorenzo Swift at 's office at this time who stated she will speak with  about patients ABX.

## 2021-03-16 NOTE — TELEPHONE ENCOUNTER
[address] [address] [place] [phone]     Lianna Conde is asking for a call back. In regards to infectious disease.

## 2021-03-16 NOTE — PROGRESS NOTES
Pharmacist Note - Vancomycin Dosing  Therapy day 5  Indication: SSTI  Current regimen: 2000mg IV [id number]    Recent Labs     03/15/21  0308 03/14/21  0307 03/13/21  0653   WBC 8.0 7.9 6.7   CREA 0.54* 0.50* 0.54*   BUN 3* 2* 3*       A Trough Level resulted at 5.9 mcg/mL which was obtained ~17.5 hrs post-dose. The extrapolated \"true\" trough is approximately 5.6 mcg/mL based on the patient's known kinetics. Goal trough: 10 - 15 mcg/mL       Plan: Change to 2000mg IV [id number] in order to boost the trough to within the goal range. Current renal function and this level predict a trough within the goal range even though the 2000mg IV [id number] regimen produced a higher trough in a previous encounter. Pharmacy will continue to monitor this patient daily for changes in clinical status and renal function.

## 2021-03-16 NOTE — PROGRESS NOTES
Progress Note    Patient: Siva Dodd MRN: [id number]  SSN: [id number]    YOB: 1965  Age: 54 y.o. Sex: female      Admit Date: 3/12/2021      Procedure:  ID 3/15    Subjective:     Dressing changed a few time. Moderate pain improved with dilaudid. No fevers. Objective:     Visit Vitals  /79   Pulse 92   Temp 98.1 °F (36.7 °C)   Resp 18   Ht 5' 8\" (1.727 m)   Wt 349 lb (158.3 kg)   SpO2 93%   BMI 53.07 kg/m²       Temp (24hrs), Av.3 °F (36.8 °C), Min:97.5 °F (36.4 °C), Max:98.8 °F (37.1 °C)      Physical Exam:    Visit Vitals  /79   Pulse 92   Temp 98.1 °F (36.7 °C)   Resp 18   Ht 5' 8\" (1.727 m)   Wt 349 lb (158.3 kg)   SpO2 93%   BMI 53.07 kg/m²     General: No acute distress, conversant  Eyes: PERRLA, no scleral icterus  HENT: Normocephalic without oral lesions  Neck: Trachea midline without LAD  Cardiac: Normal pulse rate and rhythm  Pulmonary: Symmetric chest rise with normal effort  GI: Soft, midline with bloody / purulent drainage, improved erythema, significant fullness  Skin: Warm without rash  Extremities: No edema or joint stiffness  Psych: Appropriate mood and affect          Lab Review: All lab results for the last 24 hours reviewed.   Recent Results (from the past 24 hour(s))   GLUCOSE, POC    Collection Time: 03/15/21 12:13 PM   Result Value Ref Range    Glucose (POC) 112 (H) 65 - 100 mg/dL    Performed by [employer], POC    Collection Time: 03/15/21  4:53 PM   Result Value Ref Range    Glucose (POC) 132 (H) 65 - 100 mg/dL    Performed by [employer], POC    Collection Time: 03/15/21  9:27 PM   Result Value Ref Range    Glucose (POC) 123 (H) 65 - 100 mg/dL    Performed by Akil Gaitan, TROUGH    Collection Time: 03/15/21 11:53 PM   Result Value Ref Range    Vancomycin,trough 5.9 5.0 - 10.0 ug/mL    Reported dose date NOT PROVIDED      Reported dose time: NOT PROVIDED      Reported dose: NOT PROVIDED UNITS   GLUCOSE, POC    Collection Time: 03/16/21  6:34 AM   Result Value Ref Range    Glucose (POC) 136 (H) 65 - 100 mg/dL    Performed by Maame [address]      CX-LIGHT STREPTOCOCCUS CONSTELLATUS -sensitivity pending  Assessment:     Hospital Problems  Date Reviewed: 1/25/2021          Codes Class Noted POA    Cellulitis ICD-10-CM: L03.90  ICD-9-CM: 682.9  3/12/2021 Unknown              Plan/Recommendations/Medical Decision Making:   PRN pain control  Cont diet  Continue IV Vanc. ID planning on PO on discharge. Awaiting final culture results with antibiotic recommendations. Plan will be to try abx, re-image as outpatient, and hope to salvage mesh vs need to cut down on mesh vs explantation in future.     Lovenox  BID wound packing to midline  Plan DC later this afternoon    David Chino MD  Bariatric and General Surgeon  [facility]

## 2021-03-16 NOTE — PROGRESS NOTES
ID Progress Note  3/16/2021    Subjective:     Still c/o constipation  Review of Systems:            Symptom Y/N Comments   Symptom Y/N Comments   Fever/Chills n      Chest Pain  n      Poor Appetite       Edema        Cough       Abdominal Pain  y      Sputum       Joint Pain        SOB/BERMAN  n     Pruritis/Rash        Nausea/vomit  n     Tolerating PT/OT        Diarrhea n      Tolerating Diet        Constipation  y     Other           Could NOT obtain due to:       Objective:     Vitals:   Visit Vitals  BP (!) 140/89   Pulse 99   Temp 99.5 °F (37.5 °C)   Resp 18   Ht 5' 8\" (1.727 m)   Wt 158.3 kg (349 lb)   SpO2 94%   BMI 53.07 kg/m²        Tmax:  Temp (24hrs), Av.5 °F (36.9 °C), Min:97.5 °F (36.4 °C), Max:99.5 °F (37.5 °C)      PHYSICAL EXAM:  General: morbidly obese, WD, WN. Alert, cooperative, no acute distress    EENT:  EOMI. Anicteric sclerae. MMM  Resp:  CTA bilaterally, no wheezing or rales. No accessory muscle use  CV:  Regular  rhythm,  No edema  GI:  Soft, mildly distended, tender around incision site. hypoactive Bowel sounds  Neurologic:  Alert and oriented X 3, normal speech,   Psych:   Good insight. Not anxious nor agitated  Skin:  No rashes.   No jaundice    Labs:   Lab Results   Component Value Date/Time    WBC 8.0 03/15/2021 03:08 AM    HGB 10.3 (L) 03/15/2021 03:08 AM    HCT 32.9 (L) 03/15/2021 03:08 AM    PLATELET 043 (H)  03:08 AM    MCV 86.8 03/15/2021 03:08 AM     Lab Results   Component Value Date/Time    Sodium 138 03/15/2021 03:08 AM    Potassium 3.5 03/15/2021 03:08 AM    Chloride 100 03/15/2021 03:08 AM    CO2 31 03/15/2021 03:08 AM    Anion gap 7 03/15/2021 03:08 AM    Glucose 120 (H) 03/15/2021 03:08 AM    BUN 3 (L) 03/15/2021 03:08 AM    Creatinine 0.54 (L) 03/15/2021 03:08 AM    BUN/Creatinine ratio 6 (L) 03/15/2021 03:08 AM    GFR est AA >60 03/15/2021 03:08 AM    GFR est non-AA >60 03/15/2021 03:08 AM    Calcium 10.2 (H) 03/15/2021 03:08 AM    Bilirubin, total 0.7 03/12/2021 01:49 PM    Alk. phosphatase 181 (H) 03/12/2021 01:49 PM    Protein, total 9.2 (H) 03/12/2021 01:49 PM    Albumin 3.6 03/12/2021 01:49 PM    Globulin 5.6 (H) 03/12/2021 01:49 PM    A-G Ratio 0.6 (L) 03/12/2021 01:49 PM    ALT (SGPT) 45 03/12/2021 01:49 PM       Assessment and Plan   Post op intra abdominal wound infection  Morbid obesity  S/p laparoscopic sleeve gastrectomy at [facility] (2016')  S/p revision of sleeve gastrectomy to gastric bypass (1/14)  S/p ventral hernia repair with mesh (1/25)  S/p I & D of abdominal wound (3/15)  - wound cx (3/13): GPC, STREPTOCOCCUS CONSTELLATUS; sensitive to ampicillin    Blood cx (3/12) no growth     Normal WBC, T-max 99.5    received IV vancomycin. Recommend to complete 2 weeks of ABX with amoxicillin 500 mg po tid upon discharge, last dose 3/30    Constipation  - Continue with bowel regimen per primary team    Pt is clear to be discharge in ID stand point.     Bridget Arellano NP

## 2021-03-16 NOTE — DISCHARGE INSTRUCTIONS
Twice daily packing changes to wound. Remove dressing and shower in the AM. Soap and water okay for the wound. Then pack with iodoform gauze down the muscle and cover with clean gauze. Take abx per ID instruction. I will order a CT as outpatient to follow up progress.

## 2021-03-17 DIAGNOSIS — T81.49XA SURGICAL SITE INFECTION: Primary | ICD-10-CM

## 2021-03-17 LAB
BACTERIA SPEC CULT: NORMAL
SERVICE CMNT-IMP: NORMAL

## 2021-03-17 NOTE — DISCHARGE SUMMARY
Admit date: 3/12/2021   Admitting Provider: Andre Dorado MD    Discharge date: 3/17/2021  Discharging Provider: Andre Dorado MD      * Admission Diagnoses: Cellulitis [L03.90]    * Discharge Diagnoses:    Hospital Problems as of 3/16/2021 Date Reviewed: 1/25/2021          Codes Class Noted - Resolved POA    Cellulitis ICD-10-CM: L03.90  ICD-9-CM: 682.9  3/12/2021 - Present Unknown              * Hospital Course: 70-year-old female status post sleeve to bypass complicated by incisional hernia status post repair of that and subsequent wound infection who presents to the clinic with ventral abdominal wall erythema. Admitted to the hospital for work-up for possible mesh infection. Fluid aspirated under ultrasound guidance. Streptococcus found. Wound drained further. I&D performed. Infectious diseases called. Patient remained aseptic during the complete hospitalization. Given amoxicillin by ID on discharge. Consults: ID    * Discharge Condition: good  * Disposition: Home    Discharge Medications:  Discharge Medication List as of 3/16/2021  3:32 PM      START taking these medications    Details   HYDROmorphone (DILAUDID) 2 mg tablet Take 1-2 Tabs by mouth every four (4) hours as needed for Pain for up to 3 days. Max Daily Amount: 24 mg., Normal, Disp-18 Tab, R-0      !! polyethylene glycol (MIRALAX) 17 gram packet Take 1 Packet by mouth daily. May take 1-2 packets as needed daily for constipation. , Normal, Disp-14 Packet, R-1      amoxicillin (AMOXIL) 500 mg capsule Take 1 Cap by mouth three (3) times daily for 14 days. , Normal, Disp-42 Cap, R-0       !! - Potential duplicate medications found. Please discuss with provider. CONTINUE these medications which have NOT CHANGED    Details   metFORMIN (GLUCOPHAGE) 500 mg tablet Historical Med      lisinopriL (PRINIVIL, ZESTRIL) 40 mg tablet Take 40 mg by mouth daily. , Historical Med      metoprolol succinate (TOPROL-XL) 50 mg XL tablet Take 50 mg by mouth daily., Historical Med      ondansetron (ZOFRAN ODT) 4 mg disintegrating tablet Take 1 Tab by mouth every eight (8) hours as needed for Nausea or Nausea or Vomiting., Normal, Disp-30 Tab, R-1      ALPRAZolam (XANAX) 0.5 mg tablet Take 1 Tab by mouth two (2) times daily as needed for Anxiety for up to 30 days. Max Daily Amount: 1 mg., Normal, Disp-60 Tab, R-0      !! polyethylene glycol (MIRALAX) 17 gram packet Take 1 Packet by mouth daily. Indications: constipation, Normal, Disp-100 Packet,R-3      pantoprazole (PROTONIX) 40 mg tablet Take 1 Tab by mouth daily. AFTER SURGERY, Normal, Disp-30 Tab,R-1      tiZANidine (ZANAFLEX) 4 mg capsule Take 4 mg by mouth three (3) times daily. , Historical Med      pravastatin (PRAVACHOL) 20 mg tablet Take 20 mg by mouth nightly., Historical Med      ketoconazole (NIZORAL) 2 % topical cream Apply  to affected area daily. , Historical Med      olopatadine (PATANOL) 0.1 % ophthalmic solution Administer 2 Drops to both eyes two (2) times a day., Historical Med      bisacodyL (Dulcolax, bisacodyl,) 5 mg EC tablet Take 5 mg by mouth daily as needed for Constipation. , Historical Med      calcium citrate/vitamin D3 (CALCIUM CITRATE + D PO) Take 600 mg by mouth daily. , Historical Med      cyanocobalamin (VITAMIN B-12) 500 mcg tablet Take 500 mcg by mouth daily. , Historical Med      multivitamin (ONE A DAY) tablet Take 1 Tab by mouth daily. , Historical Med      albuterol (ProAir HFA) 90 mcg/actuation inhaler Take 2 Puffs by inhalation every four (4) hours as needed., Historical Med      naloxone 4 mg/actuation spry 4 mg by Nasal route as needed. , Print, Disp-1 Bottle, R-1      ergocalciferol (VITAMIN D2) 50,000 unit capsule Take 50,000 Units by mouth every seven (7) days. , Historical Med      amLODIPine (NORVASC) 10 mg tablet Take 10 mg by mouth daily. , Historical Med      butalbital-acetaminophen-caffeine (FIORICET) -40 mg per tablet Take 1 Tab by mouth daily as needed., Historical Med       !! - Potential duplicate medications found. Please discuss with provider. STOP taking these medications       traZODone (DESYREL) 150 mg tablet Comments:   Reason for Stopping:               * Follow-up Care/Patient Instructions: Activity: Activity as tolerated  Diet: Efrain regular  Wound Care: As directed    Follow-up Information     Follow up With Specialties Details Why Contact Info    Patricia Tang MD [place] [address]  [phone]      [facility]   They will provide Home Health services for wound care. Contact upon discharge.   [facility],   [facility], [address]  [phone]          Signed:  Isaac Cervantes MD  3/17/2021  11:18 AM

## 2021-03-25 ENCOUNTER — VIRTUAL VISIT (OUTPATIENT)
Dept: SURGERY | Age: 56
End: 2021-03-25
Payer: MEDICARE

## 2021-03-25 VITALS — BODY MASS INDEX: 44.41 KG/M2 | WEIGHT: 293 LBS | HEIGHT: 68 IN

## 2021-03-25 DIAGNOSIS — K91.2 POSTOPERATIVE INTESTINAL MALABSORPTION: ICD-10-CM

## 2021-03-25 DIAGNOSIS — E66.01 MORBID OBESITY (HCC): ICD-10-CM

## 2021-03-25 DIAGNOSIS — Z09 FOLLOW-UP EXAMINATION AFTER ABDOMINAL SURGERY: Primary | ICD-10-CM

## 2021-03-25 DIAGNOSIS — T81.49XA POST-OPERATIVE WOUND ABSCESS: ICD-10-CM

## 2021-03-25 PROCEDURE — 99024 POSTOP FOLLOW-UP VISIT: CPT | Performed by: NURSE PRACTITIONER

## 2021-03-25 NOTE — PROGRESS NOTES
I was in the office while conducting this encounter.    Consent:  She and/or her healthcare decision maker is aware that this patient-initiated Telehealth encounter is a billable service, with coverage as determined by her insurance carrier. She is aware that she may receive a bill and has provided verbal consent to proceed: No - Not billable    This virtual visit was conducted via [email or web address].  Pursuant to the emergency declaration under the [place] Act and the National Emergencies Act, 1135 waiver authority and the Coronavirus Preparedness and Response Supplemental Appropriations Act, this Virtual  Visit was conducted to reduce the patient's risk of exposure to COVID-19 and provide continuity of care for an established patient.  Services were provided through a video synchronous discussion virtually to substitute for in-person clinic visit.  Due to this being a TeleHealth evaluation, many elements of the physical examination are unable to be assessed.     Total Time: minutes: 11-20 minutes.    Chief Complaint   Patient presents with   • Surgical Follow-up     8 weeks (1/25/2021) S/P Diagnostic laparoscopy, Reduction and repair of incarcerated hernia by Dr. Whitmore - Virtual visit # [phone]   • Surgical Follow-up     10 weeks (1/14/2021) S/P Lap Revision of Sleeve gastrectomy to gastric bypass by Dr. Sanders - down 66.5 pounds - Lost 18.5 pounds       Day Angel she is 10 weeks status post revision of a sleeve gastrectomy to gastric bypass for severe GERD and morbid obesity.  She had a ventral hernia that became incarcerated postoperatively that was located at an old incision unrelated to the revision procedure that required laparoscopy and reduction and repair.  That was done about 2 months ago.  She developed cellulitis at the hernia site and was admitted to the hospital last week had an I&D and did not appear that any mesh was involved.  The culture was growing strep and she has been on amoxicillin since  discharge. She has been taking the amoxicillin and says she knows she is to stay on it until the 30th. She is feeling well, no fever or chills, chest pain or shortness of breath  Says her energy is getting better  She has no abdominal pain  [facility] is coming twice a week for an assessment and to do the wound care. She says the wound however is very small and just a small amount of gauze will now fit in the wound. She has no wound drainage, no erythema and no warmth  She has been bathing and then her friend has been doing the wound care daily  she has been taking her bariatric vitamins every day  She said she does have much of an appetite but she is making herself eat. Today she has had 2 horrible day so far and some fruit  She is drinking her fluids  She is voiding without difficulty  She had a bowel movement today and says she is using MiraLAX when she needs to  She plans on going walking outside this afternoon  She has a CT scan scheduled for tomorrow and is going to  her contrast    Visit Vitals  Ht 5' 8\" (1.727 m)   Wt 331 lb (150.1 kg)   BMI 50.33 kg/m²     She appears well and is smiling  Mucous membranes appear moist  Her abdomen is obese and appears soft she has a dry dressing over the wound    ICD-10-CM ICD-9-CM    1. Follow-up examination after abdominal surgery  Z09 V67.09    2. Postoperative intestinal malabsorption  K91.2 579.3    3. Post-operative wound abscess  T81.49XA 998.59    4. Morbid obesity ([address]  E66.01 278.01    5.  BMI 50.0-59.9, adult [facility])  Z68.43 V85.43      Almost 3-month status post revision sleeve gastrectomy to a gastric bypass  2-month status post ventral hernia repair with a postop wound infection  Finish the antibiotic she is on amoxicillin 3 times a day and she will continue through March 30 per infectious disease recommendations  CT scan to reassess is scheduled for tomorrow at [facility]  I reviewed her diet high-protein low carbohydrate and made suggestions again for protein options  Continue bariatric vitamins daily  Daily walking 10 minutes 3 times a day and get outside as much as possible  Use MiraLAX as needed for constipation  Follow-up in 1 month, but will follow up with her after the CT scan and probably be able to to have her results by Monday  Continue wound care which sounds like will be much longer  Lorena Roca verbalized understanding and questions were answered to the best of my knowledge and ability. Diet and wound care  educational materials were provided.

## 2021-03-25 NOTE — PROGRESS NOTES
1. Have you been to the ER, urgent care clinic since your last visit? Hospitalized since your last visit? No    2. Have you seen or consulted any other health care providers outside of the [address] [place] [address] since your last visit? Include any pap smears or colon screening. No    Pink Vo  Body composition    female  54 y.o. Vitals:    03/25/21 1100   Weight: 331 lb (150.1 kg)   Height: 5' 8\" (1.727 m)     Body mass index is 50.33 kg/m².      No C/O Pain

## 2021-03-26 ENCOUNTER — TELEPHONE (OUTPATIENT)
Dept: SURGERY | Age: 56
End: 2021-03-26

## 2021-03-26 NOTE — TELEPHONE ENCOUNTER
Spoke with [employer] with [facility] scheduling. Patient called to cancel CT scan.

## 2021-03-26 NOTE — TELEPHONE ENCOUNTER
[address]  [phone]    States 2 CT orders put in    1. Azucena - 3/12 - Abdomen and pelvin  2. Yogesh Deem 3/17 - just abdomen    Please call back as soon as possible to confirm which they should do.

## 2021-04-02 RX ORDER — PANTOPRAZOLE SODIUM 40 MG/1
TABLET, DELAYED RELEASE ORAL
Qty: 30 TAB | Refills: 0 | Status: SHIPPED | OUTPATIENT
Start: 2021-04-02 | End: 2021-05-04

## 2021-04-22 ENCOUNTER — VIRTUAL VISIT (OUTPATIENT)
Dept: SURGERY | Age: 56
End: 2021-04-22
Payer: MEDICARE

## 2021-04-22 VITALS — BODY MASS INDEX: 44.41 KG/M2 | WEIGHT: 293 LBS | HEIGHT: 68 IN

## 2021-04-22 DIAGNOSIS — E55.9 VITAMIN D DEFICIENCY: ICD-10-CM

## 2021-04-22 DIAGNOSIS — E66.01 MORBID OBESITY WITH BMI OF 45.0-49.9, ADULT (HCC): ICD-10-CM

## 2021-04-22 DIAGNOSIS — E61.1 IRON DEFICIENCY: ICD-10-CM

## 2021-04-22 DIAGNOSIS — Z09 FOLLOW-UP EXAMINATION AFTER ABDOMINAL SURGERY: Primary | ICD-10-CM

## 2021-04-22 DIAGNOSIS — K91.2 POSTOPERATIVE INTESTINAL MALABSORPTION: ICD-10-CM

## 2021-04-22 DIAGNOSIS — E53.8 B12 DEFICIENCY: ICD-10-CM

## 2021-04-22 PROCEDURE — 99024 POSTOP FOLLOW-UP VISIT: CPT | Performed by: NURSE PRACTITIONER

## 2021-04-22 NOTE — PROGRESS NOTES
1. Have you been to the ER, urgent care clinic since your last visit? Hospitalized since your last visit? no    2. Have you seen or consulted any other health care providers outside of the [address] [place] [address] since your last visit? Include any pap smears or colon screening.  no

## 2021-04-23 NOTE — PATIENT INSTRUCTIONS
If you develop redness, drainage or pain at the old wound site please let us know ASAP. If that happens we need to schedule a CT scan. Keep up the walking most days with your granddaughter Stay on your vitamins and you need to be taken a multivitamin with iron. The gummy vitamins do not contain iron. I suggest: 
Multivitamin with 18 mg of iron, such as a [employer]on complete multivitamin with iron and take it twice per day. B12 at least 500 mcg daily Vitamin D 5000 international units daily and you can get over-the-counter Calcium plus D it is okay to take the gummy version of this Try and use your protein shakes such as a boost or Ensure high-protein. The Premier is a good choice as well and lots of patients like it. Drink mostly water. Plan on follow-up with me July 22, 2021 at 2 PM and this is a virtual visit. Please reach out if you need anything in the meantime. Zoom Support Group 2nd Thursday of each Month from 6-7 pm  
The next one is 5/13/21 6-7 pm  
You can access the link at [email or web address] Go to the Calendar tab and click on the date and it should go right to the link Additional Resources: 
[employer]:  [email or web address]  
 Offering online support groups and pre-recorded videos 
o Every Wednesday evening at 7:00 pm  
[employer] Facebook page  [address], Advice, and Motivation from fellow patients Bariatric Pal: [email or web address] 
[employer] has launched a podcast!  Hosted by Baron Torres, our podcast will cover topics about obesity, pre-weight loss surgery, post-weight loss surgery, food addiction, emotional eating, myths about weight loss surgery, and more. Each episode will feature an expert in the field of weight loss and bariatric surgery who can provide a deeper insight into these topics.  
ACAC:  acarestOpolis 
 Offering discounted exercise programs (8 Week programs, On-Demand/Virtual)  See flyer  Contact Madelin Cristina at [email or web address] or [phone]

## 2021-04-23 NOTE — PROGRESS NOTES
I was in the office while conducting this encounter. Consent:  She and/or her healthcare decision maker is aware that this patient-initiated Telehealth encounter is a billable service, with coverage as determined by her insurance carrier. She is aware that she may receive a bill and has provided verbal consent to proceed: No - Not billable    This virtual visit was conducted via [employer]. Pursuant to the emergency declaration under the [facility], [facility] waiver authority and the [employer] Act, this Virtual  Visit was conducted to reduce the patient's risk of exposure to COVID-19 and provide continuity of care for an established patient. Services were provided through a video synchronous discussion virtually to substitute for in-person clinic visit. Due to this being a TeleHealth evaluation, many elements of the physical examination are unable to be assessed. Total Time: minutes: 11-20 minutes. Chief Complaint   Patient presents with    Surgical Follow-up     3 months s/p revision lap sleeve gastrectmy to lap gastric bypass down 74.1 pound lost 45. 6.. Renee Hess 3-month status post revision of a sleeve gastrectomy to laparoscopic gastric bypass. She had postoperative complications of a ventral hernia and then a abdominal wall abscess. Both requiring the opposite hospitalization. She was scheduled to have a repeat CT scan several weeks ago but had to cancel because she did not have a ride. She says since then her wound is completely healed for at least the past 2 weeks. She said she has a small scab on it but there is no redness, warmth or pain in the area. She said no fever, chills, chest pain or shortness of breath. Reports she is feeling much better. She is in no difficulty with her breathing breathing and she is using her CPAP machine.   She has chronic pain from her arthritis in her back and she is using Tylenol 650 mg 2 tabs 2-3 times a day. She is moving her bowels every other day and denies constipation  Diet recall: Thus far today she has had a piece of sausage, handful of cashews and fruit  She drinks mostly water  She remains smoke-free  She is asking about using chewable vitamins \"the gummy kind\". She has been taking a multivitamin but it is a gummy so she is not getting any iron  She has a history of iron deficiency  Reports her blood sugars are good and \"normal\"  Says she is doing more walking and walking with her granddaughter several days a week    Visit Vitals   5' 8\" (1.727 m)   Wt 321 lb 3.2 oz (145.7 kg)   BMI 48.84 kg/m²     She appears well  She is well dressed she is up and out of bed and is smiling  Speech is clear breathing is unlabored  Abdomen is obese and it appears well-healed there is no visible erythema or induration or open wound    ICD-10-CM ICD-9-CM    1. Follow-up examination after abdominal surgery  Z09 V67.09 VITAMIN B12 & FOLATE      IRON PROFILE      VITAMIN D, 25 HYDROXY      VITAMIN B1, WHOLE BLOOD      CBC W/O DIFF      METABOLIC PANEL, COMPREHENSIVE      VITAMIN B12 & FOLATE      IRON PROFILE      VITAMIN D, 25 HYDROXY      VITAMIN B1, WHOLE BLOOD      CBC W/O DIFF      METABOLIC PANEL, COMPREHENSIVE   2. Postoperative intestinal malabsorption  K91.2 579.3 VITAMIN B12 & FOLATE      IRON PROFILE      VITAMIN D, 25 HYDROXY      VITAMIN B1, WHOLE BLOOD      CBC W/O DIFF      METABOLIC PANEL, COMPREHENSIVE      VITAMIN B12 & FOLATE      IRON PROFILE      VITAMIN D, 25 HYDROXY      VITAMIN B1, WHOLE BLOOD      CBC W/O DIFF      METABOLIC PANEL, COMPREHENSIVE   3.  Morbid obesity with BMI of 45.0-49.9, adult ([facility])  E66.01 278.01 VITAMIN B12 & FOLATE    Z68.42 V85.42 IRON PROFILE      VITAMIN D, 25 HYDROXY      VITAMIN B1, WHOLE BLOOD      CBC W/O DIFF      METABOLIC PANEL, COMPREHENSIVE      VITAMIN B12 & FOLATE      IRON PROFILE      VITAMIN D, 25 HYDROXY VITAMIN B1, WHOLE BLOOD      CBC W/O DIFF      METABOLIC PANEL, COMPREHENSIVE   4. Vitamin D deficiency  E55.9 268.9 VITAMIN B12 & FOLATE      IRON PROFILE      VITAMIN D, 25 HYDROXY      VITAMIN B1, WHOLE BLOOD      CBC W/O DIFF      METABOLIC PANEL, COMPREHENSIVE      VITAMIN B12 & FOLATE      IRON PROFILE      VITAMIN D, 25 HYDROXY      VITAMIN B1, WHOLE BLOOD      CBC W/O DIFF      METABOLIC PANEL, COMPREHENSIVE   5. Iron deficiency  E61.1 280.9 VITAMIN B12 & FOLATE      IRON PROFILE      VITAMIN D, 25 HYDROXY      VITAMIN B1, WHOLE BLOOD      CBC W/O DIFF      METABOLIC PANEL, COMPREHENSIVE      VITAMIN B12 & FOLATE      IRON PROFILE      VITAMIN D, 25 HYDROXY      VITAMIN B1, WHOLE BLOOD      CBC W/O DIFF      METABOLIC PANEL, COMPREHENSIVE   6. B12 deficiency  E53.8 266.2 VITAMIN B12 & FOLATE      IRON PROFILE      VITAMIN D, 25 HYDROXY      VITAMIN B1, WHOLE BLOOD      CBC W/O DIFF      METABOLIC PANEL, COMPREHENSIVE      VITAMIN B12 & FOLATE      IRON PROFILE      VITAMIN D, 25 HYDROXY      VITAMIN B1, WHOLE BLOOD      CBC W/O DIFF      METABOLIC PANEL, COMPREHENSIVE     3-month status post revision sleeve gastrectomy to laparoscopic gastric bypass for treatment of morbid obesity and severe GERD  Postoperative complications of ventral hernia (at an old incision site unrelated to the bariatric procedure) and she had postoperative wound infection following the hernia repair  She never had the CT scan but the issue seems to have resolved. She has difficulty with transportation and at this point I do not think it is necessary to just get the CT scan when it is clinically not indicated.   If she develops fever, chills, abdominal pain, redness at the site or drainage she is to let me know and we will go ahead and schedule the CT  Mail her a lab order for vitamin labs  I did review vitamins with her and she needs a multivitamin with iron, separate B12, vitamin D and calcium and I will send her a list in the mail  Reviewed diet and again made suggestions for protein supplementation  Drink mostly water  Low-fat no added sugar high-protein low-carb diet  Dietitian is available  Continue walking most days with her granddaughter  Follow-up in 3 months, July 22, 2021 at 2 PM virtual visit as she does have difficulty with transportation  Ivan Sharma verbalized understanding and questions were answered to the best of my knowledge and ability. Diet, vitamins and activity  educational materials were provided.

## 2021-05-04 DIAGNOSIS — F33.9 EPISODE OF RECURRENT MAJOR DEPRESSIVE DISORDER, UNSPECIFIED DEPRESSION EPISODE SEVERITY (HCC): ICD-10-CM

## 2021-05-04 DIAGNOSIS — F41.1 GENERALIZED ANXIETY DISORDER: ICD-10-CM

## 2021-05-04 RX ORDER — PANTOPRAZOLE SODIUM 40 MG/1
TABLET, DELAYED RELEASE ORAL
Qty: 30 TAB | Refills: 0 | Status: SHIPPED | OUTPATIENT
Start: 2021-05-04 | End: 2021-08-02

## 2021-05-04 RX ORDER — ALPRAZOLAM 0.5 MG/1
0.5 TABLET ORAL
Qty: 15 TAB | Refills: 0 | Status: SHIPPED | OUTPATIENT
Start: 2021-05-04 | End: 2021-06-02

## 2021-05-04 RX ORDER — ALPRAZOLAM 0.5 MG/1
TABLET ORAL
Qty: 60 TAB | Refills: 0 | OUTPATIENT
Start: 2021-05-04

## 2021-05-04 RX ORDER — TRAZODONE HYDROCHLORIDE 150 MG/1
TABLET ORAL
Qty: 90 TAB | Refills: 0 | OUTPATIENT
Start: 2021-05-04

## 2021-06-01 DIAGNOSIS — F33.1 MODERATE EPISODE OF RECURRENT MAJOR DEPRESSIVE DISORDER (HCC): ICD-10-CM

## 2021-06-01 DIAGNOSIS — F33.9 EPISODE OF RECURRENT MAJOR DEPRESSIVE DISORDER, UNSPECIFIED DEPRESSION EPISODE SEVERITY (HCC): ICD-10-CM

## 2021-06-01 DIAGNOSIS — F41.1 GENERALIZED ANXIETY DISORDER: ICD-10-CM

## 2021-06-02 RX ORDER — TRAZODONE HYDROCHLORIDE 150 MG/1
TABLET ORAL
Qty: 90 TABLET | Refills: 0 | OUTPATIENT
Start: 2021-06-02

## 2021-06-02 RX ORDER — CITALOPRAM 40 MG/1
TABLET, FILM COATED ORAL
Qty: 90 TABLET | Refills: 0 | Status: SHIPPED | OUTPATIENT
Start: 2021-06-02 | End: 2021-08-31

## 2021-06-02 RX ORDER — ARIPIPRAZOLE 10 MG/1
TABLET ORAL
Qty: 90 TABLET | Refills: 0 | Status: SHIPPED | OUTPATIENT
Start: 2021-06-02 | End: 2021-08-31

## 2021-06-02 RX ORDER — ALPRAZOLAM 0.5 MG/1
TABLET ORAL
Qty: 15 TABLET | Refills: 0 | Status: SHIPPED | OUTPATIENT
Start: 2021-06-02 | End: 2021-07-02

## 2021-06-18 ENCOUNTER — TELEPHONE (OUTPATIENT)
Dept: SURGERY | Age: 56
End: 2021-06-18

## 2021-06-18 NOTE — TELEPHONE ENCOUNTER
Yaron Carrizales from [place], [place]. [facility] called in regards to orders for pt. That were not signed. Yaron Carrizales will be faxing them back over to us. The two orders are as follows:    From February 10 Order # [id number]     April 6 order #[id number]    I did verify fax number.  Her direct number is [phone]

## 2021-06-22 NOTE — TELEPHONE ENCOUNTER
Spoke to [facility] regarding still haven't received orders. She to fax to my attention.

## 2021-07-02 DIAGNOSIS — F41.1 GENERALIZED ANXIETY DISORDER: ICD-10-CM

## 2021-07-02 RX ORDER — ALPRAZOLAM 0.25 MG/1
0.25 TABLET ORAL
Qty: 30 TABLET | Refills: 0 | Status: SHIPPED | OUTPATIENT
Start: 2021-07-02 | End: 2021-08-02

## 2021-08-02 DIAGNOSIS — F41.1 GENERALIZED ANXIETY DISORDER: ICD-10-CM

## 2021-08-02 RX ORDER — ALPRAZOLAM 0.25 MG/1
TABLET ORAL
Qty: 30 TABLET | Refills: 0 | Status: SHIPPED | OUTPATIENT
Start: 2021-08-02 | End: 2021-08-31

## 2021-08-02 RX ORDER — PANTOPRAZOLE SODIUM 40 MG/1
TABLET, DELAYED RELEASE ORAL
Qty: 30 TABLET | Refills: 0 | Status: SHIPPED | OUTPATIENT
Start: 2021-08-02 | End: 2021-10-04

## 2021-08-03 PROBLEM — E66.01 OBESITY, MORBID (HCC): Status: RESOLVED | Noted: 2017-12-07 | Resolved: 2021-08-03

## 2021-08-26 ENCOUNTER — TELEPHONE (OUTPATIENT)
Dept: SURGERY | Age: 56
End: 2021-08-26

## 2021-08-26 NOTE — TELEPHONE ENCOUNTER
I tried calling the patient to check her in for her appointment today. I was unable to reach the patient. I LVM for pt to call our office back.

## 2021-08-27 ENCOUNTER — HOSPITAL ENCOUNTER (EMERGENCY)
Age: 56
Discharge: HOME | End: 2021-08-27
Attending: EMERGENCY MEDICINE
Payer: MEDICARE

## 2021-08-27 VITALS
WEIGHT: 293 LBS | RESPIRATION RATE: 20 BRPM | OXYGEN SATURATION: 98 % | DIASTOLIC BLOOD PRESSURE: 92 MMHG | TEMPERATURE: 98.3 F | BODY MASS INDEX: 44.41 KG/M2 | HEIGHT: 68 IN | HEART RATE: 89 BPM | SYSTOLIC BLOOD PRESSURE: 159 MMHG

## 2021-08-27 DIAGNOSIS — K04.7 DENTAL ABSCESS: Primary | ICD-10-CM

## 2021-08-27 DIAGNOSIS — K02.9 DENTAL CARIES: ICD-10-CM

## 2021-08-27 PROCEDURE — 74011250637 HC RX REV CODE- 250/637: Performed by: EMERGENCY MEDICINE

## 2021-08-27 PROCEDURE — 99283 EMERGENCY DEPT VISIT LOW MDM: CPT

## 2021-08-27 RX ORDER — AMOXICILLIN 500 MG/1
500 TABLET, FILM COATED ORAL 3 TIMES DAILY
Qty: 30 TABLET | Refills: 0 | Status: SHIPPED | OUTPATIENT
Start: 2021-08-27 | End: 2022-03-04

## 2021-08-27 RX ORDER — AMOXICILLIN 250 MG/1
1000 CAPSULE ORAL
Status: COMPLETED | OUTPATIENT
Start: 2021-08-27 | End: 2021-08-27

## 2021-08-27 RX ORDER — IBUPROFEN 600 MG/1
600 TABLET ORAL
Status: COMPLETED | OUTPATIENT
Start: 2021-08-27 | End: 2021-08-27

## 2021-08-27 RX ADMIN — AMOXICILLIN 1000 MG: 250 CAPSULE ORAL at 12:29

## 2021-08-27 RX ADMIN — IBUPROFEN 600 MG: 600 TABLET ORAL at 12:29

## 2021-08-27 NOTE — ED TRIAGE NOTES
.  Chief Complaint   Patient presents with    Dental Pain     pt presents with dental pain that has been ongoing for 1 week, states she has not seen dentist due to not having money, pain started on left side and has now progressed to right upper and lower

## 2021-08-27 NOTE — DISCHARGE INSTRUCTIONS
Patient encouraged to follow-up with the dentist next week for further evaluation of her teeth. Encouraged to take the antibiotic as instructed.

## 2021-08-27 NOTE — ED PROVIDER NOTES
HPI   Patient is a 64 y.o. female BLACK/ who presents to the ER with a chief complaint of dental pain for 2 weeks. Patient didn't take medication for the pain. Denies fever chills nausea vomiting. Mild swelling on right lower jaw. . No recent trauma.   Chief Complaint   Patient presents with    Dental Pain     pt presents with dental pain that has been ongoing for 1 week, states she has not seen dentist due to not having money, pain started on left side and has now progressed to right upper and lower      Past Medical History:   Diagnosis Date    Anxiety 2017    Arthritis     Asthma     Back injury     Depression     Essential hypertension     GERD (gastroesophageal reflux disease)     Morbid obesity ([facility] [address]     Sleep apnea in adult 3/6/2018    CPAP       Past Surgical History:   Procedure Laterality Date    HX APPENDECTOMY      HX  SECTION      HX COLONOSCOPY      HX GASTRECTOMY      lap sleeve gastrectomy    HX HERNIA REPAIR  2021    Incarcerated hernia repair Dr. Ruben Collins      left knee    HX LAP GASTRIC BYPASS  2021    REVISION SLEEVE TO BYPASS - Tommy     HX LYSIS OF ADHESIONS  2021    Dr. Bob Dominguez      rt knee partial replacement    HX ORTHOPAEDIC Right     LIGAMENT REPAIR    IR [facility] IVC FILTER  2021    [place] LAP,DIAGNOSTIC ABDOMEN  2021    Dr. Jose Cai         Family History:   Problem Relation Age of Onset    Stroke Mother     Heart Disease Mother         PACEMAKER    Alcohol abuse Father     Seizures Father     Anesth Problems Neg Hx        Social History     Socioeconomic History    Marital status: SINGLE     Spouse name: Not on file    Number of children: Not on file    Years of education: Not on file    Highest education level: Not on file   Occupational History    Occupation: disability      Comment: since  with back    Tobacco Use    Smoking status: Former Smoker     Packs/day: 1.00     Years: 30.00     Pack years: 30.00     Quit date: 2020     Years since quittin.2    Smokeless tobacco: Never Used   Substance and Sexual Activity    Alcohol use: No    Drug use: No    Sexual activity: Not on file     Comment: post menopausal    Other Topics Concern    Not on file   Social History Narrative    In the home with 15year old grand daughter and friend, Maryellen Chavez      Social Determinants of Health     Financial Resource Strain:     Difficulty of Paying Living Expenses:    Food Insecurity:     Worried About [address] in the Last Year:     [address] in the Last Year:    Transportation Needs:     Lack of Transportation (Medical):  Lack of Transportation (Non-Medical):    Physical Activity:     Days of Exercise per Week:     Minutes of Exercise per Session:    Stress:     Feeling of Stress :    Social Connections:     Frequency of Communication with Friends and Family:     Frequency of Social Gatherings with Friends and Family:     Attends [employer] Services:     Active Member of Clubs or Organizations:     Attends Club or Organization Meetings:     Marital Status:    Intimate Partner Violence:     Fear of Current or Ex-Partner:     Emotionally Abused:     Physically Abused:     Sexually Abused: ALLERGIES: Patient has no known allergies. Review of Systems   Constitutional: Negative. HENT: Positive for dental problem. Eyes: Negative. Respiratory: Negative. Cardiovascular: Negative. Gastrointestinal: Negative. Endocrine: Negative. Genitourinary: Negative. Musculoskeletal: Negative. Skin: Negative. Allergic/Immunologic: Negative. Neurological: Negative. Hematological: Negative. Psychiatric/Behavioral: Negative.         Vitals:    21 1139 21 1215   BP: (!) 159/92    Pulse: 89    Resp: 20    Temp: 98.3 °F (36.8 °C)    SpO2: 96% 98%   Weight: 156.5 kg (345 lb)    Height: 5' 8\" (1.727 m)             Physical Exam  Vitals and nursing note reviewed. Constitutional:       Appearance: Normal appearance. She is obese. HENT:      Head: Normocephalic. Nose: Nose normal.      Mouth/Throat:        Comments: Dental pain tooth #16, #32 tooth #18 and tooth #2. Mild swelling. Eyes:      Pupils: Pupils are equal, round, and reactive to light. Cardiovascular:      Rate and Rhythm: Normal rate. Pulmonary:      Effort: Pulmonary effort is normal.   Abdominal:      General: Abdomen is flat. Bowel sounds are normal.      Palpations: Abdomen is soft. Musculoskeletal:         General: Normal range of motion. Cervical back: Normal range of motion. Skin:     General: Skin is warm. Capillary Refill: Capillary refill takes less than 2 seconds. Neurological:      General: No focal deficit present. Mental Status: She is alert. [facility]   Medical  records reviewed nurses notes  Procedures      Dx:     Plan: Discharge home with follow-up with dentist next week.

## 2021-08-31 DIAGNOSIS — F33.9 EPISODE OF RECURRENT MAJOR DEPRESSIVE DISORDER, UNSPECIFIED DEPRESSION EPISODE SEVERITY (HCC): ICD-10-CM

## 2021-08-31 DIAGNOSIS — F33.1 MODERATE EPISODE OF RECURRENT MAJOR DEPRESSIVE DISORDER (HCC): ICD-10-CM

## 2021-08-31 DIAGNOSIS — F41.1 GENERALIZED ANXIETY DISORDER: ICD-10-CM

## 2021-08-31 RX ORDER — ALPRAZOLAM 0.25 MG/1
TABLET ORAL
Qty: 30 TABLET | Refills: 0 | Status: SHIPPED | OUTPATIENT
Start: 2021-08-31 | End: 2021-10-01

## 2021-08-31 RX ORDER — CITALOPRAM 40 MG/1
TABLET, FILM COATED ORAL
Qty: 90 TABLET | Refills: 0 | Status: SHIPPED | OUTPATIENT
Start: 2021-08-31 | End: 2021-11-01

## 2021-08-31 RX ORDER — ARIPIPRAZOLE 10 MG/1
TABLET ORAL
Qty: 90 TABLET | Refills: 0 | Status: SHIPPED | OUTPATIENT
Start: 2021-08-31 | End: 2021-10-01

## 2021-09-17 ENCOUNTER — DOCUMENTATION ONLY (OUTPATIENT)
Dept: SURGERY | Age: 56
End: 2021-09-17

## 2021-09-17 ENCOUNTER — TELEPHONE (OUTPATIENT)
Dept: SURGERY | Age: 56
End: 2021-09-17

## 2021-09-17 NOTE — PROGRESS NOTES
Contacted patient to make her aware that she missed her appointment with [facility] today. Left the patient a voicemail to call back to reschedule and will send a letter.

## 2021-10-01 DIAGNOSIS — F33.1 MODERATE EPISODE OF RECURRENT MAJOR DEPRESSIVE DISORDER (HCC): ICD-10-CM

## 2021-10-01 DIAGNOSIS — F41.1 GENERALIZED ANXIETY DISORDER: ICD-10-CM

## 2021-10-01 RX ORDER — ALPRAZOLAM 0.25 MG/1
TABLET ORAL
Qty: 30 TABLET | Refills: 0 | Status: SHIPPED | OUTPATIENT
Start: 2021-10-01 | End: 2021-11-01

## 2021-10-01 RX ORDER — ARIPIPRAZOLE 10 MG/1
TABLET ORAL
Qty: 90 TABLET | Refills: 0 | Status: SHIPPED | OUTPATIENT
Start: 2021-10-01 | End: 2022-03-04

## 2021-10-04 ENCOUNTER — VIRTUAL VISIT (OUTPATIENT)
Dept: SURGERY | Age: 56
End: 2021-10-04
Payer: MEDICARE

## 2021-10-04 VITALS — HEIGHT: 68 IN | WEIGHT: 293 LBS | BODY MASS INDEX: 44.41 KG/M2

## 2021-10-04 DIAGNOSIS — E66.01 MORBID OBESITY WITH BMI OF 50.0-59.9, ADULT (HCC): ICD-10-CM

## 2021-10-04 DIAGNOSIS — K91.2 POSTOPERATIVE INTESTINAL MALABSORPTION: Primary | ICD-10-CM

## 2021-10-04 DIAGNOSIS — R63.5 WEIGHT GAIN: ICD-10-CM

## 2021-10-04 PROCEDURE — G8417 CALC BMI ABV UP PARAM F/U: HCPCS | Performed by: NURSE PRACTITIONER

## 2021-10-04 PROCEDURE — G8756 NO BP MEASURE DOC: HCPCS | Performed by: NURSE PRACTITIONER

## 2021-10-04 PROCEDURE — G9717 DOC PT DX DEP/BP F/U NT REQ: HCPCS | Performed by: NURSE PRACTITIONER

## 2021-10-04 PROCEDURE — 99213 OFFICE O/P EST LOW 20 MIN: CPT | Performed by: NURSE PRACTITIONER

## 2021-10-04 PROCEDURE — 3017F COLORECTAL CA SCREEN DOC REV: CPT | Performed by: NURSE PRACTITIONER

## 2021-10-04 PROCEDURE — G8427 DOCREV CUR MEDS BY ELIG CLIN: HCPCS | Performed by: NURSE PRACTITIONER

## 2021-10-04 RX ORDER — PANTOPRAZOLE SODIUM 40 MG/1
TABLET, DELAYED RELEASE ORAL
Qty: 30 TABLET | Refills: 0 | Status: SHIPPED | OUTPATIENT
Start: 2021-10-04 | End: 2022-03-04

## 2021-10-04 NOTE — PROGRESS NOTES
1. Have you been to the ER, urgent care clinic since your last visit? Hospitalized since your last visit? no    2. Have you seen or consulted any other health care providers outside of the [address] [place] [address] since your last visit? Include any pap smears or colon screening.  no

## 2021-10-08 NOTE — PROGRESS NOTES
I was in the office while conducting this encounter. Consent:  She and/or her healthcare decision maker is aware that this patient-initiated Telehealth encounter is a billable service, with coverage as determined by her insurance carrier. She is aware that she may receive a bill and has provided verbal consent to proceed: Yes    This virtual visit was conducted via [employer]. Pursuant to the emergency declaration under the [facility] waiver authority and the [employer] Act, this Virtual  Visit was conducted to reduce the patient's risk of exposure to COVID-19 and provide continuity of care for an established patient. Services were provided through a video synchronous discussion virtually to substitute for in-person clinic visit. Due to this being a TeleHealth evaluation, many elements of the physical examination are unable to be assessed. Total Time: minutes: 21-30 minutes. Chief Complaint   Patient presents with    Surgical Follow-up     9 months s/p revision lap sleeve gastrectomy to lap gastric bypass, gained 19 lbs since 4/22/21, virtual # [phone]    Morbid Obesity    Weight Gain       Lorena Roca 9-month status post revision of a sleeve gastrectomy to a laparoscopic gastric bypass. She is gained about 20 pounds since her last visit. She said she is really been struggling and \"not doing right\". She is having a lot of arthritis pain and is going be going to pain management. Is difficult for her to walk. She has been using the scooter more particular if she goes to the store. She admits she has been eating for comfort. She has been eating fried chicken and snacking on chips and drinking Pepsi. Sleep is poor and she only gets about 2 hours a night.   She denies any nausea or vomiting  Bowels are moving most days  Denies GERD  She is frustrated by her weight gain but she understands that her diet has contributed to her weight gain  Visit Vitals  Ht 5' 8\" (1.727 m)   Wt 340 lb (154.2 kg)   BMI 51.70 kg/m²     She appears well  Speech is clear breathing is unlabored  Mucous membranes appear moist    ICD-10-CM ICD-9-CM    1. Postoperative intestinal malabsorption  K91.2 579.3    2. Morbid obesity with BMI of 50.0-59.9, adult ([facility])  E66.01 278.01     Z68.43 V85.43    3. Weight gain  R63.5 783.1      9-month status post revision of a sleeve gastrectomy to a gastric bypass with weight regain  Like her to get with the dietitian and I will send her information in the mail with how to make that appointment. She did not have my chart  Encouraged her to get back to drinking her water and leave the sodas alone  Start doing her protein shakes again versus relying on convenience food and snacks  Keep the junk out of the house  She can use her air Lorrayne Kiera versus traditional frying methods which will minimize her fat intake  Hopefully pain management can get her some relief with regard to her pain but I did stress to that how she is eating and what she is drinking may also be contributing to her inflammation in her overall pain  Reminded to avoid NSAIDs  Follow-up in 3 months and hopefully she will see the dietitian in the interim  She is had recent labs with her primary care  Continue bariatric vitamins daily  Anastacio Bennett  verbalized understanding and questions were answered to the best of my knowledge and ability. Diet and RD  educational materials were provided.

## 2021-10-08 NOTE — PATIENT INSTRUCTIONS
to make an appointment with one of the bariatric dietitians, please call the bariatric line at [phone]. Appointments are offered in person and virtual at no charge. Avoid soda and all carbonated drinks. Drinks like Pepsi or Coke will make your pain worse because they cause more inflammation. Get back to drinking her water. Start using her protein drinks again he can use them in place of a meal.    Leave the junk at the store. Start using an air Terrie versus oil frying. This will minimize your fat intake and help support your weight loss. Stay on your bariatric vitamins every day    Avoid ALL NSAID'S, including:  Advil, Motrin, Aleve, BC Powders, Excedrin, Naproxen, Ibuprofen, Goodies, Voltaren, Mobic, Diclofenac, etc.      If you are given a new prescription for pain ask if it is a NSAID (non-steroidal antiinflammatory drug). If it is, you should not take after having gastric bypass. Zoom Support Group   2nd Thursday of each Month from 6-7 pm   The next one is 10/14/21 6-7 pm   You can access the link at [email or web address]  Go to the Calendar tab and click on the date and it should go right to the link     Additional Resources:  Sheng Salazar:  [email or web address]    Offering online support groups and pre-recorded videos  o Every Wednesday evening at 7:00 pm   [employer] Facebook page  Mika [address], Advice, and Motivation from fellow patients  Bariatric Pal: [email or web address]  [employer]ariatricPal has launched a podcast!  Hosted by Sonya Keith, our podcast will cover topics about obesity, pre-weight loss surgery, post-weight loss surgery, food addiction, emotional eating, myths about weight loss surgery, and more. Each episode will feature an expert in the field of weight loss and bariatric surgery who can provide a deeper insight into these topics.   [employer]:  [employer]   Offering discounted exercise programs (8 Week programs, On-Demand/Virtual)   See flyer   [address] at [email or web address] or [phone]

## 2021-10-11 ENCOUNTER — TELEPHONE (OUTPATIENT)
Dept: SURGERY | Age: 56
End: 2021-10-11

## 2021-11-01 DIAGNOSIS — F33.9 EPISODE OF RECURRENT MAJOR DEPRESSIVE DISORDER, UNSPECIFIED DEPRESSION EPISODE SEVERITY (HCC): ICD-10-CM

## 2021-11-01 DIAGNOSIS — F41.1 GENERALIZED ANXIETY DISORDER: ICD-10-CM

## 2021-11-01 RX ORDER — CITALOPRAM 40 MG/1
TABLET, FILM COATED ORAL
Qty: 90 TABLET | Refills: 0 | Status: SHIPPED | OUTPATIENT
Start: 2021-11-01 | End: 2021-11-30

## 2021-11-01 RX ORDER — ALPRAZOLAM 0.25 MG/1
TABLET ORAL
Qty: 30 TABLET | Refills: 0 | Status: SHIPPED | OUTPATIENT
Start: 2021-11-01 | End: 2021-11-30

## 2021-11-20 ENCOUNTER — APPOINTMENT (OUTPATIENT)
Dept: GENERAL RADIOLOGY | Age: 56
End: 2021-11-20
Attending: EMERGENCY MEDICINE
Payer: MEDICARE

## 2021-11-20 ENCOUNTER — APPOINTMENT (OUTPATIENT)
Dept: CT IMAGING | Age: 56
End: 2021-11-20
Attending: EMERGENCY MEDICINE
Payer: MEDICARE

## 2021-11-20 ENCOUNTER — HOSPITAL ENCOUNTER (EMERGENCY)
Age: 56
Discharge: HOME | End: 2021-11-20
Attending: EMERGENCY MEDICINE
Payer: MEDICARE

## 2021-11-20 VITALS
OXYGEN SATURATION: 100 % | SYSTOLIC BLOOD PRESSURE: 129 MMHG | DIASTOLIC BLOOD PRESSURE: 93 MMHG | HEART RATE: 78 BPM | RESPIRATION RATE: 18 BRPM | TEMPERATURE: 98.4 F | BODY MASS INDEX: 44.41 KG/M2 | HEIGHT: 68 IN | WEIGHT: 293 LBS

## 2021-11-20 DIAGNOSIS — R10.84 ABDOMINAL PAIN, GENERALIZED: Primary | ICD-10-CM

## 2021-11-20 LAB
ALBUMIN SERPL-MCNC: 4.6 G/DL (ref 3.5–5)
ALBUMIN/GLOB SERPL: 1.1 {RATIO} (ref 1.1–2.2)
ALP SERPL-CCNC: 199 U/L (ref 45–117)
ALT SERPL-CCNC: 29 U/L (ref 12–78)
ANION GAP SERPL CALC-SCNC: 13 MMOL/L (ref 5–15)
APPEARANCE UR: CLEAR
AST SERPL W P-5'-P-CCNC: 23 U/L (ref 15–37)
BACTERIA URNS QL MICRO: ABNORMAL /HPF
BASOPHILS # BLD: 0.1 K/UL (ref 0–0.2)
BASOPHILS NFR BLD: 1 % (ref 0–2.5)
BILIRUB SERPL-MCNC: 0.3 MG/DL (ref 0.2–1)
BILIRUB UR QL: NEGATIVE
BUN SERPL-MCNC: 8 MG/DL (ref 6–20)
BUN/CREAT SERPL: 12 (ref 12–20)
CA-I BLD-MCNC: 9.8 MG/DL (ref 8.5–10.1)
CHLORIDE SERPL-SCNC: 102 MMOL/L (ref 97–108)
CO2 SERPL-SCNC: 27 MMOL/L (ref 21–32)
COLOR UR: ABNORMAL
CREAT SERPL-MCNC: 0.65 MG/DL (ref 0.55–1.02)
EOSINOPHIL # BLD: 0.1 K/UL (ref 0–0.7)
EOSINOPHIL NFR BLD: 1 % (ref 0.9–2.9)
ERYTHROCYTE [DISTWIDTH] IN BLOOD BY AUTOMATED COUNT: 17.3 % (ref 11.5–14.5)
GLOBULIN SER CALC-MCNC: 4.1 G/DL (ref 2–4)
GLUCOSE SERPL-MCNC: 104 MG/DL (ref 65–100)
GLUCOSE UR STRIP.AUTO-MCNC: NEGATIVE MG/DL
HCT VFR BLD AUTO: 40.6 % (ref 36–46)
HGB BLD-MCNC: 13.7 G/DL (ref 13.5–17.5)
HGB UR QL STRIP: ABNORMAL
KETONES UR QL STRIP.AUTO: NEGATIVE MG/DL
LACTATE SERPL-SCNC: 1.7 MMOL/L (ref 0.4–2)
LEUKOCYTE ESTERASE UR QL STRIP.AUTO: NEGATIVE
LIPASE SERPL-CCNC: 55 U/L (ref 73–393)
LYMPHOCYTES # BLD: 1.5 K/UL (ref 1–4.8)
LYMPHOCYTES NFR BLD: 27 % (ref 20.5–51.1)
MAGNESIUM SERPL-MCNC: 2.3 MG/DL (ref 1.6–2.4)
MCH RBC QN AUTO: 30 PG (ref 31–34)
MCHC RBC AUTO-ENTMCNC: 33.8 G/DL (ref 31–36)
MCV RBC AUTO: 88.6 FL (ref 80–100)
MONOCYTES # BLD: 0.2 K/UL (ref 0.2–2.4)
MONOCYTES NFR BLD: 3 % (ref 1.7–9.3)
NEUTS SEG # BLD: 3.7 K/UL (ref 1.8–7.7)
NEUTS SEG NFR BLD: 68 % (ref 42–75)
NITRITE UR QL STRIP.AUTO: NEGATIVE
NRBC # BLD: 0.01 K/UL
NRBC BLD-RTO: 0.1 PER 100 WBC
PH UR STRIP: 6 [PH] (ref 5–8)
PLATELET # BLD AUTO: 225 K/UL (ref 150–400)
PMV BLD AUTO: 8.8 FL (ref 6.5–11.5)
POTASSIUM SERPL-SCNC: 3.8 MMOL/L (ref 3.5–5.1)
PROT SERPL-MCNC: 8.7 G/DL (ref 6.4–8.2)
PROT UR STRIP-MCNC: ABNORMAL MG/DL
RBC # BLD AUTO: 4.58 M/UL (ref 4.5–5.9)
RBC #/AREA URNS HPF: ABNORMAL /HPF (ref 0–3)
SODIUM SERPL-SCNC: 142 MMOL/L (ref 136–145)
SP GR UR REFRACTOMETRY: 1.02 (ref 1–1.03)
UROBILINOGEN UR QL STRIP.AUTO: 0.2 EU/DL (ref 0.2–1)
WBC # BLD AUTO: 5.5 K/UL (ref 4.4–11.3)
WBC URNS QL MICRO: ABNORMAL /HPF (ref 0–5)

## 2021-11-20 PROCEDURE — 83735 ASSAY OF MAGNESIUM: CPT

## 2021-11-20 PROCEDURE — 83605 ASSAY OF LACTIC ACID: CPT

## 2021-11-20 PROCEDURE — 71045 X-RAY EXAM CHEST 1 VIEW: CPT

## 2021-11-20 PROCEDURE — 85025 COMPLETE CBC W/AUTO DIFF WBC: CPT

## 2021-11-20 PROCEDURE — 81001 URINALYSIS AUTO W/SCOPE: CPT

## 2021-11-20 PROCEDURE — 96374 THER/PROPH/DIAG INJ IV PUSH: CPT

## 2021-11-20 PROCEDURE — 80053 COMPREHEN METABOLIC PANEL: CPT

## 2021-11-20 PROCEDURE — 74011000636 HC RX REV CODE- 636: Performed by: EMERGENCY MEDICINE

## 2021-11-20 PROCEDURE — 74011250636 HC RX REV CODE- 250/636: Performed by: EMERGENCY MEDICINE

## 2021-11-20 PROCEDURE — 99283 EMERGENCY DEPT VISIT LOW MDM: CPT

## 2021-11-20 PROCEDURE — 36415 COLL VENOUS BLD VENIPUNCTURE: CPT

## 2021-11-20 PROCEDURE — 74177 CT ABD & PELVIS W/CONTRAST: CPT

## 2021-11-20 PROCEDURE — 83690 ASSAY OF LIPASE: CPT

## 2021-11-20 PROCEDURE — 96375 TX/PRO/DX INJ NEW DRUG ADDON: CPT

## 2021-11-20 PROCEDURE — 74011250637 HC RX REV CODE- 250/637: Performed by: EMERGENCY MEDICINE

## 2021-11-20 RX ORDER — ONDANSETRON 2 MG/ML
4 INJECTION INTRAMUSCULAR; INTRAVENOUS
Status: COMPLETED | OUTPATIENT
Start: 2021-11-20 | End: 2021-11-20

## 2021-11-20 RX ORDER — OXYCODONE AND ACETAMINOPHEN 5; 325 MG/1; MG/1
1 TABLET ORAL
Status: COMPLETED | OUTPATIENT
Start: 2021-11-20 | End: 2021-11-20

## 2021-11-20 RX ORDER — KETOROLAC TROMETHAMINE 30 MG/ML
15 INJECTION, SOLUTION INTRAMUSCULAR; INTRAVENOUS
Status: DISCONTINUED | OUTPATIENT
Start: 2021-11-20 | End: 2021-11-20

## 2021-11-20 RX ORDER — FENTANYL CITRATE 50 UG/ML
50 INJECTION, SOLUTION INTRAMUSCULAR; INTRAVENOUS
Status: COMPLETED | OUTPATIENT
Start: 2021-11-20 | End: 2021-11-20

## 2021-11-20 RX ORDER — KETOROLAC TROMETHAMINE 30 MG/ML
30 INJECTION, SOLUTION INTRAMUSCULAR; INTRAVENOUS
Status: DISCONTINUED | OUTPATIENT
Start: 2021-11-20 | End: 2021-11-20

## 2021-11-20 RX ADMIN — FENTANYL CITRATE 50 MCG: 50 INJECTION, SOLUTION INTRAMUSCULAR; INTRAVENOUS at 14:35

## 2021-11-20 RX ADMIN — OXYCODONE AND ACETAMINOPHEN 1 TABLET: 5; 325 TABLET ORAL at 16:41

## 2021-11-20 RX ADMIN — IOPAMIDOL 100 ML: 755 INJECTION, SOLUTION INTRAVENOUS at 15:19

## 2021-11-20 RX ADMIN — ONDANSETRON 4 MG: 2 INJECTION INTRAMUSCULAR; INTRAVENOUS at 14:34

## 2021-11-20 NOTE — ED PROVIDER NOTES
EMERGENCY DEPARTMENT HISTORY AND PHYSICAL EXAM      Date: 11/20/2021  Patient Name: Milton Su    History of Presenting Illness     Chief Complaint   Patient presents with    Abdominal Pain       History Provided By: Patient    HPI: Milton Su, 64 y.o. female with a past medical history significant diabetes, hypertension, obesity and sleep apnea presents to the ED with cc of abd pain since Tuesday. Pain scale 9/10. Patint she had a bowel obstruction and this feels like that felt. She denies constipation,nausea, vomiting, fevers, or chills. No recent trauma. S/p gastric bypass this earlier year. There are no other complaints, changes, or physical findings at this time. PCP: Praveen Benites MD    No current facility-administered medications on file prior to encounter. Current Outpatient Medications on File Prior to Encounter   Medication Sig Dispense Refill    ALPRAZolam (XANAX) 0.25 mg tablet TAKE 1 TABLET BY MOUTH EVERY DAY AS NEEDED FOR ANXIETY  30 Tablet 0    citalopram (CELEXA) 40 mg tablet TAKE ONE (1) TABLET BY MOUTH DAILY. 90 Tablet 0    pantoprazole (PROTONIX) 40 mg tablet TAKE ONE TABLET BY MOUTH ONE TIME A DAY AFTER SURGERY. 30 Tablet 0    ARIPiprazole (ABILIFY) 10 mg tablet TAKE ONE (1) TABLET BY MOUTH DAILY. 90 Tablet 0    amoxicillin 500 mg tab Take 500 mg by mouth three (3) times daily for 10 days. Indications: an infection of the skin and the tissue below the skin 30 Tablet 0    omeprazole (PRILOSEC) 40 mg capsule       metFORMIN (GLUCOPHAGE) 500 mg tablet       lisinopriL (PRINIVIL, ZESTRIL) 40 mg tablet Take 40 mg by mouth daily.  metoprolol succinate (TOPROL-XL) 50 mg XL tablet Take 50 mg by mouth daily.  tiZANidine (ZANAFLEX) 4 mg capsule Take 4 mg by mouth three (3) times daily.  pravastatin (PRAVACHOL) 20 mg tablet Take 20 mg by mouth nightly.  ketoconazole (NIZORAL) 2 % topical cream Apply  to affected area daily.       olopatadine (PATANOL) 0.1 % ophthalmic solution Administer 2 Drops to both eyes two (2) times a day.  calcium citrate/vitamin D3 (CALCIUM CITRATE + D PO) Take 600 mg by mouth daily.  cyanocobalamin (VITAMIN B-12) 500 mcg tablet Take 500 mcg by mouth daily.  multivitamin (ONE A DAY) tablet Take 1 Tab by mouth daily.  ergocalciferol (VITAMIN D2) 50,000 unit capsule Take 50,000 Units by mouth every seven (7) days.  amLODIPine (NORVASC) 10 mg tablet Take 10 mg by mouth daily.  butalbital-acetaminophen-caffeine (FIORICET) -40 mg per tablet Take 1 Tab by mouth daily as needed.          Past History     Past Medical History:  Past Medical History:   Diagnosis Date    Anxiety 2017    Arthritis     Asthma     Back injury     Depression     Essential hypertension     GERD (gastroesophageal reflux disease)     Morbid obesity ([address]     Sleep apnea in adult 3/6/2018    CPAP       Past Surgical History:  Past Surgical History:   Procedure Laterality Date    HX APPENDECTOMY      HX  SECTION      HX COLONOSCOPY      HX GASTRECTOMY      lap sleeve gastrectomy    HX HERNIA REPAIR  2021    Incarcerated hernia repair Dr. Jone Paredes      left knee    HX LAP GASTRIC BYPASS  2021    REVISION SLEEVE TO BYPASS - Tommy     HX LYSIS OF ADHESIONS  2021    Dr. Anya Martinez      rt knee partial replacement    HX ORTHOPAEDIC Right     LIGAMENT REPAIR    IR PLC IVC FILTER  2021    [place] LAP,DIAGNOSTIC ABDOMEN  2021    Dr. Verona Menjivar       Family History:  Family History   Problem Relation Age of Onset    Stroke Mother     Heart Disease Mother         PACEMAKER    Alcohol abuse Father     Seizures Father     Anesth Problems Neg Hx        Social History:  Social History     Tobacco Use    Smoking status: Former Smoker     Packs/day: 1.00     Years: 30.00     Pack years: 30.00     Quit date: 2020     Years since quittin.4    Smokeless tobacco: Never Used   Substance Use Topics    Alcohol use: No    Drug use: No       Allergies:  No Known Allergies      Review of Systems     Review of Systems   Constitutional: Negative. HENT: Negative. Eyes: Negative. Respiratory: Negative. Negative for shortness of breath. Cardiovascular: Negative. Negative for chest pain. Gastrointestinal: Positive for abdominal pain. Negative for constipation, diarrhea, nausea and vomiting. Endocrine: Negative. Genitourinary: Negative. Musculoskeletal: Negative. Skin: Negative. Allergic/Immunologic: Negative. Neurological: Negative. Hematological: Negative. Psychiatric/Behavioral: Negative. Physical Exam     Physical Exam  Vitals and nursing note reviewed. Constitutional:       Appearance: Normal appearance. Comments: She appears to be in mild distress   HENT:      Head: Normocephalic. Right Ear: Tympanic membrane normal.      Left Ear: Tympanic membrane normal.      Nose: Nose normal.      Mouth/Throat:      Mouth: Mucous membranes are moist.   Eyes:      Pupils: Pupils are equal, round, and reactive to light. Cardiovascular:      Rate and Rhythm: Normal rate. Pulses: Normal pulses. Pulmonary:      Effort: Pulmonary effort is normal.   Abdominal:      General: Abdomen is flat and protuberant. Bowel sounds are normal.      Palpations: Abdomen is soft. Tenderness: There is abdominal tenderness in the left upper quadrant. There is no right CVA tenderness, left CVA tenderness, guarding or rebound. Negative signs include Bravo's sign, Rovsing's sign, McBurney's sign, psoas sign and obturator sign. Hernia: No hernia is present. Musculoskeletal:         General: Normal range of motion. Cervical back: Normal range of motion and neck supple. Skin:     Capillary Refill: Capillary refill takes less than 2 seconds.    Neurological:      General: No focal deficit present. Mental Status: She is alert. Psychiatric:         Mood and Affect: Mood normal.         Lab and Diagnostic Study Results     Labs -   No results found for this or any previous visit (from the past 12 hour(s)). Radiologic Studies -   @lastxrresult@  CT Results  (Last 48 hours)    None        CXR Results  (Last 48 hours)    None            Medical Decision Making   - I am the first provider for this patient. - I reviewed the vital signs, available nursing notes, past medical history, past surgical history, family history and social history. - Initial assessment performed. The patients presenting problems have been discussed, and they are in agreement with the care plan formulated and outlined with them. I have encouraged them to ask questions as they arise throughout their visit. Vital Signs-Reviewed the patient's vital signs. Patient Vitals for the past 12 hrs:   Temp Pulse Resp BP SpO2   11/20/21 1356 98.4 °F (36.9 °C) 78 18 (!) 129/93 100 %       Records Reviewed: Nursing Notes    The patient presents with abdominal pain with a differential diagnosis of AAA, abdominal pain, appendicitis, biliary colic, cholecystitis, diverticulitis, gastritis, gastroenteritis, GERD, obstruction, pancreatitis, PUD, renal Colic and UTI      ED Course:          Provider Notes (Medical Decision Making): MDM       Procedures   Medical Decision Makingedical Decision Making  Performed by: Jodie Gill MD  PROCEDURES:Procedures       Disposition   Disposition: Condition stable        DISCHARGE PLAN:  1. Current Discharge Medication List      CONTINUE these medications which have NOT CHANGED    Details   ALPRAZolam (XANAX) 0.25 mg tablet TAKE 1 TABLET BY MOUTH EVERY DAY AS NEEDED FOR ANXIETY   Qty: 30 Tablet, Refills: 0    Associated Diagnoses: Generalized anxiety disorder      citalopram (CELEXA) 40 mg tablet TAKE ONE (1) TABLET BY MOUTH DAILY.    Qty: 90 Tablet, Refills: 0 Associated Diagnoses: Episode of recurrent major depressive disorder, unspecified depression episode severity (HCC)      pantoprazole (PROTONIX) 40 mg tablet TAKE ONE TABLET BY MOUTH ONE TIME A DAY AFTER SURGERY. Qty: 30 Tablet, Refills: 0      ARIPiprazole (ABILIFY) 10 mg tablet TAKE ONE (1) TABLET BY MOUTH DAILY. Qty: 90 Tablet, Refills: 0    Associated Diagnoses: Moderate episode of recurrent major depressive disorder (HCC)      amoxicillin 500 mg tab Take 500 mg by mouth three (3) times daily for 10 days. Indications: an infection of the skin and the tissue below the skin  Qty: 30 Tablet, Refills: 0      omeprazole (PRILOSEC) 40 mg capsule       metFORMIN (GLUCOPHAGE) 500 mg tablet       lisinopriL (PRINIVIL, ZESTRIL) 40 mg tablet Take 40 mg by mouth daily. metoprolol succinate (TOPROL-XL) 50 mg XL tablet Take 50 mg by mouth daily. tiZANidine (ZANAFLEX) 4 mg capsule Take 4 mg by mouth three (3) times daily. pravastatin (PRAVACHOL) 20 mg tablet Take 20 mg by mouth nightly.      ketoconazole (NIZORAL) 2 % topical cream Apply  to affected area daily. olopatadine (PATANOL) 0.1 % ophthalmic solution Administer 2 Drops to both eyes two (2) times a day. calcium citrate/vitamin D3 (CALCIUM CITRATE + D PO) Take 600 mg by mouth daily. cyanocobalamin (VITAMIN B-12) 500 mcg tablet Take 500 mcg by mouth daily. multivitamin (ONE A DAY) tablet Take 1 Tab by mouth daily. ergocalciferol (VITAMIN D2) 50,000 unit capsule Take 50,000 Units by mouth every seven (7) days. amLODIPine (NORVASC) 10 mg tablet Take 10 mg by mouth daily. butalbital-acetaminophen-caffeine (FIORICET) -40 mg per tablet Take 1 Tab by mouth daily as needed. 2.   Follow-up Information    None       3. Return to ED if worse   4. Current Discharge Medication List            Diagnosis     Clinical Impression:    ICD-10-CM ICD-9-CM    1.  Abdominal pain, generalized  R10.84 789.07 ICD-10-CM ICD-9-CM    1. Abdominal pain, LUQ (left upper quadrant)  R10.12 789.02        Tommie Cabezas MD    Please note that this dictation was completed with [employer], the computer voice recognition software. Quite often unanticipated grammatical, syntax, homophones, and other interpretive errors are inadvertently transcribed by the computer software. Please disregard these errors. Please excuse any errors that have escaped final proofreading. Thank you.

## 2021-11-20 NOTE — ED TRIAGE NOTES
Patient reports abdominal pain that has been going on since Tuesday. Reports bowel obstruction January 26, & had a gastric bypass surgery shortly after then. Reports last bowel movement was yesterday which was normal & reports normal urination.   Denies NVD

## 2021-11-30 DIAGNOSIS — F41.1 GENERALIZED ANXIETY DISORDER: ICD-10-CM

## 2021-11-30 DIAGNOSIS — F33.9 EPISODE OF RECURRENT MAJOR DEPRESSIVE DISORDER, UNSPECIFIED DEPRESSION EPISODE SEVERITY (HCC): ICD-10-CM

## 2021-11-30 RX ORDER — ALPRAZOLAM 0.25 MG/1
TABLET ORAL
Qty: 30 TABLET | Refills: 0 | Status: SHIPPED | OUTPATIENT
Start: 2021-11-30 | End: 2022-01-03

## 2021-11-30 RX ORDER — CITALOPRAM 40 MG/1
TABLET, FILM COATED ORAL
Qty: 90 TABLET | Refills: 0 | Status: SHIPPED | OUTPATIENT
Start: 2021-11-30 | End: 2022-06-07

## 2021-12-03 ENCOUNTER — APPOINTMENT (OUTPATIENT)
Dept: ULTRASOUND IMAGING | Age: 56
End: 2021-12-03
Attending: EMERGENCY MEDICINE
Payer: MEDICARE

## 2021-12-03 ENCOUNTER — HOSPITAL ENCOUNTER (EMERGENCY)
Age: 56
Discharge: HOME | End: 2021-12-03
Attending: EMERGENCY MEDICINE
Payer: MEDICARE

## 2021-12-03 VITALS
TEMPERATURE: 98.8 F | RESPIRATION RATE: 19 BRPM | WEIGHT: 293 LBS | DIASTOLIC BLOOD PRESSURE: 76 MMHG | SYSTOLIC BLOOD PRESSURE: 189 MMHG | HEART RATE: 87 BPM | HEIGHT: 68 IN | OXYGEN SATURATION: 95 % | BODY MASS INDEX: 44.41 KG/M2

## 2021-12-03 DIAGNOSIS — R10.13 ABDOMINAL PAIN, EPIGASTRIC: ICD-10-CM

## 2021-12-03 DIAGNOSIS — K80.20 CALCULUS OF GALLBLADDER WITHOUT CHOLECYSTITIS WITHOUT OBSTRUCTION: Primary | ICD-10-CM

## 2021-12-03 LAB
ALBUMIN SERPL-MCNC: 4.3 G/DL (ref 3.5–5)
ALBUMIN/GLOB SERPL: 0.9 {RATIO} (ref 1.1–2.2)
ALP SERPL-CCNC: 199 U/L (ref 45–117)
ALT SERPL-CCNC: 27 U/L (ref 12–78)
ANION GAP SERPL CALC-SCNC: 12 MMOL/L (ref 5–15)
AST SERPL W P-5'-P-CCNC: 19 U/L (ref 15–37)
BASOPHILS # BLD: 0.1 K/UL (ref 0–0.2)
BASOPHILS NFR BLD: 1 % (ref 0–2.5)
BILIRUB SERPL-MCNC: 0.2 MG/DL (ref 0.2–1)
BUN SERPL-MCNC: 9 MG/DL (ref 6–20)
BUN/CREAT SERPL: 13 (ref 12–20)
CA-I BLD-MCNC: 9.2 MG/DL (ref 8.5–10.1)
CHLORIDE SERPL-SCNC: 102 MMOL/L (ref 97–108)
CO2 SERPL-SCNC: 28 MMOL/L (ref 21–32)
CREAT SERPL-MCNC: 0.68 MG/DL (ref 0.55–1.02)
EOSINOPHIL # BLD: 0.1 K/UL (ref 0–0.7)
EOSINOPHIL NFR BLD: 1 % (ref 0.9–2.9)
ERYTHROCYTE [DISTWIDTH] IN BLOOD BY AUTOMATED COUNT: 16.9 % (ref 11.5–14.5)
GLOBULIN SER CALC-MCNC: 4.6 G/DL (ref 2–4)
GLUCOSE SERPL-MCNC: 123 MG/DL (ref 65–100)
HCT VFR BLD AUTO: 38.2 % (ref 36–46)
HGB BLD-MCNC: 12.8 G/DL (ref 13.5–17.5)
INR PPP: 1 (ref 0.9–1.1)
LIPASE SERPL-CCNC: 64 U/L (ref 73–393)
LYMPHOCYTES # BLD: 1.9 K/UL (ref 1–4.8)
LYMPHOCYTES NFR BLD: 27 % (ref 20.5–51.1)
MCH RBC QN AUTO: 29.9 PG (ref 31–34)
MCHC RBC AUTO-ENTMCNC: 33.5 G/DL (ref 31–36)
MCV RBC AUTO: 89.3 FL (ref 80–100)
MONOCYTES # BLD: 0.4 K/UL (ref 0.2–2.4)
MONOCYTES NFR BLD: 5 % (ref 1.7–9.3)
NEUTS SEG # BLD: 4.4 K/UL (ref 1.8–7.7)
NEUTS SEG NFR BLD: 66 % (ref 42–75)
NRBC # BLD: 0.01 K/UL
NRBC BLD-RTO: 0.2 PER 100 WBC
PLATELET # BLD AUTO: 205 K/UL (ref 150–400)
PMV BLD AUTO: 9.1 FL (ref 6.5–11.5)
POTASSIUM SERPL-SCNC: 3.2 MMOL/L (ref 3.5–5.1)
PROT SERPL-MCNC: 8.9 G/DL (ref 6.4–8.2)
PROTHROMBIN TIME: 9.9 SEC (ref 9–11.1)
RBC # BLD AUTO: 4.28 M/UL (ref 4.5–5.9)
SODIUM SERPL-SCNC: 142 MMOL/L (ref 136–145)
WBC # BLD AUTO: 6.8 K/UL (ref 4.4–11.3)

## 2021-12-03 PROCEDURE — 74011250637 HC RX REV CODE- 250/637: Performed by: EMERGENCY MEDICINE

## 2021-12-03 PROCEDURE — 80053 COMPREHEN METABOLIC PANEL: CPT

## 2021-12-03 PROCEDURE — 85610 PROTHROMBIN TIME: CPT

## 2021-12-03 PROCEDURE — 76705 ECHO EXAM OF ABDOMEN: CPT

## 2021-12-03 PROCEDURE — 99284 EMERGENCY DEPT VISIT MOD MDM: CPT

## 2021-12-03 PROCEDURE — 36415 COLL VENOUS BLD VENIPUNCTURE: CPT

## 2021-12-03 PROCEDURE — 74011000250 HC RX REV CODE- 250: Performed by: EMERGENCY MEDICINE

## 2021-12-03 PROCEDURE — 85025 COMPLETE CBC W/AUTO DIFF WBC: CPT

## 2021-12-03 PROCEDURE — 83690 ASSAY OF LIPASE: CPT

## 2021-12-03 RX ORDER — PANTOPRAZOLE SODIUM 40 MG/1
40 TABLET, DELAYED RELEASE ORAL ONCE
Status: COMPLETED | OUTPATIENT
Start: 2021-12-03 | End: 2021-12-03

## 2021-12-03 RX ORDER — MAG HYDROX/ALUMINUM HYD/SIMETH 200-200-20
30 SUSPENSION, ORAL (FINAL DOSE FORM) ORAL
Qty: 1 EACH | Refills: 0 | Status: SHIPPED | OUTPATIENT
Start: 2021-12-03

## 2021-12-03 RX ORDER — MAG HYDROX/ALUMINUM HYD/SIMETH 200-200-20
30 SUSPENSION, ORAL (FINAL DOSE FORM) ORAL ONCE
Status: COMPLETED | OUTPATIENT
Start: 2021-12-03 | End: 2021-12-03

## 2021-12-03 RX ORDER — LIDOCAINE HYDROCHLORIDE 20 MG/ML
15 SOLUTION OROPHARYNGEAL ONCE
Status: COMPLETED | OUTPATIENT
Start: 2021-12-03 | End: 2021-12-03

## 2021-12-03 RX ORDER — ONDANSETRON 2 MG/ML
4 INJECTION INTRAMUSCULAR; INTRAVENOUS ONCE
Status: DISCONTINUED | OUTPATIENT
Start: 2021-12-03 | End: 2021-12-03 | Stop reason: HOSPADM

## 2021-12-03 RX ORDER — ONDANSETRON 4 MG/1
4 TABLET, ORALLY DISINTEGRATING ORAL
Status: COMPLETED | OUTPATIENT
Start: 2021-12-03 | End: 2021-12-03

## 2021-12-03 RX ADMIN — MAGNESIUM HYDROXIDE/ALUMINUM HYDROXICE/SIMETHICONE 30 ML: 120; 1200; 1200 SUSPENSION ORAL at 10:43

## 2021-12-03 RX ADMIN — PANTOPRAZOLE SODIUM 40 MG: 40 TABLET, DELAYED RELEASE ORAL at 10:44

## 2021-12-03 RX ADMIN — LIDOCAINE HYDROCHLORIDE 15 ML: 20 SOLUTION ORAL; TOPICAL at 10:43

## 2021-12-03 RX ADMIN — ONDANSETRON 4 MG: 4 TABLET, ORALLY DISINTEGRATING ORAL at 10:43

## 2021-12-03 NOTE — ED NOTES
Pt given discharge and follow instructions. Pt advised to follow up with PCP. Pt advised to continue Protonix and low fat diet per MD. Pt to follow with Dr. Jessenia Wilson. No further questions at this time.

## 2021-12-03 NOTE — ED PROVIDER NOTES
EMERGENCY DEPARTMENT HISTORY AND PHYSICAL EXAM      Date: 12/3/2021  Patient Name: Maxine De Leon    History of Presenting Illness     Chief Complaint   Patient presents with    Abdominal Pain       History Provided By: Patient    HPI: Maxine De Leon, 64 y.o. female with a past medical history significant Gastric bypass surgery 11 months ago presents to the ED with cc of abdominal pain for the past 2 weeks    There are no other complaints, changes, or physical findings at this time. PCP: Nina Carvalho MD    No current facility-administered medications on file prior to encounter. Current Outpatient Medications on File Prior to Encounter   Medication Sig Dispense Refill    citalopram (CELEXA) 40 mg tablet TAKE ONE (1) TABLET BY MOUTH DAILY. 90 Tablet 0    ALPRAZolam (XANAX) 0.25 mg tablet TAKE 1 TABLET BY MOUTH EVERY DAY AS NEEDED FOR ANXIETY 30 Tablet 0    pantoprazole (PROTONIX) 40 mg tablet TAKE ONE TABLET BY MOUTH ONE TIME A DAY AFTER SURGERY. 30 Tablet 0    ARIPiprazole (ABILIFY) 10 mg tablet TAKE ONE (1) TABLET BY MOUTH DAILY. 90 Tablet 0    amoxicillin 500 mg tab Take 500 mg by mouth three (3) times daily for 10 days. Indications: an infection of the skin and the tissue below the skin 30 Tablet 0    omeprazole (PRILOSEC) 40 mg capsule       metFORMIN (GLUCOPHAGE) 500 mg tablet       lisinopriL (PRINIVIL, ZESTRIL) 40 mg tablet Take 40 mg by mouth daily.  metoprolol succinate (TOPROL-XL) 50 mg XL tablet Take 50 mg by mouth daily.  tiZANidine (ZANAFLEX) 4 mg capsule Take 4 mg by mouth three (3) times daily.  pravastatin (PRAVACHOL) 20 mg tablet Take 20 mg by mouth nightly.  ketoconazole (NIZORAL) 2 % topical cream Apply  to affected area daily.  olopatadine (PATANOL) 0.1 % ophthalmic solution Administer 2 Drops to both eyes two (2) times a day.  calcium citrate/vitamin D3 (CALCIUM CITRATE + D PO) Take 600 mg by mouth daily.       cyanocobalamin (VITAMIN B-12) 500 mcg tablet Take 500 mcg by mouth daily.  multivitamin (ONE A DAY) tablet Take 1 Tab by mouth daily.  ergocalciferol (VITAMIN D2) 50,000 unit capsule Take 50,000 Units by mouth every seven (7) days.  amLODIPine (NORVASC) 10 mg tablet Take 10 mg by mouth daily.  butalbital-acetaminophen-caffeine (FIORICET) -40 mg per tablet Take 1 Tab by mouth daily as needed.          Past History     Past Medical History:  Past Medical History:   Diagnosis Date    Anxiety 2017    Arthritis     Asthma     Back injury     Depression     Essential hypertension     GERD (gastroesophageal reflux disease)     Morbid obesity ([facility] [address]     Sleep apnea in adult 3/6/2018    CPAP       Past Surgical History:  Past Surgical History:   Procedure Laterality Date    HX APPENDECTOMY      HX  SECTION      HX COLONOSCOPY      HX GASTRECTOMY      lap sleeve gastrectomy    HX HERNIA REPAIR  2021    Incarcerated hernia repair Dr. Jag Laguna      left knee    HX LAP GASTRIC BYPASS  2021    REVISION SLEEVE TO BYPASS - Tommy     HX LYSIS OF ADHESIONS  2021    Dr. Dupree Bile      rt knee partial replacement    HX ORTHOPAEDIC Right     LIGAMENT REPAIR    IR [facility] IVC FILTER  2021    [place] LAP,DIAGNOSTIC ABDOMEN  2021    Dr. Latonia Gonzalez       Family History:  Family History   Problem Relation Age of Onset    Stroke Mother     Heart Disease Mother         PACEMAKER    Alcohol abuse Father     Seizures Father     Anesth Problems Neg Hx        Social History:  Social History     Tobacco Use    Smoking status: Former Smoker     Packs/day: 1.00     Years: 30.00     Pack years: 30.00     Quit date: 2020     Years since quittin.5    Smokeless tobacco: Never Used   Substance Use Topics    Alcohol use: No    Drug use: No       Allergies:  No Known Allergies      Review of Systems     Review of Systems Constitutional: Negative for chills and fever. HENT: Negative for rhinorrhea and sore throat. Eyes: Negative for pain and visual disturbance. Respiratory: Negative for cough and shortness of breath. Cardiovascular: Negative for chest pain and leg swelling. Gastrointestinal: Positive for abdominal pain and nausea. Negative for vomiting. Endocrine: Negative for polydipsia and polyuria. Genitourinary: Negative for dysuria and urgency. Musculoskeletal: Negative for back pain and neck pain. Skin: Negative for color change and pallor. Neurological: Negative for weakness and numbness. Psychiatric/Behavioral: Negative. Physical Exam     Physical Exam  Vitals and nursing note reviewed. Constitutional:       General: She is not in acute distress. Appearance: She is not ill-appearing, toxic-appearing or diaphoretic. HENT:      Head: Normocephalic and atraumatic. Mouth/Throat:      Mouth: Mucous membranes are moist.   Eyes:      Extraocular Movements: Extraocular movements intact. Pupils: Pupils are equal, round, and reactive to light. Cardiovascular:      Rate and Rhythm: Normal rate and regular rhythm. Heart sounds: Normal heart sounds. Pulmonary:      Effort: Pulmonary effort is normal.      Breath sounds: Normal breath sounds. Abdominal:      General: Bowel sounds are normal.      Palpations: Abdomen is soft. Tenderness: There is abdominal tenderness in the epigastric area. Skin:     General: Skin is warm and dry. Capillary Refill: Capillary refill takes less than 2 seconds. Neurological:      General: No focal deficit present. Mental Status: She is alert and oriented to person, place, and time. Psychiatric:         Mood and Affect: Mood normal.         Behavior: Behavior normal.         Lab and Diagnostic Study Results     Labs -   No results found for this or any previous visit (from the past 12 hour(s)).     Radiologic Studies - @lastxrresult@  CT Results  (Last 48 hours)    None        CXR Results  (Last 48 hours)    None            Medical Decision Making   - I am the first provider for this patient. - I reviewed the vital signs, available nursing notes, past medical history, past surgical history, family history and social history. - Initial assessment performed. The patients presenting problems have been discussed, and they are in agreement with the care plan formulated and outlined with them. I have encouraged them to ask questions as they arise throughout their visit. Vital Signs-Reviewed the patient's vital signs. Patient Vitals for the past 12 hrs:   Temp Pulse Resp BP SpO2   12/03/21 0928 98.8 °F (37.1 °C) (!) 106 20 (!) 204/94 94 %       Records Reviewed: Nursing Notes    The patient presents with abdominal pain with a differential diagnosis of abdominal pain, cholecystitis, diverticulitis, gastritis and obstruction      ED Course:     ED Course as of 12/04/21 1758   Fri Dec 03, 2021   7881 History of gastric bypass surgery January 2021 at [facility] Dr. Daksha Sanabria is her general surgeon, patient complains of 2 weeks of abdominal pain in the epigastrium worse with food ingestion no vomiting but nausea, denies any improvement with the pantoprazole, patient has not followed up with her PCP since her last visit to the ED and she has not contacted the GI doctor at [facility] [[facility]]      ED Course User Index  [[facility]] Deana Ohara MD       Provider Notes (Medical Decision Making): MDM       Procedures   Medical Decision Makingedical Decision Making  Performed by: Jamie Mcburney, MD  PROCEDURES:  Procedures       Disposition   Disposition: Condition stable and improved  DC- Adult Discharges: All of the diagnostic tests were reviewed and questions answered. Diagnosis, care plan and treatment options were discussed. The patient understands the instructions and will follow up as directed.  The patients results have been reviewed with them. They have been counseled regarding their diagnosis. The patient verbally convey understanding and agreement of the signs, symptoms, diagnosis, treatment and prognosis and additionally agrees to follow up as recommended with their PCP in 24 - 48 hours. They also agree with the care-plan and convey that all of their questions have been answered. I have also put together some discharge instructions for them that include: 1) educational information regarding their diagnosis, 2) how to care for their diagnosis at home, as well a 3) list of reasons why they would want to return to the ED prior to their follow-up appointment, should their condition change. DISCHARGE PLAN:  1. Current Discharge Medication List      CONTINUE these medications which have NOT CHANGED    Details   citalopram (CELEXA) 40 mg tablet TAKE ONE (1) TABLET BY MOUTH DAILY. Qty: 90 Tablet, Refills: 0    Associated Diagnoses: Episode of recurrent major depressive disorder, unspecified depression episode severity (HCC)      ALPRAZolam (XANAX) 0.25 mg tablet TAKE 1 TABLET BY MOUTH EVERY DAY AS NEEDED FOR ANXIETY  Qty: 30 Tablet, Refills: 0    Associated Diagnoses: Generalized anxiety disorder      pantoprazole (PROTONIX) 40 mg tablet TAKE ONE TABLET BY MOUTH ONE TIME A DAY AFTER SURGERY. Qty: 30 Tablet, Refills: 0      ARIPiprazole (ABILIFY) 10 mg tablet TAKE ONE (1) TABLET BY MOUTH DAILY. Qty: 90 Tablet, Refills: 0    Associated Diagnoses: Moderate episode of recurrent major depressive disorder (HCC)      amoxicillin 500 mg tab Take 500 mg by mouth three (3) times daily for 10 days. Indications: an infection of the skin and the tissue below the skin  Qty: 30 Tablet, Refills: 0      omeprazole (PRILOSEC) 40 mg capsule       metFORMIN (GLUCOPHAGE) 500 mg tablet       lisinopriL (PRINIVIL, ZESTRIL) 40 mg tablet Take 40 mg by mouth daily.       metoprolol succinate (TOPROL-XL) 50 mg XL tablet Take 50 mg by mouth daily. tiZANidine (ZANAFLEX) 4 mg capsule Take 4 mg by mouth three (3) times daily. pravastatin (PRAVACHOL) 20 mg tablet Take 20 mg by mouth nightly.      ketoconazole (NIZORAL) 2 % topical cream Apply  to affected area daily. olopatadine (PATANOL) 0.1 % ophthalmic solution Administer 2 Drops to both eyes two (2) times a day. calcium citrate/vitamin D3 (CALCIUM CITRATE + D PO) Take 600 mg by mouth daily. cyanocobalamin (VITAMIN B-12) 500 mcg tablet Take 500 mcg by mouth daily. multivitamin (ONE A DAY) tablet Take 1 Tab by mouth daily. ergocalciferol (VITAMIN D2) 50,000 unit capsule Take 50,000 Units by mouth every seven (7) days. amLODIPine (NORVASC) 10 mg tablet Take 10 mg by mouth daily. butalbital-acetaminophen-caffeine (FIORICET) -40 mg per tablet Take 1 Tab by mouth daily as needed. 2.   Follow-up Information    None       3. Return to ED if worse   4. Current Discharge Medication List            Diagnosis     Clinical Impression: No diagnosis found. Attestations:    Sb Rios MD    Please note that this dictation was completed with [employer], the computer voice recognition software. Quite often unanticipated grammatical, syntax, homophones, and other interpretive errors are inadvertently transcribed by the computer software. Please disregard these errors. Please excuse any errors that have escaped final proofreading. Thank you.

## 2021-12-03 NOTE — DISCHARGE INSTRUCTIONS
Patient instructed to continue taking the Protonix, instructed to have a low-fat diet and follow-up with Dr. Kymberly Lakhani

## 2021-12-03 NOTE — ED TRIAGE NOTES
Pt is hypertensive in triage--did not take Bp meds this Pt is ambulatory to triage with a cane--Pt reports diffuse upper abd pain with nausea x 2 wks.

## 2021-12-29 ENCOUNTER — HOSPITAL ENCOUNTER (EMERGENCY)
Age: 56
Discharge: HOME | End: 2021-12-29
Attending: EMERGENCY MEDICINE | Admitting: EMERGENCY MEDICINE
Payer: MEDICARE

## 2021-12-29 VITALS
RESPIRATION RATE: 18 BRPM | TEMPERATURE: 98.1 F | DIASTOLIC BLOOD PRESSURE: 90 MMHG | SYSTOLIC BLOOD PRESSURE: 160 MMHG | WEIGHT: 293 LBS | HEART RATE: 98 BPM | OXYGEN SATURATION: 99 % | HEIGHT: 68 IN | BODY MASS INDEX: 44.41 KG/M2

## 2021-12-29 DIAGNOSIS — K02.9 PAIN DUE TO DENTAL CARIES: Primary | ICD-10-CM

## 2021-12-29 PROCEDURE — 74011250636 HC RX REV CODE- 250/636: Performed by: EMERGENCY MEDICINE

## 2021-12-29 PROCEDURE — 74011250637 HC RX REV CODE- 250/637: Performed by: EMERGENCY MEDICINE

## 2021-12-29 PROCEDURE — 96372 THER/PROPH/DIAG INJ SC/IM: CPT

## 2021-12-29 PROCEDURE — 99283 EMERGENCY DEPT VISIT LOW MDM: CPT

## 2021-12-29 RX ORDER — CLINDAMYCIN HYDROCHLORIDE 150 MG/1
150 CAPSULE ORAL
Status: COMPLETED | OUTPATIENT
Start: 2021-12-29 | End: 2021-12-29

## 2021-12-29 RX ORDER — CLINDAMYCIN HYDROCHLORIDE 300 MG/1
300 CAPSULE ORAL 3 TIMES DAILY
Qty: 21 CAPSULE | Refills: 0 | Status: SHIPPED | OUTPATIENT
Start: 2021-12-29 | End: 2022-01-05

## 2021-12-29 RX ORDER — ACETAMINOPHEN AND CODEINE PHOSPHATE 300; 30 MG/1; MG/1
2 TABLET ORAL
Status: COMPLETED | OUTPATIENT
Start: 2021-12-29 | End: 2021-12-29

## 2021-12-29 RX ORDER — KETOROLAC TROMETHAMINE 30 MG/ML
60 INJECTION, SOLUTION INTRAMUSCULAR; INTRAVENOUS
Status: COMPLETED | OUTPATIENT
Start: 2021-12-29 | End: 2021-12-29

## 2021-12-29 RX ORDER — IBUPROFEN 800 MG/1
800 TABLET ORAL
Qty: 20 TABLET | Refills: 0 | Status: SHIPPED | OUTPATIENT
Start: 2021-12-29 | End: 2022-01-05

## 2021-12-29 RX ADMIN — KETOROLAC TROMETHAMINE 60 MG: 30 INJECTION, SOLUTION INTRAMUSCULAR at 10:18

## 2021-12-29 RX ADMIN — ACETAMINOPHEN AND CODEINE PHOSPHATE 2 TABLET: 300; 30 TABLET ORAL at 10:17

## 2021-12-29 RX ADMIN — CLINDAMYCIN HYDROCHLORIDE 150 MG: 150 CAPSULE ORAL at 10:18

## 2021-12-29 NOTE — ED TRIAGE NOTES
.  Chief Complaint   Patient presents with    Dental Pain     Pt presents with c/o dental pain to left lower jaw that has been ongoing for last 2 days, pt states that tooth has hole in it.  Pt rates pain 10/10, taking tylenol at home w/o relief

## 2021-12-29 NOTE — ED PROVIDER NOTES
Patient presents with complaint of left lower jaw pain and toothache for past 2 days . She was seen in August for same , but she has not seen a dentist. She denies fever or chills. Duration:  2 days      Intensity: moderate    Modified by: cold/hot.                 Past Medical History:   Diagnosis Date    Anxiety 2017    Arthritis     Asthma     Back injury     Depression     Essential hypertension     GERD (gastroesophageal reflux disease)     Morbid obesity ([address]     Sleep apnea in adult 3/6/2018    CPAP       Past Surgical History:   Procedure Laterality Date    HX APPENDECTOMY      HX  SECTION      HX COLONOSCOPY      HX GASTRECTOMY      lap sleeve gastrectomy    HX HERNIA REPAIR  2021    Incarcerated hernia repair Dr. Yolanda Baez      left knee    HX LAP GASTRIC BYPASS  2021    REVISION SLEEVE TO BYPASS - Tommy     HX LYSIS OF ADHESIONS  2021    Dr. Sarai Barton      rt knee partial replacement    HX ORTHOPAEDIC Right     LIGAMENT REPAIR    IR [facility] IVC FILTER  2021    [place] LAP,DIAGNOSTIC ABDOMEN  2021    Dr. Lucy Bess         Family History:   Problem Relation Age of Onset    Stroke Mother     Heart Disease Mother         PACEMAKER    Alcohol abuse Father     Seizures Father     Anesth Problems Neg Hx        Social History     Socioeconomic History    Marital status: SINGLE     Spouse name: Not on file    Number of children: Not on file    Years of education: Not on file    Highest education level: Not on file   Occupational History    Occupation: disability      Comment: since  with back    Tobacco Use    Smoking status: Former Smoker     Packs/day: 1.00     Years: 30.00     Pack years: 30.00     Quit date: 2020     Years since quittin.5    Smokeless tobacco: Never Used   Substance and Sexual Activity    Alcohol use: No    Drug use: No    Sexual activity: Not on file     Comment: post menopausal    Other Topics Concern    Not on file   Social History Narrative    In the home with 15year old grand daughter and friend, Royce Ojeda      Social Determinants of Health     Financial Resource Strain:     Difficulty of Paying Living Expenses: Not on file   Food Insecurity:     Worried About [address] in the Last Year: Not on file    [address] in the Last Year: Not on file   Transportation Needs:     Lack of Transportation (Medical): Not on file    Lack of Transportation (Non-Medical): Not on file   Physical Activity:     Days of Exercise per Week: Not on file    Minutes of Exercise per Session: Not on file   Stress:     Feeling of Stress : Not on file   Social Connections:     Frequency of Communication with Friends and Family: Not on file    Frequency of Social Gatherings with Friends and Family: Not on file    Attends Nondenominational Services: Not on file    Active Member of [address] [place] [address] or Organizations: Not on file    Attends Club or Organization Meetings: Not on file    Marital Status: Not on file   Intimate Partner Violence:     Fear of Current or Ex-Partner: Not on file    Emotionally Abused: Not on file    Physically Abused: Not on file    Sexually Abused: Not on file   Housing Stability:     Unable to Pay for Housing in the Last Year: Not on file    Number of [place] in the Last Year: Not on file    Unstable Housing in the Last Year: Not on file         ALLERGIES: Patient has no known allergies. Review of Systems   Constitutional: Negative. HENT: Positive for dental problem. Left jaw pain. Eyes: Negative. Respiratory: Negative. Cardiovascular: Negative. Gastrointestinal: Negative. Endocrine: Negative. Genitourinary: Negative. Skin: Negative. Allergic/Immunologic: Negative. Neurological: Negative. Hematological: Negative. Psychiatric/Behavioral: Negative.     All other systems reviewed and are negative. Vitals:    12/29/21 0953   BP: (!) 169/99   Pulse: (!) 104   Resp: 20   Temp: 98.1 °F (36.7 °C)   SpO2: 99%   Weight: 149.7 kg (330 lb)   Height: 5' 8\" (1.727 m)            Physical Exam  Vitals and nursing note reviewed. Constitutional:       Appearance: She is well-developed. HENT:      Head: Normocephalic and atraumatic. Mouth/Throat:        Comments: Carious left lower molar. Cardiovascular:      Rate and Rhythm: Normal rate and regular rhythm. Heart sounds: Normal heart sounds. Pulmonary:      Breath sounds: Normal breath sounds. Abdominal:      General: Bowel sounds are normal.      Palpations: Abdomen is soft. Musculoskeletal:         General: Normal range of motion. Cervical back: Normal range of motion and neck supple. Neurological:      General: No focal deficit present. Mental Status: She is alert.    Psychiatric:         Mood and Affect: Mood normal.         Behavior: Behavior normal.          MDM       Procedures

## 2022-01-03 DIAGNOSIS — F41.1 GENERALIZED ANXIETY DISORDER: ICD-10-CM

## 2022-01-03 RX ORDER — ALPRAZOLAM 0.25 MG/1
TABLET ORAL
Qty: 30 TABLET | Refills: 0 | Status: SHIPPED | OUTPATIENT
Start: 2022-01-03 | End: 2022-01-31

## 2022-01-13 ENCOUNTER — TELEPHONE (OUTPATIENT)
Dept: SURGERY | Age: 57
End: 2022-01-13

## 2022-01-13 NOTE — TELEPHONE ENCOUNTER
Called patient and left a voicemail to remind her2 of her upcoming appointment, our cancellation policy, our late policy and no visitor rule. Unable to ask COVID-19 screening questions due to no answer.

## 2022-01-14 ENCOUNTER — TELEPHONE (OUTPATIENT)
Dept: SURGERY | Age: 57
End: 2022-01-14

## 2022-01-14 NOTE — TELEPHONE ENCOUNTER
Contacted patient to make her aware that she missed her appointment with JORY Duvall today. Patient has been rescheduled to 1/24/22 at 1pm. No Show letter sent.

## 2022-01-14 NOTE — TELEPHONE ENCOUNTER
Attempted to check in the patient early for her virtual. Left voicemail to call back to be checked in.

## 2022-01-31 DIAGNOSIS — F41.1 GENERALIZED ANXIETY DISORDER: ICD-10-CM

## 2022-01-31 RX ORDER — ALPRAZOLAM 0.25 MG/1
TABLET ORAL
Qty: 30 TABLET | Refills: 0 | Status: SHIPPED | OUTPATIENT
Start: 2022-01-31 | End: 2022-03-02

## 2022-02-28 ENCOUNTER — HOSPITAL ENCOUNTER (OUTPATIENT)
Dept: GENERAL RADIOLOGY | Age: 57
Discharge: HOME | End: 2022-02-28

## 2022-02-28 ENCOUNTER — APPOINTMENT (OUTPATIENT)
Dept: GENERAL RADIOLOGY | Age: 57
End: 2022-02-28

## 2022-02-28 ENCOUNTER — TRANSCRIBE ORDER (OUTPATIENT)
Dept: REGISTRATION | Age: 57
End: 2022-02-28

## 2022-02-28 DIAGNOSIS — M54.51 VERTEBROGENIC LOW BACK PAIN: Primary | ICD-10-CM

## 2022-03-01 ENCOUNTER — HOSPITAL ENCOUNTER (OUTPATIENT)
Dept: GENERAL RADIOLOGY | Age: 57
Discharge: HOME | End: 2022-03-01
Payer: MEDICARE

## 2022-03-01 DIAGNOSIS — M54.51 VERTEBROGENIC LOW BACK PAIN: ICD-10-CM

## 2022-03-01 PROCEDURE — 72100 X-RAY EXAM L-S SPINE 2/3 VWS: CPT

## 2022-03-02 DIAGNOSIS — F41.1 GENERALIZED ANXIETY DISORDER: ICD-10-CM

## 2022-03-02 RX ORDER — ALPRAZOLAM 0.25 MG/1
TABLET ORAL
Qty: 30 TABLET | Refills: 0 | Status: SHIPPED | OUTPATIENT
Start: 2022-03-02 | End: 2022-03-04

## 2022-03-04 ENCOUNTER — OFFICE VISIT (OUTPATIENT)
Dept: BEHAVIORAL/MENTAL HEALTH CLINIC | Age: 57
End: 2022-03-04
Payer: MEDICARE

## 2022-03-04 VITALS — WEIGHT: 293 LBS | BODY MASS INDEX: 51.91 KG/M2

## 2022-03-04 DIAGNOSIS — F33.1 MODERATE EPISODE OF RECURRENT MAJOR DEPRESSIVE DISORDER (HCC): ICD-10-CM

## 2022-03-04 DIAGNOSIS — Z51.81 MEDICATION MONITORING ENCOUNTER: Primary | ICD-10-CM

## 2022-03-04 PROCEDURE — G8417 CALC BMI ABV UP PARAM F/U: HCPCS | Performed by: NURSE PRACTITIONER

## 2022-03-04 PROCEDURE — G8756 NO BP MEASURE DOC: HCPCS | Performed by: NURSE PRACTITIONER

## 2022-03-04 PROCEDURE — 3017F COLORECTAL CA SCREEN DOC REV: CPT | Performed by: NURSE PRACTITIONER

## 2022-03-04 PROCEDURE — G8427 DOCREV CUR MEDS BY ELIG CLIN: HCPCS | Performed by: NURSE PRACTITIONER

## 2022-03-04 PROCEDURE — G9717 DOC PT DX DEP/BP F/U NT REQ: HCPCS | Performed by: NURSE PRACTITIONER

## 2022-03-04 PROCEDURE — 99214 OFFICE O/P EST MOD 30 MIN: CPT | Performed by: NURSE PRACTITIONER

## 2022-03-04 RX ORDER — HYDROCODONE BITARTRATE AND ACETAMINOPHEN 5; 325 MG/1; MG/1
1 TABLET ORAL
COMMUNITY
End: 2022-08-29

## 2022-03-04 RX ORDER — BUPROPION HYDROCHLORIDE 150 MG/1
150 TABLET ORAL DAILY
Qty: 30 TABLET | Refills: 2 | Status: SHIPPED | OUTPATIENT
Start: 2022-03-04 | End: 2022-06-07

## 2022-03-04 RX ORDER — FLUTICASONE PROPIONATE 50 MCG
50 SPRAY, SUSPENSION (ML) NASAL
COMMUNITY
Start: 2022-02-01 | End: 2022-03-04

## 2022-03-04 RX ORDER — ALBUTEROL SULFATE 90 UG/1
1 AEROSOL, METERED RESPIRATORY (INHALATION)
COMMUNITY

## 2022-03-04 NOTE — PROGRESS NOTES
Pedro Douglas is a 64 y.o. female who presents today for the following:  Chief Complaint   Patient presents with    Follow-up     \"I stay depressed. \"    Depression       No Known Allergies    Current Outpatient Medications   Medication Sig    albuterol (ProAir HFA) 90 mcg/actuation inhaler Take 1 Puff by inhalation two (2) times daily as needed for Wheezing.  HYDROcodone-acetaminophen (NORCO) 5-325 mg per tablet Take 1 Tablet by mouth two (2) times daily as needed for Pain.  buPROPion XL (WELLBUTRIN XL) 150 mg tablet Take 1 Tablet by mouth daily.  fluticasone furoate 50 mcg/actuation dsdv 1 [place] by Nasal route as directed.  alum-mag hydroxide-simeth (Maalox Advanced) 200-200-20 mg/5 mL susp Take 30 mL by mouth every four (4) hours as needed for Indigestion or GI Pain.  citalopram (CELEXA) 40 mg tablet TAKE ONE (1) TABLET BY MOUTH DAILY.  pantoprazole (PROTONIX) 40 mg tablet TAKE ONE TABLET BY MOUTH ONE TIME A DAY AFTER SURGERY.  amoxicillin 500 mg tab Take 500 mg by mouth three (3) times daily for 10 days. Indications: an infection of the skin and the tissue below the skin    omeprazole (PRILOSEC) 40 mg capsule     metFORMIN (GLUCOPHAGE) 500 mg tablet     lisinopriL (PRINIVIL, ZESTRIL) 40 mg tablet Take 40 mg by mouth daily.  metoprolol succinate (TOPROL-XL) 50 mg XL tablet Take 50 mg by mouth daily.  tiZANidine (ZANAFLEX) 4 mg capsule Take 4 mg by mouth three (3) times daily.  pravastatin (PRAVACHOL) 20 mg tablet Take 20 mg by mouth nightly.  ketoconazole (NIZORAL) 2 % topical cream Apply  to affected area daily.  olopatadine (PATANOL) 0.1 % ophthalmic solution Administer 2 Drops to both eyes two (2) times a day.  calcium citrate/vitamin D3 (CALCIUM CITRATE + D PO) Take 600 mg by mouth daily.  cyanocobalamin (VITAMIN B-12) 500 mcg tablet Take 500 mcg by mouth daily.  multivitamin (ONE A DAY) tablet Take 1 Tab by mouth daily.     ergocalciferol (VITAMIN D2) 50,000 unit capsule Take 50,000 Units by mouth every seven (7) days.  amLODIPine (NORVASC) 10 mg tablet Take 10 mg by mouth daily.  butalbital-acetaminophen-caffeine (FIORICET) -40 mg per tablet Take 1 Tab by mouth daily as needed. No current facility-administered medications for this visit.        Past Medical History:   Diagnosis Date    Anxiety 2017    Arthritis     Asthma     Back injury     Depression     Essential hypertension     GERD (gastroesophageal reflux disease)     Morbid obesity ([address]     Sleep apnea in adult 3/6/2018    CPAP       Past Surgical History:   Procedure Laterality Date    HX APPENDECTOMY      HX  SECTION      HX COLONOSCOPY      HX GASTRECTOMY      lap sleeve gastrectomy    HX HERNIA REPAIR  2021    Incarcerated hernia repair Dr. Saldana Goods      left knee    HX LAP GASTRIC BYPASS  2021    REVISION SLEEVE TO BYPASS - Tommy     HX LYSIS OF ADHESIONS  2021    Dr. Mary Meier      rt knee partial replacement    HX ORTHOPAEDIC Right     LIGAMENT REPAIR    IR PLC IVC FILTER  2021    [place] LAP,DIAGNOSTIC ABDOMEN  2021    Dr. Juanita Horton       Family History   Problem Relation Age of Onset    Stroke Mother     Heart Disease Mother         PACEMAKER    Alcohol abuse Father     Seizures Father     Anesth Problems Neg Hx        Social History     Socioeconomic History    Marital status: SINGLE     Spouse name: Not on file    Number of children: Not on file    Years of education: Not on file    Highest education level: Not on file   Occupational History    Occupation: disability      Comment: since  with back    Tobacco Use    Smoking status: Former Smoker     Packs/day: 1.00     Years: 30.00     Pack years: 30.00     Quit date: 2020     Years since quittin.7    Smokeless tobacco: Never Used   Substance and Sexual Activity    Alcohol use: No    Drug use: No    Sexual activity: Not on file     Comment: post menopausal    Other Topics Concern    Not on file   Social History Narrative    In the home with 15year old grand daughter and friend, Simeon Mcmahon      Social Determinants of Health     Financial Resource Strain:     Difficulty of Paying Living Expenses: Not on file   Food Insecurity:     Worried About [address] in the Last Year: Not on file    Nora of Food in the Last Year: Not on file   Transportation Needs:     Lack of Transportation (Medical): Not on file    Lack of Transportation (Non-Medical): Not on file   Physical Activity:     Days of Exercise per Week: Not on file    Minutes of Exercise per Session: Not on file   Stress:     Feeling of Stress : Not on file   Social Connections:     Frequency of Communication with Friends and Family: Not on file    Frequency of Social Gatherings with Friends and Family: Not on file    Attends Mosque Services: Not on file    Active Member of [address] [place] [address] or Organizations: Not on file    Attends Club or Organization Meetings: Not on file    Marital Status: Not on file   Intimate Partner Violence:     Fear of Current or Ex-Partner: Not on file    Emotionally Abused: Not on file    Physically Abused: Not on file    Sexually Abused: Not on file   Housing Stability:     Unable to Pay for Housing in the Last Year: Not on file    Number of [place] in the Last Year: Not on file    Unstable Housing in the Last Year: Not on file         Ms. Lamar Ames follows up in the clinic for major depression with psychotic features and anxiety disorder. She is currently prescribed citalopram 40 mg take 1 tablet daily and Xanax 0.25 mg take 1 tablet daily as needed. Patient reports she has not had Abilify due to no refills. Patient last visited the clinic December 14, 2020.   It is noted patient is under pain management with Dr. iDlip Law and is prescribed Norco 5/325 mg 1 tablet 1-2 times daily as needed for pain. Patient had gastric bypass surgery January 2021 which she reports losing about 40 pounds. She reports that she is trying to lose weight at this point to help with arthritis. Patient also mentions that she needs a sleep study due to sleeping about 2 hours per night. Patient presents to the clinic unaccompanied, clean fully alert and oriented. Gait is stable and slow with assistance of a cane. Her mood appears moderate to severely depressed without suicidal/homicidal thinking. She reports having low energy, depressed mood, experiencing death ideations and seeing shadows. She reports feeling depressed for the past year and thinking about her own death for the past 3 months. Patient reports still having anxiety which she reports Xanax is ineffective. We discussed risks associated with benzodiazepine and opioid use. Patient voices understanding and would like to discontinue Xanax since it is ineffective. She reports appetite is stable with eating 1 meal per day. No suicidal/homicidal thinking noted, risk for suicide is low, protective factor-15year-old granddaughter. Patient reports that she had her 15year-old granddaughter since the age of 1. She reports her granddaughter is very helpful around the house. Patient will start physical therapy March 14 for 5 weeks. No smoking, alcohol or drug use reported. Patient lives in the home with her granddaughter and significant other in a stable home environment. Review of Systems   Musculoskeletal: Positive for joint pain and myalgias. Neurological: Positive for headaches. Psychiatric/Behavioral: Positive for depression. All other systems reviewed and are negative. Visit Vitals  Wt 154.9 kg (341 lb 6.4 oz)   BMI 51.91 kg/m²     Physical Exam  Psychiatric:         Attention and Perception: She perceives visual (shadows) hallucinations. Mood and Affect: Mood is depressed.          Speech: Speech normal. Behavior: Behavior is withdrawn. Thought Content: Thought content normal.         Cognition and Memory: Cognition and memory normal.         Judgment: Judgment normal.        Plan:    Discontinue Xanax 0.25 mg. Start bupropion  mg take 1 tablet daily. Side effect profile discussed. She may continue citalopram as prescribed. Therapy is recommended. She is encouraged to eat a healthy diet and to increase physical activity. Follow-up with medical provider and pain specialist as appropriate. For emergencies-call 911 or go to the emergency department.

## 2022-03-14 ENCOUNTER — APPOINTMENT (OUTPATIENT)
Dept: PHYSICAL THERAPY | Age: 57
End: 2022-03-14

## 2022-03-18 PROBLEM — F32.A DEPRESSION: Status: ACTIVE | Noted: 2018-03-06

## 2022-03-19 PROBLEM — G47.30 SLEEP APNEA IN ADULT: Status: ACTIVE | Noted: 2018-03-06

## 2022-03-19 PROBLEM — I10 ESSENTIAL HYPERTENSION: Status: ACTIVE | Noted: 2020-01-08

## 2022-03-19 PROBLEM — F41.9 ANXIETY: Status: ACTIVE | Noted: 2017-08-24

## 2022-03-19 PROBLEM — K43.2 INCISIONAL HERNIA: Status: ACTIVE | Noted: 2021-01-25

## 2022-03-19 PROBLEM — T81.43XA POSTOPERATIVE INTRA-ABDOMINAL ABSCESS: Status: ACTIVE | Noted: 2021-02-06

## 2022-03-19 PROBLEM — K65.1 POSTOPERATIVE INTRA-ABDOMINAL ABSCESS (HCC): Status: ACTIVE | Noted: 2021-02-06

## 2022-03-19 PROBLEM — E66.01 MORBID OBESITY (HCC): Status: ACTIVE | Noted: 2021-01-14

## 2022-03-19 PROBLEM — K21.9 GASTROESOPHAGEAL REFLUX DISEASE WITHOUT ESOPHAGITIS: Status: ACTIVE | Noted: 2020-01-08

## 2022-03-19 PROBLEM — E86.0 DEHYDRATION: Status: ACTIVE | Noted: 2021-01-25

## 2022-03-19 PROBLEM — E78.00 HYPERCHOLESTEREMIA: Status: ACTIVE | Noted: 2020-01-08

## 2022-03-19 PROBLEM — R11.2 NAUSEA AND VOMITING: Status: ACTIVE | Noted: 2021-01-25

## 2022-03-19 PROBLEM — J45.20 MILD INTERMITTENT ASTHMA WITHOUT COMPLICATION: Status: ACTIVE | Noted: 2020-01-08

## 2022-03-19 PROBLEM — L03.90 CELLULITIS: Status: ACTIVE | Noted: 2021-03-12

## 2022-03-19 PROBLEM — K56.609 SMALL BOWEL OBSTRUCTION (HCC): Status: ACTIVE | Noted: 2021-01-25

## 2022-04-01 ENCOUNTER — APPOINTMENT (OUTPATIENT)
Dept: PHYSICAL THERAPY | Age: 57
End: 2022-04-01
Payer: MEDICARE

## 2022-04-06 ENCOUNTER — HOSPITAL ENCOUNTER (OUTPATIENT)
Dept: PHYSICAL THERAPY | Age: 57
Discharge: HOME | End: 2022-04-06
Payer: MEDICARE

## 2022-04-06 PROCEDURE — 97161 PT EVAL LOW COMPLEX 20 MIN: CPT

## 2022-04-06 NOTE — PROGRESS NOTES
[address]  [place], [place]  Ph: [phone]    Fax: [phone]    Plan of Care/Statement of Necessity for Physical Therapy Services  2-15    Patient name: Marcial Bosch  : 1965  Provider#: [id number]  Referral source: Eda Jessicagillian*      Medical/Treatment Diagnosis: Vertebrogenic low back pain [M54.51]     Prior Hospitalization: see medical history     Comorbidities: see medical history  Prior Level of Function: Independent with all ADL's; primary use of single point cane for mobility  Medications: Verified on Patient Summary List  Start of Care: 2022      Onset Date: chronic (more than 10 years)   The Plan of Care and following information is based on the information from the initial evaluation. Assessment/ key information:   Pt presents to outpatient physical therapy with chronic lower back pain dating back more than 10 years. Her current deficits include decreased ROM, decreased strength, and increased pain with functional mobility. She may benefit from skilled physical therapy services in order to address these deficits so that she can less pain with IADL's and functional activities.     Evaluation Complexity History LOW Complexity : Zero comorbidities / personal factors that will impact the outcome / POC; Examination LOW Complexity : 1-2 Standardized tests and measures addressing body structure, function, activity limitation and / or participation in recreation  ;Presentation LOW Complexity : Stable, uncomplicated  ;Clinical Decision Making TUG Score: 14 seconds  Overall Complexity Rating: LOW     Problem List: pain affecting function, decrease ROM, decrease strength, impaired gait/ balance, decrease ADL/ functional abilitiies, decrease activity tolerance and decrease flexibility/ joint mobility   Treatment Plan may include any combination of the following: Therapeutic exercise, Therapeutic activities, Neuromuscular re-education, Physical agent/modality, Gait/balance training, Manual therapy, Patient education and Functional mobility training  Patient / Family readiness to learn indicated by: asking questions and interest  Persons(s) to be included in education: patient (P)  Barriers to Learning/Limitations: None  Patient Goal (s): to get pain relief  Patient Self Reported Health Status: poor  Rehabilitation Potential: poor    Short Term Goals: To be accomplished in 5 treatments. 1)  Pt to be independent with HEP  2)  Pt to improve lumbar flexion so she can don/doff shoes without pain beyond 4/10    Long Term Goals: To be accomplished in 10 treatments. 1)  Pt to demonstrate the ability to get in and out of bed without pain in her lower back using proper technique to minimize facet joint arthropathy strain to improve comfort with IADL's  2)  Pt to improve LE strength to WNL so she can perform functional mobility without back pain beyond 3/10  Frequency / Duration: Patient to be seen 2 times per week for 10 treatments. Patient/ Caregiver education and instruction: activity modification and exercises    [x]  Plan of care has been reviewed with PTA        Certification Period: 2022 to 2022    Willie Barillas PT, DPT 2022     ________________________________________________________________________    I certify that the above Therapy Services are being furnished while the patient is under my care. I agree with the treatment plan and certify that this therapy is necessary.     [de-identified] Signature:____________________  Date:____________Time: _________    Patient name: Brittany Whelan  : 1965  Provider#: [id number]

## 2022-04-06 NOTE — PROGRESS NOTES
PT INITIAL EVALUATION NOTE - [facility] 2-15    Patient Name: Nhi Goncalves  Date:2022  : 1965  [x]  Patient  Verified  Payor: BLUE CROSS MEDICARE / Plan: [place] PPO / Product Type: Managed Care Medicare /    In time:1540  Out time:1630  Total Treatment Time (min): 50  Total Timed Codes (min): 0  1:1 Treatment Time ( only): 50   Visit #: 1    Treatment Area: Vertebrogenic low back pain [M54.51]    SUBJECTIVE  Pain Level (0-10 scale): 10/10  Any medication changes, allergies to medications, adverse drug reactions, diagnosis change, or new procedure performed?: [] No    [x] Yes (see summary sheet for update)  Subjective:    Pt reports chronic back pain for more than 20 years that has been progressively worse. She reports that the pain has been intensifying more and more for the last 2-3 years. She states that she has been in pain management for some time but the clinic she was going to went out of business. She states that since she has started a new pain management clinic she has been unable to get the pain medication that controls her pain and that her doctor wanted her to start therapy first before he would increase or alter her current dosage course. She states she has pain with all functional mobility tasks and notes pain to include headaches, arm and hand pain, back pain, neck pain, knee pain, and bilateral foot pain. She states the pain is constant at 10/10 and nothing effectively alleviates it at this time. She notes prolonged positioning is uncomfortable as well.   She sates she has never received therapy for her back in the past.  PLOF: Independent with all ADL's; primary use of cane  for mobility  Mechanism of Injury: unknown  Previous Treatment/Compliance: fair  Radiographs: Recent X-ray showed severe lumbar DDD and DJD  What increases symptoms: movement  What decreases symptoms: rest  Work Hx: retired  Living Situation: lives with friend  Pt Goals: to get pain relief  Barriers: none  Motivation: Poor due to chronic back pain  Substance use: None reported  Cognition: A&O x 4  Fall Assessment: No falls risk assessment indicated at this time. Past Medical History:  Past Medical History:   Diagnosis Date    Anxiety 2017    Arthritis     Asthma     Back injury     Depression     Essential hypertension     GERD (gastroesophageal reflux disease)     [address] [address]     Sleep apnea in adult 3/6/2018    CPAP     Past Surgical History:  Past Surgical History:   Procedure Laterality Date    HX APPENDECTOMY      HX  SECTION      HX COLONOSCOPY      HX GASTRECTOMY      lap sleeve gastrectomy    HX HERNIA REPAIR  2021    Incarcerated hernia repair Dr. Stuart Thornton     HX HYSTERECTOMY      HX KNEE REPLACEMENT      left knee    HX LAP GASTRIC BYPASS  2021    REVISION SLEEVE TO BYPASS - Tommy     HX LYSIS OF ADHESIONS  2021    Dr. Michael Ballesteros     HX ORTHOPAEDIC      rt knee partial replacement    HX ORTHOPAEDIC Right     LIGAMENT REPAIR    IR PLC IVC FILTER  2021    [place] LAP,DIAGNOSTIC ABDOMEN  2021    Dr. Stuart Thornton     Current Medications:  Current Outpatient Medications   Medication Instructions    albuterol (ProAir HFA) 90 mcg/actuation inhaler 1 Puff, Inhalation, 2 TIMES DAILY AS NEEDED    alum-mag hydroxide-simeth (Maalox Advanced) 200-200-20 mg/5 mL susp 30 mL, Oral, EVERY 4 HOURS AS NEEDED    amLODIPine (NORVASC) 10 mg, Oral, DAILY    buPROPion XL (WELLBUTRIN XL) 150 mg, Oral, DAILY    calcium citrate/vitamin D3 (CALCIUM CITRATE + D PO) 600 mg, Oral, DAILY    citalopram (CELEXA) 40 mg tablet TAKE ONE (1) TABLET BY MOUTH DAILY.     cyanocobalamin (VITAMIN B-12) 500 mcg, Oral, DAILY    ergocalciferol (VITAMIN D2) 50,000 Units, Oral, EVERY 7 DAYS    fluticasone furoate 50 mcg/actuation dsdv 1 Spray, Nasal, AS DIRECTED    HYDROcodone-acetaminophen (NORCO) 5-325 mg per tablet 1 Tablet, Oral, 2 TIMES DAILY AS NEEDED    ketoconazole (NIZORAL) 2 % topical cream Topical, DAILY    lisinopriL (PRINIVIL, ZESTRIL) 40 mg, Oral, DAILY    metFORMIN (GLUCOPHAGE) 500 mg tablet No dose, route, or frequency recorded.  metoprolol succinate (TOPROL-XL) 50 mg, Oral, DAILY    multivitamin (ONE A DAY) tablet 1 Tablet, Oral, DAILY    olopatadine (PATANOL) 0.1 % ophthalmic solution 2 Drops, Both Eyes, 2 TIMES DAILY    omeprazole (PRILOSEC) 40 mg capsule No dose, route, or frequency recorded.     pravastatin (PRAVACHOL) 20 mg, Oral, EVERY BEDTIME    tiZANidine (ZANAFLEX) 4 mg, Oral, 3 TIMES DAILY          OBJECTIVE/EXAMINATION    OBJECTIVE    Posture:  Decreased lumbar lordosis  Gait and Functional Mobility:  Antalgic gait  Palpation: mild tenderness of the lumbar paraspinals  Sensation: WFL        Lumbar AROM:      Flexion             37 degrees      Extension            13 degrees                     R                    L  Side Bending   22 degrees  36 degrees    Rotation   47 degrees  54 degrees      Manual Muscle Testing  Hip Flexion                   4/5                  5/5  Knee Extension           5/5                    5/5  Knee Flexion               5/5  5/5  Ankle PF  5/5  5/5  Ankle DF  5/5  5/5    Mobility Assessment: all lumbar AROM is guarded      Special Tests:    Trendelenberg: neg    Slump: pos            With   [] TE   [] TA   [] neuro   [] other: Patient Education: [] Provided HEP  [] Progressed/Changed HEP based on:   [] positioning   [] body mechanics   [] transfers   [] heat/ice application    [] other:        Pain Level (0-10 scale) post treatment: 10/10    ASSESSMENT/Changes in Function:   [x]  See Plan of [address] Alta Duran PT, DPT 4/6/2022

## 2022-04-13 ENCOUNTER — HOSPITAL ENCOUNTER (OUTPATIENT)
Dept: PHYSICAL THERAPY | Age: 57
Discharge: HOME | End: 2022-04-13
Payer: MEDICARE

## 2022-04-13 PROCEDURE — 97110 THERAPEUTIC EXERCISES: CPT

## 2022-04-13 PROCEDURE — 97014 ELECTRIC STIMULATION THERAPY: CPT

## 2022-04-13 NOTE — PROGRESS NOTES
PT DAILY TREATMENT NOTE - [facility] 2-15    Patient Name: Lauren Tyson  Date:2022  : 1965  [x]  Patient  Verified  Payor: BLUE CROSS MEDICARE / Plan[place] / Product Type: Managed Care Medicare /    In time:10:30 AM  Out time:11:15 AM  Total Treatment Time (min): 45  Total Timed Codes (min): 30  1:1 Treatment Time ( only): 30   Visit #:  2    Treatment Area: Vertebrogenic low back pain [M54.51]    SUBJECTIVE  Pain Level (0-10 scale): 8/10  Any medication changes, allergies to medications, adverse drug reactions, diagnosis change, or new procedure performed?: [x] No    [] Yes (see summary sheet for update)  Subjective functional status/changes:   [] No changes reported  I am not able to lie down on my back. OBJECTIVE    Modality rationale: decrease pain and increase tissue extensibility to improve the patients ability to increase daily activities.    Min Type Additional Details      15 [x] Estim: []Att   [x]Unatt    []TENS instruct                  [x]IFC  []Premod   []NMES                    []Other:  []w/US      [x]w/ heat  []w/ ice  Position:seated  Location: Low back       []  Traction: [] Cervical       []Lumbar                       [] Prone          []Supine                       []Intermittent   []Continuous Lbs:  [] before manual  [] after manual  [] w/ heat  [] Simultaneously performed with w/ Estim    []  Ultrasound: []Continuous   [] Pulsed                       at: []1MHz   []3MHz Location:  W/cm2:    [] Paraffin         Location:   []w/heat    []  Ice     []  Heat  []  Ice massage Position:  Location:    []  Laser  []  Other: Position:  Location:      []  Vasopneumatic Device Pressure:       [] lo [] med [] hi   [] w/ ice      Temperature:   [] Simultaneously performed with w/ Estim     [x] Skin assessment post-treatment:  [x]intact []redness- no adverse reaction     []redness - adverse reaction:     30 min Therapeutic Exercise:  [x] See flow sheet :   Rationale: increase ROM and increase strength to improve the patients ability to perform daily activities. With   [] TE   [] TA   [] neuro   [] other: Patient Education: [x] Review HEP    [] Progressed/Changed HEP based on:   [] positioning   [] body mechanics   [] transfers   [] heat/ice application    [] other:      Other Objective: Patient is not able to perform Exercises in supine. Pain Level (0-10 scale) post treatment: 6/10    ASSESSMENT/Changes in Function:   The pt tolerated treatment. Provide patient with a HEP that can be performed in sitting and standing position. Patient will continue to benefit from skilled PT services to address ROM deficits, address strength deficits and analyze and address soft tissue restrictions to attain remaining goals     [x]  See Plan of Care  []  See progress note/recertification  []  See Discharge Summary         Progress towards goals / Updated goals:  Short Term Goals: To be accomplished in 5 treatments. 1)  Pt to be independent with HEP  2)  Pt to improve lumbar flexion so she can don/doff shoes without pain beyond 4/10     Long Term Goals: To be accomplished in 10 treatments.   1)  Pt to demonstrate the ability to get in and out of bed without pain in her lower back using proper technique to minimize facet joint arthropathy strain to improve comfort with IADL's  2)  Pt to improve LE strength to WNL so she can perform functional mobility without back pain beyond 3/10    PLAN  [x]  Upgrade activities as tolerated     [x]  Continue plan of care  []  Update interventions per flow sheet       []  Discharge due to:_  []  Other:_      Johnathan Patel, PT,  4/13/2022

## 2022-04-18 ENCOUNTER — APPOINTMENT (OUTPATIENT)
Dept: PHYSICAL THERAPY | Age: 57
End: 2022-04-18
Payer: MEDICARE

## 2022-04-20 ENCOUNTER — HOSPITAL ENCOUNTER (OUTPATIENT)
Dept: PHYSICAL THERAPY | Age: 57
Discharge: HOME | End: 2022-04-20
Payer: MEDICARE

## 2022-04-20 PROCEDURE — 97150 GROUP THERAPEUTIC PROCEDURES: CPT

## 2022-04-20 PROCEDURE — 97110 THERAPEUTIC EXERCISES: CPT

## 2022-04-20 NOTE — PROGRESS NOTES
PT DAILY TREATMENT NOTE - [facility] 2-15    Patient Name: Hung Garcia  Date:2022  : 1965  [x]  Patient  Verified  Payor: BLUE CROSS MEDICARE / [place] / Product Type: Managed Care Medicare /    In time:2:30 PM  Out time:3:00 PM  Total Treatment Time (min):30  Total Timed Codes (min):15  1:1 Treatment Time ( [address] only): 15   Visit #:  3    Treatment Area: Vertebrogenic low back pain [M54.51]    SUBJECTIVE  Pain Level (0-10 scale): 6/10  Any medication changes, allergies to medications, adverse drug reactions, diagnosis change, or new procedure performed?: [x] No    [] Yes (see summary sheet for update)  Subjective functional status/changes:   [] No changes reported    I don't like the modality treatment. OBJECTIVE    15 min Therapeutic Exercise:  [] See flow sheet :   Rationale: increase ROM and increase strength to improve the patients ability to sit and stand for  a longer period of time. 15 min Group Therapy:  [x] See flow sheet :   Billed while completing treatment with another patient    With   [] TE   [] TA   [] neuro   [] other: Patient Education: [x] Review HEP    [] Progressed/Changed HEP based on:   [] positioning   [] body mechanics   [] transfers   [] heat/ice application    [] other:         Pain Level (0-10 scale) post treatment: 6/10    ASSESSMENT/Changes in Function:   The pt tolerated treatment. Progress to lumbar stabilization exercises on the next visit as tolerated. Patient will continue to benefit from skilled PT services to address ROM deficits, address strength deficits, analyze and address soft tissue restrictions and analyze and cue movement patterns to attain remaining goals     [x]  See Plan of Care  []  See progress note/recertification  []  See Discharge Summary           Progress towards goals / Updated goals:  Short Term Goals: To be accomplished in 5 treatments.   1)  Pt to be independent with HEP  2)  Pt to improve lumbar flexion so she can don/doff shoes without pain beyond 4/10     Long Term Goals: To be accomplished in 10 treatments.   1)  Pt to demonstrate the ability to get in and out of bed without pain in her lower back using proper technique to minimize facet joint arthropathy strain to improve comfort with IADL's  2)  Pt to improve LE strength to WNL so she can perform functional mobility without back pain beyond 3/10    PLAN  [x]  Upgrade activities as tolerated     [x]  Continue plan of care  []  Update interventions per flow sheet       []  Discharge due to:_  []  Other:_      Quintin Bacon, PT,  4/20/2022

## 2022-04-22 ENCOUNTER — APPOINTMENT (OUTPATIENT)
Dept: PHYSICAL THERAPY | Age: 57
End: 2022-04-22
Payer: MEDICARE

## 2022-04-26 ENCOUNTER — APPOINTMENT (OUTPATIENT)
Dept: PHYSICAL THERAPY | Age: 57
End: 2022-04-26
Payer: MEDICARE

## 2022-04-29 ENCOUNTER — APPOINTMENT (OUTPATIENT)
Dept: PHYSICAL THERAPY | Age: 57
End: 2022-04-29
Payer: MEDICARE

## 2022-06-06 DIAGNOSIS — F33.1 MODERATE EPISODE OF RECURRENT MAJOR DEPRESSIVE DISORDER (HCC): ICD-10-CM

## 2022-06-06 DIAGNOSIS — F33.9 EPISODE OF RECURRENT MAJOR DEPRESSIVE DISORDER, UNSPECIFIED DEPRESSION EPISODE SEVERITY (HCC): ICD-10-CM

## 2022-06-07 RX ORDER — CITALOPRAM 40 MG/1
TABLET, FILM COATED ORAL
Qty: 90 TABLET | Refills: 0 | Status: SHIPPED | OUTPATIENT
Start: 2022-06-07 | End: 2022-09-02

## 2022-06-07 RX ORDER — BUPROPION HYDROCHLORIDE 150 MG/1
TABLET ORAL
Qty: 30 TABLET | Refills: 2 | Status: SHIPPED | OUTPATIENT
Start: 2022-06-07 | End: 2022-09-02

## 2022-08-29 ENCOUNTER — HOSPITAL ENCOUNTER (EMERGENCY)
Age: 57
Discharge: HOME | End: 2022-08-29
Attending: EMERGENCY MEDICINE
Payer: MEDICARE

## 2022-08-29 VITALS
DIASTOLIC BLOOD PRESSURE: 97 MMHG | WEIGHT: 293 LBS | HEART RATE: 78 BPM | SYSTOLIC BLOOD PRESSURE: 161 MMHG | OXYGEN SATURATION: 98 % | BODY MASS INDEX: 44.41 KG/M2 | RESPIRATION RATE: 18 BRPM | TEMPERATURE: 98.5 F | HEIGHT: 68 IN

## 2022-08-29 DIAGNOSIS — M15.9 OSTEOARTHRITIS OF MULTIPLE JOINTS, UNSPECIFIED OSTEOARTHRITIS TYPE: Primary | ICD-10-CM

## 2022-08-29 DIAGNOSIS — G89.29 OTHER CHRONIC PAIN: ICD-10-CM

## 2022-08-29 PROCEDURE — 74011250637 HC RX REV CODE- 250/637: Performed by: EMERGENCY MEDICINE

## 2022-08-29 PROCEDURE — 99283 EMERGENCY DEPT VISIT LOW MDM: CPT

## 2022-08-29 RX ORDER — IBUPROFEN 800 MG/1
800 TABLET ORAL ONCE
Status: COMPLETED | OUTPATIENT
Start: 2022-08-29 | End: 2022-08-29

## 2022-08-29 RX ORDER — IBUPROFEN 800 MG/1
800 TABLET ORAL
Qty: 20 TABLET | Refills: 0 | Status: SHIPPED | OUTPATIENT
Start: 2022-08-29 | End: 2022-09-05

## 2022-08-29 RX ORDER — TRAMADOL HYDROCHLORIDE 50 MG/1
50 TABLET ORAL
Qty: 10 TABLET | Refills: 0 | Status: SHIPPED | OUTPATIENT
Start: 2022-08-29 | End: 2022-09-01

## 2022-08-29 RX ADMIN — IBUPROFEN 800 MG: 800 TABLET, FILM COATED ORAL at 08:42

## 2022-08-29 NOTE — ED PROVIDER NOTES
HPI 51-year-old female multiple medical psychiatric problems listed below presents the ED complaining of worsening arthritic pain diffusely throughout her entire body primarily involving joints of the hands shoulders back and neck also with some pain in the knees so she has had bilateral knee replacements. Scheduled for evaluation by pain management 2022 PCP is Dr. Lele Oviedo who prescribes no pain medication patient shows me her MyChart record previously prescribed hydromorphone and oxycodone. Cannot tolerate steroids due to side effects.   Denies fever respiratory GI or  symptoms    Past Medical History:   Diagnosis Date    Anxiety 2017    Arthritis     Asthma     Back injury     Depression     Essential hypertension     GERD (gastroesophageal reflux disease)     Morbid obesity ([address]     Sleep apnea in adult 3/6/2018    CPAP       Past Surgical History:   Procedure Laterality Date    HX APPENDECTOMY      HX  SECTION      HX COLONOSCOPY      HX GASTRECTOMY  2016    lap sleeve gastrectomy    HX HERNIA REPAIR  2021    Incarcerated hernia repair Dr. Tomas Persaud     HX Ranelle Bussing      left knee    HX LAP GASTRIC BYPASS  2021    REVISION SLEEVE TO BYPASS - Tommy     HX LYSIS OF ADHESIONS  2021    Dr. Mcguire [facility]      rt knee partial replacement    HX ORTHOPAEDIC Right     LIGAMENT REPAIR    IR PLC IVC FILTER  2021    AL LAP,DIAGNOSTIC ABDOMEN  2021    Dr. Tomas Persaud         Family History:   Problem Relation Age of Onset    Stroke Mother     Heart Disease Mother         PACEMAKER    Alcohol abuse Father     Seizures Father     Anesth Problems Neg Hx        Social History     Socioeconomic History    Marital status: SINGLE     Spouse name: Not on file    Number of children: Not on file    Years of education: Not on file    Highest education level: Not on file   Occupational History    Occupation: disability      Comment: since  with back    Tobacco Use    Smoking status: Former     Packs/day: 1.00     Years: 30.00     Pack years: 30.00     Types: Cigarettes     Quit date: 2020     Years since quittin.2    Smokeless tobacco: Never   Substance and Sexual Activity    Alcohol use: No    Drug use: No    Sexual activity: Not on file     Comment: post menopausal    Other Topics Concern    Not on file   Social History Narrative    In the home with 15year old grand daughter and friend, Jean-Claude Iverson      Social Determinants of Health     Financial Resource Strain: Not on file   Food Insecurity: Not on file   Transportation Needs: Not on file   Physical Activity: Not on file   Stress: Not on file   Social Connections: Not on file   Intimate Partner Violence: Not on file   Housing Stability: Not on file         ALLERGIES: Patient has no known allergies. Review of Systems   Constitutional: Negative. HENT: Negative. Eyes: Negative. Respiratory:  Negative for cough, chest tightness, shortness of breath and wheezing. Cardiovascular:  Negative for chest pain, palpitations and leg swelling. Gastrointestinal:  Negative for abdominal distention, abdominal pain, blood in stool, constipation, diarrhea, nausea and vomiting. Endocrine: Negative. Genitourinary:  Negative for difficulty urinating, dysuria, flank pain, frequency and hematuria. Musculoskeletal:  Positive for arthralgias, back pain, joint swelling and neck pain. Neurological:  Negative for dizziness, seizures, speech difficulty, weakness and numbness. Hematological: Negative. Psychiatric/Behavioral: Negative. Vitals:    22 0812 22 0814   BP: (!) 161/97    Pulse: 78    Resp: 18    Temp: 98.5 °F (36.9 °C)    SpO2: 98% 98%   Weight: 154.2 kg (340 lb)    Height: 5' 8\" (1.727 m)             Physical Exam  Vitals and nursing note reviewed. Constitutional:       General: She is not in acute distress. Appearance: She is obese.  She is not ill-appearing, toxic-appearing or diaphoretic. HENT:      Head: Normocephalic and atraumatic. Nose: Nose normal.      Mouth/Throat:      Mouth: Mucous membranes are moist.   Eyes:      Extraocular Movements: Extraocular movements intact. Conjunctiva/sclera: Conjunctivae normal.   Cardiovascular:      Rate and Rhythm: Normal rate and regular rhythm. Pulses: Normal pulses. Heart sounds: Normal heart sounds. No murmur heard. Pulmonary:      Effort: Pulmonary effort is normal. No respiratory distress. Breath sounds: Normal breath sounds. No wheezing, rhonchi or rales. Abdominal:      General: Bowel sounds are normal. There is no distension. Palpations: Abdomen is soft. There is no mass. Tenderness: There is no abdominal tenderness. There is no right CVA tenderness, left CVA tenderness, guarding or rebound. Hernia: No hernia is present. Musculoskeletal:         General: Tenderness present. No swelling, deformity or signs of injury. Cervical back: Normal range of motion and neck supple. No rigidity or tenderness. Right lower leg: No edema. Left lower leg: No edema. Comments: Chronic changes of osteoarthritis of the hands Minor back tenderness no acute inflammation   Lymphadenopathy:      Cervical: No cervical adenopathy. Skin:     General: Skin is warm and dry. Capillary Refill: Capillary refill takes less than 2 seconds. Findings: No erythema, lesion or rash. Neurological:      General: No focal deficit present. Mental Status: She is alert and oriented to person, place, and time. Mental status is at baseline. Cranial Nerves: No cranial nerve deficit. Sensory: No sensory deficit. Psychiatric:         Mood and Affect: Mood normal.         Behavior: Behavior normal.         Thought Content: Thought content normal.        MDM  Challenging chronic pain-patient with polyarthritis due to osteoarthritis.   Encouraged trial of steroid taper patient refuses this. Adamant that her pain is much worse we will give short-term tramadol 50 mg #10 and suggest ibuprofen 2-3 times daily.   Return if symptoms worsen       Procedures

## 2022-08-29 NOTE — ED NOTES
Pt given discharge and follow up instructions. Pt advised to follow with PCP and continue with pain management. Pt given education on prescriptions. No further questions a this time.

## 2022-08-29 NOTE — ED TRIAGE NOTES
Pt reports generalized arthritic pain rated 10/10. Pt to follow with pain management on 9/13/2022. Pt states the pain is always present, but has gotten worse. Pt reports recent right knee sx last month.

## 2022-09-27 ENCOUNTER — TRANSCRIBE ORDER (OUTPATIENT)
Dept: SCHEDULING | Age: 57
End: 2022-09-27

## 2022-09-27 DIAGNOSIS — Z96.653 TOTAL KNEE REPLACEMENT STATUS, BILATERAL: Primary | ICD-10-CM

## 2022-09-29 ENCOUNTER — VIRTUAL VISIT (OUTPATIENT)
Dept: SURGERY | Age: 57
End: 2022-09-29
Payer: MEDICARE

## 2022-09-29 VITALS — WEIGHT: 286 LBS | HEIGHT: 68 IN | BODY MASS INDEX: 43.35 KG/M2

## 2022-09-29 DIAGNOSIS — T39.395A NSAID INDUCED GASTRITIS: ICD-10-CM

## 2022-09-29 DIAGNOSIS — R10.13 EPIGASTRIC PAIN: ICD-10-CM

## 2022-09-29 DIAGNOSIS — E66.01 MORBID OBESITY WITH BMI OF 40.0-44.9, ADULT (HCC): ICD-10-CM

## 2022-09-29 DIAGNOSIS — K29.60 NSAID INDUCED GASTRITIS: ICD-10-CM

## 2022-09-29 DIAGNOSIS — K91.2 POSTOPERATIVE INTESTINAL MALABSORPTION: Primary | ICD-10-CM

## 2022-09-29 PROCEDURE — 3017F COLORECTAL CA SCREEN DOC REV: CPT | Performed by: NURSE PRACTITIONER

## 2022-09-29 PROCEDURE — G8417 CALC BMI ABV UP PARAM F/U: HCPCS | Performed by: NURSE PRACTITIONER

## 2022-09-29 PROCEDURE — G9717 DOC PT DX DEP/BP F/U NT REQ: HCPCS | Performed by: NURSE PRACTITIONER

## 2022-09-29 PROCEDURE — G8427 DOCREV CUR MEDS BY ELIG CLIN: HCPCS | Performed by: NURSE PRACTITIONER

## 2022-09-29 PROCEDURE — 99213 OFFICE O/P EST LOW 20 MIN: CPT | Performed by: NURSE PRACTITIONER

## 2022-09-29 PROCEDURE — G8756 NO BP MEASURE DOC: HCPCS | Performed by: NURSE PRACTITIONER

## 2022-09-29 RX ORDER — OMEPRAZOLE 40 MG/1
40 CAPSULE, DELAYED RELEASE ORAL DAILY
Qty: 90 CAPSULE | Refills: 3 | Status: SHIPPED | OUTPATIENT
Start: 2022-09-29

## 2022-09-29 NOTE — PROGRESS NOTES
1. Have you been to the ER, urgent care clinic since your last visit? Hospitalized since your last visit? yes knee replacement, tooth pain, back pain    2. Have you seen or consulted any other health care providers outside of the [address] [place] [address] since your last visit? Include any pap smears or colon screening.  No

## 2022-10-07 NOTE — PROGRESS NOTES
I was in the office while conducting this encounter. Consent:  He and/or his healthcare decision maker is aware that this patient-initiated Telehealth encounter is a billable service, with coverage as determined by his insurance carrier. He is aware that he may receive a bill and has provided verbal consent to proceed: Yes    This virtual visit was conducted via D[employer]. me. Pursuant to the emergency declaration under the [address] [place] [address], [facility] waiver authority and the [employer] Act, this Virtual  Visit was conducted to reduce the patient's risk of exposure to COVID-19 and provide continuity of care for an established patient. Services were provided through a video synchronous discussion virtually to substitute for in-person clinic visit. Due to this being a TeleHealth evaluation, many elements of the physical examination are unable to be assessed. Total Time: minutes: 21-30 minutes. Sarah Sena, was evaluated through a synchronous (real-time) audio-video encounter. The patient (or guardian if applicable) is aware that this is a billable service, which includes applicable co-pays. This Virtual Visit was conducted with patient's (and/or legal guardian's) consent. The visit was conducted pursuant to the emergency declaration under the [address] [place] [address], [address] [place] [address] [facility] authority and the [employer] and [employer] General Act. Patient identification was verified, and a caregiver was present when appropriate. The patient was located in a state where the provider was licensed to provide care. Chief Complaint   Patient presents with    Surgical Follow-up     8 months s/p sleeve to lap gastric bypass down 54 pounds #[phone]    Abdominal Pain       Sarah Sena almost 2 years s/p Gastric bypass for treatment of morbid obesity.    C/o epigastric pain prn and has been taking some NSAIDS for her arthritis pain. Drinking Pepsi. Not sure if she is still taking \"stomach medication\"  Was in the ER recently with joint pain and they gave her Tramadol and Ibuprofen   Seeing pain mgmt next week   Occasional nausea and regurgitation   No hematemesis   No melena   BM \"ok\"   Takes prune juice prn to \"get it going\"   Says she is walking more with her walker and \"can do so much more\"   She is very thankful for her surgery and our support   Labs in the ER reviewed      Co-Morbid(s)     Was anti coagulation initiated for presumed / confirmed DVT/PE? NO    Was an incisional hernia noted on exam?       NO      COMORBIDITY     SLEEP APNEA                 YES        GERD  (req.meds)           YES  HYPERLIPIDEMIA           YES  HYPERTENSION             YES       IF YES, # OF HTN MEDICATIONS 2  DIABETES                        YES      IF YES, 1 NON-INSULIN   0 INSULIN       Visit Vitals  Ht 5' 8\" (1.727 m)   Wt 286 lb (129.7 kg)   BMI 43.49 kg/m²     Looks well   Speech clear and breathing unlabored   Mucous membranes appear moist   Ambulating in her bedroom     ICD-10-CM ICD-9-CM    1. Postoperative intestinal malabsorption  K91.2 579.3       2. Morbid obesity with BMI of 40.0-44.9, adult ([facility]  E66.01 278.01     Z68.41 V85.41       3. Epigastric pain  R10.13 789.06 omeprazole (PRILOSEC) 40 mg capsule      4. NSAID induced gastritis  K29.60 535.40 omeprazole (PRILOSEC) 40 mg capsule    T39.395A E935.8         Almost 2 yeats s/p Gastric bypass for treatment of morbid obesity   > 100 lbs weight loss  Reminded NO NSAIDS   Continue Omeprazole 40 mg every day for acid suppression   Reviewed vitamins and recent labs   Back to basics nutrition class with RD   Follow up in 6 months   Daily walking   Support group   Jordan Oviedo verbalized understanding and questions were answered to the best of my knowledge and ability. Diet and activity  educational materials were provided.

## 2022-10-07 NOTE — PATIENT INSTRUCTIONS
Back to Basics Bariatric Nutrition. Virtual class with Janiya Garcia RD. Joel Andrew will reach out to you via e-mail with details on date/time and Zoom link. Take your vitamins every day     Stay on the Omeprazole every day for your stomach   Avoid ALL NSAID'S, including:  Advil, Motrin, Aleve, BC Powders, Excedrin, Naproxen, Ibuprofen, Goodies, Voltaren, Mobic, Diclofenac, etc.      If you are given a new prescription for pain ask if it is a NSAID (non-steroidal antiinflammatory drug). If it is, you should not take after having gastric bypass.

## 2022-10-14 ENCOUNTER — HOSPITAL ENCOUNTER (EMERGENCY)
Age: 57
Discharge: STILL ADMITTED OR ADMINISTRATIVE | End: 2022-10-14

## 2022-10-14 ENCOUNTER — TRANSCRIBE ORDER (OUTPATIENT)
Dept: GENERAL RADIOLOGY | Age: 57
End: 2022-10-14

## 2022-10-14 ENCOUNTER — HOSPITAL ENCOUNTER (OUTPATIENT)
Dept: GENERAL RADIOLOGY | Age: 57
Discharge: HOME | End: 2022-10-14
Payer: MEDICARE

## 2022-10-14 DIAGNOSIS — M51.37 DDD (DEGENERATIVE DISC DISEASE), LUMBOSACRAL: ICD-10-CM

## 2022-10-14 DIAGNOSIS — M51.36 DEGENERATION OF LUMBAR INTERVERTEBRAL DISC: ICD-10-CM

## 2022-10-14 DIAGNOSIS — M47.817 LUMBOSACRAL SPONDYLOSIS WITHOUT MYELOPATHY: ICD-10-CM

## 2022-10-14 DIAGNOSIS — M47.817 LUMBOSACRAL SPONDYLOSIS WITHOUT MYELOPATHY: Primary | ICD-10-CM

## 2022-10-14 PROCEDURE — 72114 X-RAY EXAM L-S SPINE BENDING: CPT

## 2022-10-19 ENCOUNTER — TELEPHONE (OUTPATIENT)
Dept: SURGERY | Age: 57
End: 2022-10-19

## 2022-10-19 DIAGNOSIS — R10.13 EPIGASTRIC PAIN: Primary | ICD-10-CM

## 2022-10-19 DIAGNOSIS — K29.60 NSAID INDUCED GASTRITIS: ICD-10-CM

## 2022-10-19 DIAGNOSIS — T39.395A NSAID INDUCED GASTRITIS: ICD-10-CM

## 2022-10-19 DIAGNOSIS — K91.2 POSTOPERATIVE INTESTINAL MALABSORPTION: ICD-10-CM

## 2022-10-19 NOTE — TELEPHONE ENCOUNTER
Returned call to patient. Two patient identifiers used. Informed patient I spoke with Robert Faye NP, per Robert Faye NP patient should continue to take Mylanta and Omeprazole. Patient agreed. Will discuss with Ciara Cordero NP when return to office to see if any further testing needs to be done.

## 2022-10-19 NOTE — TELEPHONE ENCOUNTER
Patient called and stated that the Mylanta/Maalox works for about 15 minutes and then the sharp pain feeling in her stomach comes back.

## 2022-10-20 ENCOUNTER — DOCUMENTATION ONLY (OUTPATIENT)
Dept: SURGERY | Age: 57
End: 2022-10-20

## 2022-10-20 RX ORDER — SUCRALFATE 1 G/10ML
1 SUSPENSION ORAL 4 TIMES DAILY
Qty: 414 ML | Refills: 1 | Status: SHIPPED | OUTPATIENT
Start: 2022-10-20

## 2022-10-20 NOTE — TELEPHONE ENCOUNTER
Returned call to patient. Two patient identifiers used. Informed patient I spoke with Keith Shine NP and which she stated she will prescribe some Carafate and would like for the patient to take a tablet and dissolve it in water in a medication cup and drink it, Patient voiced understanding and agreed. Patient was also informed Keith Shine NP will be doing a referral for endoscopy. Patient voiced understanding.

## 2022-10-20 NOTE — TELEPHONE ENCOUNTER
Patient had been taking NSAIDS for joint pain.   Recently resumed PPI   Will add Carafate 1 gm qid and refer to GI   No melena   No hematemesis   Referred to [facility] for EGD

## 2022-11-18 DIAGNOSIS — K29.60 NSAID INDUCED GASTRITIS: ICD-10-CM

## 2022-11-18 DIAGNOSIS — T39.395A NSAID INDUCED GASTRITIS: ICD-10-CM

## 2022-11-18 DIAGNOSIS — R10.13 EPIGASTRIC PAIN: ICD-10-CM

## 2022-11-18 RX ORDER — SUCRALFATE 1 G/10ML
SUSPENSION ORAL
Qty: 414 ML | Refills: 1 | Status: SHIPPED | OUTPATIENT
Start: 2022-11-18

## 2022-12-20 DIAGNOSIS — R10.13 EPIGASTRIC PAIN: ICD-10-CM

## 2022-12-20 DIAGNOSIS — K29.60 NSAID INDUCED GASTRITIS: ICD-10-CM

## 2022-12-20 DIAGNOSIS — T39.395A NSAID INDUCED GASTRITIS: ICD-10-CM

## 2022-12-20 RX ORDER — SUCRALFATE 1 G/10ML
SUSPENSION ORAL
Qty: 414 ML | Refills: 1 | Status: SHIPPED | OUTPATIENT
Start: 2022-12-20

## 2023-02-27 NOTE — PROGRESS NOTES
Results in pt's chart under media section Routing to , there is nothing avail for today or tomorrow. Please advise

## 2023-03-16 ENCOUNTER — TRANSCRIBE ORDER (OUTPATIENT)
Dept: SCHEDULING | Age: 58
End: 2023-03-16

## 2023-03-16 DIAGNOSIS — M51.26 OTHER INTERVERTEBRAL DISC DISPLACEMENT, LUMBAR REGION: Primary | ICD-10-CM

## 2023-03-28 ENCOUNTER — HOSPITAL ENCOUNTER (OUTPATIENT)
Dept: MRI IMAGING | Age: 58
Discharge: HOME | End: 2023-03-28
Payer: MEDICARE

## 2023-03-28 DIAGNOSIS — M51.26 OTHER INTERVERTEBRAL DISC DISPLACEMENT, LUMBAR REGION: ICD-10-CM

## 2023-03-28 PROCEDURE — 72148 MRI LUMBAR SPINE W/O DYE: CPT

## 2023-04-21 DIAGNOSIS — Z96.653 TOTAL KNEE REPLACEMENT STATUS, BILATERAL: Primary | ICD-10-CM

## 2023-05-12 RX ORDER — SUCRALFATE ORAL 1 G/10ML
SUSPENSION ORAL
Qty: 414 ML | Refills: 0 | Status: SHIPPED | OUTPATIENT
Start: 2023-05-12

## 2023-05-12 NOTE — TELEPHONE ENCOUNTER
Patient identified with two patient identifiers. Patient informed message received confirmed with patient she is request refill on carafate. Patient informed we have not recently received any refill request.  Patient informed I will send request to provider for review. She will need to schedule office follow up informed at last visit she was instructed to follow up in 6 months. Patient confirmed she is still having symptoms related to H/O ulcer. Patient is also still smoking daily. Patient expressed understanding transferred to [facility] to schedule.

## 2023-05-12 NOTE — TELEPHONE ENCOUNTER
Patient called stating that she needs a refill of the medication for her stomach ulcer. Patient states that it was denied and she does not understand why. I told patient that I would send a message for a return call and she stated \"let them know I need it today. \"

## 2023-05-25 ENCOUNTER — OFFICE VISIT (OUTPATIENT)
Age: 58
End: 2023-05-25
Payer: MEDICARE

## 2023-05-25 VITALS
BODY MASS INDEX: 44.41 KG/M2 | OXYGEN SATURATION: 97 % | SYSTOLIC BLOOD PRESSURE: 122 MMHG | TEMPERATURE: 98.3 F | WEIGHT: 293 LBS | DIASTOLIC BLOOD PRESSURE: 77 MMHG | HEIGHT: 68 IN | HEART RATE: 67 BPM | RESPIRATION RATE: 18 BRPM

## 2023-05-25 DIAGNOSIS — E53.8 B12 DEFICIENCY: ICD-10-CM

## 2023-05-25 DIAGNOSIS — K28.9 MARGINAL ULCER: ICD-10-CM

## 2023-05-25 DIAGNOSIS — K91.2 POSTOPERATIVE INTESTINAL MALABSORPTION: ICD-10-CM

## 2023-05-25 DIAGNOSIS — R10.13 EPIGASTRIC PAIN: ICD-10-CM

## 2023-05-25 DIAGNOSIS — F17.200 SMOKES TOBACCO DAILY: ICD-10-CM

## 2023-05-25 DIAGNOSIS — E61.1 IRON DEFICIENCY: ICD-10-CM

## 2023-05-25 DIAGNOSIS — E55.9 VITAMIN D DEFICIENCY: ICD-10-CM

## 2023-05-25 DIAGNOSIS — E66.01 MORBID OBESITY WITH BMI OF 45.0-49.9, ADULT (HCC): ICD-10-CM

## 2023-05-25 DIAGNOSIS — K91.2 POSTOPERATIVE INTESTINAL MALABSORPTION: Primary | ICD-10-CM

## 2023-05-25 PROCEDURE — 3074F SYST BP LT 130 MM HG: CPT | Performed by: NURSE PRACTITIONER

## 2023-05-25 PROCEDURE — 99213 OFFICE O/P EST LOW 20 MIN: CPT | Performed by: NURSE PRACTITIONER

## 2023-05-25 PROCEDURE — 3078F DIAST BP <80 MM HG: CPT | Performed by: NURSE PRACTITIONER

## 2023-05-25 ASSESSMENT — PATIENT HEALTH QUESTIONNAIRE - PHQ9
SUM OF ALL RESPONSES TO PHQ QUESTIONS 1-9: 0
2. FEELING DOWN, DEPRESSED OR HOPELESS: 0
SUM OF ALL RESPONSES TO PHQ QUESTIONS 1-9: 0
SUM OF ALL RESPONSES TO PHQ QUESTIONS 1-9: 0
SUM OF ALL RESPONSES TO PHQ9 QUESTIONS 1 & 2: 0
1. LITTLE INTEREST OR PLEASURE IN DOING THINGS: 0
SUM OF ALL RESPONSES TO PHQ QUESTIONS 1-9: 0

## 2023-05-25 NOTE — PATIENT INSTRUCTIONS
I placed a lab order to recheck your labs / vitamins. You can go to [facility] at [facility]. Upper endoscopy: Ming Canada will contact you to arrange a date and time for the procedure and you will need a      Switch to a Complete Multivitamin with 18 mg of iron and take twice daily in addition to the B12, calcium, and D.      Before you drink a soda, drink a bottle of water     Eat your food at the kitchen table     Spruce up your bedroom and make it your sanctuary where there is:

## 2023-05-25 NOTE — PROGRESS NOTES
Identified patient with two patient identifiers (name and ). Reviewed chart in preparation for visit and have obtained necessary documentation. Edward Ventura is a 62 y.o. female  Chief Complaint   Patient presents with    Follow-up     1 year s/p sleeve gastrectomy conversion to gastric bypass. Gained 12 lbs      /77   Pulse 67   Temp 98.3 °F (36.8 °C)   Ht 5' 8\" (1.727 m)   Wt 298 lb 9.6 oz (135.4 kg)   SpO2 97%   BMI 45.40 kg/m²     1. Have you been to the ER, urgent care clinic since your last visit? Hospitalized since your last visit?no    2. Have you seen or consulted any other health care providers outside of the [address] [place] [address] since your last visit? Include any pap smears or colon screening.  no

## 2023-05-27 NOTE — PROGRESS NOTES
Chief Complaint   Patient presents with    Follow-up     2+ year s/p sleeve gastrectomy conversion to gastric bypass. Gained 12 lbs          Babs Pollard is 2+ years status post conversion sleeve to gastric bypass for treatment of obesity and GERD. Presents today with epigastric pain. Weight gain 12 lbs     Smoking 1 ppd   Drinking 5-6 cans Pepsi daily   Eats most of meals, smokes, and drinks Pepsi in her bed    EGD > 1 year ago   \"Stomach aches\" and denies hematemesis or melena   Denies NSAID'S     Pain mgmt for back 6/2/23    No GERD   No nausea and denies vomiting     Joined [employer] but has not been     Bowels moving most days     Vitamin compliance _MVI 50+, B12   _yes__ Calcium Citrate 1200 -1500 mg daily in divided doses        Activity  none  Sleep   Poor up most of the night watching TV    Pre op weight 340 lbs   Power  286 lbs   Current  298 lbs     Physical Exam  /77 (Site: Right Upper Arm, Position: Sitting, Cuff Size: Large Adult)   Pulse 67   Temp 98.3 °F (36.8 °C) (Oral)   Resp 18   Ht 5' 8\" (1.727 m)   Wt 298 lb 9.6 oz (135.4 kg)   SpO2 97%   BMI 45.40 kg/m²   A + O x 3, here with grand daughter who seems very supportive and encouraging   Chest  CTA  COR  RRR  ABD Soft, obese, mild epigastric tenderness, /ND, +BS, no masses or hernias. EXT Trace LE  edema; in wheelchair      Diagnosis Orders   1. Postoperative intestinal malabsorption  Ferritin    Iron and TIBC    Comprehensive Metabolic Panel    Vitamin D 25 Hydroxy    Vitamin B12 & Folate    CBC with Auto Differential      2. Morbid obesity with BMI of 45.0-49.9, adult (HCC)  Ferritin    Iron and TIBC    Comprehensive Metabolic Panel    Vitamin D 25 Hydroxy    Vitamin B12 & Folate    CBC with Auto Differential      3. Epigastric pain  Ferritin    Iron and TIBC    Comprehensive Metabolic Panel    Vitamin D 25 Hydroxy    Vitamin B12 & Folate    CBC with Auto Differential      4.  Marginal ulcer  Ferritin    Iron and TIBC

## 2023-05-30 ENCOUNTER — TELEPHONE (OUTPATIENT)
Age: 58
End: 2023-05-30

## 2023-05-30 NOTE — TELEPHONE ENCOUNTER
Spoke to patient to schedule her EGD. I let her know that this procedure is done at [facility]. she acknowledged ok  . I offered  6/5/23 @ 10:00AM with arrival time of 8:30AM.  she acknowledged: that would work     I asked her if she is taking anything for acid reflux/GERD:  she acknowledged:  no    I informed her with the procedure she is required to have someone come in with her at registration and stay on the property during the duration of the procedure. Bring photo ID and insurance card. she acknowledged ok    I told her once this is scheduled I will put all this information we discussed in a letter that will post to her my chart and go out in the mail. She acknowledged ok and thank you   .

## 2023-05-31 ENCOUNTER — TELEPHONE (OUTPATIENT)
Age: 58
End: 2023-05-31

## 2023-05-31 NOTE — TELEPHONE ENCOUNTER
LM for patient letting her know I need a copy of her insurance card that goes in to effect 6/1/23 and she has a procedure scheduled for 6/5/23. I need it by tomorrow so I can find out if the procedure needs a prior authorization.

## 2023-06-01 ENCOUNTER — TELEPHONE (OUTPATIENT)
Age: 58
End: 2023-06-01

## 2023-06-01 NOTE — TELEPHONE ENCOUNTER
Patient called letting me know she is having issue uploading her insurance card to her babberlyt. She now has [facility] [employer] [id number], I told her to bring her card and photo ID on Monday so they can add it to the system    I was able to go on line and get approval for her Endo procedure.   Auth # [id number]
ABRASION

## 2023-06-01 NOTE — TELEPHONE ENCOUNTER
LM letting patient know I still haven't received her new insurance information that I need to see if she needs prior authorization for Monday's procedure. I stated that if I don't hear from her today by 3:00 I will have to cancel her procedure and we will have to reschedule to a later date.

## 2023-06-05 ENCOUNTER — ANESTHESIA (OUTPATIENT)
Facility: HOSPITAL | Age: 58
End: 2023-06-05
Payer: MEDICARE

## 2023-06-05 ENCOUNTER — HOSPITAL ENCOUNTER (OUTPATIENT)
Facility: HOSPITAL | Age: 58
Setting detail: OUTPATIENT SURGERY
Discharge: HOME | End: 2023-06-05
Attending: SURGERY | Admitting: SURGERY
Payer: MEDICARE

## 2023-06-05 ENCOUNTER — ANESTHESIA EVENT (OUTPATIENT)
Facility: HOSPITAL | Age: 58
End: 2023-06-05
Payer: MEDICARE

## 2023-06-05 VITALS
WEIGHT: 293 LBS | HEIGHT: 68 IN | RESPIRATION RATE: 20 BRPM | BODY MASS INDEX: 44.41 KG/M2 | HEART RATE: 68 BPM | DIASTOLIC BLOOD PRESSURE: 75 MMHG | SYSTOLIC BLOOD PRESSURE: 135 MMHG | OXYGEN SATURATION: 97 % | TEMPERATURE: 97.5 F

## 2023-06-05 PROBLEM — K28.9 MARGINAL ULCER: Status: ACTIVE | Noted: 2023-06-05

## 2023-06-05 PROBLEM — Z72.0 TOBACCO ABUSE: Status: ACTIVE | Noted: 2023-06-05

## 2023-06-05 LAB
GLUCOSE BLD STRIP.AUTO-MCNC: 88 MG/DL (ref 65–117)
SERVICE CMNT-IMP: NORMAL

## 2023-06-05 PROCEDURE — 7100000010 HC PHASE II RECOVERY - FIRST 15 MIN: Performed by: SURGERY

## 2023-06-05 PROCEDURE — 82962 GLUCOSE BLOOD TEST: CPT

## 2023-06-05 PROCEDURE — 6360000002 HC RX W HCPCS: Performed by: NURSE ANESTHETIST, CERTIFIED REGISTERED

## 2023-06-05 PROCEDURE — 2709999900 HC NON-CHARGEABLE SUPPLY: Performed by: SURGERY

## 2023-06-05 PROCEDURE — 3700000000 HC ANESTHESIA ATTENDED CARE: Performed by: SURGERY

## 2023-06-05 PROCEDURE — 43235 EGD DIAGNOSTIC BRUSH WASH: CPT | Performed by: SURGERY

## 2023-06-05 PROCEDURE — 2500000003 HC RX 250 WO HCPCS: Performed by: NURSE ANESTHETIST, CERTIFIED REGISTERED

## 2023-06-05 RX ORDER — BUPROPION HYDROCHLORIDE 150 MG/1
150 TABLET, EXTENDED RELEASE ORAL DAILY
COMMUNITY

## 2023-06-05 RX ORDER — SODIUM CHLORIDE 0.9 % (FLUSH) 0.9 %
5-40 SYRINGE (ML) INJECTION EVERY 12 HOURS SCHEDULED
Status: DISCONTINUED | OUTPATIENT
Start: 2023-06-05 | End: 2023-06-05 | Stop reason: HOSPADM

## 2023-06-05 RX ORDER — IBUPROFEN 200 MG
1 CAPSULE ORAL DAILY
COMMUNITY

## 2023-06-05 RX ORDER — OMEPRAZOLE 40 MG/1
40 CAPSULE, DELAYED RELEASE ORAL 2 TIMES DAILY
Qty: 60 CAPSULE | Refills: 0 | Status: SHIPPED | OUTPATIENT
Start: 2023-06-05 | End: 2023-07-05

## 2023-06-05 RX ORDER — SODIUM CHLORIDE 0.9 % (FLUSH) 0.9 %
5-40 SYRINGE (ML) INJECTION PRN
Status: DISCONTINUED | OUTPATIENT
Start: 2023-06-05 | End: 2023-06-05 | Stop reason: HOSPADM

## 2023-06-05 RX ORDER — PROPOFOL 10 MG/ML
INJECTION, EMULSION INTRAVENOUS CONTINUOUS PRN
Status: DISCONTINUED | OUTPATIENT
Start: 2023-06-05 | End: 2023-06-05 | Stop reason: SDUPTHER

## 2023-06-05 RX ORDER — SODIUM CHLORIDE 9 MG/ML
25 INJECTION, SOLUTION INTRAVENOUS PRN
Status: DISCONTINUED | OUTPATIENT
Start: 2023-06-05 | End: 2023-06-05 | Stop reason: HOSPADM

## 2023-06-05 RX ORDER — LIDOCAINE HYDROCHLORIDE 20 MG/ML
INJECTION, SOLUTION EPIDURAL; INFILTRATION; INTRACAUDAL; PERINEURAL PRN
Status: DISCONTINUED | OUTPATIENT
Start: 2023-06-05 | End: 2023-06-05 | Stop reason: SDUPTHER

## 2023-06-05 RX ADMIN — LIDOCAINE HYDROCHLORIDE 100 MG: 20 INJECTION, SOLUTION EPIDURAL; INFILTRATION; INTRACAUDAL; PERINEURAL at 10:30

## 2023-06-05 RX ADMIN — PROPOFOL 500 MCG/KG/MIN: 10 INJECTION, EMULSION INTRAVENOUS at 10:31

## 2023-06-05 ASSESSMENT — PAIN - FUNCTIONAL ASSESSMENT: PAIN_FUNCTIONAL_ASSESSMENT: 0-10

## 2023-06-05 ASSESSMENT — PAIN DESCRIPTION - DESCRIPTORS: DESCRIPTORS: ACHING

## 2023-06-05 NOTE — ANESTHESIA PRE PROCEDURE
and upper dentures      Pulmonary: breath sounds clear to auscultation  (+) sleep apnea: on CPAP,  asthma: seasonal asthma,                            Cardiovascular:    (+) hypertension: mild and no interval change,         Rhythm: regular  Rate: normal                    Neuro/Psych:               GI/Hepatic/Renal:   (+) morbid obesity          Endo/Other:    (+) DiabetesType II DM, well controlled, , .                 Abdominal:             Vascular: Other Findings:           Anesthesia Plan      MAC     ASA 3             Anesthetic plan and risks discussed with patient. Plan discussed with CRNA.                     Gareth Ortiz MD   6/5/2023

## 2023-06-05 NOTE — PROGRESS NOTES
Kylee Splinter  1965  [id number]    Situation:  Verbal report received from: Melinda SCHWARZ   Procedure: Procedure(s):  EGD ESOPHAGOGASTRODUODENOSCOPY    Background:    Preoperative diagnosis: Gastroesophageal reflux disease, unspecified whether esophagitis present [K21.9]  Postoperative diagnosis: * No post-op diagnosis entered *    :  Dr. Christine Del Rio  Assistant(s): Circulator: Blanca Gay RN  Endoscopy Technician: Evelia Turner    Specimens: * No specimens in log *  H. Pylori  No    Assessment:    Anesthesia gave intra-procedure sedation and medications, see anesthesia flow sheet No    Intravenous fluids: NS@ KVO     Vital signs stable     Abdominal assessment: round and soft     Recommendation:  Discharge patient per MD order.   Return to floor  Family or Friend   Permission to share finding with family or friend Yes

## 2023-06-05 NOTE — DISCHARGE INSTRUCTIONS
Discharge Instructions for Endoscopy Patients       YOU MUST QUIT SMOKING    CONTINUE PPI AND CARAFATE    Do not drive or operate machinery while taking sedating or narcotic medications. Some discomfort is expected in your throat or upper abdomen. Take tylenol as needed. If an acid monitoring device was deployed, please follow the instructions for recording and returning the device. You may walk as desired and go up and down stairs as needed. Walking is encouraged. You may shower like normal    You may resume regular diet. Follow up with provider as scheduled. If you experience fever (greater than 101.5), chills, vomiting or redness or drainage at surgical site, please contact your surgeons office. If you have further questions or concerns, please call your surgeons office at [phone].

## 2023-06-05 NOTE — PROGRESS NOTES
Endoscopy discharge instructions have been reviewed and given to patient. The patient verbalized understanding and acceptance of instructions. Dr. Shahla Freeman discussed with patient procedure findings and next steps.

## 2023-06-05 NOTE — OP NOTE
[facility]   Endoscopic Procedure Note        NAME:  Leatha Brown   :   1965   MRN:   [id number]     Date/Time:  2023 10:40 AM    Esophagogastroduodenoscopy (EGD) Procedure Note    Preoperative Diagnosis: Gastroesophageal reflux disease, unspecified whether esophagitis present [K21.9]  Postoperative Diagnosis: Post-Op Diagnosis Codes:     * Gastroesophageal reflux disease, unspecified whether esophagitis present [K21.9]  Marginal ulcers  Tobacco abuse       Surgeon:  Heather Oh MD    Staff: Circulator: Billy Galeana RN  Endoscopy Technician: Ramiro Garg     Implants: None    Anethesia/Sedation:  MAC anesthesia Propofol    Procedure Details     After infom consent was obtained for the procedure, with all risks and benefits of procedure explained the patient was taken to the endoscopy suite and placed in the left lateral decubitus position. Following sequential administration of sedation as per above, the GIF-H190 gastroscope was inserted into the mouth and advanced under direct vision to proximal jejunum. A careful inspection was made as the gastroscope was withdrawn; findings and interventions are described below. Findings:  Esophagus: Normal,  GEJ at 39 cm, no hiatal hernia  Stomach: Normal pouch, Multiple 3-4 mm marginal ulcers on jejunal side of anastomosis  Duodenum/jejunum: Normal       Therapies: None    Specimens: None           EBL: Minimal    Complications:   None; patient tolerated the procedure well. Impression:    See Postoperative diagnosis above    Recommendations:  Stop smoking. PPI increased to BID, Cont carafate.     Discharge disposition:  Home in the company of  when able to ambulate    Heather Oh MD, Alvaro Hsu, [facility]  Bariatric and General Surgeon  [facility]

## 2023-06-05 NOTE — ANESTHESIA POSTPROCEDURE EVALUATION
Department of Anesthesiology  Postprocedure Note    Patient: Meliton Remy  MRN: [id number]  YOB: 1965  Date of evaluation: 6/5/2023      Procedure Summary     Date: 06/05/23 Room / Location: [facility] 03 / [facility] ENDOSCOPY    Anesthesia Start: 1030 Anesthesia Stop: 1039    Procedure: EGD ESOPHAGOGASTRODUODENOSCOPY (Upper GI Region) Diagnosis:       Gastroesophageal reflux disease, unspecified whether esophagitis present      (Gastroesophageal reflux disease, unspecified whether esophagitis present [K21.9])    Surgeons: Gil Alves MD Responsible Provider: Jessica Rogel MD    Anesthesia Type: MAC ASA Status: 3          Anesthesia Type: No value filed.     Daivd Phase I: David Score: 10    David Phase II: David Score: 10      Anesthesia Post Evaluation    Patient location during evaluation: bedside  Patient participation: complete - patient participated  Level of consciousness: awake  Airway patency: patent  Nausea & Vomiting: no vomiting and no nausea  Complications: no  Cardiovascular status: hemodynamically stable  Respiratory status: acceptable  Hydration status: stable

## 2023-06-05 NOTE — PERIOP NOTE
Report from Naman Roa CRNA, see anesthesia record. ABD remains soft and non-tender post procedure. Pt has no complaints at this time and tolerated the procedure well. Endoscope was pre-cleaned at bedside immediately following procedure by [facility].

## 2023-06-05 NOTE — H&P
General Surgery History and Physical    CC: ulcer after bypass    History of Present Illness:      Raman Gamez is a 62 y.o. female who presents with ulcers after sleeve to bypass.         Past Medical History:   Diagnosis Date    Anxiety 2017    Arthritis     Asthma     Back injury     Depression     Essential hypertension     GERD (gastroesophageal reflux disease)     Morbid obesity ([address]     Sleep apnea in adult 3/6/2018    CPAP     Past Surgical History:   Procedure Laterality Date    APPENDECTOMY       SECTION      COLONOSCOPY      GASTRECTOMY      lap sleeve gastrectomy    GASTRIC BYPASS SURGERY  2021    REVISION SLEEVE TO BYPASS - Linda     HERNIA REPAIR  2021    Incarcerated hernia repair Dr. Christopher Coates (CERVIX STATUS UNKNOWN)      IR IVC FILTER PLACEMENT W IMAGING  2021    IR IVC FILTER PLACEMENT W IMAGING 2021 [address] [place] [address] RAD ANGIO IR    IR IVC FILTER PLACEMENT W IMAGING  2021    LAP,DIAGNOSTIC ABDOMEN  2021    Dr. Pacheco Hicks  2021    Dr. Alfredo Watson      rt knee partial replacement    TOTAL KNEE ARTHROPLASTY Right 2022    TOTAL KNEE ARTHROPLASTY      left knee      Family History   Problem Relation Age of Onset    Stroke Mother     Seizures Father     Alcohol Abuse Father     Heart Disease Mother         PACEMAKER    Anesth Problems Neg Hx      Social History     Socioeconomic History    Marital status: Single   Tobacco Use    Smoking status: Every Day     Packs/day: 1.00     Years: 1.00     Pack years: 1.00     Types: Cigarettes     Last attempt to quit: 2020     Years since quitting: 3.0    Smokeless tobacco: Never   Substance and Sexual Activity    Alcohol use: No    Drug use: No   Social History Narrative    In the home with 15year old grand daughter and friend, Corinne Krebs       Prior to Admission medications    Medication Sig Start Date End

## 2023-06-07 ENCOUNTER — TELEPHONE (OUTPATIENT)
Age: 58
End: 2023-06-07

## 2023-06-07 NOTE — TELEPHONE ENCOUNTER
Please call pt back. Pt stated Dr Nilsa Lynn did her EGD and her stomach is hurting. Would like to know if medication can be called in.

## 2023-06-07 NOTE — TELEPHONE ENCOUNTER
I called and spoke with the patient earlier. She informed me the pain started today and about an hour ago. I told her I will speak with the doctor and call you back. I spoke with the doctor and he directed her to take her PPI and the Carafate. He also recommended she stop smoking. I called and informed the patient he wants you to take the PPI as directed, twice a day and take the Carafate. I asked her if she has been smoking and she stated, \"Yes! \" I then told her he said, \"Stop smoking! \" She acknowledged understanding and thanked me for the call.

## 2024-05-16 ENCOUNTER — HOSPITAL ENCOUNTER (EMERGENCY)
Facility: HOSPITAL | Age: 59
Discharge: HOME | End: 2024-05-16
Attending: EMERGENCY MEDICINE
Payer: MEDICARE

## 2024-05-16 ENCOUNTER — APPOINTMENT (OUTPATIENT)
Facility: HOSPITAL | Age: 59
End: 2024-05-16
Attending: EMERGENCY MEDICINE
Payer: MEDICARE

## 2024-05-16 VITALS
RESPIRATION RATE: 19 BRPM | DIASTOLIC BLOOD PRESSURE: 88 MMHG | HEART RATE: 71 BPM | OXYGEN SATURATION: 100 % | HEIGHT: 68 IN | SYSTOLIC BLOOD PRESSURE: 138 MMHG | WEIGHT: 266.8 LBS | BODY MASS INDEX: 40.44 KG/M2 | TEMPERATURE: 97.9 F

## 2024-05-16 DIAGNOSIS — K80.20 CALCULUS OF GALLBLADDER WITHOUT CHOLECYSTITIS WITHOUT OBSTRUCTION: ICD-10-CM

## 2024-05-16 DIAGNOSIS — R10.9 ABDOMINAL PAIN, UNSPECIFIED ABDOMINAL LOCATION: Primary | ICD-10-CM

## 2024-05-16 DIAGNOSIS — R11.2 NAUSEA AND VOMITING, UNSPECIFIED VOMITING TYPE: ICD-10-CM

## 2024-05-16 LAB
ALBUMIN SERPL-MCNC: 3.7 G/DL (ref 3.5–5)
ALBUMIN/GLOB SERPL: 0.9 (ref 1.1–2.2)
ALP SERPL-CCNC: 149 U/L (ref 45–117)
ALT SERPL-CCNC: 17 U/L (ref 12–78)
ANION GAP SERPL CALC-SCNC: 10 MMOL/L (ref 5–15)
AST SERPL W P-5'-P-CCNC: 13 U/L (ref 15–37)
BASOPHILS # BLD: 0 K/UL (ref 0–0.1)
BASOPHILS NFR BLD: 1 % (ref 0–1)
BILIRUB SERPL-MCNC: 0.4 MG/DL (ref 0.2–1)
BUN SERPL-MCNC: 10 MG/DL (ref 6–20)
BUN/CREAT SERPL: 15 (ref 12–20)
CA-I BLD-MCNC: 9.9 MG/DL (ref 8.5–10.1)
CHLORIDE SERPL-SCNC: 101 MMOL/L (ref 97–108)
CO2 SERPL-SCNC: 30 MMOL/L (ref 21–32)
CREAT SERPL-MCNC: 0.65 MG/DL (ref 0.55–1.02)
DIFFERENTIAL METHOD BLD: ABNORMAL
EOSINOPHIL # BLD: 0.1 K/UL (ref 0–0.4)
EOSINOPHIL NFR BLD: 1 % (ref 0–7)
ERYTHROCYTE [DISTWIDTH] IN BLOOD BY AUTOMATED COUNT: 16.4 % (ref 11.5–14.5)
GLOBULIN SER CALC-MCNC: 4.3 G/DL (ref 2–4)
GLUCOSE SERPL-MCNC: 103 MG/DL (ref 65–100)
HCT VFR BLD AUTO: 37.5 % (ref 35–47)
HGB BLD-MCNC: 12.2 G/DL (ref 11.5–16)
IMM GRANULOCYTES # BLD AUTO: 0 K/UL (ref 0–0.04)
IMM GRANULOCYTES NFR BLD AUTO: 0 % (ref 0–0.5)
LIPASE SERPL-CCNC: 22 U/L (ref 13–75)
LYMPHOCYTES # BLD: 1.8 K/UL (ref 0.8–3.5)
LYMPHOCYTES NFR BLD: 30 % (ref 12–49)
MCH RBC QN AUTO: 28.4 PG (ref 26–34)
MCHC RBC AUTO-ENTMCNC: 32.5 G/DL (ref 30–36.5)
MCV RBC AUTO: 87.4 FL (ref 80–99)
MONOCYTES # BLD: 0.2 K/UL (ref 0–1)
MONOCYTES NFR BLD: 4 % (ref 5–13)
NEUTS SEG # BLD: 3.9 K/UL (ref 1.8–8)
NEUTS SEG NFR BLD: 64 % (ref 32–75)
NRBC # BLD: 0 K/UL (ref 0–0.01)
NRBC BLD-RTO: 0 PER 100 WBC
PLATELET # BLD AUTO: 329 K/UL (ref 150–400)
PMV BLD AUTO: 9.3 FL (ref 8.9–12.9)
POTASSIUM SERPL-SCNC: 3.5 MMOL/L (ref 3.5–5.1)
PROT SERPL-MCNC: 8 G/DL (ref 6.4–8.2)
RBC # BLD AUTO: 4.29 M/UL (ref 3.8–5.2)
SODIUM SERPL-SCNC: 141 MMOL/L (ref 136–145)
WBC # BLD AUTO: 6 K/UL (ref 3.6–11)

## 2024-05-16 PROCEDURE — 85025 COMPLETE CBC W/AUTO DIFF WBC: CPT

## 2024-05-16 PROCEDURE — 2580000003 HC RX 258: Performed by: EMERGENCY MEDICINE

## 2024-05-16 PROCEDURE — 83690 ASSAY OF LIPASE: CPT

## 2024-05-16 PROCEDURE — 6370000000 HC RX 637 (ALT 250 FOR IP): Performed by: EMERGENCY MEDICINE

## 2024-05-16 PROCEDURE — 96374 THER/PROPH/DIAG INJ IV PUSH: CPT

## 2024-05-16 PROCEDURE — 36415 COLL VENOUS BLD VENIPUNCTURE: CPT

## 2024-05-16 PROCEDURE — 96372 THER/PROPH/DIAG INJ SC/IM: CPT

## 2024-05-16 PROCEDURE — 93005 ELECTROCARDIOGRAM TRACING: CPT | Performed by: EMERGENCY MEDICINE

## 2024-05-16 PROCEDURE — 6360000002 HC RX W HCPCS: Performed by: EMERGENCY MEDICINE

## 2024-05-16 PROCEDURE — 99284 EMERGENCY DEPT VISIT MOD MDM: CPT

## 2024-05-16 PROCEDURE — 80053 COMPREHEN METABOLIC PANEL: CPT

## 2024-05-16 PROCEDURE — 74176 CT ABD & PELVIS W/O CONTRAST: CPT

## 2024-05-16 RX ORDER — FAMOTIDINE 10 MG
20 TABLET ORAL 2 TIMES DAILY
Qty: 120 TABLET | Refills: 0 | Status: SHIPPED | OUTPATIENT
Start: 2024-05-16 | End: 2024-06-15

## 2024-05-16 RX ORDER — MORPHINE SULFATE 4 MG/ML
4 INJECTION, SOLUTION INTRAMUSCULAR; INTRAVENOUS
Status: COMPLETED | OUTPATIENT
Start: 2024-05-16 | End: 2024-05-16

## 2024-05-16 RX ORDER — DICYCLOMINE HYDROCHLORIDE 10 MG/1
10 CAPSULE ORAL EVERY 6 HOURS PRN
Qty: 20 CAPSULE | Refills: 0 | Status: SHIPPED | OUTPATIENT
Start: 2024-05-16 | End: 2024-05-21

## 2024-05-16 RX ORDER — ONDANSETRON 4 MG/1
4 TABLET, FILM COATED ORAL 3 TIMES DAILY PRN
Qty: 15 TABLET | Refills: 0 | Status: SHIPPED | OUTPATIENT
Start: 2024-05-16

## 2024-05-16 RX ORDER — 0.9 % SODIUM CHLORIDE 0.9 %
1000 INTRAVENOUS SOLUTION INTRAVENOUS ONCE
Status: COMPLETED | OUTPATIENT
Start: 2024-05-16 | End: 2024-05-16

## 2024-05-16 RX ORDER — ONDANSETRON 2 MG/ML
4 INJECTION INTRAMUSCULAR; INTRAVENOUS ONCE
Status: COMPLETED | OUTPATIENT
Start: 2024-05-16 | End: 2024-05-16

## 2024-05-16 RX ORDER — MORPHINE SULFATE 4 MG/ML
4 INJECTION, SOLUTION INTRAMUSCULAR; INTRAVENOUS
Status: DISCONTINUED | OUTPATIENT
Start: 2024-05-16 | End: 2024-05-16

## 2024-05-16 RX ORDER — ONDANSETRON 4 MG/1
4 TABLET, ORALLY DISINTEGRATING ORAL EVERY 8 HOURS PRN
Status: DISCONTINUED | OUTPATIENT
Start: 2024-05-16 | End: 2024-05-16 | Stop reason: HOSPADM

## 2024-05-16 RX ADMIN — MORPHINE SULFATE 4 MG: 4 INJECTION, SOLUTION INTRAMUSCULAR; INTRAVENOUS at 16:36

## 2024-05-16 RX ADMIN — SODIUM CHLORIDE 1000 ML: 9 INJECTION, SOLUTION INTRAVENOUS at 16:07

## 2024-05-16 RX ADMIN — ONDANSETRON 4 MG: 2 INJECTION INTRAMUSCULAR; INTRAVENOUS at 16:08

## 2024-05-16 RX ADMIN — ONDANSETRON 4 MG: 4 TABLET, ORALLY DISINTEGRATING ORAL at 16:36

## 2024-05-16 ASSESSMENT — PAIN SCALES - GENERAL
PAINLEVEL_OUTOF10: 10
PAINLEVEL_OUTOF10: 10
PAINLEVEL_OUTOF10: 5

## 2024-05-16 ASSESSMENT — PAIN DESCRIPTION - LOCATION: LOCATION: ABDOMEN

## 2024-05-16 ASSESSMENT — PAIN DESCRIPTION - DESCRIPTORS: DESCRIPTORS: ACHING;CRAMPING

## 2024-05-16 ASSESSMENT — PAIN DESCRIPTION - ORIENTATION: ORIENTATION: UPPER;MID

## 2024-05-16 ASSESSMENT — PAIN DESCRIPTION - PAIN TYPE: TYPE: ACUTE PAIN

## 2024-05-16 ASSESSMENT — PAIN - FUNCTIONAL ASSESSMENT
PAIN_FUNCTIONAL_ASSESSMENT: PREVENTS OR INTERFERES SOME ACTIVE ACTIVITIES AND ADLS
PAIN_FUNCTIONAL_ASSESSMENT: 0-10
PAIN_FUNCTIONAL_ASSESSMENT: 0-10

## 2024-05-16 ASSESSMENT — LIFESTYLE VARIABLES
HOW OFTEN DO YOU HAVE A DRINK CONTAINING ALCOHOL: NEVER
HOW MANY STANDARD DRINKS CONTAINING ALCOHOL DO YOU HAVE ON A TYPICAL DAY: PATIENT DOES NOT DRINK

## 2024-05-16 NOTE — ED PROVIDER NOTES
[facility] EMERGENCY DEPT  EMERGENCY DEPARTMENT HISTORY AND PHYSICAL EXAM      Date: 2024  Patient Name: Juju Mari  MRN: [id number]  Birthdate 1965  Date of evaluation: 2024  Provider: José Sethi MD   Note Started: 2:59 PM EDT 24    HISTORY OF PRESENT ILLNESS     Chief Complaint   Patient presents with    Nausea    Abdominal Pain     N/v       History Provided By: Patient    HPI: Juju Mari is a 58 y.o. female began feeling nauseous after waking this morning. Reports pain mid abdomen. No radiation of pain. No cough , congestion runny nose. Reports 2 episodes of vomiting since this morning. No cp or sob.  Denies urinary symptoms denies flank pain or back pain no associating with eating or drinking    PAST MEDICAL HISTORY   Past Medical History:  Past Medical History:   Diagnosis Date    Anxiety 2017    Arthritis     Asthma     Back injury     Depression     Diabetes mellitus (HCC)     Essential hypertension     GERD (gastroesophageal reflux disease)     Morbid obesity (HCC)     Sleep apnea in adult 3/6/2018    CPAP       Past Surgical History:  Past Surgical History:   Procedure Laterality Date    APPENDECTOMY       SECTION      COLONOSCOPY      GASTRECTOMY  2016    lap sleeve gastrectomy    GASTRIC BYPASS SURGERY  2021    REVISION SLEEVE TO BYPASS - Linda     HERNIA REPAIR  2021    Incarcerated hernia repair Dr. Abdi Lux     HYSTERECTOMY (CERVIX STATUS UNKNOWN)      IR IVC FILTER PLACEMENT W IMAGING  2021    IR IVC FILTER PLACEMENT W IMAGING 2021 [facility] RAD ANGIO IR    IR IVC FILTER PLACEMENT W IMAGING  2021    LAP,DIAGNOSTIC ABDOMEN  2021    Dr. Abdi Lux    LYSIS OF ADHESIONS  2021    Dr. Mckeon     ORTHOPEDIC SURGERY Right     LIGAMENT REPAIR    ORTHOPEDIC SURGERY      rt knee partial replacement    TOTAL KNEE ARTHROPLASTY Right 2022    TOTAL KNEE ARTHROPLASTY      left knee    UPPER GASTROINTESTINAL ENDOSCOPY N/A 2023

## 2024-05-16 NOTE — ED TRIAGE NOTES
Pt present via stretcher per ems for c/o n/v abd pain x 5 hrs. Pt moaning. No sob noted. Ems report tenderness per palpation at umbilicus.

## 2024-05-16 NOTE — ED NOTES
Very poor venous access- Multiple nurses attempted IV stick- all attempts unsuccessful- MD aware. Phlebot trying to get labs. MD at bedside. Pt vomiting undigested food

## 2024-05-17 LAB
EKG ATRIAL RATE: 79 BPM
EKG DIAGNOSIS: NORMAL
EKG P AXIS: 87 DEGREES
EKG P-R INTERVAL: 215 MS
EKG Q-T INTERVAL: 386 MS
EKG QRS DURATION: 102 MS
EKG QTC CALCULATION (BAZETT): 446 MS
EKG R AXIS: 50 DEGREES
EKG VENTRICULAR RATE: 80 BPM

## 2024-05-28 ENCOUNTER — OFFICE VISIT (OUTPATIENT)
Age: 59
End: 2024-05-28

## 2024-05-28 ENCOUNTER — TELEPHONE (OUTPATIENT)
Age: 59
End: 2024-05-28

## 2024-05-28 VITALS
DIASTOLIC BLOOD PRESSURE: 77 MMHG | TEMPERATURE: 98.4 F | WEIGHT: 260 LBS | SYSTOLIC BLOOD PRESSURE: 117 MMHG | OXYGEN SATURATION: 96 % | HEIGHT: 68 IN | RESPIRATION RATE: 20 BRPM | HEART RATE: 76 BPM | BODY MASS INDEX: 39.4 KG/M2

## 2024-05-28 DIAGNOSIS — F17.200 SMOKING: ICD-10-CM

## 2024-05-28 DIAGNOSIS — K80.20 SYMPTOMATIC CHOLELITHIASIS: Primary | ICD-10-CM

## 2024-05-28 RX ORDER — PREGABALIN 50 MG/1
50 CAPSULE ORAL
COMMUNITY
Start: 2024-04-29

## 2024-05-28 RX ORDER — OXYCODONE AND ACETAMINOPHEN 10; 325 MG/1; MG/1
1 TABLET ORAL EVERY 4 HOURS PRN
COMMUNITY
Start: 2024-05-03

## 2024-05-28 RX ORDER — FLUOXETINE HYDROCHLORIDE 40 MG/1
40 CAPSULE ORAL DAILY
COMMUNITY
Start: 2024-05-01

## 2024-05-28 RX ORDER — HYDROXYZINE HYDROCHLORIDE 25 MG/1
25 TABLET, FILM COATED ORAL DAILY
COMMUNITY
Start: 2024-05-02

## 2024-05-28 RX ORDER — SEMAGLUTIDE 0.68 MG/ML
INJECTION, SOLUTION SUBCUTANEOUS
COMMUNITY
Start: 2024-05-17

## 2024-05-28 ASSESSMENT — ENCOUNTER SYMPTOMS
ANAL BLEEDING: 0
ALLERGIC/IMMUNOLOGIC NEGATIVE: 1
CONSTIPATION: 0
DIARRHEA: 0
BLOOD IN STOOL: 0
ABDOMINAL DISTENTION: 0
VOMITING: 1
EYES NEGATIVE: 1
RECTAL PAIN: 0
RESPIRATORY NEGATIVE: 1
ABDOMINAL PAIN: 1
NAUSEA: 1

## 2024-05-28 ASSESSMENT — PATIENT HEALTH QUESTIONNAIRE - PHQ9
SUM OF ALL RESPONSES TO PHQ9 QUESTIONS 1 & 2: 0
2. FEELING DOWN, DEPRESSED OR HOPELESS: NOT AT ALL
SUM OF ALL RESPONSES TO PHQ QUESTIONS 1-9: 0
SUM OF ALL RESPONSES TO PHQ QUESTIONS 1-9: 0
1. LITTLE INTEREST OR PLEASURE IN DOING THINGS: NOT AT ALL
SUM OF ALL RESPONSES TO PHQ QUESTIONS 1-9: 0
SUM OF ALL RESPONSES TO PHQ QUESTIONS 1-9: 0

## 2024-05-28 NOTE — PROGRESS NOTES
Identified pt with two pt identifiers (name and ). Reviewed chart in preparation for visit and have obtained necessary documentation.    Juju Mari is a 58 y.o. female  Chief Complaint   Patient presents with    New Patient     Gallstones no pain today      /77 (Site: Right Upper Arm, Position: Sitting, Cuff Size: Large Adult)   Pulse 76   Temp 98.4 °F (36.9 °C) (Oral)   Resp 20   Ht 1.727 m (5' 8\")   Wt 117.9 kg (260 lb)   SpO2 96%   BMI 39.53 kg/m²         
(PATANOL) 0.1 % ophthalmic solution Apply 2 drops to eye 2 times daily       No current facility-administered medications for this visit.        Review of Systems   Constitutional: Negative.    HENT: Negative.     Eyes: Negative.    Respiratory: Negative.     Cardiovascular: Negative.    Gastrointestinal:  Positive for abdominal pain, nausea and vomiting. Negative for abdominal distention, anal bleeding, blood in stool, constipation, diarrhea and rectal pain.   Endocrine: Negative.    Genitourinary: Negative.    Musculoskeletal: Negative.    Skin: Negative.    Allergic/Immunologic: Negative.    Neurological: Negative.    Hematological: Negative.    Psychiatric/Behavioral: Negative.                Physical Exam  /77 (Site: Right Upper Arm, Position: Sitting, Cuff Size: Large Adult)   Pulse 76   Temp 98.4 °F (36.9 °C) (Oral)   Resp 20   Ht 1.727 m (5' 8\")   Wt 117.9 kg (260 lb)   SpO2 96%   BMI 39.53 kg/m²     Physical Exam  Constitutional:       Appearance: She is obese.   HENT:      Head: Normocephalic and atraumatic.   Cardiovascular:      Rate and Rhythm: Normal rate.   Pulmonary:      Effort: Pulmonary effort is normal.   Abdominal:      Palpations: Abdomen is soft.      Tenderness: There is no abdominal tenderness. There is no guarding or rebound.   Neurological:      Mental Status: She is alert and oriented to person, place, and time.   Psychiatric:         Mood and Affect: Mood normal.         Behavior: Behavior normal.         Thought Content: Thought content normal.         Judgment: Judgment normal.              Assessment  Problem List Items Addressed This Visit    None  Visit Diagnoses       Symptomatic cholelithiasis    -  Primary                Plan    Smoking cessation counseling provided.  Also counseled patient to avoid NSAIDs and continue taking bariatric multivitamins daily  I explained the risk, benefits, and alternatives of cholecystectomy to the patient  She consented for

## 2024-05-28 NOTE — TELEPHONE ENCOUNTER
Perfect Serve sent to provider. I called and spoke with Juju Mari 2 patient identifiers used I let her know that I did send a message to  and will update her in the morning with her response.

## 2024-05-28 NOTE — TELEPHONE ENCOUNTER
Patient called in checking in to see if Dr. Kenney is still going to send something to the pharmacy to help her quit smoking? Patient states she still wants to quit. Please contact patient to assist. Thanks DCR

## 2024-05-29 ENCOUNTER — TELEPHONE (OUTPATIENT)
Age: 59
End: 2024-05-29

## 2024-05-29 ENCOUNTER — PREP FOR PROCEDURE (OUTPATIENT)
Age: 59
End: 2024-05-29

## 2024-05-29 DIAGNOSIS — K80.20 SYMPTOMATIC CHOLELITHIASIS: ICD-10-CM

## 2024-05-29 RX ORDER — BUPROPION HYDROCHLORIDE 150 MG/1
150 TABLET, EXTENDED RELEASE ORAL 2 TIMES DAILY
Qty: 60 TABLET | Refills: 1 | Status: SHIPPED | OUTPATIENT
Start: 2024-05-29

## 2024-05-29 NOTE — TELEPHONE ENCOUNTER
Called patient ton inform her that per Anne Marie she was not sending any medication but was going to see if she could find somewhere to refer her, she has not had any luck with finding a facility to refer her to but she will look into some programs and send that information to her mychart.   Patient call was forwarded to voicemail.

## 2024-05-29 NOTE — TELEPHONE ENCOUNTER
Attempted to contact patient to schedule surgery with Dr. Kenney. No answer, left a voicemail for a return call.

## 2024-05-29 NOTE — TELEPHONE ENCOUNTER
wanted me to give the patient a call to let her know that she sent in some medication for her to start taking. She would like for her to speak with her PCP first to make sure its okay to take with her other medications. I called and spoke with Juju Mari 2 patient identifiers used. Informed the patient of what  stated and she understood.

## 2024-05-29 NOTE — TELEPHONE ENCOUNTER
Patient called back and was offered 6/6 for surgery and patient accepted. I notified patient that I would put a surgical letter in her MyChart and also mail one to her home address. Patient thanked me for the call.

## 2024-06-03 ENCOUNTER — HOSPITAL ENCOUNTER (OUTPATIENT)
Facility: HOSPITAL | Age: 59
Discharge: HOME | End: 2024-06-06
Attending: SURGERY
Payer: MEDICARE

## 2024-06-03 ENCOUNTER — HOSPITAL ENCOUNTER (OUTPATIENT)
Facility: HOSPITAL | Age: 59
Discharge: HOME | End: 2024-06-06
Payer: MEDICARE

## 2024-06-03 LAB
ANION GAP SERPL CALC-SCNC: 9 MMOL/L (ref 5–15)
BUN SERPL-MCNC: 10 MG/DL (ref 6–20)
BUN/CREAT SERPL: 19 (ref 12–20)
CA-I BLD-MCNC: 9.8 MG/DL (ref 8.5–10.1)
CHLORIDE SERPL-SCNC: 105 MMOL/L (ref 97–108)
CO2 SERPL-SCNC: 27 MMOL/L (ref 21–32)
CREAT SERPL-MCNC: 0.54 MG/DL (ref 0.55–1.02)
ERYTHROCYTE [DISTWIDTH] IN BLOOD BY AUTOMATED COUNT: 17 % (ref 11.5–14.5)
GLUCOSE SERPL-MCNC: 84 MG/DL (ref 65–100)
HCT VFR BLD AUTO: 35.3 % (ref 35–47)
HGB BLD-MCNC: 11.7 G/DL (ref 11.5–16)
MCH RBC QN AUTO: 29.3 PG (ref 26–34)
MCHC RBC AUTO-ENTMCNC: 33.1 G/DL (ref 30–36.5)
MCV RBC AUTO: 88.5 FL (ref 80–99)
NRBC # BLD: 0 K/UL (ref 0–0.01)
NRBC BLD-RTO: 0 PER 100 WBC
PLATELET # BLD AUTO: 253 K/UL (ref 150–400)
PMV BLD AUTO: 10.5 FL (ref 8.9–12.9)
POTASSIUM SERPL-SCNC: 3.5 MMOL/L (ref 3.5–5.1)
RBC # BLD AUTO: 3.99 M/UL (ref 3.8–5.2)
SODIUM SERPL-SCNC: 141 MMOL/L (ref 136–145)
WBC # BLD AUTO: 5.7 K/UL (ref 3.6–11)

## 2024-06-03 PROCEDURE — 80048 BASIC METABOLIC PNL TOTAL CA: CPT

## 2024-06-03 PROCEDURE — 85027 COMPLETE CBC AUTOMATED: CPT

## 2024-06-03 PROCEDURE — 93005 ELECTROCARDIOGRAM TRACING: CPT | Performed by: SURGERY

## 2024-06-03 PROCEDURE — 36415 COLL VENOUS BLD VENIPUNCTURE: CPT

## 2024-06-03 PROCEDURE — 71046 X-RAY EXAM CHEST 2 VIEWS: CPT

## 2024-06-03 NOTE — DISCHARGE INSTRUCTIONS
Nothing to eat or drink after midnight Wednesday use CHG wipe on abdomen before going to bed and after shower on Thursday 6-6-24. Take blood pressure meds and heart med with enough water to swallow on 6-6-24 report to [facility] at 630 am to emergency room entrance to register     No Heavy Lifting greater than 10 pounds for 4 weeks  No driving or making important legal decisions for the next 24 hours and while taking narcotics  Ok to shower starting tomorrow  Do not remove steristrips. They will fall off on their own  No swimming or tub bathing x 3 weeks   Diet as tolerated

## 2024-06-04 LAB
EKG ATRIAL RATE: 69 BPM
EKG DIAGNOSIS: NORMAL
EKG P AXIS: 73 DEGREES
EKG P-R INTERVAL: 240 MS
EKG Q-T INTERVAL: 399 MS
EKG QRS DURATION: 102 MS
EKG QTC CALCULATION (BAZETT): 428 MS
EKG R AXIS: 68 DEGREES
EKG T AXIS: 47 DEGREES
EKG VENTRICULAR RATE: 69 BPM

## 2024-06-06 ENCOUNTER — ANESTHESIA EVENT (OUTPATIENT)
Facility: HOSPITAL | Age: 59
End: 2024-06-06
Payer: MEDICARE

## 2024-06-06 ENCOUNTER — HOSPITAL ENCOUNTER (OUTPATIENT)
Facility: HOSPITAL | Age: 59
Discharge: HOME | End: 2024-06-06
Attending: SURGERY | Admitting: SURGERY
Payer: MEDICARE

## 2024-06-06 ENCOUNTER — ANESTHESIA (OUTPATIENT)
Facility: HOSPITAL | Age: 59
End: 2024-06-06
Payer: MEDICARE

## 2024-06-06 DIAGNOSIS — K80.20 SYMPTOMATIC CHOLELITHIASIS: Primary | ICD-10-CM

## 2024-06-06 LAB
GLUCOSE BLD STRIP.AUTO-MCNC: 84 MG/DL (ref 65–100)
PERFORMED BY:: NORMAL

## 2024-06-06 PROCEDURE — 2580000003 HC RX 258: Performed by: SURGERY

## 2024-06-06 PROCEDURE — 7100000001 HC PACU RECOVERY - ADDTL 15 MIN: Performed by: SURGERY

## 2024-06-06 PROCEDURE — 3600000014 HC SURGERY LEVEL 4 ADDTL 15MIN: Performed by: SURGERY

## 2024-06-06 PROCEDURE — 3600000004 HC SURGERY LEVEL 4 BASE: Performed by: SURGERY

## 2024-06-06 PROCEDURE — 82962 GLUCOSE BLOOD TEST: CPT

## 2024-06-06 PROCEDURE — 2709999900 HC NON-CHARGEABLE SUPPLY: Performed by: SURGERY

## 2024-06-06 PROCEDURE — 3700000001 HC ADD 15 MINUTES (ANESTHESIA): Performed by: SURGERY

## 2024-06-06 PROCEDURE — 2720000010 HC SURG SUPPLY STERILE: Performed by: SURGERY

## 2024-06-06 PROCEDURE — 3700000000 HC ANESTHESIA ATTENDED CARE: Performed by: SURGERY

## 2024-06-06 PROCEDURE — 47562 LAPAROSCOPIC CHOLECYSTECTOMY: CPT | Performed by: SURGERY

## 2024-06-06 PROCEDURE — 2500000003 HC RX 250 WO HCPCS: Performed by: NURSE ANESTHETIST, CERTIFIED REGISTERED

## 2024-06-06 PROCEDURE — 6360000002 HC RX W HCPCS: Performed by: SURGERY

## 2024-06-06 PROCEDURE — 6360000002 HC RX W HCPCS: Performed by: NURSE ANESTHETIST, CERTIFIED REGISTERED

## 2024-06-06 PROCEDURE — 7100000000 HC PACU RECOVERY - FIRST 15 MIN: Performed by: SURGERY

## 2024-06-06 PROCEDURE — 7100000011 HC PHASE II RECOVERY - ADDTL 15 MIN: Performed by: SURGERY

## 2024-06-06 PROCEDURE — 6360000002 HC RX W HCPCS

## 2024-06-06 PROCEDURE — 99024 POSTOP FOLLOW-UP VISIT: CPT | Performed by: SURGERY

## 2024-06-06 PROCEDURE — 88304 TISSUE EXAM BY PATHOLOGIST: CPT

## 2024-06-06 PROCEDURE — 7100000010 HC PHASE II RECOVERY - FIRST 15 MIN: Performed by: SURGERY

## 2024-06-06 RX ORDER — BUPIVACAINE HYDROCHLORIDE 2.5 MG/ML
INJECTION, SOLUTION EPIDURAL; INFILTRATION; INTRACAUDAL
Status: DISCONTINUED
Start: 2024-06-06 | End: 2024-06-06 | Stop reason: HOSPADM

## 2024-06-06 RX ORDER — HYDROMORPHONE HYDROCHLORIDE 2 MG/ML
INJECTION, SOLUTION INTRAMUSCULAR; INTRAVENOUS; SUBCUTANEOUS
Status: DISCONTINUED
Start: 2024-06-06 | End: 2024-06-06 | Stop reason: HOSPADM

## 2024-06-06 RX ORDER — KETAMINE HCL IN NACL, ISO-OSM 100MG/10ML
SYRINGE (ML) INJECTION PRN
Status: DISCONTINUED | OUTPATIENT
Start: 2024-06-06 | End: 2024-06-06 | Stop reason: SDUPTHER

## 2024-06-06 RX ORDER — DEXAMETHASONE SODIUM PHOSPHATE 10 MG/ML
INJECTION INTRAMUSCULAR; INTRAVENOUS PRN
Status: DISCONTINUED | OUTPATIENT
Start: 2024-06-06 | End: 2024-06-06 | Stop reason: SDUPTHER

## 2024-06-06 RX ORDER — MIDAZOLAM HYDROCHLORIDE 1 MG/ML
INJECTION INTRAMUSCULAR; INTRAVENOUS PRN
Status: DISCONTINUED | OUTPATIENT
Start: 2024-06-06 | End: 2024-06-06 | Stop reason: SDUPTHER

## 2024-06-06 RX ORDER — SODIUM CHLORIDE 9 MG/ML
INJECTION, SOLUTION INTRAVENOUS PRN
Status: DISCONTINUED | OUTPATIENT
Start: 2024-06-06 | End: 2024-06-06 | Stop reason: HOSPADM

## 2024-06-06 RX ORDER — SODIUM CHLORIDE, SODIUM LACTATE, POTASSIUM CHLORIDE, CALCIUM CHLORIDE 600; 310; 30; 20 MG/100ML; MG/100ML; MG/100ML; MG/100ML
INJECTION, SOLUTION INTRAVENOUS CONTINUOUS
Status: DISCONTINUED | OUTPATIENT
Start: 2024-06-06 | End: 2024-06-06 | Stop reason: HOSPADM

## 2024-06-06 RX ORDER — PHENYLEPHRINE HYDROCHLORIDE 10 MG/ML
INJECTION INTRAVENOUS PRN
Status: DISCONTINUED | OUTPATIENT
Start: 2024-06-06 | End: 2024-06-06 | Stop reason: SDUPTHER

## 2024-06-06 RX ORDER — METRONIDAZOLE 500 MG/100ML
500 INJECTION, SOLUTION INTRAVENOUS ONCE
Status: COMPLETED | OUTPATIENT
Start: 2024-06-06 | End: 2024-06-06

## 2024-06-06 RX ORDER — BUPIVACAINE HYDROCHLORIDE 2.5 MG/ML
INJECTION, SOLUTION EPIDURAL; INFILTRATION; INTRACAUDAL PRN
Status: DISCONTINUED | OUTPATIENT
Start: 2024-06-06 | End: 2024-06-06 | Stop reason: HOSPADM

## 2024-06-06 RX ORDER — ONDANSETRON 2 MG/ML
INJECTION INTRAMUSCULAR; INTRAVENOUS PRN
Status: DISCONTINUED | OUTPATIENT
Start: 2024-06-06 | End: 2024-06-06 | Stop reason: SDUPTHER

## 2024-06-06 RX ORDER — LIDOCAINE HYDROCHLORIDE 20 MG/ML
INJECTION, SOLUTION EPIDURAL; INFILTRATION; INTRACAUDAL; PERINEURAL PRN
Status: DISCONTINUED | OUTPATIENT
Start: 2024-06-06 | End: 2024-06-06 | Stop reason: SDUPTHER

## 2024-06-06 RX ORDER — SODIUM CHLORIDE 0.9 % (FLUSH) 0.9 %
5-40 SYRINGE (ML) INJECTION PRN
Status: DISCONTINUED | OUTPATIENT
Start: 2024-06-06 | End: 2024-06-06 | Stop reason: HOSPADM

## 2024-06-06 RX ORDER — METRONIDAZOLE 500 MG/100ML
INJECTION, SOLUTION INTRAVENOUS
Status: COMPLETED
Start: 2024-06-06 | End: 2024-06-06

## 2024-06-06 RX ORDER — NEOSTIGMINE METHYLSULFATE 1 MG/ML
INJECTION, SOLUTION INTRAVENOUS PRN
Status: DISCONTINUED | OUTPATIENT
Start: 2024-06-06 | End: 2024-06-06 | Stop reason: SDUPTHER

## 2024-06-06 RX ORDER — PROPOFOL 10 MG/ML
INJECTION, EMULSION INTRAVENOUS PRN
Status: DISCONTINUED | OUTPATIENT
Start: 2024-06-06 | End: 2024-06-06 | Stop reason: SDUPTHER

## 2024-06-06 RX ORDER — SODIUM CHLORIDE 0.9 % (FLUSH) 0.9 %
5-40 SYRINGE (ML) INJECTION EVERY 12 HOURS SCHEDULED
Status: DISCONTINUED | OUTPATIENT
Start: 2024-06-06 | End: 2024-06-06 | Stop reason: HOSPADM

## 2024-06-06 RX ORDER — EPHEDRINE SULFATE 50 MG/ML
INJECTION INTRAVENOUS PRN
Status: DISCONTINUED | OUTPATIENT
Start: 2024-06-06 | End: 2024-06-06 | Stop reason: SDUPTHER

## 2024-06-06 RX ORDER — NALOXONE HYDROCHLORIDE 0.4 MG/ML
INJECTION, SOLUTION INTRAMUSCULAR; INTRAVENOUS; SUBCUTANEOUS PRN
Status: DISCONTINUED | OUTPATIENT
Start: 2024-06-06 | End: 2024-06-06 | Stop reason: HOSPADM

## 2024-06-06 RX ORDER — GLYCOPYRROLATE 0.2 MG/ML
INJECTION INTRAMUSCULAR; INTRAVENOUS PRN
Status: DISCONTINUED | OUTPATIENT
Start: 2024-06-06 | End: 2024-06-06 | Stop reason: SDUPTHER

## 2024-06-06 RX ORDER — VECURONIUM BROMIDE 1 MG/ML
INJECTION, POWDER, LYOPHILIZED, FOR SOLUTION INTRAVENOUS PRN
Status: DISCONTINUED | OUTPATIENT
Start: 2024-06-06 | End: 2024-06-06 | Stop reason: SDUPTHER

## 2024-06-06 RX ADMIN — ONDANSETRON 4 MG: 2 INJECTION INTRAMUSCULAR; INTRAVENOUS at 08:02

## 2024-06-06 RX ADMIN — LIDOCAINE HYDROCHLORIDE 60 MG: 20 INJECTION, SOLUTION EPIDURAL; INFILTRATION; INTRACAUDAL; PERINEURAL at 07:50

## 2024-06-06 RX ADMIN — NEOSTIGMINE METHYLSULFATE 3 MG: 1 INJECTION INTRAVENOUS at 09:03

## 2024-06-06 RX ADMIN — PROPOFOL 200 MG: 10 INJECTION, EMULSION INTRAVENOUS at 07:50

## 2024-06-06 RX ADMIN — PHENYLEPHRINE HYDROCHLORIDE 100 MCG: 10 INJECTION INTRAVENOUS at 08:57

## 2024-06-06 RX ADMIN — HYDROMORPHONE HYDROCHLORIDE 1 MG: 1 INJECTION, SOLUTION INTRAMUSCULAR; INTRAVENOUS; SUBCUTANEOUS at 10:33

## 2024-06-06 RX ADMIN — METRONIDAZOLE 500 MG: 500 INJECTION, SOLUTION INTRAVENOUS at 07:13

## 2024-06-06 RX ADMIN — Medication 20 MG: at 07:44

## 2024-06-06 RX ADMIN — HYDROMORPHONE HYDROCHLORIDE 0.5 MG: 1 INJECTION, SOLUTION INTRAMUSCULAR; INTRAVENOUS; SUBCUTANEOUS at 08:44

## 2024-06-06 RX ADMIN — SODIUM CHLORIDE, POTASSIUM CHLORIDE, SODIUM LACTATE AND CALCIUM CHLORIDE: 600; 310; 30; 20 INJECTION, SOLUTION INTRAVENOUS at 08:40

## 2024-06-06 RX ADMIN — GLYCOPYRROLATE 0.2 MG: 0.2 INJECTION INTRAMUSCULAR; INTRAVENOUS at 07:50

## 2024-06-06 RX ADMIN — DEXAMETHASONE SODIUM PHOSPHATE 10 MG: 10 INJECTION INTRAMUSCULAR; INTRAVENOUS at 08:16

## 2024-06-06 RX ADMIN — CEFAZOLIN 2000 MG: 2 INJECTION, POWDER, FOR SOLUTION INTRAMUSCULAR; INTRAVENOUS at 07:57

## 2024-06-06 RX ADMIN — HYDROMORPHONE HYDROCHLORIDE 0.5 MG: 1 INJECTION, SOLUTION INTRAMUSCULAR; INTRAVENOUS; SUBCUTANEOUS at 08:36

## 2024-06-06 RX ADMIN — EPHEDRINE SULFATE 10 MG: 50 INJECTION INTRAVENOUS at 08:53

## 2024-06-06 RX ADMIN — GLYCOPYRROLATE 0.4 MG: 0.2 INJECTION INTRAMUSCULAR; INTRAVENOUS at 09:03

## 2024-06-06 RX ADMIN — MIDAZOLAM 2 MG: 1 INJECTION INTRAMUSCULAR; INTRAVENOUS at 07:44

## 2024-06-06 RX ADMIN — SODIUM CHLORIDE, POTASSIUM CHLORIDE, SODIUM LACTATE AND CALCIUM CHLORIDE: 600; 310; 30; 20 INJECTION, SOLUTION INTRAVENOUS at 07:44

## 2024-06-06 RX ADMIN — EPHEDRINE SULFATE 15 MG: 50 INJECTION INTRAVENOUS at 08:03

## 2024-06-06 RX ADMIN — GLYCOPYRROLATE 0.2 MG: 0.2 INJECTION INTRAMUSCULAR; INTRAVENOUS at 09:01

## 2024-06-06 RX ADMIN — VECURONIUM BROMIDE 3 MG: 10 INJECTION, POWDER, LYOPHILIZED, FOR SOLUTION INTRAVENOUS at 07:58

## 2024-06-06 RX ADMIN — EPHEDRINE SULFATE 10 MG: 50 INJECTION INTRAVENOUS at 08:19

## 2024-06-06 RX ADMIN — SODIUM CHLORIDE, POTASSIUM CHLORIDE, SODIUM LACTATE AND CALCIUM CHLORIDE: 600; 310; 30; 20 INJECTION, SOLUTION INTRAVENOUS at 07:12

## 2024-06-06 RX ADMIN — Medication 30 MG: at 07:50

## 2024-06-06 ASSESSMENT — PAIN - FUNCTIONAL ASSESSMENT
PAIN_FUNCTIONAL_ASSESSMENT: 0-10

## 2024-06-06 ASSESSMENT — PAIN DESCRIPTION - DESCRIPTORS
DESCRIPTORS: NAGGING
DESCRIPTORS: CRAMPING;BURNING
DESCRIPTORS: NAGGING
DESCRIPTORS: BURNING;CRAMPING

## 2024-06-06 ASSESSMENT — PAIN SCALES - GENERAL: PAINLEVEL_OUTOF10: 8

## 2024-06-06 ASSESSMENT — PAIN DESCRIPTION - ORIENTATION: ORIENTATION: POSTERIOR

## 2024-06-06 ASSESSMENT — PAIN DESCRIPTION - LOCATION: LOCATION: ABDOMEN

## 2024-06-06 NOTE — ANESTHESIA POSTPROCEDURE EVALUATION
Department of Anesthesiology  Postprocedure Note    Patient: Juju Mari  MRN: [id number]  YOB: 1965  Date of evaluation: 6/6/2024    Procedure Summary       Date: 06/06/24 Room / Location: [facility] MAIN OR 01 / [facility] MAIN OR    Anesthesia Start: 0744 Anesthesia Stop: 0929    Procedure: LAPAROSCOPIC CHOLECYSTECTOMY (Abdomen) Diagnosis:       Symptomatic cholelithiasis      (Symptomatic cholelithiasis [K80.20])    Surgeons: Janis Kenney MD Responsible Provider:     Anesthesia Type: General ASA Status: 3            Anesthesia Type: General    David Phase I: David Score: 10    David Phase II:      Anesthesia Post Evaluation    Patient location during evaluation: PACU  Patient participation: complete - patient participated  Level of consciousness: sleepy but conscious  Pain score: 0  Airway patency: patent  Nausea & Vomiting: no vomiting and no nausea  Cardiovascular status: blood pressure returned to baseline  Respiratory status: room air  Hydration status: stable  Multimodal analgesia pain management approach    No notable events documented.

## 2024-06-06 NOTE — PROGRESS NOTES
Complain  of opsite pain rate 8 Dr. Kenney notified order received for IV dilaudid 1033 Dilaudid 1mg IV given as ordered

## 2024-06-06 NOTE — H&P
General Surgery H&P      Chief Complaint   Patient presents with    New Patient       Gallstones no pain today    Abdominal pain     History of Present Illness  This is a 58-year-old female with history of Tenisha-en-Y gastric bypass who presents with abdominal pain.  She states she has had some epigastric pain over the last year for which she was diagnosed with marginal ulcers and is currently being treated for it with omeprazole and Carafate which has completely controlled her symptoms.  However this pain she had almost 2 weeks ago was different according to the patient.  It was located in the right upper quadrant, severe in intensity, lasted for almost 12 hours and was different from the pain related to her marginal ulcers.  When the pain was present it was constant, alleviated by analgesics, no known aggravating factors.  It was associated with nausea and vomiting which is not typically present when she has pain for her marginal ulcers.  She went to the emergency room and was diagnosed with symptomatic cholelithiasis and referred to see a general surgeon for cholecystectomy.  Today she denies abdominal pain, she Denies nausea, vomiting, diarrhea, constipation , blood per rectum, melena, chest pain, shortnesss breath, fever, chills      Past Medical History        Past Medical History:   Diagnosis Date    Anxiety 2017    Arthritis      Asthma      Back injury      Depression      Diabetes mellitus (HCC)      Essential hypertension      GERD (gastroesophageal reflux disease)      Morbid obesity (HCC)      Sleep apnea in adult 3/6/2018     CPAP            Past Surgical History         Past Surgical History:   Procedure Laterality Date    APPENDECTOMY         SECTION        COLONOSCOPY        GASTRECTOMY   2016     lap sleeve gastrectomy    GASTRIC BYPASS SURGERY   2021     REVISION SLEEVE TO BYPASS - Linda     HERNIA REPAIR   2021     Incarcerated hernia repair Dr. Abdi Lux      Take 2 tablets by mouth 2 times daily 120 tablet 0    ondansetron (ZOFRAN) 4 MG tablet Take 1 tablet by mouth 3 times daily as needed for Nausea or Vomiting 15 tablet 0    calcium citrate (CALCITRATE) 950 (200 Ca) MG tablet Take 1 tablet by mouth daily        buPROPion (WELLBUTRIN SR) 150 MG extended release tablet Take 2 tablets by mouth daily        omeprazole (PRILOSEC) 40 MG delayed release capsule Take 1 capsule by mouth 2 times daily Open capsule over food and consume food. 60 capsule 0    sucralfate (CARAFATE) 1 GM/10ML suspension TAKE 10ML BY MOUTH FOUR (4) TIMES DAILY 414 mL 0    amLODIPine (NORVASC) 10 MG tablet Take 1 tablet by mouth daily        cyanocobalamin 500 MCG tablet Take 1 tablet by mouth daily        ergocalciferol (ERGOCALCIFEROL) 1.25 MG (38638 UT) capsule Take 1 capsule by mouth every 7 days        ketoconazole (NIZORAL) 2 % cream Apply topically daily        lisinopril (PRINIVIL;ZESTRIL) 40 MG tablet Take 1 tablet by mouth daily        metoprolol succinate (TOPROL XL) 100 MG extended release tablet Take 1 tablet by mouth daily        pravastatin (PRAVACHOL) 20 MG tablet Take 1 tablet by mouth nightly        tiZANidine (ZANAFLEX) 4 MG capsule Take 1 capsule by mouth 3 times daily        albuterol sulfate HFA (PROVENTIL;VENTOLIN;PROAIR) 108 (90 Base) MCG/ACT inhaler Inhale 1 puff into the lungs 2 times daily as needed (Patient not taking: Reported on 6/5/2023)        aluminum & magnesium hydroxide-simethicone (MAALOX) 200-200-20 MG/5ML SUSP suspension Take 30 mLs by mouth every 4 hours as needed (Patient not taking: Reported on 5/28/2024)        Fluticasone Furoate (ARNUITY ELLIPTA) 50 MCG/ACT AEPB 1 spray by Nasal route (Patient not taking: Reported on 6/5/2023)        metFORMIN (GLUCOPHAGE) 500 MG tablet ceived the following from [facility]: Outside name: metFORMIN (GLUCOPHAGE) 500 mg tablet        olopatadine (PATANOL) 0.1 % ophthalmic solution Apply 2 drops to eye 2 times

## 2024-06-06 NOTE — ANESTHESIA PRE PROCEDURE
Department of Anesthesiology  Preprocedure Note       Name:  Juju Mari   Age:  58 y.o.  :  1965                                          MRN:  [id number]         Date:  2024      Surgeon: Surgeon(s):  Janis Kenney MD    Procedure: Procedure(s):  LAPAROSCOPIC CHOLECYSTECTOMY    Medications prior to admission:   Prior to Admission medications    Medication Sig Start Date End Date Taking? Authorizing Provider   buPROPion (WELLBUTRIN SR) 150 MG extended release tablet Take 1 tablet by mouth 2 times daily Start taking 150 mg PO once daily for the first week. Then increase to 150 mg PO twice daily after the first week. Start taking 1-2 weeks before you smoking quitting date. 24   Janis Kenney MD   oxyCODONE-acetaminophen (PERCOCET)  MG per tablet Take 1 tablet by mouth every 4 hours as needed. 5/3/24   Brown Simmons MD   OZEMPIC, 0.25 OR 0.5 MG/DOSE, 2 MG/3ML SOPN  24   Brown Simmons MD   hydrOXYzine HCl (ATARAX) 25 MG tablet Take 1 tablet by mouth daily 24   Brown Simmons MD   FLUoxetine (PROZAC) 40 MG capsule Take 1 capsule by mouth daily 24   Brown Simmons MD   pregabalin (LYRICA) 50 MG capsule Take 1 capsule by mouth. 24   Brown Simmons MD   dicyclomine (BENTYL) 10 MG capsule Take 1 capsule by mouth every 6 hours as needed (Bowel spasms) 24  José Sethi MD   famotidine (PEPCID) 10 MG tablet Take 2 tablets by mouth 2 times daily 5/16/24 6/15/24  José Sethi MD   ondansetron (ZOFRAN) 4 MG tablet Take 1 tablet by mouth 3 times daily as needed for Nausea or Vomiting 24   José Sethi MD   calcium citrate (CALCITRATE) 950 (200 Ca) MG tablet Take 1 tablet by mouth daily    Brown Simmons MD   buPROPion (WELLBUTRIN SR) 150 MG extended release tablet Take 2 tablets by mouth daily    Brown Simmons MD   omeprazole (PRILOSEC) 40 MG delayed release capsule Take 1 capsule

## 2024-06-07 ENCOUNTER — TELEPHONE (OUTPATIENT)
Age: 59
End: 2024-06-07

## 2024-06-07 VITALS
DIASTOLIC BLOOD PRESSURE: 72 MMHG | HEART RATE: 76 BPM | WEIGHT: 264.4 LBS | TEMPERATURE: 97.1 F | RESPIRATION RATE: 20 BRPM | BODY MASS INDEX: 40.07 KG/M2 | OXYGEN SATURATION: 94 % | SYSTOLIC BLOOD PRESSURE: 128 MMHG | HEIGHT: 68 IN

## 2024-06-07 NOTE — TELEPHONE ENCOUNTER
Patient  called in stating Dr. Kenney has to call her Pain management physician  Dr. Celi Ribera. Pt states she had surgery yesterday and her Percocet 10 is not working at all. She would like Dr. Kenney to give her a call along side of pain management physician. Phone number [phone]

## 2024-06-07 NOTE — OP NOTE
Operative Note      Patient: Juju Mari  YOB: 1965  MRN: [id number]    Date of Procedure: 6/6/2024    Pre-Op Diagnosis Codes:     * Symptomatic cholelithiasis [K80.20]    Post-Op Diagnosis: Same       Procedure(s):  LAPAROSCOPIC CHOLECYSTECTOMY    Surgeon(s):  Janis Kenney MD    Assistant:   * No surgical staff found *    Anesthesia: General    Estimated Blood Loss (mL): Minimal    Complications: None    Specimens:   ID Type Source Tests Collected by Time Destination   1 : gallbladder and contents Tissue Gallbladder SURGICAL PATHOLOGY Janis Kenney MD 6/6/2024 0835        Implants:  * No implants in log *      Drains: * No LDAs found *    Findings:  Infection Present At Time Of Surgery (PATOS) (choose all levels that have infection present):  No infection present  Other Findings: adhesion of bowel to midline from just above the umbilicus  to midway epigastric region    Detailed Description of Procedure:     The patient was brought operating room and placed in the supine position.  DVT prophylaxis was established by means of bilateral  pneumatic compression devices.  General anesthesia was induced.  IV antibiotics were given prior to incision.  Timeout was completed verifying correct patient, procedure, site, positioning, and special implants prior to beginning procedure.  The abdomen was prepped and draped in usual sterile fashion.      Since patient An 5mm incision was made just above the umbilicus.   Using optical trocar with laparoscope inserted, a 5 mm port was inserted under direct visualization.  Entry into the peritoneum was confirmed visually and no bowel was noted in the vicinity of the incision.    The abdomen was insufflated with CO2 to a pressure of 12 to 15 mmHg.  The patient tolerated insufflation well.  The laparoscope was inserted and the abdomen inspected.  No injuries from trocar placement were noted.    Additional trocars were then inserted in the following locations: A

## 2024-06-07 NOTE — TELEPHONE ENCOUNTER
Patient called today from pharmacy in [facility]. They do not have any medications for her. She had surgery yesterday and is in need for pain medication. Please call her asap [phone]

## 2024-06-10 ENCOUNTER — TELEPHONE (OUTPATIENT)
Age: 59
End: 2024-06-10

## 2024-06-10 NOTE — TELEPHONE ENCOUNTER
Patient called and asked if she could have something for pain called in since she had surgery on the 6th. She said that she has a PAIN CONTRACT and called [facility] office at [phone] to ask if she can have something different . They told her that she had to come into their office to discuss any additional pain medications  or changes. She began to get very loud and irritated on the phone and said she did not want to go into their office because she was in too much pain \"it felt like knives where cutting her stomach and through here back. I asked her if she was having any symptoms of infection. She denied any fever, N/V and said her wound was not open nor draining. Pt also said she had rectal bleeding when she wipes but has not had a bowl movement and has not passed gas since her surgery. Pt said she is eating very little just because she does not feel like it. She continues to cut me off when speaking with here and just stressed that she just wants pain medication. I adviser her that I will ask Dr. Kenney and see what she could recommend. Pt verbalized understanding.

## 2024-06-11 DIAGNOSIS — Z90.49 S/P LAPAROSCOPIC CHOLECYSTECTOMY: Primary | ICD-10-CM

## 2024-06-11 DIAGNOSIS — R10.84 GENERALIZED ABDOMINAL PAIN: ICD-10-CM

## 2024-06-11 NOTE — PROGRESS NOTES
Patient called office because of abdominal pain post cholecystectomy.  I called patient back and talk to her.  It appears that she is having some incisional pain that is not controlled with her current regimen.  She has a pain contract with a pain management specialist Cindi Ribera and she has been on Percocets 10/325 preoperatively.  She states that this dose is not helping her postoperative pain.    I called her pain management specialist Cindi Ribera and she has agreed to change her prescription from Percocets 10/3/2025 to Dilaudid pending patient brings in the remaining Percocets that she picked up Halie 3.  This was communicated to the patient by me    In addition I did order a CT scan of the abdomen and pelvis to evaluate and rule out any other etiology besides incisional pain as cause of patient's abdominal pain.  This was discussed with the patient.  She did not want to go to the emergency room.    Telephone call completed Halie 10, 2024

## 2024-06-17 ENCOUNTER — OFFICE VISIT (OUTPATIENT)
Age: 59
End: 2024-06-17

## 2024-06-17 VITALS
TEMPERATURE: 98.3 F | HEART RATE: 80 BPM | WEIGHT: 262 LBS | OXYGEN SATURATION: 95 % | SYSTOLIC BLOOD PRESSURE: 117 MMHG | RESPIRATION RATE: 18 BRPM | HEIGHT: 68 IN | DIASTOLIC BLOOD PRESSURE: 78 MMHG | BODY MASS INDEX: 39.71 KG/M2

## 2024-06-17 DIAGNOSIS — Z90.49 S/P LAPAROSCOPIC CHOLECYSTECTOMY: Primary | ICD-10-CM

## 2024-06-17 DIAGNOSIS — R10.11 RIGHT UPPER QUADRANT ABDOMINAL PAIN: ICD-10-CM

## 2024-06-17 DIAGNOSIS — R60.0 LEG EDEMA: ICD-10-CM

## 2024-06-17 PROCEDURE — 99024 POSTOP FOLLOW-UP VISIT: CPT | Performed by: SURGERY

## 2024-06-17 RX ORDER — METRONIDAZOLE 500 MG/1
500 TABLET ORAL 2 TIMES DAILY
Qty: 14 TABLET | Refills: 0 | Status: SHIPPED | OUTPATIENT
Start: 2024-06-17 | End: 2024-06-24

## 2024-06-17 RX ORDER — SULFAMETHOXAZOLE AND TRIMETHOPRIM 800; 160 MG/1; MG/1
1 TABLET ORAL 2 TIMES DAILY
Qty: 14 TABLET | Refills: 0 | Status: SHIPPED | OUTPATIENT
Start: 2024-06-17 | End: 2024-06-24

## 2024-06-17 ASSESSMENT — PATIENT HEALTH QUESTIONNAIRE - PHQ9
SUM OF ALL RESPONSES TO PHQ QUESTIONS 1-9: 4
5. POOR APPETITE OR OVEREATING: SEVERAL DAYS
8. MOVING OR SPEAKING SO SLOWLY THAT OTHER PEOPLE COULD HAVE NOTICED. OR THE OPPOSITE, BEING SO FIGETY OR RESTLESS THAT YOU HAVE BEEN MOVING AROUND A LOT MORE THAN USUAL: NOT AT ALL
SUM OF ALL RESPONSES TO PHQ QUESTIONS 1-9: 4
10. IF YOU CHECKED OFF ANY PROBLEMS, HOW DIFFICULT HAVE THESE PROBLEMS MADE IT FOR YOU TO DO YOUR WORK, TAKE CARE OF THINGS AT HOME, OR GET ALONG WITH OTHER PEOPLE: NOT DIFFICULT AT ALL
9. THOUGHTS THAT YOU WOULD BE BETTER OFF DEAD, OR OF HURTING YOURSELF: NOT AT ALL
3. TROUBLE FALLING OR STAYING ASLEEP: NEARLY EVERY DAY
2. FEELING DOWN, DEPRESSED OR HOPELESS: NOT AT ALL
1. LITTLE INTEREST OR PLEASURE IN DOING THINGS: NOT AT ALL
SUM OF ALL RESPONSES TO PHQ QUESTIONS 1-9: 4
4. FEELING TIRED OR HAVING LITTLE ENERGY: NOT AT ALL
SUM OF ALL RESPONSES TO PHQ9 QUESTIONS 1 & 2: 0
7. TROUBLE CONCENTRATING ON THINGS, SUCH AS READING THE NEWSPAPER OR WATCHING TELEVISION: NOT AT ALL
SUM OF ALL RESPONSES TO PHQ QUESTIONS 1-9: 4
6. FEELING BAD ABOUT YOURSELF - OR THAT YOU ARE A FAILURE OR HAVE LET YOURSELF OR YOUR FAMILY DOWN: NOT AT ALL

## 2024-06-17 NOTE — PROGRESS NOTES
Identified patient with two patient identifiers (name and ). Reviewed chart in preparation for visit and have obtained necessary documentation.    Juju Mari is a 58 y.o. female  Chief Complaint   Patient presents with    Post-Op Check     Laparoscopic Cholecystectomy     /78 (Site: Left Upper Arm, Position: Sitting, Cuff Size: Large Adult)   Pulse 80   Temp 98.3 °F (36.8 °C)   Resp 18   Ht 1.727 m (5' 8\")   Wt 118.8 kg (262 lb)   SpO2 95%   BMI 39.84 kg/m²     1. Have you been to the ER, urgent care clinic since your last visit?  Hospitalized since your last visit?yes     2. Have you seen or consulted any other health care providers outside of the [facility] since your last visit?  Include any pap smears or colon screening. yes

## 2024-06-18 ENCOUNTER — TRANSCRIBE ORDERS (OUTPATIENT)
Facility: HOSPITAL | Age: 59
End: 2024-06-18

## 2024-06-18 DIAGNOSIS — R60.0 LOWER EXTREMITY EDEMA: Primary | ICD-10-CM

## 2024-06-20 ENCOUNTER — HOSPITAL ENCOUNTER (OUTPATIENT)
Facility: HOSPITAL | Age: 59
Discharge: HOME | End: 2024-06-22
Attending: SURGERY
Payer: MEDICARE

## 2024-06-20 ENCOUNTER — HOSPITAL ENCOUNTER (OUTPATIENT)
Facility: HOSPITAL | Age: 59
Discharge: HOME | End: 2024-06-20
Attending: SURGERY
Payer: MEDICARE

## 2024-06-20 DIAGNOSIS — R10.84 GENERALIZED ABDOMINAL PAIN: ICD-10-CM

## 2024-06-20 DIAGNOSIS — R60.0 LOWER EXTREMITY EDEMA: ICD-10-CM

## 2024-06-20 DIAGNOSIS — R60.0 LEG EDEMA: ICD-10-CM

## 2024-06-20 PROCEDURE — 74177 CT ABD & PELVIS W/CONTRAST: CPT

## 2024-06-20 PROCEDURE — 93970 EXTREMITY STUDY: CPT

## 2024-06-20 PROCEDURE — 6360000004 HC RX CONTRAST MEDICATION: Performed by: SURGERY

## 2024-06-20 RX ADMIN — IOPAMIDOL 100 ML: 755 INJECTION, SOLUTION INTRAVENOUS at 09:22

## 2024-06-22 DIAGNOSIS — T81.43XA POSTOPERATIVE INTRA-ABDOMINAL ABSCESS: Primary | ICD-10-CM

## 2024-06-22 RX ORDER — METRONIDAZOLE 500 MG/1
500 TABLET ORAL 2 TIMES DAILY
Qty: 20 TABLET | Refills: 0 | Status: SHIPPED | OUTPATIENT
Start: 2024-06-22 | End: 2024-07-02

## 2024-06-22 RX ORDER — LEVOFLOXACIN 500 MG/1
750 TABLET, FILM COATED ORAL DAILY
Qty: 15 TABLET | Refills: 0 | Status: SHIPPED | OUTPATIENT
Start: 2024-06-22 | End: 2024-07-02

## 2024-06-23 ENCOUNTER — TELEPHONE (OUTPATIENT)
Facility: HOSPITAL | Age: 59
End: 2024-06-23

## 2024-06-23 DIAGNOSIS — R10.11 RIGHT UPPER QUADRANT ABDOMINAL PAIN: ICD-10-CM

## 2024-06-23 DIAGNOSIS — T81.43XA POSTOPERATIVE INTRA-ABDOMINAL ABSCESS: Primary | ICD-10-CM

## 2024-06-23 DIAGNOSIS — Z90.49 S/P LAPAROSCOPIC CHOLECYSTECTOMY: ICD-10-CM

## 2024-06-24 ENCOUNTER — APPOINTMENT (OUTPATIENT)
Facility: HOSPITAL | Age: 59
DRG: 863 | End: 2024-06-24
Attending: SURGERY
Payer: MEDICARE

## 2024-06-24 ENCOUNTER — HOSPITAL ENCOUNTER (INPATIENT)
Facility: HOSPITAL | Age: 59
LOS: 1 days | Discharge: HOME | DRG: 863 | End: 2024-06-25
Attending: SURGERY | Admitting: SURGERY
Payer: MEDICARE

## 2024-06-24 PROBLEM — T81.43XA ABSCESS, INTRA-ABDOMINAL, POSTOPERATIVE: Status: ACTIVE | Noted: 2024-06-24

## 2024-06-24 PROBLEM — K65.1 ABSCESS, INTRA-ABDOMINAL, POSTOPERATIVE (HCC): Status: ACTIVE | Noted: 2024-06-24

## 2024-06-24 LAB
ALBUMIN SERPL-MCNC: 3.1 G/DL (ref 3.5–5)
ALBUMIN/GLOB SERPL: 0.7 (ref 1.1–2.2)
ALP SERPL-CCNC: 115 U/L (ref 45–117)
ALT SERPL-CCNC: 18 U/L (ref 12–78)
ANION GAP SERPL CALC-SCNC: 4 MMOL/L (ref 5–15)
AST SERPL W P-5'-P-CCNC: 12 U/L (ref 15–37)
BASOPHILS # BLD: 0.1 K/UL (ref 0–0.1)
BASOPHILS NFR BLD: 1 % (ref 0–1)
BILIRUB DIRECT SERPL-MCNC: 0.1 MG/DL (ref 0–0.2)
BILIRUB SERPL-MCNC: 0.3 MG/DL (ref 0.2–1)
BUN SERPL-MCNC: 8 MG/DL (ref 6–20)
BUN/CREAT SERPL: 12 (ref 12–20)
CA-I BLD-MCNC: 9.9 MG/DL (ref 8.5–10.1)
CHLORIDE SERPL-SCNC: 109 MMOL/L (ref 97–108)
CO2 SERPL-SCNC: 25 MMOL/L (ref 21–32)
CREAT SERPL-MCNC: 0.65 MG/DL (ref 0.55–1.02)
DIFFERENTIAL METHOD BLD: ABNORMAL
EOSINOPHIL # BLD: 0 K/UL (ref 0–0.4)
EOSINOPHIL NFR BLD: 1 % (ref 0–7)
ERYTHROCYTE [DISTWIDTH] IN BLOOD BY AUTOMATED COUNT: 17.5 % (ref 11.5–14.5)
GLOBULIN SER CALC-MCNC: 4.6 G/DL (ref 2–4)
GLUCOSE SERPL-MCNC: 80 MG/DL (ref 65–100)
HCT VFR BLD AUTO: 31.7 % (ref 35–47)
HGB BLD-MCNC: 10.1 G/DL (ref 11.5–16)
IMM GRANULOCYTES # BLD AUTO: 0 K/UL (ref 0–0.04)
IMM GRANULOCYTES NFR BLD AUTO: 1 % (ref 0–0.5)
INR PPP: 1.1 (ref 0.9–1.1)
LYMPHOCYTES # BLD: 1.9 K/UL (ref 0.8–3.5)
LYMPHOCYTES NFR BLD: 33 % (ref 12–49)
MAGNESIUM SERPL-MCNC: 2.3 MG/DL (ref 1.6–2.4)
MCH RBC QN AUTO: 28.5 PG (ref 26–34)
MCHC RBC AUTO-ENTMCNC: 31.9 G/DL (ref 30–36.5)
MCV RBC AUTO: 89.5 FL (ref 80–99)
MONOCYTES # BLD: 0.3 K/UL (ref 0–1)
MONOCYTES NFR BLD: 5 % (ref 5–13)
NEUTS SEG # BLD: 3.4 K/UL (ref 1.8–8)
NEUTS SEG NFR BLD: 59 % (ref 32–75)
NRBC # BLD: 0 K/UL (ref 0–0.01)
NRBC BLD-RTO: 0 PER 100 WBC
PHOSPHATE SERPL-MCNC: 2.8 MG/DL (ref 2.6–4.7)
PLATELET # BLD AUTO: 480 K/UL (ref 150–400)
PMV BLD AUTO: 9.4 FL (ref 8.9–12.9)
POTASSIUM SERPL-SCNC: 3.8 MMOL/L (ref 3.5–5.1)
PROT SERPL-MCNC: 7.7 G/DL (ref 6.4–8.2)
PROTHROMBIN TIME: 14.6 SEC (ref 11.9–14.6)
RBC # BLD AUTO: 3.54 M/UL (ref 3.8–5.2)
SODIUM SERPL-SCNC: 138 MMOL/L (ref 136–145)
WBC # BLD AUTO: 5.7 K/UL (ref 3.6–11)

## 2024-06-24 PROCEDURE — 85025 COMPLETE CBC W/AUTO DIFF WBC: CPT

## 2024-06-24 PROCEDURE — 80076 HEPATIC FUNCTION PANEL: CPT

## 2024-06-24 PROCEDURE — 2580000003 HC RX 258: Performed by: SURGERY

## 2024-06-24 PROCEDURE — 83735 ASSAY OF MAGNESIUM: CPT

## 2024-06-24 PROCEDURE — 3430000000 HC RX DIAGNOSTIC RADIOPHARMACEUTICAL: Performed by: SURGERY

## 2024-06-24 PROCEDURE — 99152 MOD SED SAME PHYS/QHP 5/>YRS: CPT

## 2024-06-24 PROCEDURE — 96365 THER/PROPH/DIAG IV INF INIT: CPT

## 2024-06-24 PROCEDURE — 74181 MRI ABDOMEN W/O CONTRAST: CPT

## 2024-06-24 PROCEDURE — 99156 MOD SED OTH PHYS/QHP 5/>YRS: CPT

## 2024-06-24 PROCEDURE — G0379 DIRECT REFER HOSPITAL OBSERV: HCPCS

## 2024-06-24 PROCEDURE — 87070 CULTURE OTHR SPECIMN AEROBIC: CPT

## 2024-06-24 PROCEDURE — 6360000002 HC RX W HCPCS: Performed by: SURGERY

## 2024-06-24 PROCEDURE — 84100 ASSAY OF PHOSPHORUS: CPT

## 2024-06-24 PROCEDURE — 99221 1ST HOSP IP/OBS SF/LOW 40: CPT | Performed by: SURGERY

## 2024-06-24 PROCEDURE — 0F943ZZ DRAINAGE OF GALLBLADDER, PERCUTANEOUS APPROACH: ICD-10-PCS | Performed by: RADIOLOGY

## 2024-06-24 PROCEDURE — 36415 COLL VENOUS BLD VENIPUNCTURE: CPT

## 2024-06-24 PROCEDURE — 6360000002 HC RX W HCPCS: Performed by: NURSE PRACTITIONER

## 2024-06-24 PROCEDURE — C1729 CATH, DRAINAGE: HCPCS

## 2024-06-24 PROCEDURE — 87075 CULTR BACTERIA EXCEPT BLOOD: CPT

## 2024-06-24 PROCEDURE — 78226 HEPATOBILIARY SYSTEM IMAGING: CPT

## 2024-06-24 PROCEDURE — G0378 HOSPITAL OBSERVATION PER HR: HCPCS

## 2024-06-24 PROCEDURE — 6370000000 HC RX 637 (ALT 250 FOR IP): Performed by: SURGERY

## 2024-06-24 PROCEDURE — 85610 PROTHROMBIN TIME: CPT

## 2024-06-24 PROCEDURE — 96375 TX/PRO/DX INJ NEW DRUG ADDON: CPT

## 2024-06-24 PROCEDURE — 2500000003 HC RX 250 WO HCPCS: Performed by: SURGERY

## 2024-06-24 PROCEDURE — 87205 SMEAR GRAM STAIN: CPT

## 2024-06-24 PROCEDURE — 80048 BASIC METABOLIC PNL TOTAL CA: CPT

## 2024-06-24 PROCEDURE — 96366 THER/PROPH/DIAG IV INF ADDON: CPT

## 2024-06-24 PROCEDURE — A9537 TC99M MEBROFENIN: HCPCS | Performed by: SURGERY

## 2024-06-24 PROCEDURE — 1100000000 HC RM PRIVATE

## 2024-06-24 RX ORDER — SODIUM CHLORIDE 0.9 % (FLUSH) 0.9 %
5-40 SYRINGE (ML) INJECTION PRN
Status: DISCONTINUED | OUTPATIENT
Start: 2024-06-24 | End: 2024-06-25 | Stop reason: HOSPADM

## 2024-06-24 RX ORDER — OXYCODONE HYDROCHLORIDE 5 MG/1
5 TABLET ORAL EVERY 4 HOURS PRN
Status: DISCONTINUED | OUTPATIENT
Start: 2024-06-24 | End: 2024-06-24

## 2024-06-24 RX ORDER — FENTANYL CITRATE 50 UG/ML
INJECTION, SOLUTION INTRAMUSCULAR; INTRAVENOUS PRN
Status: COMPLETED | OUTPATIENT
Start: 2024-06-24 | End: 2024-06-24

## 2024-06-24 RX ORDER — SODIUM CHLORIDE 0.9 % (FLUSH) 0.9 %
5-40 SYRINGE (ML) INJECTION EVERY 12 HOURS SCHEDULED
Status: DISCONTINUED | OUTPATIENT
Start: 2024-06-24 | End: 2024-06-25 | Stop reason: HOSPADM

## 2024-06-24 RX ORDER — OXYCODONE HYDROCHLORIDE 10 MG/1
10 TABLET ORAL EVERY 4 HOURS PRN
Status: DISCONTINUED | OUTPATIENT
Start: 2024-06-24 | End: 2024-06-24

## 2024-06-24 RX ORDER — SODIUM CHLORIDE 9 MG/ML
INJECTION, SOLUTION INTRAVENOUS PRN
Status: DISCONTINUED | OUTPATIENT
Start: 2024-06-24 | End: 2024-06-25 | Stop reason: HOSPADM

## 2024-06-24 RX ORDER — HYDROMORPHONE HYDROCHLORIDE 2 MG/1
4 TABLET ORAL ONCE
Status: COMPLETED | OUTPATIENT
Start: 2024-06-24 | End: 2024-06-24

## 2024-06-24 RX ORDER — SODIUM CHLORIDE, SODIUM LACTATE, POTASSIUM CHLORIDE, CALCIUM CHLORIDE 600; 310; 30; 20 MG/100ML; MG/100ML; MG/100ML; MG/100ML
INJECTION, SOLUTION INTRAVENOUS CONTINUOUS
Status: DISCONTINUED | OUTPATIENT
Start: 2024-06-24 | End: 2024-06-25 | Stop reason: HOSPADM

## 2024-06-24 RX ORDER — DOCUSATE SODIUM 100 MG/1
100 CAPSULE, LIQUID FILLED ORAL 2 TIMES DAILY
Status: DISCONTINUED | OUTPATIENT
Start: 2024-06-24 | End: 2024-06-25 | Stop reason: HOSPADM

## 2024-06-24 RX ORDER — HYDROMORPHONE HYDROCHLORIDE 2 MG/1
4 TABLET ORAL EVERY 6 HOURS PRN
Status: DISCONTINUED | OUTPATIENT
Start: 2024-06-24 | End: 2024-06-25 | Stop reason: HOSPADM

## 2024-06-24 RX ORDER — HYDROMORPHONE HYDROCHLORIDE 1 MG/ML
1 INJECTION, SOLUTION INTRAMUSCULAR; INTRAVENOUS; SUBCUTANEOUS EVERY 6 HOURS PRN
Status: DISCONTINUED | OUTPATIENT
Start: 2024-06-24 | End: 2024-06-24

## 2024-06-24 RX ORDER — ONDANSETRON 2 MG/ML
4 INJECTION INTRAMUSCULAR; INTRAVENOUS EVERY 6 HOURS PRN
Status: DISCONTINUED | OUTPATIENT
Start: 2024-06-24 | End: 2024-06-25 | Stop reason: HOSPADM

## 2024-06-24 RX ORDER — KIT FOR THE PREPARATION OF TECHNETIUM TC 99M MEBROFENIN 45 MG/10ML
7 INJECTION, POWDER, LYOPHILIZED, FOR SOLUTION INTRAVENOUS
Status: COMPLETED | OUTPATIENT
Start: 2024-06-24 | End: 2024-06-24

## 2024-06-24 RX ORDER — ONDANSETRON 4 MG/1
4 TABLET, ORALLY DISINTEGRATING ORAL EVERY 8 HOURS PRN
Status: DISCONTINUED | OUTPATIENT
Start: 2024-06-24 | End: 2024-06-25 | Stop reason: HOSPADM

## 2024-06-24 RX ORDER — MIDAZOLAM HYDROCHLORIDE 2 MG/2ML
INJECTION, SOLUTION INTRAMUSCULAR; INTRAVENOUS PRN
Status: COMPLETED | OUTPATIENT
Start: 2024-06-24 | End: 2024-06-24

## 2024-06-24 RX ORDER — POLYETHYLENE GLYCOL 3350 17 G/17G
17 POWDER, FOR SOLUTION ORAL 2 TIMES DAILY
Status: DISCONTINUED | OUTPATIENT
Start: 2024-06-24 | End: 2024-06-25 | Stop reason: HOSPADM

## 2024-06-24 RX ADMIN — POLYETHYLENE GLYCOL 3350 17 G: 17 POWDER, FOR SOLUTION ORAL at 18:37

## 2024-06-24 RX ADMIN — MEBROFENIN 7 MILLICURIE: 45 INJECTION, POWDER, LYOPHILIZED, FOR SOLUTION INTRAVENOUS at 10:20

## 2024-06-24 RX ADMIN — HYDROMORPHONE HYDROCHLORIDE 4 MG: 2 TABLET ORAL at 23:41

## 2024-06-24 RX ADMIN — FENTANYL CITRATE 50 MCG: 50 INJECTION, SOLUTION INTRAMUSCULAR; INTRAVENOUS at 15:05

## 2024-06-24 RX ADMIN — PIPERACILLIN AND TAZOBACTAM 3375 MG: 3; .375 INJECTION, POWDER, LYOPHILIZED, FOR SOLUTION INTRAVENOUS at 16:42

## 2024-06-24 RX ADMIN — MIDAZOLAM HYDROCHLORIDE 1 MG: 1 INJECTION, SOLUTION INTRAMUSCULAR; INTRAVENOUS at 15:05

## 2024-06-24 RX ADMIN — FENTANYL CITRATE 25 MCG: 50 INJECTION, SOLUTION INTRAMUSCULAR; INTRAVENOUS at 15:12

## 2024-06-24 RX ADMIN — PIPERACILLIN AND TAZOBACTAM 3375 MG: 3; .375 INJECTION, POWDER, LYOPHILIZED, FOR SOLUTION INTRAVENOUS at 23:42

## 2024-06-24 RX ADMIN — HYDROMORPHONE HYDROCHLORIDE 1 MG: 1 INJECTION, SOLUTION INTRAMUSCULAR; INTRAVENOUS; SUBCUTANEOUS at 12:01

## 2024-06-24 RX ADMIN — SODIUM CHLORIDE, PRESERVATIVE FREE 10 ML: 5 INJECTION INTRAVENOUS at 20:39

## 2024-06-24 RX ADMIN — PIPERACILLIN AND TAZOBACTAM 4500 MG: 4; .5 INJECTION, POWDER, LYOPHILIZED, FOR SOLUTION INTRAVENOUS at 11:59

## 2024-06-24 RX ADMIN — SODIUM CHLORIDE, POTASSIUM CHLORIDE, SODIUM LACTATE AND CALCIUM CHLORIDE: 600; 310; 30; 20 INJECTION, SOLUTION INTRAVENOUS at 11:59

## 2024-06-24 RX ADMIN — OXYCODONE HYDROCHLORIDE 10 MG: 10 TABLET ORAL at 15:54

## 2024-06-24 RX ADMIN — MIDAZOLAM HYDROCHLORIDE 0.5 MG: 1 INJECTION, SOLUTION INTRAMUSCULAR; INTRAVENOUS at 15:12

## 2024-06-24 RX ADMIN — DOCUSATE SODIUM 100 MG: 100 CAPSULE, LIQUID FILLED ORAL at 18:37

## 2024-06-24 RX ADMIN — HYDROMORPHONE HYDROCHLORIDE 4 MG: 2 TABLET ORAL at 21:22

## 2024-06-24 RX ADMIN — HYDROMORPHONE HYDROCHLORIDE 4 MG: 2 TABLET ORAL at 18:13

## 2024-06-24 ASSESSMENT — PAIN DESCRIPTION - LOCATION
LOCATION: ABDOMEN

## 2024-06-24 ASSESSMENT — PAIN SCALES - GENERAL
PAINLEVEL_OUTOF10: 8
PAINLEVEL_OUTOF10: 10
PAINLEVEL_OUTOF10: 8
PAINLEVEL_OUTOF10: 10
PAINLEVEL_OUTOF10: 8
PAINLEVEL_OUTOF10: 7

## 2024-06-24 ASSESSMENT — PAIN DESCRIPTION - ORIENTATION
ORIENTATION: RIGHT;LOWER
ORIENTATION: RIGHT;LOWER

## 2024-06-24 ASSESSMENT — PAIN - FUNCTIONAL ASSESSMENT
PAIN_FUNCTIONAL_ASSESSMENT: ACTIVITIES ARE NOT PREVENTED
PAIN_FUNCTIONAL_ASSESSMENT: ACTIVITIES ARE NOT PREVENTED

## 2024-06-24 ASSESSMENT — PAIN DESCRIPTION - DESCRIPTORS
DESCRIPTORS: ACHING
DESCRIPTORS: SORE;ACHING
DESCRIPTORS: ACHING

## 2024-06-24 NOTE — PROGRESS NOTES
Pharmacy Note - Extended Infusion Beta-Lactam Adjustment    Piperacillin/Tazobactam 3375mg Q6h for treatment of Intra-abdominal Infection. Per [facility] Extended Infusion Beta-Lactam Policy, piperacillin/tazobactam will be changed to 4500mg loading dose followed by 3375mg Q8h extended infusion    Estimated Creatinine Clearance: Estimated Creatinine Clearance: 154 mL/min (A) (based on SCr of 0.54 mg/dL (L)).    BMI: There is no height or weight on file to calculate BMI.    Please call with any questions.    Thank you,    Saira Orellana, [facility]

## 2024-06-24 NOTE — PROGRESS NOTES
4 Eyes Skin Assessment     NAME:  Juju Mari  YOB: 1965  MEDICAL RECORD NUMBER:  [id number]    The patient is being assessed for  Admission    I agree that at least one RN has performed a thorough Head to Toe Skin Assessment on the patient. ALL assessment sites listed below have been assessed.      Areas assessed by both nurses:    Head, Face, Ears, Shoulders, Back, Chest, Arms, Elbows, Hands, Sacrum. Buttock, Coccyx, Ischium, Legs. Feet and Heels, and Under Medical Devices         Does the Patient have a Wound? Yes wound(s) were present on assessment. LDA wound assessment was Initiated and completed by RN       Pavan Prevention initiated by RN: Yes  Wound Care Orders initiated by RN: Yes    Pressure Injury (Stage 3,4, Unstageable, DTI, NWPT, and Complex wounds) if present, place Wound referral order by RN under : No    New Ostomies, if present place, Ostomy referral order under : No     Nurse 1 eSignature: Electronically signed by Carol Liu RN on 6/24/24 at 3:58 PM EDT    **SHARE this note so that the co-signing nurse can place an eSignature**    Nurse 2 eSignature: Electronically signed by Mu Back RN on 6/24/24 at 5:08 PM EDT

## 2024-06-24 NOTE — PROGRESS NOTES
4 Eyes Skin Assessment     NAME:  Juju Mari  YOB: 1965  MEDICAL RECORD NUMBER:  [id number]    The patient is being assessed for  Post-Op Surgical    I agree that at least one RN has performed a thorough Head to Toe Skin Assessment on the patient. ALL assessment sites listed below have been assessed.      Areas assessed by both nurses:    Head, Face, Ears, Shoulders, Back, Chest, Arms, Elbows, Hands, Sacrum. Buttock, Coccyx, Ischium, Legs. Feet and Heels, and Under Medical Devices         Does the Patient have a Wound? Yes wound(s) were present on assessment. LDA wound assessment was Initiated and completed by RN       Pavan Prevention initiated by RN: Yes  Wound Care Orders initiated by RN: Yes    Pressure Injury (Stage 3,4, Unstageable, DTI, NWPT, and Complex wounds) if present, place Wound referral order by RN under : No    New Ostomies, if present place, Ostomy referral order under : No     Nurse 1 eSignature: Electronically signed by Carol Liu RN on 6/24/24 at 3:59 PM EDT    **SHARE this note so that the co-signing nurse can place an eSignature**    Nurse 2 eSignature: Electronically signed by Mu Back RN on 6/24/24 at 5:08 PM EDT

## 2024-06-24 NOTE — H&P
INTERVENTIONAL RADIOLOGY  Preoperative History and Physical      Patient:  Juju Mari  :  1965  Age:  58 y.o.  MRN:  [id number]  Today's Date:  2024      CC / HPI   Juju Mari is a 58 y.o. female with a history of gallbladder fossa collection concerning for abscess who presents for drainage.    PAST MEDICAL HISTORY  Past Medical History:   Diagnosis Date    Anxiety 2017    Arthritis     Asthma     Back injury     Depression     Diabetes mellitus (HCC)     Essential hypertension     GERD (gastroesophageal reflux disease)     Hyperlipidemia     Morbid obesity (HCC)     Sleep apnea in adult 3/6/2018    CPAP     PAST SURGICAL HISTORY  Past Surgical History:   Procedure Laterality Date    APPENDECTOMY       SECTION      CHOLECYSTECTOMY, LAPAROSCOPIC N/A 2024    LAPAROSCOPIC CHOLECYSTECTOMY performed by Janis Kenney MD at [facility] MAIN OR    COLONOSCOPY      GASTRECTOMY      lap sleeve gastrectomy    GASTRIC BYPASS SURGERY  2021    REVISION SLEEVE TO BYPASS - Linda     HERNIA REPAIR  2021    Incarcerated hernia repair Dr. Abdi Lux     HYSTERECTOMY (CERVIX STATUS UNKNOWN)      IR IVC FILTER PLACEMENT W IMAGING  2021    IR IVC FILTER PLACEMENT W IMAGING 2021 [facility] RAD ANGIO IR    IR IVC FILTER PLACEMENT W IMAGING  2021    LAP,DIAGNOSTIC ABDOMEN  2021    Dr. Abdi Lux    LYSIS OF ADHESIONS  2021    Dr. Mckeon     ORTHOPEDIC SURGERY Right     LIGAMENT REPAIR    ORTHOPEDIC SURGERY      rt knee partial replacement    TOTAL KNEE ARTHROPLASTY Right 2022    TOTAL KNEE ARTHROPLASTY      left knee    UPPER GASTROINTESTINAL ENDOSCOPY N/A 2023    EGD ESOPHAGOGASTRODUODENOSCOPY performed by Abdi Lux MD at [facility] ENDOSCOPY     SOCIAL HISTORY  Social History     Socioeconomic History    Marital status: Single     Spouse name: Not on file    Number of children: Not on file    Years of education: Not on file    Highest education level: Not on  file   Occupational History    Not on file   Tobacco Use    Smoking status: Every Day     Current packs/day: 0.00     Average packs/day: 1 pack/day for 1 year (1.0 ttl pk-yrs)     Types: Cigarettes     Start date: 2019     Last attempt to quit: 2020     Years since quittin.0    Smokeless tobacco: Never   Vaping Use    Vaping Use: Never used   Substance and Sexual Activity    Alcohol use: No    Drug use: No    Sexual activity: Not on file     Comment: post menopausal    Other Topics Concern    Not on file   Social History Narrative    In the home with 12 year old grand daughter and friendEren      Social Determinants of Health     Financial Resource Strain: Not on file   Food Insecurity: Not on file   Transportation Needs: Not on file   Physical Activity: Not on file   Stress: Not on file   Social Connections: Not on file   Intimate Partner Violence: Not on file   Housing Stability: Not on file     FAMILY HISTORY  Family History   Problem Relation Age of Onset    Stroke Mother     Heart Disease Mother         PACEMAKER    Seizures Father     Alcohol Abuse Father     Arthritis Sister     Anesth Problems Neg Hx        CURRENT MEDICATIONS  Current Facility-Administered Medications   Medication Dose Route Frequency Provider Last Rate Last Admin    sodium chloride flush 0.9 % injection 5-40 mL  5-40 mL IntraVENous 2 times per day Janis Kenney MD        sodium chloride flush 0.9 % injection 5-40 mL  5-40 mL IntraVENous PRN Janis Kenney MD        0.9 % sodium chloride infusion   IntraVENous PRN Janis Kenney MD        ondansetron (ZOFRAN-ODT) disintegrating tablet 4 mg  4 mg Oral Q8H PRN Janis Kenney MD        Or    ondansetron (ZOFRAN) injection 4 mg  4 mg IntraVENous Q6H PRN Janis Kenney MD        lactated ringers IV soln infusion   IntraVENous Continuous Janis Kenney MD 50 mL/hr at 24 1159 New Bag at 24 1159    oxyCODONE (ROXICODONE) immediate release tablet 5 mg

## 2024-06-25 VITALS
SYSTOLIC BLOOD PRESSURE: 111 MMHG | RESPIRATION RATE: 18 BRPM | HEART RATE: 80 BPM | TEMPERATURE: 98.2 F | DIASTOLIC BLOOD PRESSURE: 64 MMHG | OXYGEN SATURATION: 100 %

## 2024-06-25 PROBLEM — T81.43XA ABSCESS, INTRA-ABDOMINAL, POSTOPERATIVE: Status: RESOLVED | Noted: 2024-06-24 | Resolved: 2024-06-25

## 2024-06-25 PROBLEM — K65.1 ABSCESS, INTRA-ABDOMINAL, POSTOPERATIVE (HCC): Status: RESOLVED | Noted: 2024-06-24 | Resolved: 2024-06-25

## 2024-06-25 PROCEDURE — 6360000002 HC RX W HCPCS: Performed by: SURGERY

## 2024-06-25 PROCEDURE — 2580000003 HC RX 258: Performed by: SURGERY

## 2024-06-25 PROCEDURE — 96366 THER/PROPH/DIAG IV INF ADDON: CPT

## 2024-06-25 PROCEDURE — G0378 HOSPITAL OBSERVATION PER HR: HCPCS

## 2024-06-25 PROCEDURE — 6370000000 HC RX 637 (ALT 250 FOR IP): Performed by: SURGERY

## 2024-06-25 RX ORDER — AMOXICILLIN AND CLAVULANATE POTASSIUM 875; 125 MG/1; MG/1
1 TABLET, FILM COATED ORAL 2 TIMES DAILY
Qty: 14 TABLET | Refills: 0 | Status: SHIPPED | OUTPATIENT
Start: 2024-06-25 | End: 2024-07-02

## 2024-06-25 RX ADMIN — PIPERACILLIN AND TAZOBACTAM 3375 MG: 3; .375 INJECTION, POWDER, LYOPHILIZED, FOR SOLUTION INTRAVENOUS at 08:00

## 2024-06-25 RX ADMIN — POLYETHYLENE GLYCOL 3350 17 G: 17 POWDER, FOR SOLUTION ORAL at 07:59

## 2024-06-25 RX ADMIN — DOCUSATE SODIUM 100 MG: 100 CAPSULE, LIQUID FILLED ORAL at 07:59

## 2024-06-25 RX ADMIN — SODIUM CHLORIDE, POTASSIUM CHLORIDE, SODIUM LACTATE AND CALCIUM CHLORIDE: 600; 310; 30; 20 INJECTION, SOLUTION INTRAVENOUS at 05:40

## 2024-06-25 RX ADMIN — HYDROMORPHONE HYDROCHLORIDE 4 MG: 2 TABLET ORAL at 11:23

## 2024-06-25 RX ADMIN — HYDROMORPHONE HYDROCHLORIDE 4 MG: 2 TABLET ORAL at 05:40

## 2024-06-25 RX ADMIN — SODIUM CHLORIDE, PRESERVATIVE FREE 10 ML: 5 INJECTION INTRAVENOUS at 08:08

## 2024-06-25 ASSESSMENT — PAIN DESCRIPTION - DESCRIPTORS
DESCRIPTORS: ACHING
DESCRIPTORS: ACHING

## 2024-06-25 ASSESSMENT — PAIN DESCRIPTION - LOCATION
LOCATION: ABDOMEN
LOCATION: ABDOMEN

## 2024-06-25 ASSESSMENT — PAIN SCALES - GENERAL
PAINLEVEL_OUTOF10: 6
PAINLEVEL_OUTOF10: 7
PAINLEVEL_OUTOF10: 8
PAINLEVEL_OUTOF10: 6
PAINLEVEL_OUTOF10: 10

## 2024-06-25 ASSESSMENT — PAIN DESCRIPTION - ORIENTATION
ORIENTATION: RIGHT
ORIENTATION: RIGHT;LOWER

## 2024-06-25 ASSESSMENT — PAIN - FUNCTIONAL ASSESSMENT: PAIN_FUNCTIONAL_ASSESSMENT: ACTIVITIES ARE NOT PREVENTED

## 2024-06-25 NOTE — PROGRESS NOTES
General Surgery Progress Note    POD #***    Subjective    ***    Objective    Vitals:    06/25/24 0303 06/25/24 0610 06/25/24 0857 06/25/24 1123   BP: 108/76  111/64    Pulse: 73  80    Resp: 18 18 20 18   Temp: 98.2 °F (36.8 °C)  98.2 °F (36.8 °C)    TempSrc: Oral  Oral    SpO2: 97%  100%       @IO@    PE  GEN - Awake, alert, communicating appropriately.  NAD  Pulm - normal WOB  CV - normal rate  Abd - ***      Labs  Recent Results (from the past 24 hour(s))   Culture, Body Fluid    Collection Time: 06/24/24  3:25 PM    Specimen: Body Fluid   Result Value Ref Range    Special Requests No Special Requests      Gram Stain 4+ WBCs seen      Gram Stain No organisms seen      Culture Moderate Streptococcus species (A)      Culture Checking for possible other organisms       ***    Assessment    1.  2.  3.     Plan        *** mins of time was spent with the patient including reviewing chart, history and physical examination, reviewing labs and imaging and discussing treatment plan with patient and their family.      Janis Kenney MD

## 2024-06-25 NOTE — CARE COORDINATION
06/25/24 1335   Service Assessment   Patient Orientation Alert and Oriented   Cognition Alert   History Provided By Patient   Primary Caregiver Self   Support Systems Family Members   Patient's Healthcare Decision Maker is: Legal Next of Kin   PCP Verified by CM Yes   Last Visit to PCP Within last 3 months   Prior Functional Level Independent in ADLs/IADLs   Current Functional Level Independent in ADLs/IADLs   Can patient return to prior living arrangement Yes   Ability to make needs known: Good   Family able to assist with home care needs: Yes   Would you like for me to discuss the discharge plan with any other family members/significant others, and if so, who? No   Financial Resources Medicaid;Medicare   Social/Functional History   Lives With Other (comment)  (Granddaughter)   Home Access Stairs to enter with rails   Entrance Stairs - Number of Steps 4   ADL Assistance Independent   Homemaking Assistance Independent   Ambulation Assistance Independent   Transfer Assistance Independent   Active  Yes   Services At/After Discharge   Transition of Care Consult (CM Consult) Discharge Planning     CM met f/f with patient to complete dcp assessment. Demos on face sheet confirmed as accurate. Patient is independent at baseline. No DME. No past HH/IRF/SNF.     Patient will drive self home once medically cleared for dc    Advance Care Planning     General Advance Care Planning (ACP) Conversation    Date of Conversation: 6/25/2024  Conducted with: Patient with Decision Making Capacity  Other persons present: None    Healthcare Decision Maker: No healthcare decision makers have been documented.  Click here to complete HealthCare Decision Makers including selection of the Healthcare Decision Maker Relationship (ie \"Primary\")       Content/Action Overview:  DECLINED ACP Conversation - will revisit periodically    Reviewed DNR/DNI and patient elects Full Code (Attempt Resuscitation)      Nathalia Pederson

## 2024-06-25 NOTE — PROGRESS NOTES
Pt was taught on how to care for mahamed drain (when to empty, how to empty, flushing, frequency and recording) Patient was able to return demonstrate to  RN. Supplies sent are as follow:    >10 cc saline flushes  >alcohol pads  >curos  >gloves  >measuring cups

## 2024-06-25 NOTE — PROGRESS NOTES
Pt upset that she hasn't been discharged. Demanded that IV be removed. Charge RN was sent to pacify the situation.

## 2024-06-25 NOTE — PLAN OF CARE
Problem: Discharge Planning  Goal: Discharge to home or other facility with appropriate resources  Outcome: Progressing  Flowsheets (Taken 6/25/2024 1019)  Discharge to home or other facility with appropriate resources:   Identify barriers to discharge with patient and caregiver   Arrange for needed discharge resources and transportation as appropriate   Identify discharge learning needs (meds, wound care, etc)   Arrange for interpreters to assist at discharge as needed   Refer to discharge planning if patient needs post-hospital services based on physician order or complex needs related to functional status, cognitive ability or social support system

## 2024-06-25 NOTE — H&P
General Surgery Consultation    Patient: Juju Mari MRN: [id number]  SSN: [id number]    YOB: 1965  Age: 58 y.o.  Sex: female      Consulting Physician:       No chief complaint on file.      History of Present illness    Juju Mari is a 58 y.o. female who is being seen for ***.    Past Medical History:   Diagnosis Date    Anxiety 2017    Arthritis     Asthma     Back injury     Depression     Diabetes mellitus (HCC)     Essential hypertension     GERD (gastroesophageal reflux disease)     Hyperlipidemia     Morbid obesity (HCC)     Sleep apnea in adult 3/6/2018    CPAP     Past Surgical History:   Procedure Laterality Date    APPENDECTOMY       SECTION      CHOLECYSTECTOMY, LAPAROSCOPIC N/A 2024    LAPAROSCOPIC CHOLECYSTECTOMY performed by Janis Kenney MD at [facility] MAIN OR    COLONOSCOPY      CT VISCERAL PERCUTANEOUS DRAIN  2024    CT VISCERAL PERCUTANEOUS DRAIN 2024 Price Newberry Jr., APRN - NP [facility] RAD CT    GASTRECTOMY  2016    lap sleeve gastrectomy    GASTRIC BYPASS SURGERY  2021    REVISION SLEEVE TO BYPASS - Linda     HERNIA REPAIR  2021    Incarcerated hernia repair Dr. Abdi Lux     HYSTERECTOMY (CERVIX STATUS UNKNOWN)      IR IVC FILTER PLACEMENT W IMAGING  2021    IR IVC FILTER PLACEMENT W IMAGING 2021 [facility] RAD ANGIO IR    IR IVC FILTER PLACEMENT W IMAGING  2021    LAP,DIAGNOSTIC ABDOMEN  2021    Dr. Abdi Lux    LYSIS OF ADHESIONS  2021    Dr. Mckeon     ORTHOPEDIC SURGERY Right     LIGAMENT REPAIR    ORTHOPEDIC SURGERY      rt knee partial replacement    TOTAL KNEE ARTHROPLASTY Right 2022    TOTAL KNEE ARTHROPLASTY      left knee    UPPER GASTROINTESTINAL ENDOSCOPY N/A 2023    EGD ESOPHAGOGASTRODUODENOSCOPY performed by Abdi Lux MD at [facility] ENDOSCOPY      Family History   Problem Relation Age of Onset    Stroke Mother

## 2024-06-25 NOTE — CARE COORDINATION
CM reviewed chart.  POD #1 CT guided drainage of gallbladder fossa collection     Unable to find accepting home health company due to out of service area or payer not accepted.    Patient updated. Primary nurse will provide education on GIORGIO drain management prior to discharge.

## 2024-06-25 NOTE — DISCHARGE INSTRUCTIONS
Empty, measure and record GIORGIO drain output daily.  Please bring record of daily GIORGIO drain output to follow-up visit in clinic next Monday  Do not flush the drain by yourself  No heavy lifting greater than 10 pounds for 2 weeks  Diet as tolerated  I changed her antibiotics.  So stop all previous antibiotics that you are taking and start taking Augmentin today

## 2024-06-25 NOTE — PROGRESS NOTES
Per MD, no flushing will be done on mahamed drain. This was relayed to pt. Pt verbalized understanding.

## 2024-06-27 LAB
BACTERIA SPEC CULT: NORMAL
BACTERIA SPEC CULT: NORMAL
GRAM STN SPEC: NORMAL
GRAM STN SPEC: NORMAL
Lab: NORMAL

## 2024-07-01 ENCOUNTER — HOSPITAL ENCOUNTER (OUTPATIENT)
Facility: HOSPITAL | Age: 59
Discharge: HOME | End: 2024-07-04
Attending: SURGERY
Payer: MEDICARE

## 2024-07-01 ENCOUNTER — OFFICE VISIT (OUTPATIENT)
Age: 59
End: 2024-07-01

## 2024-07-01 VITALS
RESPIRATION RATE: 18 BRPM | BODY MASS INDEX: 38.8 KG/M2 | OXYGEN SATURATION: 95 % | WEIGHT: 256 LBS | HEIGHT: 68 IN | SYSTOLIC BLOOD PRESSURE: 127 MMHG | TEMPERATURE: 98.8 F | DIASTOLIC BLOOD PRESSURE: 86 MMHG | HEART RATE: 83 BPM

## 2024-07-01 DIAGNOSIS — Z90.49 S/P LAPAROSCOPIC CHOLECYSTECTOMY: Primary | ICD-10-CM

## 2024-07-01 DIAGNOSIS — Z90.49 S/P LAPAROSCOPIC CHOLECYSTECTOMY: ICD-10-CM

## 2024-07-01 DIAGNOSIS — R10.11 RIGHT UPPER QUADRANT ABDOMINAL PAIN: ICD-10-CM

## 2024-07-01 DIAGNOSIS — T81.43XA POSTPROCEDURAL INTRAABDOMINAL ABSCESS: ICD-10-CM

## 2024-07-01 PROCEDURE — 76705 ECHO EXAM OF ABDOMEN: CPT

## 2024-07-01 PROCEDURE — 99024 POSTOP FOLLOW-UP VISIT: CPT | Performed by: SURGERY

## 2024-07-01 RX ORDER — AMOXICILLIN AND CLAVULANATE POTASSIUM 875; 125 MG/1; MG/1
1 TABLET, FILM COATED ORAL 2 TIMES DAILY
Qty: 14 TABLET | Refills: 0 | Status: SHIPPED | OUTPATIENT
Start: 2024-07-01 | End: 2024-07-08

## 2024-07-01 RX ORDER — L. ACIDOPHILUS/L.BULGARICUS 100MM CELL
1 GRANULES IN PACKET (EA) ORAL 2 TIMES DAILY
Qty: 28 PACKET | Refills: 0 | Status: SHIPPED | OUTPATIENT
Start: 2024-07-01 | End: 2024-07-15

## 2024-07-01 RX ORDER — HYDROMORPHONE HYDROCHLORIDE 4 MG/1
4 TABLET ORAL
COMMUNITY

## 2024-07-01 ASSESSMENT — ENCOUNTER SYMPTOMS
ANAL BLEEDING: 0
DIARRHEA: 0
RECTAL PAIN: 0
ALLERGIC/IMMUNOLOGIC NEGATIVE: 1
EYES NEGATIVE: 1
ABDOMINAL PAIN: 1
NAUSEA: 0
RESPIRATORY NEGATIVE: 1
VOMITING: 0
CONSTIPATION: 0
BLOOD IN STOOL: 0
ABDOMINAL DISTENTION: 0

## 2024-07-01 ASSESSMENT — PATIENT HEALTH QUESTIONNAIRE - PHQ9
SUM OF ALL RESPONSES TO PHQ QUESTIONS 1-9: 0
SUM OF ALL RESPONSES TO PHQ QUESTIONS 1-9: 0
2. FEELING DOWN, DEPRESSED OR HOPELESS: NOT AT ALL
1. LITTLE INTEREST OR PLEASURE IN DOING THINGS: NOT AT ALL
SUM OF ALL RESPONSES TO PHQ QUESTIONS 1-9: 0
SUM OF ALL RESPONSES TO PHQ9 QUESTIONS 1 & 2: 0
SUM OF ALL RESPONSES TO PHQ QUESTIONS 1-9: 0

## 2024-07-01 NOTE — PROGRESS NOTES
Chief Complaint   Patient presents with    Follow-up     2 week post op     \"Have you been to the ER, urgent care clinic since your last visit?  Hospitalized since your last visit?\"    NO    “Have you seen or consulted any other health care providers outside of [facility] since your last visit?”    NO    Have you had a mammogram?”   NO    No breast cancer screening on file             Click Here for Release of Records Request

## 2024-07-01 NOTE — PROGRESS NOTES
General Surgery Progress Note    Discussed HIDA scan and clinical findings with gastroenterologist at [facility].  Drain placed and per IR aspirate not bilious and was seropurulent  Drain currently draining serosanguineous fluid  However HIDA scan shows tracer accumulation in gallbladder fossa  I discussed ERCP with gastroenterologist at [facility] to confirm diagnosis and possible treat if positive. Since patient has had a RYGB conventional ERCP here at [place] not possible.  Gastroenterogist on call at [facility] recommended against ERCP as risks outweigh the benefits especially given clinical findings. He recommended drain placement and observation. If bilious output from drain and its persistent then  he would consider ERCP  Discussed all the above in detail with the patient

## 2024-07-05 NOTE — PROGRESS NOTES
Chart review completed all necessary documentation present.  Identified patient with two patient identifiers (name and ). Reviewed chart in preparation for visit and have obtained necessary documentation.    Juju Mari is a 58 y.o. female  No chief complaint on file.    /81 (Site: Right Upper Arm, Position: Sitting, Cuff Size: Large Adult)   Pulse 65   Temp 97.9 °F (36.6 °C) (Oral)   Resp 18   Ht 1.727 m (5' 8\")   Wt 115.2 kg (254 lb)   SpO2 98%   BMI 38.62 kg/m²     1. Have you been to the ER, urgent care clinic since your last visit?  Hospitalized since your last visit?no    2. Have you seen or consulted any other health care providers outside of the [facility] since your last visit?  Include any pap smears or colon screening. No

## 2024-07-08 ENCOUNTER — OFFICE VISIT (OUTPATIENT)
Age: 59
End: 2024-07-08

## 2024-07-08 VITALS
TEMPERATURE: 97.9 F | RESPIRATION RATE: 18 BRPM | HEART RATE: 65 BPM | BODY MASS INDEX: 38.49 KG/M2 | HEIGHT: 68 IN | OXYGEN SATURATION: 98 % | SYSTOLIC BLOOD PRESSURE: 124 MMHG | WEIGHT: 254 LBS | DIASTOLIC BLOOD PRESSURE: 81 MMHG

## 2024-07-08 DIAGNOSIS — Z90.49 S/P LAPAROSCOPIC CHOLECYSTECTOMY: Primary | ICD-10-CM

## 2024-07-08 PROCEDURE — 99024 POSTOP FOLLOW-UP VISIT: CPT | Performed by: SURGERY

## 2024-07-08 ASSESSMENT — PATIENT HEALTH QUESTIONNAIRE - PHQ9
3. TROUBLE FALLING OR STAYING ASLEEP: SEVERAL DAYS
10. IF YOU CHECKED OFF ANY PROBLEMS, HOW DIFFICULT HAVE THESE PROBLEMS MADE IT FOR YOU TO DO YOUR WORK, TAKE CARE OF THINGS AT HOME, OR GET ALONG WITH OTHER PEOPLE: NOT DIFFICULT AT ALL
SUM OF ALL RESPONSES TO PHQ QUESTIONS 1-9: 2
SUM OF ALL RESPONSES TO PHQ QUESTIONS 1-9: 2
5. POOR APPETITE OR OVEREATING: NOT AT ALL
SUM OF ALL RESPONSES TO PHQ QUESTIONS 1-9: 2
SUM OF ALL RESPONSES TO PHQ QUESTIONS 1-9: 2
SUM OF ALL RESPONSES TO PHQ9 QUESTIONS 1 & 2: 0
4. FEELING TIRED OR HAVING LITTLE ENERGY: SEVERAL DAYS
1. LITTLE INTEREST OR PLEASURE IN DOING THINGS: NOT AT ALL
2. FEELING DOWN, DEPRESSED OR HOPELESS: NOT AT ALL
7. TROUBLE CONCENTRATING ON THINGS, SUCH AS READING THE NEWSPAPER OR WATCHING TELEVISION: NOT AT ALL
9. THOUGHTS THAT YOU WOULD BE BETTER OFF DEAD, OR OF HURTING YOURSELF: NOT AT ALL
6. FEELING BAD ABOUT YOURSELF - OR THAT YOU ARE A FAILURE OR HAVE LET YOURSELF OR YOUR FAMILY DOWN: NOT AT ALL
8. MOVING OR SPEAKING SO SLOWLY THAT OTHER PEOPLE COULD HAVE NOTICED. OR THE OPPOSITE, BEING SO FIGETY OR RESTLESS THAT YOU HAVE BEEN MOVING AROUND A LOT MORE THAN USUAL: NOT AT ALL

## 2024-07-11 ENCOUNTER — HOSPITAL ENCOUNTER (EMERGENCY)
Facility: HOSPITAL | Age: 59
Discharge: HOME | End: 2024-07-11
Attending: EMERGENCY MEDICINE
Payer: MEDICARE

## 2024-07-11 ENCOUNTER — APPOINTMENT (OUTPATIENT)
Facility: HOSPITAL | Age: 59
End: 2024-07-11
Attending: EMERGENCY MEDICINE
Payer: MEDICARE

## 2024-07-11 VITALS
BODY MASS INDEX: 38.49 KG/M2 | RESPIRATION RATE: 18 BRPM | WEIGHT: 254 LBS | HEIGHT: 68 IN | TEMPERATURE: 97.6 F | HEART RATE: 56 BPM | OXYGEN SATURATION: 100 % | SYSTOLIC BLOOD PRESSURE: 131 MMHG | DIASTOLIC BLOOD PRESSURE: 79 MMHG

## 2024-07-11 DIAGNOSIS — R10.31 ABDOMINAL PAIN, RIGHT LOWER QUADRANT: Primary | ICD-10-CM

## 2024-07-11 LAB
ALBUMIN SERPL-MCNC: 3.4 G/DL (ref 3.5–5)
ALBUMIN/GLOB SERPL: 0.8 (ref 1.1–2.2)
ALP SERPL-CCNC: 149 U/L (ref 45–117)
ALT SERPL-CCNC: 17 U/L (ref 12–78)
ANION GAP SERPL CALC-SCNC: 12 MMOL/L (ref 5–15)
AST SERPL W P-5'-P-CCNC: 15 U/L (ref 15–37)
BASOPHILS # BLD: 0 K/UL (ref 0–0.1)
BASOPHILS NFR BLD: 1 % (ref 0–1)
BILIRUB SERPL-MCNC: 0.3 MG/DL (ref 0.2–1)
BUN SERPL-MCNC: 8 MG/DL (ref 6–20)
BUN/CREAT SERPL: 13 (ref 12–20)
CA-I BLD-MCNC: 9.6 MG/DL (ref 8.5–10.1)
CHLORIDE SERPL-SCNC: 108 MMOL/L (ref 97–108)
CO2 SERPL-SCNC: 23 MMOL/L (ref 21–32)
CREAT SERPL-MCNC: 0.61 MG/DL (ref 0.55–1.02)
DIFFERENTIAL METHOD BLD: ABNORMAL
EOSINOPHIL # BLD: 0.1 K/UL (ref 0–0.4)
EOSINOPHIL NFR BLD: 1 % (ref 0–7)
ERYTHROCYTE [DISTWIDTH] IN BLOOD BY AUTOMATED COUNT: 18.5 % (ref 11.5–14.5)
GLOBULIN SER CALC-MCNC: 4.3 G/DL (ref 2–4)
GLUCOSE SERPL-MCNC: 78 MG/DL (ref 65–100)
HCT VFR BLD AUTO: 35.5 % (ref 35–47)
HGB BLD-MCNC: 11.3 G/DL (ref 11.5–16)
IMM GRANULOCYTES # BLD AUTO: 0.1 K/UL (ref 0–0.04)
IMM GRANULOCYTES NFR BLD AUTO: 2 % (ref 0–0.5)
LIPASE SERPL-CCNC: 44 U/L (ref 13–75)
LYMPHOCYTES # BLD: 1.9 K/UL (ref 0.8–3.5)
LYMPHOCYTES NFR BLD: 39 % (ref 12–49)
MCH RBC QN AUTO: 28.3 PG (ref 26–34)
MCHC RBC AUTO-ENTMCNC: 31.8 G/DL (ref 30–36.5)
MCV RBC AUTO: 88.8 FL (ref 80–99)
MONOCYTES # BLD: 0.3 K/UL (ref 0–1)
MONOCYTES NFR BLD: 7 % (ref 5–13)
NEUTS SEG # BLD: 2.5 K/UL (ref 1.8–8)
NEUTS SEG NFR BLD: 50 % (ref 32–75)
NRBC # BLD: 0 K/UL (ref 0–0.01)
NRBC BLD-RTO: 0 PER 100 WBC
PLATELET # BLD AUTO: 218 K/UL (ref 150–400)
PMV BLD AUTO: 10.2 FL (ref 8.9–12.9)
POTASSIUM SERPL-SCNC: 3.6 MMOL/L (ref 3.5–5.1)
PROT SERPL-MCNC: 7.7 G/DL (ref 6.4–8.2)
RBC # BLD AUTO: 4 M/UL (ref 3.8–5.2)
SODIUM SERPL-SCNC: 143 MMOL/L (ref 136–145)
WBC # BLD AUTO: 4.9 K/UL (ref 3.6–11)

## 2024-07-11 PROCEDURE — 74177 CT ABD & PELVIS W/CONTRAST: CPT

## 2024-07-11 PROCEDURE — 6360000004 HC RX CONTRAST MEDICATION: Performed by: EMERGENCY MEDICINE

## 2024-07-11 PROCEDURE — 83690 ASSAY OF LIPASE: CPT

## 2024-07-11 PROCEDURE — 99285 EMERGENCY DEPT VISIT HI MDM: CPT

## 2024-07-11 PROCEDURE — 36415 COLL VENOUS BLD VENIPUNCTURE: CPT

## 2024-07-11 PROCEDURE — 6370000000 HC RX 637 (ALT 250 FOR IP): Performed by: EMERGENCY MEDICINE

## 2024-07-11 PROCEDURE — 80053 COMPREHEN METABOLIC PANEL: CPT

## 2024-07-11 PROCEDURE — 85025 COMPLETE CBC W/AUTO DIFF WBC: CPT

## 2024-07-11 PROCEDURE — 6360000002 HC RX W HCPCS: Performed by: EMERGENCY MEDICINE

## 2024-07-11 PROCEDURE — 96374 THER/PROPH/DIAG INJ IV PUSH: CPT

## 2024-07-11 RX ORDER — ACETAMINOPHEN 500 MG
1000 TABLET ORAL
Status: COMPLETED | OUTPATIENT
Start: 2024-07-11 | End: 2024-07-11

## 2024-07-11 RX ADMIN — HYDROMORPHONE HYDROCHLORIDE 1 MG: 1 INJECTION, SOLUTION INTRAMUSCULAR; INTRAVENOUS; SUBCUTANEOUS at 15:50

## 2024-07-11 RX ADMIN — IOPAMIDOL 100 ML: 755 INJECTION, SOLUTION INTRAVENOUS at 16:30

## 2024-07-11 RX ADMIN — ACETAMINOPHEN 1000 MG: 500 TABLET ORAL at 15:28

## 2024-07-11 ASSESSMENT — LIFESTYLE VARIABLES
HOW MANY STANDARD DRINKS CONTAINING ALCOHOL DO YOU HAVE ON A TYPICAL DAY: PATIENT DOES NOT DRINK
HOW OFTEN DO YOU HAVE A DRINK CONTAINING ALCOHOL: NEVER

## 2024-07-11 ASSESSMENT — PAIN DESCRIPTION - ORIENTATION: ORIENTATION: RIGHT;UPPER

## 2024-07-11 ASSESSMENT — PAIN SCALES - GENERAL
PAINLEVEL_OUTOF10: 10
PAINLEVEL_OUTOF10: 7
PAINLEVEL_OUTOF10: 10

## 2024-07-11 ASSESSMENT — PAIN - FUNCTIONAL ASSESSMENT
PAIN_FUNCTIONAL_ASSESSMENT: 0-10
PAIN_FUNCTIONAL_ASSESSMENT: PREVENTS OR INTERFERES WITH MANY ACTIVE NOT PASSIVE ACTIVITIES

## 2024-07-11 ASSESSMENT — PAIN DESCRIPTION - DESCRIPTORS: DESCRIPTORS: SHARP

## 2024-07-11 ASSESSMENT — PAIN DESCRIPTION - LOCATION
LOCATION: ABDOMEN
LOCATION: ABDOMEN

## 2024-07-11 NOTE — DISCHARGE INSTRUCTIONS
You were seen in the ER for your RLQ pain. We did not find any signs of an infection or blockage or other acute abnormality on your bloodwork or CT scan. This could be a pulled muscle.    You can take tylenol or ibuprofen for aches and pains for the next few days. If needed, you can alternate these every 3 hours so that you always have something on board. For instance, you can take tylenol at 9am, ibuprofen at noon, tylenol again at 3pm and ibuprofen again at 6pm. Take in plenty of water to stay hydrated and follow up with your general surgeon on Monday. Return to the ER for any new or concerning symptoms.

## 2024-07-11 NOTE — ED TRIAGE NOTES
PT reports 2.5 weeks ago she had her gallbladder removed. Pt reports she was admitted for infection to [facility]. Denies drainag from site, reports pain is intermittent and increased with movement and twisting. PT in NAD.

## 2024-07-15 ENCOUNTER — OFFICE VISIT (OUTPATIENT)
Age: 59
End: 2024-07-15

## 2024-07-15 VITALS
RESPIRATION RATE: 18 BRPM | BODY MASS INDEX: 38.43 KG/M2 | DIASTOLIC BLOOD PRESSURE: 77 MMHG | HEIGHT: 68 IN | OXYGEN SATURATION: 97 % | TEMPERATURE: 98.4 F | WEIGHT: 253.6 LBS | HEART RATE: 60 BPM | SYSTOLIC BLOOD PRESSURE: 120 MMHG

## 2024-07-15 DIAGNOSIS — Z90.49 S/P LAPAROSCOPIC CHOLECYSTECTOMY: Primary | ICD-10-CM

## 2024-07-15 PROCEDURE — 99024 POSTOP FOLLOW-UP VISIT: CPT | Performed by: SURGERY

## 2024-07-15 ASSESSMENT — PATIENT HEALTH QUESTIONNAIRE - PHQ9
1. LITTLE INTEREST OR PLEASURE IN DOING THINGS: NOT AT ALL
9. THOUGHTS THAT YOU WOULD BE BETTER OFF DEAD, OR OF HURTING YOURSELF: NOT AT ALL
SUM OF ALL RESPONSES TO PHQ QUESTIONS 1-9: 3
8. MOVING OR SPEAKING SO SLOWLY THAT OTHER PEOPLE COULD HAVE NOTICED. OR THE OPPOSITE, BEING SO FIGETY OR RESTLESS THAT YOU HAVE BEEN MOVING AROUND A LOT MORE THAN USUAL: NOT AT ALL
SUM OF ALL RESPONSES TO PHQ QUESTIONS 1-9: 3
10. IF YOU CHECKED OFF ANY PROBLEMS, HOW DIFFICULT HAVE THESE PROBLEMS MADE IT FOR YOU TO DO YOUR WORK, TAKE CARE OF THINGS AT HOME, OR GET ALONG WITH OTHER PEOPLE: SOMEWHAT DIFFICULT
SUM OF ALL RESPONSES TO PHQ QUESTIONS 1-9: 3
SUM OF ALL RESPONSES TO PHQ9 QUESTIONS 1 & 2: 0
3. TROUBLE FALLING OR STAYING ASLEEP: SEVERAL DAYS
2. FEELING DOWN, DEPRESSED OR HOPELESS: NOT AT ALL
4. FEELING TIRED OR HAVING LITTLE ENERGY: SEVERAL DAYS
SUM OF ALL RESPONSES TO PHQ QUESTIONS 1-9: 3
5. POOR APPETITE OR OVEREATING: SEVERAL DAYS
6. FEELING BAD ABOUT YOURSELF - OR THAT YOU ARE A FAILURE OR HAVE LET YOURSELF OR YOUR FAMILY DOWN: NOT AT ALL
7. TROUBLE CONCENTRATING ON THINGS, SUCH AS READING THE NEWSPAPER OR WATCHING TELEVISION: NOT AT ALL

## 2024-07-15 NOTE — PROGRESS NOTES
Identified patient with two patient identifiers (name and ). Reviewed chart in preparation for visit and have obtained necessary documentation.    Juju Mari is a 58 y.o. female  Chief Complaint   Patient presents with    Follow-up     LVMM     /77 (Site: Right Upper Arm, Position: Sitting, Cuff Size: Large Adult)   Pulse 60   Temp 98.4 °F (36.9 °C) (Oral)   Resp 18   Ht 1.727 m (5' 8\")   Wt 115 kg (253 lb 9.6 oz)   SpO2 97%   BMI 38.56 kg/m²     1. Have you been to the ER, urgent care clinic since your last visit?  Hospitalized since your last visit?no    2. Have you seen or consulted any other health care providers outside of the [facility] since your last visit?  Include any pap smears or colon screening. no

## 2024-07-22 ENCOUNTER — TELEPHONE (OUTPATIENT)
Age: 59
End: 2024-07-22

## 2024-07-22 NOTE — TELEPHONE ENCOUNTER
Pt cld & request that Dr Kenney call her pain management doctor, Cindi Ribera  and  discuss her case as she is still having bad pains in her stomach and side.  Dr. Ribera # is [phone].  She is not able to take certain medications so please have the 2 doctors discuss her case.  Thanks.

## 2024-07-23 ENCOUNTER — TELEPHONE (OUTPATIENT)
Facility: HOSPITAL | Age: 59
End: 2024-07-23

## 2024-07-23 NOTE — TELEPHONE ENCOUNTER
Patients Pain management specialist Celi Ribera called today. Talked to Celi Ribera.Celi will call Juju Mari to discuss what choice pain management she would like prior to her appointment in August.

## 2024-07-25 ENCOUNTER — TELEPHONE (OUTPATIENT)
Age: 59
End: 2024-07-25

## 2024-12-06 ENCOUNTER — HOSPITAL ENCOUNTER (OUTPATIENT)
Facility: HOSPITAL | Age: 59
Discharge: HOME | End: 2024-12-09
Payer: MEDICARE

## 2024-12-06 DIAGNOSIS — Z12.31 OTHER SCREENING MAMMOGRAM: ICD-10-CM

## 2024-12-06 PROCEDURE — 77063 BREAST TOMOSYNTHESIS BI: CPT

## 2024-12-19 ENCOUNTER — TELEPHONE (OUTPATIENT)
Age: 59
End: 2024-12-19

## 2024-12-19 NOTE — TELEPHONE ENCOUNTER
pt wanted to cancel appt, pt did not want to reschdule at this time. Asked pt if she wanted another appt she said no.

## 2025-07-29 ENCOUNTER — TELEPHONE (OUTPATIENT)
Age: 60
End: 2025-07-29

## 2025-07-29 NOTE — TELEPHONE ENCOUNTER
LM for pt to call and schedule annual bariatric exam. Letter sent. Blurbt message also sent. [facility]

## (undated) DEVICE — RESERVOIR,SUCTION,100CC,SILICONE: Brand: MEDLINE

## (undated) DEVICE — TUBING INSUF 0.3UM FLTR W/ LUERLOCK CONN

## (undated) DEVICE — SURGICEL ENDOSCP APPL

## (undated) DEVICE — SUTURE VCRL SZ 2-0 L36IN ABSRB UD L40MM CT 1/2 CIR J957H

## (undated) DEVICE — CANNULA CUSH AD W/ 14FT TBG

## (undated) DEVICE — TOTAL TRAY, DB, 100% SILI FOLEY, 16FR 10: Brand: MEDLINE

## (undated) DEVICE — ELECTRODE ELECSURG MONOPOLAR 5 MMX45 CM L HK TIP EZ CLN

## (undated) DEVICE — GAUZE,SPONGE,AVANT,4"X4",4PLY,STRL,10/TR: Brand: MEDLINE

## (undated) DEVICE — FILTER SMK EVAC FLO CLR MEGADYNE

## (undated) DEVICE — Device

## (undated) DEVICE — APPLIER CLP L SHFT DIA12MM 20 ROT MULT LIGACLP

## (undated) DEVICE — SUT SLK 2-0SH 30IN BLK --

## (undated) DEVICE — SOLUTION IRRIG 3000ML 0.9% SOD CHL USP UROMATIC PLAS CONT

## (undated) DEVICE — SOLUTION IV 1000ML 0.9% SOD CHL

## (undated) DEVICE — 3M™ STERI-STRIP™ ANTIMICROBIAL SKIN CLOSURES 1/2 INCH X 4 INCHES 50/CARTON 4 CARTONS/CASE A1847: Brand: 3M™ STERI-STRIP™

## (undated) DEVICE — REM POLYHESIVE ADULT PATIENT RETURN ELECTRODE: Brand: VALLEYLAB

## (undated) DEVICE — RELOAD STPL H38XL60MM G REG THCK B FORM NAT ARTC ECHELON

## (undated) DEVICE — DRAPE FLD WRM W44XL66IN C6L FOR INTRATEMP SYS THERMABASIN

## (undated) DEVICE — STERILE POLYISOPRENE POWDER-FREE SURGICAL GLOVES WITH EMOLLIENT COATING: Brand: PROTEXIS

## (undated) DEVICE — DISSECTOR LAP DIA5MM BLNT TIP ENDOPATH

## (undated) DEVICE — PREP SKN CHLRAPRP APL 26ML STR --

## (undated) DEVICE — INTENDED FOR TISSUE SEPARATION, AND OTHER PROCEDURES THAT REQUIRE A SHARP SURGICAL BLADE TO PUNCTURE OR CUT.: Brand: BARD-PARKER ® DISPOSABLE SCALPELS

## (undated) DEVICE — BAG SPEC REM 224ML W4XL6IN DIA10MM 1 HND GYN DISP ENDOPCH

## (undated) DEVICE — BITEBLOCK ENDOSCP 60FR MAXI WHT POLYETH STURDY W/ VELC WVN

## (undated) DEVICE — TROCAR: Brand: KII FIOS FIRST ENTRY

## (undated) DEVICE — TOTAL TRAY, 16FR 10ML SIL FOLEY, URN: Brand: MEDLINE

## (undated) DEVICE — LIQUIBAND RAPID ADHESIVE 36/CS 0.8ML: Brand: MEDLINE

## (undated) DEVICE — 3M™ TEGADERM™ +PAD FILM DRESSING WITH NON-ADHERENT PAD, 3586, 3-1/2 IN X 4 IN (9 CM X 10 CM), 25/CAR, 4 CAR/CS: Brand: 3M™ TEGADERM™

## (undated) DEVICE — 1200CC GUARDIAN II: Brand: GUARDIAN

## (undated) DEVICE — CRADLE POS 3X5X24IN RASPBERRY ARM PRONE FOAM DISP

## (undated) DEVICE — INTENDED FOR TISSUE SEPARATION, AND OTHER PROCEDURES THAT REQUIRE A SHARP SURGICAL BLADE TO PUNCTURE OR CUT.: Brand: BARD-PARKER ® CARBON RIB-BACK BLADES

## (undated) DEVICE — SCISSORS ENDOSCP DIA5MM CRV MPLR CAUT W/ RATCH HNDL

## (undated) DEVICE — CATHETER IV 22GA L1IN OD0.8382-0.9144MM ID0.6096-0.6858MM

## (undated) DEVICE — TUBING HYDR IRR --

## (undated) DEVICE — MASTISOL ADHESIVE LIQ 2/3ML

## (undated) DEVICE — TROCAR: Brand: KII® OPTICAL ACCESS SYSTEM

## (undated) DEVICE — KIT BREATHING CIRCUIT L72IN THREE LUMEN BAG SAMPLING LINE L1

## (undated) DEVICE — SYRINGE MED 10ML LUERLOCK TIP W/O SFTY DISP

## (undated) DEVICE — SUTURE PDS II SZ 0 L27IN ABSRB VLT L26MM CT-2 1/2 CIR Z334H

## (undated) DEVICE — SUTURE PDS II SZ 1 L96IN ABSRB VLT TP-1 L65MM 1/2 CIR Z880G

## (undated) DEVICE — SUTURE VCRL SZ 3-0 L27IN ABSRB VLT L26MM SH 1/2 CIR J316H

## (undated) DEVICE — IV STRT KT 3282] LSL INDUSTRIES INC]

## (undated) DEVICE — SUT ETHLN 2-0 18IN FS BLK --

## (undated) DEVICE — SUTURE MCRYL SZ 4-0 L27IN ABSRB UD L19MM PS-2 1/2 CIR PRIM Y426H

## (undated) DEVICE — TROCAR: Brand: KII SLEEVE

## (undated) DEVICE — HANDPIECE LAP L23MM DIA5MM VES SEAL CUT CRV JAW PSTL GRP

## (undated) DEVICE — GAUZE,SPONGE,2"X2",8PLY,STERILE,LF,2'S: Brand: MEDLINE

## (undated) DEVICE — LOOP LIG SUT SZ 0 L18IN ABSRB POLYDIOXANONE MFIL PDS II

## (undated) DEVICE — YANKAUER,BULB TIP,W/O VENT,RIGID,STERILE: Brand: MEDLINE

## (undated) DEVICE — 3M™ SURGICAL CLIPPER PROFESSIONAL BLADE 9680: Brand: 3M™

## (undated) DEVICE — ELECTRODE,RADIOTRANSLUCENT,FOAM,3PK: Brand: MEDLINE

## (undated) DEVICE — DRAIN SURG 19FR 100% SIL RADPQ RND CHN FULL FLUT

## (undated) DEVICE — STRIP,CLOSURE,WOUND,MEDI-STRIP,1/2X4: Brand: MEDLINE

## (undated) DEVICE — 1200 GUARD II KIT W/5MM TUBE W/O VAC TUBE: Brand: GUARDIAN

## (undated) DEVICE — INFECTION CONTROL KIT SYS

## (undated) DEVICE — BLADE SEAL L4-9CM CRV TIP ULTRASONIC ADJ OPN APPRCH HARMONY

## (undated) DEVICE — SUTURE PDS + SZ 0 L27IN ABSRB VLT L36MM CT 1 1 2 CIR PDP340H

## (undated) DEVICE — NEEDLE SPNL 22GA L3.5IN BLK HUB S STL REG WALL FIT STYL W/

## (undated) DEVICE — DEVICE SUT 0 L48IN GRN POLY BRAID LD UNIT DISP SURGDAC

## (undated) DEVICE — SHEAR RMFG HARMONIC 5MMX36CM -- LAWSON OEM ITEM 322219

## (undated) DEVICE — TROCARS: Brand: KII® BALLOON BLUNT TIP SYSTEM

## (undated) DEVICE — GARMENT,MEDLINE,DVT,INT,CALF,MED, GEN2: Brand: MEDLINE

## (undated) DEVICE — 3M™ TEGADERM™ TRANSPARENT FILM DRESSING FRAME STYLE, 1624W, 2-3/8 IN X 2-3/4 IN (6 CM X 7 CM), 100/CT 4CT/CASE: Brand: 3M™ TEGADERM™

## (undated) DEVICE — SET ADMIN 16ML TBNG L100IN 2 Y INJ SITE IV PIGGY BK DISP (ORDER IN MULIPLES OF 48)

## (undated) DEVICE — Z INACTIVE USE 2240337 DRAPE SURG PT TRANSFER TRAWAY SHT

## (undated) DEVICE — KIT COLON W/ 1.1OZ LUB AND 2 END

## (undated) DEVICE — TROCAR: Brand: KII® SLEEVE

## (undated) DEVICE — SYRINGE MED 3ML CLR PLAS STD N CTRL LUERLOCK TIP DISP

## (undated) DEVICE — BASIN EMSIS 16OZ GRAPHITE PLAS KID SHP MOLD GRAD FOR ORAL

## (undated) DEVICE — ARTICULATING RELOAD WITH TRI-STAPLE TECHNOLOGY: Brand: ENDO GIA

## (undated) DEVICE — LAP-BAND SYSTEM CALIBRATION TUBE: Brand: LAP-BAND

## (undated) DEVICE — APPLICATOR MEDICATED 26 CC SOLUTION HI LT ORNG CHLORAPREP

## (undated) DEVICE — LAPAROSCOPIC TROCAR SLEEVE/SINGLE USE: Brand: KII® OPTICAL ACCESS SYSTEM

## (undated) DEVICE — SUTURE MONOCRYL + SZ 4-0 L27IN ABSRB UD L19MM PS-2 3/8 CIR MCP426H

## (undated) DEVICE — SURGICAL PROCEDURE KIT GEN LAPAROSCOPY LF

## (undated) DEVICE — GOWN,SIRUS,NONRNF,SETINSLV,XL,20/CS: Brand: MEDLINE

## (undated) DEVICE — FLOVAC HANDCONTROLLED SUCTION/IRRIGATION: Brand: FLOVAC

## (undated) DEVICE — SOLUTION IRRIG 1000ML STRL H2O USP PLAS POUR BTL

## (undated) DEVICE — SINGLE USE AIR/WATER, SUCTION AND BIOPSY VALVE SET WITH WATER JET CONNECTOR: Brand: ORCAPOD™

## (undated) DEVICE — PACK,SET-UP: Brand: MEDLINE

## (undated) DEVICE — LAPAROSCOPIC SCISSORS: Brand: EPIX LAPAROSCOPIC SCISSORS

## (undated) DEVICE — SURGICAL PROCEDURE PACK BASIN MAJ SET CUST NO CAUT

## (undated) DEVICE — BLUNTFILL WITH FILTER: Brand: MONOJECT

## (undated) DEVICE — HYPODERMIC SAFETY NEEDLE: Brand: MAGELLAN

## (undated) DEVICE — PENCIL SMK EVAC 10 FT BLADE ELECTRD ROCKER FOR TELSCP

## (undated) DEVICE — SUTURE PERMAHAND SZ 0 L30IN NONABSORBABLE BLK SILK BRAID A306H

## (undated) DEVICE — FORCEPS BX L240CM JAW DIA2.8MM L CAP W/ NDL MIC MESH TOOTH

## (undated) DEVICE — ELECTRODE ES 36CM LAP FLAT L HK COAT DISP CLEANCOAT

## (undated) DEVICE — SOLIDIFIER FLD 2OZ 1500CC N DISINF IN BTL DISP SAFESORB

## (undated) DEVICE — DEVON TUBE HOLDER FIXED TOUCH FASTEN STRAP: Brand: DEVON

## (undated) DEVICE — BLUNTFILL: Brand: MONOJECT

## (undated) DEVICE — INTELLIGENT RELOAD: Brand: TRI-STAPLE 2.0

## (undated) DEVICE — KIT SUTURING DEVICE M-CLOSE

## (undated) DEVICE — MAGNETIC INSTR DRAPE 20X16: Brand: MEDLINE INDUSTRIES, INC.

## (undated) DEVICE — TUBE ET DIA7MM ORAL NSL CUF MURPHY EYE HI LO RADPQ LN DISP

## (undated) DEVICE — RELOAD STPL L60MM H1-2.6MM MESENTERY THN TISS WHT 6 ROW

## (undated) DEVICE — DBD-PACK,LAPAROTOMY,2 REINFORCED GOWNS: Brand: MEDLINE

## (undated) DEVICE — DERMABOND SKIN ADH 0.7ML -- DERMABOND ADVANCED 12/BX

## (undated) DEVICE — SMARTGOWN SURGICAL GOWN, LARGE: Brand: CONVERTORS

## (undated) DEVICE — TUBES STOMACH 48 IN X 16FR DBL LUMN SUMP SALEM ARGYLE ENFIT

## (undated) DEVICE — ELECTRODE ES L33CM DIA5MM STD L HK MPLR LAP APPRCH EZ TO

## (undated) DEVICE — APPLIER CLP M/L SHFT DIA5MM 15 LIG LIGAMAX 5

## (undated) DEVICE — TUBING, SUCTION, 1/4" X 12', STRAIGHT: Brand: MEDLINE

## (undated) DEVICE — TOWEL SURG W17XL27IN STD BLU COT NONFENESTRATED PREWASHED

## (undated) DEVICE — SHEAR HARMONIC 5MMX45CM -- ACE 7+

## (undated) DEVICE — SYRINGE MED 5ML STD CLR PLAS LUERLOCK TIP N CTRL DISP

## (undated) DEVICE — 4-PORT MANIFOLD: Brand: NEPTUNE 2

## (undated) DEVICE — AGENT HEMSTAT 3GM OXIDIZED REGENERATED CELOS ABSRB FOR CONT (ORDER MULTIPLES OF 5EA)

## (undated) DEVICE — STAPLER INT DIA5MM 25 ABSRB STRP FIX DISP FOR HERN MESH

## (undated) DEVICE — STRAP,POSITIONING,KNEE/BODY,FOAM,4X60": Brand: MEDLINE